# Patient Record
Sex: FEMALE | Race: WHITE | NOT HISPANIC OR LATINO | Employment: OTHER | URBAN - METROPOLITAN AREA
[De-identification: names, ages, dates, MRNs, and addresses within clinical notes are randomized per-mention and may not be internally consistent; named-entity substitution may affect disease eponyms.]

---

## 2018-01-12 ENCOUNTER — HOSPITAL ENCOUNTER (OUTPATIENT)
Dept: RADIOLOGY | Facility: HOSPITAL | Age: 63
Discharge: HOME | End: 2018-01-12
Attending: INTERNAL MEDICINE
Payer: MEDICARE

## 2018-01-12 ENCOUNTER — GENERIC CONVERSION - ENCOUNTER (OUTPATIENT)
Dept: OTHER | Facility: OTHER | Age: 63
End: 2018-01-12

## 2018-01-12 VITALS — OXYGEN SATURATION: 98 % | SYSTOLIC BLOOD PRESSURE: 123 MMHG | DIASTOLIC BLOOD PRESSURE: 62 MMHG | HEART RATE: 75 BPM

## 2018-01-12 DIAGNOSIS — K74.69 FLORID CIRRHOSIS (HCC): ICD-10-CM

## 2018-01-12 PROCEDURE — 49083 ABD PARACENTESIS W/IMAGING: CPT

## 2018-01-12 RX ORDER — ALBUMIN (HUMAN) 12.5 G/50ML
SOLUTION INTRAVENOUS
Status: DISCONTINUED | OUTPATIENT
Start: 2018-01-12 | End: 2018-01-13 | Stop reason: HOSPADM

## 2018-01-12 RX ADMIN — ALBUMIN (HUMAN) 50 G: 5 SOLUTION INTRAVENOUS at 14:10

## 2018-04-09 ENCOUNTER — HOSPITAL ENCOUNTER (OUTPATIENT)
Dept: RADIOLOGY | Facility: HOSPITAL | Age: 63
Discharge: HOME | End: 2018-04-09
Admitting: RADIOLOGY
Payer: MEDICARE

## 2018-04-09 VITALS
OXYGEN SATURATION: 95 % | SYSTOLIC BLOOD PRESSURE: 119 MMHG | DIASTOLIC BLOOD PRESSURE: 61 MMHG | HEART RATE: 67 BPM | RESPIRATION RATE: 16 BRPM

## 2018-04-09 DIAGNOSIS — K74.00 HEPATIC FIBROSIS: ICD-10-CM

## 2018-04-09 DIAGNOSIS — K74.69 FLORID CIRRHOSIS (HCC): ICD-10-CM

## 2018-04-09 PROCEDURE — 49083 ABD PARACENTESIS W/IMAGING: CPT

## 2018-04-09 PROCEDURE — 49083 ABD PARACENTESIS W/IMAGING: CPT | Performed by: RADIOLOGY

## 2018-04-09 RX ORDER — ALBUMIN (HUMAN) 12.5 G/50ML
SOLUTION INTRAVENOUS
Status: COMPLETED | OUTPATIENT
Start: 2018-04-09 | End: 2018-04-09

## 2018-04-09 RX ADMIN — ALBUMIN (HUMAN) 25 G: 5 SOLUTION INTRAVENOUS at 10:10

## 2018-04-09 NOTE — BRIEF OP NOTE (RAD/CATH)
IR PARACENTESIS  Procedure Note    PATIENT NAME: Luis Lockhartole  : 1955  MRN: [id number]     Pre-op Diagnosis:   1  Hepatic fibrosis    2  [place] cirrhosis (HCC)      Post-op Diagnosis:   1  Hepatic fibrosis    2   [place] cirrhosis [facility])        Surgeon:   Max Canales MD  Assistants:     No qualified resident was available, Resident is only observing    Estimated Blood Loss: none  Findings: 6950 ml clear, abimael ascites via RLQ    Specimens: none    Complications:  none    Anesthesia: Local    Max Canales MD     Date: 2018  Time: 12:02 PM

## 2018-08-23 ENCOUNTER — HOSPITAL ENCOUNTER (OUTPATIENT)
Dept: RADIOLOGY | Facility: HOSPITAL | Age: 63
Discharge: HOME | End: 2018-08-23
Attending: INTERNAL MEDICINE | Admitting: RADIOLOGY
Payer: MEDICARE

## 2018-08-23 VITALS
SYSTOLIC BLOOD PRESSURE: 113 MMHG | OXYGEN SATURATION: 98 % | RESPIRATION RATE: 18 BRPM | HEART RATE: 63 BPM | DIASTOLIC BLOOD PRESSURE: 56 MMHG

## 2018-08-23 DIAGNOSIS — R18.8 ASCITES: ICD-10-CM

## 2018-08-23 LAB
ALBUMIN FLD-MCNC: 1.9 G/DL
APPEARANCE FLD: ABNORMAL
BASOPHILS NFR FLD MANUAL: 0 %
COLOR FLD: ABNORMAL
EOSINOPHIL NFR FLD MANUAL: 0 %
LDH FLD L TO P-CCNC: 78 U/L
LYMPHOCYTES NFR BLD AUTO: 37 %
MONO+MESO NFR FLD MANUAL: 1 %
MONOCYTES NFR BLD AUTO: 50 %
MONONUC CELLS NFR FLD MANUAL: 0 %
NEUTS BAND NFR FLD MANUAL: 0 %
NEUTS SEG NFR BLD AUTO: 12 %
PROT FLD-MCNC: 3.6 G/DL
SITE: ABNORMAL
TOTAL CELLS COUNTED SPEC: 100
WBC # FLD MANUAL: 355 /UL

## 2018-08-23 PROCEDURE — 88305 TISSUE EXAM BY PATHOLOGIST: CPT | Performed by: PATHOLOGY

## 2018-08-23 PROCEDURE — 49083 ABD PARACENTESIS W/IMAGING: CPT

## 2018-08-23 PROCEDURE — 88112 CYTOPATH CELL ENHANCE TECH: CPT | Performed by: PATHOLOGY

## 2018-08-23 PROCEDURE — 84157 ASSAY OF PROTEIN OTHER: CPT | Performed by: INTERNAL MEDICINE

## 2018-08-23 PROCEDURE — 87205 SMEAR GRAM STAIN: CPT | Performed by: INTERNAL MEDICINE

## 2018-08-23 PROCEDURE — 49083 ABD PARACENTESIS W/IMAGING: CPT | Performed by: RADIOLOGY

## 2018-08-23 PROCEDURE — 87070 CULTURE OTHR SPECIMN AEROBIC: CPT | Performed by: INTERNAL MEDICINE

## 2018-08-23 PROCEDURE — 89051 BODY FLUID CELL COUNT: CPT | Performed by: INTERNAL MEDICINE

## 2018-08-23 PROCEDURE — 83615 LACTATE (LD) (LDH) ENZYME: CPT | Performed by: INTERNAL MEDICINE

## 2018-08-23 PROCEDURE — 82042 OTHER SOURCE ALBUMIN QUAN EA: CPT | Performed by: INTERNAL MEDICINE

## 2018-08-23 RX ORDER — ALBUMIN (HUMAN) 12.5 G/50ML
SOLUTION INTRAVENOUS
Status: COMPLETED | OUTPATIENT
Start: 2018-08-23 | End: 2018-08-23

## 2018-08-23 RX ADMIN — ALBUMIN (HUMAN) 25 G: 5 SOLUTION INTRAVENOUS at 11:01

## 2018-08-23 NOTE — DISCHARGE INSTRUCTIONS
Abdominal Paracentesis     WHAT YOU NEED TO KNOW:   Abdominal paracentesis is a procedure to remove abnormal fluid buildup in your abdomen  Fluid builds up because of liver problems, such as swelling and scarring  Heart failure, kidney disease, a mass, or problems with your pancreas may also cause fluid buildup  DISCHARGE INSTRUCTIONS:     Follow up with your healthcare provider as directed: Write down your questions so you remember to ask them during your visits  Wound care: Remove dressing after 24 hours  Leave glue in place  Return to your normal activities    Contact Interventional Radiology at [phone] Darleen PATIENTS: Contact Interventional Radiology at [phone]) Dariel Teixeira PATIENTS: Contact Interventional Radiology at [phone]) if:  · You have a fever and your wound is red and swollen  · You have yellow, green, or bad-smelling discharge coming from your wound  · You have pain or swelling in your abdomen  · You have an upset stomach or you vomit  · You have sudden, sharp pain in your abdomen  · You urinate very little or not at all  · You feel confused and more tired than usual    · Your arm or leg feels warm, tender, and painful  It may look swollen and red  · You suddenly feel lightheaded and have trouble breathing

## 2018-08-26 LAB
BACTERIA SPEC BFLD CULT: NO GROWTH
GRAM STN SPEC: NORMAL
GRAM STN SPEC: NORMAL

## 2018-11-05 ENCOUNTER — HOSPITAL ENCOUNTER (OUTPATIENT)
Dept: RADIOLOGY | Facility: HOSPITAL | Age: 63
Discharge: HOME | End: 2018-11-05
Attending: INTERNAL MEDICINE | Admitting: RADIOLOGY
Payer: MEDICARE

## 2018-11-05 VITALS
DIASTOLIC BLOOD PRESSURE: 56 MMHG | OXYGEN SATURATION: 99 % | HEART RATE: 64 BPM | RESPIRATION RATE: 20 BRPM | SYSTOLIC BLOOD PRESSURE: 114 MMHG

## 2018-11-05 DIAGNOSIS — R18.8 ASCITES: ICD-10-CM

## 2018-11-05 PROCEDURE — 49083 ABD PARACENTESIS W/IMAGING: CPT | Performed by: RADIOLOGY

## 2018-11-05 PROCEDURE — 49083 ABD PARACENTESIS W/IMAGING: CPT

## 2018-11-05 RX ORDER — ALBUMIN (HUMAN) 12.5 G/50ML
SOLUTION INTRAVENOUS
Status: COMPLETED | OUTPATIENT
Start: 2018-11-05 | End: 2018-11-05

## 2018-11-05 RX ADMIN — ALBUMIN (HUMAN) 25 G: 5 SOLUTION INTRAVENOUS at 13:55

## 2018-11-05 NOTE — BRIEF OP NOTE (RAD/CATH)
IR PARACENTESIS  Procedure Note    PATIENT NAME: Rosa Velasquez  : 1955  MRN: [id number]     Pre-op Diagnosis:   1  Ascites      Post-op Diagnosis:   1   Ascites        Surgeon:   Dipesh Rivero MD  Assistants:     No qualified resident was available, Resident is only observing    Estimated Blood Loss: none  Findings: 8000 ml cloudy, abimael ascites    Specimens: none    Complications:  none    Anesthesia: Local    Dipesh Rivero MD     Date: 2018  Time: 4:42 PM

## 2019-01-11 ENCOUNTER — HOSPITAL ENCOUNTER (OUTPATIENT)
Dept: RADIOLOGY | Facility: HOSPITAL | Age: 64
Discharge: HOME | End: 2019-01-11
Attending: INTERNAL MEDICINE | Admitting: RADIOLOGY
Payer: MEDICARE

## 2019-01-11 VITALS
HEART RATE: 70 BPM | RESPIRATION RATE: 20 BRPM | OXYGEN SATURATION: 98 % | SYSTOLIC BLOOD PRESSURE: 132 MMHG | DIASTOLIC BLOOD PRESSURE: 63 MMHG

## 2019-01-11 DIAGNOSIS — R18.8 ASCITES: ICD-10-CM

## 2019-01-11 PROCEDURE — 49083 ABD PARACENTESIS W/IMAGING: CPT | Performed by: RADIOLOGY

## 2019-01-11 PROCEDURE — 49083 ABD PARACENTESIS W/IMAGING: CPT

## 2019-01-11 RX ORDER — ALBUMIN (HUMAN) 12.5 G/50ML
SOLUTION INTRAVENOUS
Status: COMPLETED | OUTPATIENT
Start: 2019-01-11 | End: 2019-01-11

## 2019-01-11 RX ADMIN — ALBUMIN (HUMAN) 25 G: 5 SOLUTION INTRAVENOUS at 11:20

## 2019-02-27 ENCOUNTER — HOSPITAL ENCOUNTER (OUTPATIENT)
Dept: RADIOLOGY | Facility: HOSPITAL | Age: 64
Discharge: HOME | End: 2019-02-27
Attending: INTERNAL MEDICINE | Admitting: RADIOLOGY
Payer: MEDICARE

## 2019-02-27 VITALS
DIASTOLIC BLOOD PRESSURE: 55 MMHG | SYSTOLIC BLOOD PRESSURE: 102 MMHG | HEART RATE: 58 BPM | OXYGEN SATURATION: 98 % | RESPIRATION RATE: 18 BRPM

## 2019-02-27 DIAGNOSIS — R18.8 ASCITES: ICD-10-CM

## 2019-02-27 DIAGNOSIS — R18.8 OTHER ASCITES: ICD-10-CM

## 2019-02-27 PROCEDURE — 49083 ABD PARACENTESIS W/IMAGING: CPT

## 2019-02-27 PROCEDURE — 49083 ABD PARACENTESIS W/IMAGING: CPT | Performed by: RADIOLOGY

## 2019-02-27 RX ORDER — ALBUMIN (HUMAN) 12.5 G/50ML
25 SOLUTION INTRAVENOUS ONCE
Status: COMPLETED | OUTPATIENT
Start: 2019-02-27 | End: 2019-02-27

## 2019-02-27 RX ADMIN — ALBUMIN (HUMAN) 25 G: 5 SOLUTION INTRAVENOUS at 13:16

## 2019-02-27 NOTE — BRIEF OP NOTE (RAD/CATH)
IR PARACENTESIS  Procedure Note    PATIENT NAME: Marino Wells  : 1955  MRN: [id number]     Pre-op Diagnosis:   1  Ascites      Post-op Diagnosis:   1   Ascites        Surgeon:   Amauri Santillan MD  Assistants:     No qualified resident was available, Resident is only observing    Estimated Blood Loss: none  Findings: 5100 ml clear, yellow ascites    Specimens: none    Complications:  none    Anesthesia: Local    Amauri Santillan MD     Date: 2019  Time: 1:38 PM

## 2019-04-15 ENCOUNTER — HOSPITAL ENCOUNTER (OUTPATIENT)
Dept: RADIOLOGY | Facility: HOSPITAL | Age: 64
Discharge: HOME | End: 2019-04-15
Admitting: NURSE PRACTITIONER
Payer: MEDICARE

## 2019-04-15 VITALS
SYSTOLIC BLOOD PRESSURE: 112 MMHG | DIASTOLIC BLOOD PRESSURE: 53 MMHG | HEART RATE: 58 BPM | RESPIRATION RATE: 16 BRPM | OXYGEN SATURATION: 99 %

## 2019-04-15 DIAGNOSIS — R18.8 ASCITES: ICD-10-CM

## 2019-04-15 DIAGNOSIS — R18.8 OTHER ASCITES: ICD-10-CM

## 2019-04-15 PROCEDURE — 49083 ABD PARACENTESIS W/IMAGING: CPT

## 2019-04-15 PROCEDURE — 49083 ABD PARACENTESIS W/IMAGING: CPT | Performed by: RADIOLOGY

## 2019-04-15 RX ORDER — LIDOCAINE HYDROCHLORIDE 10 MG/ML
INJECTION, SOLUTION INFILTRATION; PERINEURAL CODE/TRAUMA/SEDATION MEDICATION
Status: COMPLETED | OUTPATIENT
Start: 2019-04-15 | End: 2019-04-15

## 2019-04-15 RX ORDER — ALBUMIN (HUMAN) 12.5 G/50ML
SOLUTION INTRAVENOUS
Status: COMPLETED | OUTPATIENT
Start: 2019-04-15 | End: 2019-04-15

## 2019-04-15 RX ADMIN — LIDOCAINE HYDROCHLORIDE 5 ML: 10 INJECTION, SOLUTION INFILTRATION; PERINEURAL at 09:43

## 2019-04-15 RX ADMIN — ALBUMIN (HUMAN) 50 G: 12.5 SOLUTION INTRAVENOUS at 10:15

## 2019-05-24 ENCOUNTER — ANESTHESIA EVENT (OUTPATIENT)
Dept: GASTROENTEROLOGY | Facility: HOSPITAL | Age: 64
End: 2019-05-24

## 2019-05-28 ENCOUNTER — ANESTHESIA (OUTPATIENT)
Dept: GASTROENTEROLOGY | Facility: HOSPITAL | Age: 64
End: 2019-05-28

## 2019-05-28 ENCOUNTER — HOSPITAL ENCOUNTER (OUTPATIENT)
Dept: GASTROENTEROLOGY | Facility: HOSPITAL | Age: 64
Setting detail: OUTPATIENT SURGERY
Discharge: HOME | End: 2019-05-28
Attending: INTERNAL MEDICINE | Admitting: INTERNAL MEDICINE
Payer: MEDICARE

## 2019-05-28 VITALS
OXYGEN SATURATION: 98 % | WEIGHT: 230 LBS | HEIGHT: 65 IN | BODY MASS INDEX: 38.32 KG/M2 | TEMPERATURE: 98.2 F | RESPIRATION RATE: 20 BRPM | DIASTOLIC BLOOD PRESSURE: 53 MMHG | SYSTOLIC BLOOD PRESSURE: 118 MMHG | HEART RATE: 75 BPM

## 2019-05-28 DIAGNOSIS — R10.9 ABDOMINAL PAIN: ICD-10-CM

## 2019-05-28 DIAGNOSIS — K92.2 GASTROINTESTINAL HEMORRHAGE: ICD-10-CM

## 2019-05-28 DIAGNOSIS — K64.0 FIRST DEGREE HEMORRHOIDS: ICD-10-CM

## 2019-05-28 RX ORDER — METOPROLOL SUCCINATE 25 MG/1
1 TABLET, EXTENDED RELEASE ORAL 2 TIMES DAILY
COMMUNITY
Start: 2018-08-27

## 2019-05-28 RX ORDER — PROPOFOL 10 MG/ML
INJECTION, EMULSION INTRAVENOUS AS NEEDED
Status: DISCONTINUED | OUTPATIENT
Start: 2019-05-28 | End: 2019-05-28 | Stop reason: SURG

## 2019-05-28 RX ORDER — ACETAMINOPHEN 500 MG
500 TABLET ORAL EVERY 6 HOURS PRN
COMMUNITY

## 2019-05-28 RX ORDER — ALBUTEROL SULFATE 90 UG/1
1 AEROSOL, METERED RESPIRATORY (INHALATION) EVERY 6 HOURS PRN
COMMUNITY
Start: 2019-03-17 | End: 2020-03-16

## 2019-05-28 RX ORDER — SODIUM CHLORIDE 9 MG/ML
125 INJECTION, SOLUTION INTRAVENOUS CONTINUOUS
Status: DISCONTINUED | OUTPATIENT
Start: 2019-05-28 | End: 2019-06-01 | Stop reason: HOSPADM

## 2019-05-28 RX ORDER — FUROSEMIDE 40 MG/1
80 TABLET ORAL DAILY
COMMUNITY
Start: 2015-11-18 | End: 2020-09-14 | Stop reason: SDUPTHER

## 2019-05-28 RX ORDER — ESCITALOPRAM OXALATE 20 MG/1
20 TABLET ORAL DAILY
COMMUNITY
Start: 2019-03-28

## 2019-05-28 RX ORDER — SPIRONOLACTONE 50 MG/1
100 TABLET, FILM COATED ORAL DAILY
COMMUNITY
End: 2021-04-08

## 2019-05-28 RX ORDER — TITANIUM DIOXIDE, OCTINOXATE, ZINC OXIDE 4.61; 1.6; .78 G/40ML; G/40ML; G/40ML
1 CREAM TOPICAL DAILY
COMMUNITY

## 2019-05-28 RX ORDER — OMEPRAZOLE 20 MG/1
20 CAPSULE, DELAYED RELEASE ORAL EVERY OTHER DAY
COMMUNITY
Start: 2017-02-03 | End: 2020-07-23 | Stop reason: HOSPADM

## 2019-05-28 RX ORDER — FLUTICASONE PROPIONATE 50 MCG
2 SPRAY, SUSPENSION (ML) NASAL
COMMUNITY
End: 2020-07-19

## 2019-05-28 RX ADMIN — PROPOFOL 100 MG: 10 INJECTION, EMULSION INTRAVENOUS at 15:12

## 2019-05-28 RX ADMIN — PROPOFOL 50 MG: 10 INJECTION, EMULSION INTRAVENOUS at 15:14

## 2019-05-28 RX ADMIN — PROPOFOL 50 MG: 10 INJECTION, EMULSION INTRAVENOUS at 15:32

## 2019-05-28 RX ADMIN — SODIUM CHLORIDE 125 ML/HR: 0.9 INJECTION, SOLUTION INTRAVENOUS at 14:43

## 2019-05-28 RX ADMIN — SODIUM CHLORIDE: 0.9 INJECTION, SOLUTION INTRAVENOUS at 15:54

## 2019-06-03 ENCOUNTER — HOSPITAL ENCOUNTER (OUTPATIENT)
Dept: RADIOLOGY | Facility: HOSPITAL | Age: 64
Discharge: HOME | End: 2019-06-03
Attending: INTERNAL MEDICINE | Admitting: RADIOLOGY
Payer: MEDICARE

## 2019-06-03 VITALS
HEART RATE: 67 BPM | SYSTOLIC BLOOD PRESSURE: 141 MMHG | OXYGEN SATURATION: 97 % | RESPIRATION RATE: 18 BRPM | DIASTOLIC BLOOD PRESSURE: 65 MMHG

## 2019-06-03 DIAGNOSIS — R18.8 ASCITES: ICD-10-CM

## 2019-06-03 DIAGNOSIS — R18.8 OTHER ASCITES: ICD-10-CM

## 2019-06-03 PROCEDURE — 49083 ABD PARACENTESIS W/IMAGING: CPT

## 2019-06-03 PROCEDURE — 49083 ABD PARACENTESIS W/IMAGING: CPT | Performed by: RADIOLOGY

## 2019-06-03 RX ORDER — ALBUMIN (HUMAN) 12.5 G/50ML
50 SOLUTION INTRAVENOUS ONCE
Status: COMPLETED | OUTPATIENT
Start: 2019-06-03 | End: 2019-06-03

## 2019-06-03 RX ADMIN — ALBUMIN (HUMAN) 50 G: 5 SOLUTION INTRAVENOUS at 14:19

## 2019-06-27 ENCOUNTER — TRANSCRIBE ORDERS (OUTPATIENT)
Dept: ADMINISTRATIVE | Facility: HOSPITAL | Age: 64
End: 2019-06-27

## 2019-06-27 DIAGNOSIS — K74.00 HEPATIC FIBROSIS: Primary | ICD-10-CM

## 2019-07-10 ENCOUNTER — HOSPITAL ENCOUNTER (OUTPATIENT)
Dept: RADIOLOGY | Facility: HOSPITAL | Age: 64
Discharge: HOME | End: 2019-07-10
Attending: INTERNAL MEDICINE
Payer: MEDICARE

## 2019-07-10 ENCOUNTER — HOSPITAL ENCOUNTER (OUTPATIENT)
Dept: INFUSION CENTER | Facility: HOSPITAL | Age: 64
Discharge: HOME | End: 2019-07-10
Payer: MEDICARE

## 2019-07-10 VITALS
SYSTOLIC BLOOD PRESSURE: 141 MMHG | RESPIRATION RATE: 18 BRPM | DIASTOLIC BLOOD PRESSURE: 67 MMHG | HEART RATE: 60 BPM | OXYGEN SATURATION: 97 %

## 2019-07-10 VITALS
HEART RATE: 57 BPM | SYSTOLIC BLOOD PRESSURE: 144 MMHG | RESPIRATION RATE: 18 BRPM | TEMPERATURE: 97 F | DIASTOLIC BLOOD PRESSURE: 65 MMHG

## 2019-07-10 DIAGNOSIS — R18.8 ASCITES: ICD-10-CM

## 2019-07-10 DIAGNOSIS — R18.8 OTHER ASCITES: ICD-10-CM

## 2019-07-10 PROCEDURE — 49083 ABD PARACENTESIS W/IMAGING: CPT

## 2019-07-10 PROCEDURE — 49083 ABD PARACENTESIS W/IMAGING: CPT | Performed by: RADIOLOGY

## 2019-07-10 RX ORDER — ALBUMIN (HUMAN) 12.5 G/50ML
25 SOLUTION INTRAVENOUS ONCE
Status: COMPLETED | OUTPATIENT
Start: 2019-07-10 | End: 2019-07-10

## 2019-07-10 RX ADMIN — ALBUMIN (HUMAN) 25 G: 12.5 SOLUTION INTRAVENOUS at 12:55

## 2019-07-10 NOTE — PLAN OF CARE
Problem: Potential for Falls  Goal: Patient will remain free of falls  Description  INTERVENTIONS:  - Assess patient frequently for physical needs  -  Identify cognitive and physical deficits and behaviors that affect risk of falls    -  [place] fall precautions as indicated by assessment   - Educate patient/family on patient safety including physical limitations  - Instruct patient to call for assistance with activity based on assessment  - Modify environment to reduce risk of injury  - Consider OT/PT consult to assist with strengthening/mobility  Outcome: Progressing

## 2019-07-10 NOTE — BRIEF OP NOTE (RAD/CATH)
IR PARACENTESIS  Procedure Note    PATIENT NAME: Macrina Galindo  : 1955  MRN: [id number]     Pre-op Diagnosis:   1  Ascites      Post-op Diagnosis:   1  Ascites        Surgeon:   Tc Mckenzie MD    Estimated Blood Loss: None    Findings: Successful paracentesis with 6 7 L abimael ascites fluid removed      Specimens: None    Complications:  None    Anesthesia: Local    Tc Mckenzie MD     Date: 7/10/2019  Time: 11:12 AM

## 2019-07-10 NOTE — H&P
IR H&P    HPI:  75year old female with cirrhosis and recurrent ascites returns for paracentesis  PMH:  Cirrhosis    PSH:  N/A    Physical exam:  /74   Pulse (!) 54   Resp 18   SpO2 96%   Gen: NAD  Abd: Distended    A/P:  75year old female with cirrhosis and recurrent ascites      - Paracentesis

## 2019-07-10 NOTE — PROGRESS NOTES
Infusion center visit complete without issue  Pt refused AVS pt will be discussing upcoming appts with physician next week

## 2019-07-18 ENCOUNTER — HOSPITAL ENCOUNTER (OUTPATIENT)
Dept: ULTRASOUND IMAGING | Facility: MEDICAL CENTER | Age: 64
Discharge: HOME | End: 2019-07-18
Attending: INTERNAL MEDICINE
Payer: MEDICARE

## 2019-07-18 DIAGNOSIS — K74.00 HEPATIC FIBROSIS: ICD-10-CM

## 2019-07-18 PROCEDURE — 76700 US EXAM ABDOM COMPLETE: CPT

## 2019-08-16 ENCOUNTER — HOSPITAL ENCOUNTER (OUTPATIENT)
Dept: RADIOLOGY | Facility: HOSPITAL | Age: 64
Discharge: HOME | End: 2019-08-16
Attending: INTERNAL MEDICINE | Admitting: RADIOLOGY
Payer: MEDICARE

## 2019-08-16 VITALS
OXYGEN SATURATION: 100 % | HEART RATE: 65 BPM | RESPIRATION RATE: 16 BRPM | SYSTOLIC BLOOD PRESSURE: 129 MMHG | DIASTOLIC BLOOD PRESSURE: 62 MMHG

## 2019-08-16 DIAGNOSIS — R18.8 OTHER ASCITES: ICD-10-CM

## 2019-08-16 DIAGNOSIS — R18.8 ASCITES: ICD-10-CM

## 2019-08-16 PROCEDURE — 49083 ABD PARACENTESIS W/IMAGING: CPT | Performed by: RADIOLOGY

## 2019-08-16 PROCEDURE — 49083 ABD PARACENTESIS W/IMAGING: CPT

## 2019-08-16 RX ORDER — ALBUMIN (HUMAN) 12.5 G/50ML
SOLUTION INTRAVENOUS
Status: COMPLETED | OUTPATIENT
Start: 2019-08-16 | End: 2019-08-16

## 2019-08-16 RX ORDER — LIDOCAINE WITH 8.4% SOD BICARB 0.9%(10ML)
SYRINGE (ML) INJECTION CODE/TRAUMA/SEDATION MEDICATION
Status: COMPLETED | OUTPATIENT
Start: 2019-08-16 | End: 2019-08-16

## 2019-08-16 RX ADMIN — LIDOCAINE HYDROCHLORIDE 10 ML: 10 INJECTION, SOLUTION INFILTRATION; PERINEURAL at 09:01

## 2019-08-16 RX ADMIN — ALBUMIN (HUMAN) 25 G: 5 SOLUTION INTRAVENOUS at 10:06

## 2019-08-16 NOTE — DISCHARGE INSTRUCTIONS
Abdominal Paracentesis     WHAT YOU NEED TO KNOW:   Abdominal paracentesis is a procedure to remove abnormal fluid buildup in your abdomen  Fluid builds up because of liver problems, such as swelling and scarring  Heart failure, kidney disease, a mass, or problems with your pancreas may also cause fluid buildup  DISCHARGE INSTRUCTIONS:     Follow up with your healthcare provider as directed: Write down your questions so you remember to ask them during your visits  Wound care: Remove dressing after 24 hours  Leave glue in place  Return to your normal activities    Contact Interventional Radiology at [phone] Darleen PATIENTS: Contact Interventional Radiology at [phone]) Quan Villatoro PATIENTS: Contact Interventional Radiology at [phone]) if:  · You have a fever and your wound is red and swollen  · You have yellow, green, or bad-smelling discharge coming from your wound  · You have pain or swelling in your abdomen  · You have an upset stomach or you vomit  · You have sudden, sharp pain in your abdomen  · You urinate very little or not at all  · You feel confused and more tired than usual    · Your arm or leg feels warm, tender, and painful  It may look swollen and red  · You suddenly feel lightheaded and have trouble breathing

## 2019-09-10 ENCOUNTER — HOSPITAL ENCOUNTER (OUTPATIENT)
Dept: RADIOLOGY | Facility: HOSPITAL | Age: 64
Discharge: HOME | End: 2019-09-10
Attending: INTERNAL MEDICINE | Admitting: RADIOLOGY
Payer: MEDICARE

## 2019-09-10 VITALS
DIASTOLIC BLOOD PRESSURE: 69 MMHG | RESPIRATION RATE: 16 BRPM | OXYGEN SATURATION: 99 % | HEART RATE: 53 BPM | SYSTOLIC BLOOD PRESSURE: 144 MMHG

## 2019-09-10 DIAGNOSIS — R18.8 ASCITES: ICD-10-CM

## 2019-09-10 DIAGNOSIS — R18.8 OTHER ASCITES: ICD-10-CM

## 2019-09-10 PROCEDURE — 49083 ABD PARACENTESIS W/IMAGING: CPT | Performed by: RADIOLOGY

## 2019-09-10 PROCEDURE — 49083 ABD PARACENTESIS W/IMAGING: CPT

## 2019-09-10 RX ORDER — ALBUMIN (HUMAN) 12.5 G/50ML
SOLUTION INTRAVENOUS
Status: COMPLETED | OUTPATIENT
Start: 2019-09-10 | End: 2019-09-10

## 2019-09-10 RX ORDER — LIDOCAINE WITH 8.4% SOD BICARB 0.9%(10ML)
SYRINGE (ML) INJECTION CODE/TRAUMA/SEDATION MEDICATION
Status: COMPLETED | OUTPATIENT
Start: 2019-09-10 | End: 2019-09-10

## 2019-09-10 RX ADMIN — ALBUMIN (HUMAN) 25 G: 12.5 SOLUTION INTRAVENOUS at 09:55

## 2019-09-10 RX ADMIN — Medication 5 ML: at 09:27

## 2019-09-10 NOTE — DISCHARGE INSTRUCTIONS
Abdominal Paracentesis     WHAT YOU NEED TO KNOW:   Abdominal paracentesis is a procedure to remove abnormal fluid buildup in your abdomen  Fluid builds up because of liver problems, such as swelling and scarring  Heart failure, kidney disease, a mass, or problems with your pancreas may also cause fluid buildup  DISCHARGE INSTRUCTIONS:     Follow up with your healthcare provider as directed: Write down your questions so you remember to ask them during your visits  Wound care: Remove dressing after 24 hours  Leave glue in place  Return to your normal activities    Contact Interventional Radiology at [phone] Darleen PATIENTS: Contact Interventional Radiology at [phone]) Vaishali Maddox PATIENTS: Contact Interventional Radiology at [phone]) if:  · You have a fever and your wound is red and swollen  · You have yellow, green, or bad-smelling discharge coming from your wound  · You have pain or swelling in your abdomen  · You have an upset stomach or you vomit  · You have sudden, sharp pain in your abdomen  · You urinate very little or not at all  · You feel confused and more tired than usual    · Your arm or leg feels warm, tender, and painful  It may look swollen and red  · You suddenly feel lightheaded and have trouble breathing

## 2019-09-10 NOTE — BRIEF OP NOTE (RAD/CATH)
IR PARACENTESIS  Procedure Note    PATIENT NAME: Neftali Jimenez  : 1955  MRN: [id number]     Pre-op Diagnosis:   1  Ascites      Post-op Diagnosis:   1   Ascites        Surgeon:   Criss Hutson MD  Assistants:     No qualified resident was available, Resident is only observing    Estimated Blood Loss: minimal     Findings:     Paracentesis performed    Specimens: none    Complications:  none    Anesthesia: Amy Chang MD     Date: 9/10/2019  Time: 9:38 AM

## 2019-09-30 ENCOUNTER — ANESTHESIA EVENT (OUTPATIENT)
Dept: GASTROENTEROLOGY | Facility: HOSPITAL | Age: 64
End: 2019-09-30

## 2019-09-30 RX ORDER — NITROFURANTOIN 25; 75 MG/1; MG/1
100 CAPSULE ORAL 2 TIMES DAILY
COMMUNITY
End: 2019-10-01 | Stop reason: ALTCHOICE

## 2019-09-30 RX ORDER — IPRATROPIUM BROMIDE AND ALBUTEROL SULFATE 2.5; .5 MG/3ML; MG/3ML
3 SOLUTION RESPIRATORY (INHALATION) 4 TIMES DAILY
Status: ON HOLD | COMMUNITY
End: 2020-07-21 | Stop reason: SDUPTHER

## 2019-10-01 ENCOUNTER — HOSPITAL ENCOUNTER (OUTPATIENT)
Dept: GASTROENTEROLOGY | Facility: HOSPITAL | Age: 64
Setting detail: OUTPATIENT SURGERY
Discharge: HOME | End: 2019-10-01
Attending: INTERNAL MEDICINE | Admitting: INTERNAL MEDICINE
Payer: MEDICARE

## 2019-10-01 ENCOUNTER — ANESTHESIA (OUTPATIENT)
Dept: GASTROENTEROLOGY | Facility: HOSPITAL | Age: 64
End: 2019-10-01

## 2019-10-01 VITALS
HEIGHT: 65 IN | DIASTOLIC BLOOD PRESSURE: 67 MMHG | RESPIRATION RATE: 20 BRPM | BODY MASS INDEX: 38.99 KG/M2 | TEMPERATURE: 98 F | OXYGEN SATURATION: 100 % | SYSTOLIC BLOOD PRESSURE: 128 MMHG | HEART RATE: 66 BPM | WEIGHT: 234 LBS

## 2019-10-01 DIAGNOSIS — K74.00 HEPATIC FIBROSIS: ICD-10-CM

## 2019-10-01 PROCEDURE — 88305 TISSUE EXAM BY PATHOLOGIST: CPT | Performed by: PATHOLOGY

## 2019-10-01 PROCEDURE — 88342 IMHCHEM/IMCYTCHM 1ST ANTB: CPT | Performed by: PATHOLOGY

## 2019-10-01 RX ORDER — SODIUM CHLORIDE 9 MG/ML
125 INJECTION, SOLUTION INTRAVENOUS CONTINUOUS
Status: DISCONTINUED | OUTPATIENT
Start: 2019-10-01 | End: 2019-10-05 | Stop reason: HOSPADM

## 2019-10-01 RX ORDER — PROPOFOL 10 MG/ML
INJECTION, EMULSION INTRAVENOUS AS NEEDED
Status: DISCONTINUED | OUTPATIENT
Start: 2019-10-01 | End: 2019-10-01 | Stop reason: SURG

## 2019-10-01 RX ADMIN — SODIUM CHLORIDE 125 ML/HR: 0.9 INJECTION, SOLUTION INTRAVENOUS at 13:32

## 2019-10-01 RX ADMIN — PROPOFOL 150 MG: 10 INJECTION, EMULSION INTRAVENOUS at 14:13

## 2019-10-01 RX ADMIN — LIDOCAINE HYDROCHLORIDE 100 MG: 20 INJECTION, SOLUTION INTRAVENOUS at 14:13

## 2019-10-01 NOTE — DISCHARGE INSTRUCTIONS
Gastritis   WHAT YOU NEED TO KNOW:   Gastritis is inflammation or irritation of the lining of your stomach  DISCHARGE INSTRUCTIONS:   Call 911 for any of the following:   · You develop chest pain or shortness of breath  Seek care immediately if:   · You vomit blood  · You have black or bloody bowel movements  · You have severe stomach or back pain  Contact your healthcare provider if:   · You have a fever  · You have new or worsening symptoms, even after treatment  · You have questions or concerns about your condition or care  Medicines:   · Medicines  may be given to help treat a bacterial infection or decrease stomach acid  · Take your medicine as directed  Contact your healthcare provider if you think your medicine is not helping or if you have side effects  Tell him or her if you are allergic to any medicine  Keep a list of the medicines, vitamins, and herbs you take  Include the amounts, and when and why you take them  Bring the list or the pill bottles to follow-up visits  Carry your medicine list with you in case of an emergency  Manage or prevent gastritis:   · Do not smoke  Nicotine and other chemicals in cigarettes and cigars can make your symptoms worse and cause lung damage  Ask your healthcare provider for information if you currently smoke and need help to quit  E-cigarettes or smokeless tobacco still contain nicotine  Talk to your healthcare provider before you use these products  · Do not drink alcohol  Alcohol can prevent healing and make your gastritis worse  Talk to your healthcare provider if you need help to stop drinking  · Do not take NSAIDs or aspirin unless directed  These and similar medicines can cause irritation  If your healthcare provider says it is okay to take NSAIDs, take them with food  · Do not eat foods that cause irritation  Foods such as oranges and salsa can cause burning or pain  Eat a variety of healthy foods   Examples include fruits (not citrus), vegetables, low-fat dairy products, beans, whole-grain breads, and lean meats and fish  Try to eat small meals, and drink water with your meals  Do not eat for at least 3 hours before you go to bed  · Find ways to relax and decrease stress  Stress can increase stomach acid and make gastritis worse  Activities such as yoga, meditation, or listening to music can help you relax  Spend time with friends, or do things you enjoy  Follow up with your healthcare provider as directed: You may need ongoing tests or treatment, or referral to a gastroenterologist  Write down your questions so you remember to ask them during your visits  © 2017 [employer] Information is for End User's use only and may not be sold, redistributed or otherwise used for commercial purposes  All illustrations and images included in CareNotes® are the copyrighted property of [employer]  or Kenrick Romero  The above information is an  only  It is not intended as medical advice for individual conditions or treatments  Talk to your doctor, nurse or pharmacist before following any medical regimen to see if it is safe and effective for you

## 2019-10-01 NOTE — ANESTHESIA PREPROCEDURE EVALUATION
Review of Systems/Medical History  Patient summary reviewed  Chart reviewed  No history of anesthetic complications     Cardiovascular   Pulmonary  Pneumonia, Asthma , Sleep apnea ,        GI/Hepatic    GI bleeding , history of, Liver disease , cirrhosis, Chronic liver disease,             Endo/Other  History of thyroid disease (nodules) ,      GYN       Hematology   Musculoskeletal    Arthritis     Neurology   Psychology   Anxiety, Depression ,                      Review of Systems/Medical History  Patient summary reviewed  Chart reviewed  No history of anesthetic complications     Cardiovascular   Pulmonary  Pneumonia, Asthma , well controlled/ stable Last rescue: < 1 week ago Asthma type of rescue: PRN inhaler, Sleep apnea (no CPAP) ,        GI/Hepatic    GI bleeding , history of, GERD well controlled, Liver disease , cirrhosis, Chronic liver disease,        Negative  ROS        Endo/Other  History of thyroid disease (nodules) ,   Obesity  morbid obesity   GYN       Hematology  Negative hematology ROS      Musculoskeletal    Arthritis     Neurology   Psychology   Anxiety, Depression , being treated for depression,              Physical Exam    Airway    Mallampati score: III  TM Distance: >3 FB  Neck ROM: full     Dental   Comment: Partials--out, No notable dental hx upper dentures and lower dentures,     Cardiovascular  Rhythm: regular, Rate: normal, Cardiovascular exam normal    Pulmonary  Pulmonary exam normal Breath sounds clear to auscultation,     Other Findings        Anesthesia Plan  ASA Score- 3     Anesthesia Type- IV sedation with anesthesia with ASA Monitors  Additional Monitors:   Airway Plan:         Plan Factors-Patient not instructed to abstain from smoking on day of procedure  Patient did not smoke on day of surgery  Induction- intravenous  Postoperative Plan-     Informed Consent- Anesthetic plan and risks discussed with patient    I personally reviewed this patient with the CRNA  Discussed and agreed on the Anesthesia Plan with the CRNA  Madhu Sorenson

## 2019-10-06 ENCOUNTER — HOSPITAL ENCOUNTER (OUTPATIENT)
Facility: HOSPITAL | Age: 64
Setting detail: OBSERVATION
Discharge: HOME | End: 2019-10-07
Attending: EMERGENCY MEDICINE | Admitting: HOSPITALIST
Payer: MEDICARE

## 2019-10-06 DIAGNOSIS — R18.8 ASCITES: Primary | ICD-10-CM

## 2019-10-06 DIAGNOSIS — R10.9 ABDOMINAL PAIN: ICD-10-CM

## 2019-10-06 PROCEDURE — 99284 EMERGENCY DEPT VISIT MOD MDM: CPT

## 2019-10-06 PROCEDURE — 1123F ACP DISCUSS/DSCN MKR DOCD: CPT | Performed by: RADIOLOGY

## 2019-10-06 PROCEDURE — 99285 EMERGENCY DEPT VISIT HI MDM: CPT | Performed by: EMERGENCY MEDICINE

## 2019-10-07 ENCOUNTER — APPOINTMENT (OUTPATIENT)
Dept: NON INVASIVE DIAGNOSTICS | Facility: HOSPITAL | Age: 64
End: 2019-10-07
Payer: MEDICARE

## 2019-10-07 VITALS
RESPIRATION RATE: 18 BRPM | TEMPERATURE: 97.1 F | HEIGHT: 65 IN | DIASTOLIC BLOOD PRESSURE: 52 MMHG | BODY MASS INDEX: 40.48 KG/M2 | OXYGEN SATURATION: 98 % | HEART RATE: 59 BPM | WEIGHT: 242.95 LBS | SYSTOLIC BLOOD PRESSURE: 92 MMHG

## 2019-10-07 PROBLEM — K72.90 DECOMPENSATED HEPATIC CIRRHOSIS (HCC): Status: ACTIVE | Noted: 2019-10-07

## 2019-10-07 PROBLEM — I10 ESSENTIAL HYPERTENSION: Status: ACTIVE | Noted: 2019-10-07

## 2019-10-07 PROBLEM — J45.909 ASTHMA: Status: ACTIVE | Noted: 2019-10-07

## 2019-10-07 PROBLEM — K74.60 DECOMPENSATED HEPATIC CIRRHOSIS (HCC): Status: ACTIVE | Noted: 2019-10-07

## 2019-10-07 PROBLEM — F32.A DEPRESSION: Status: ACTIVE | Noted: 2019-10-07

## 2019-10-07 PROBLEM — G47.30 SLEEP APNEA: Status: ACTIVE | Noted: 2019-10-07

## 2019-10-07 LAB
ALBUMIN SERPL BCP-MCNC: 2.8 G/DL (ref 3.5–5)
ALP SERPL-CCNC: 120 U/L (ref 46–116)
ALT SERPL W P-5'-P-CCNC: 15 U/L (ref 12–78)
ANION GAP SERPL CALCULATED.3IONS-SCNC: 7 MMOL/L (ref 4–13)
APTT PPP: 35 SECONDS (ref 23–37)
AST SERPL W P-5'-P-CCNC: 30 U/L (ref 5–45)
BASOPHILS # BLD AUTO: 0.05 THOUSANDS/ΜL (ref 0–0.1)
BASOPHILS NFR BLD AUTO: 1 % (ref 0–1)
BILIRUB SERPL-MCNC: 0.62 MG/DL (ref 0.2–1)
BUN SERPL-MCNC: 11 MG/DL (ref 5–25)
CALCIUM SERPL-MCNC: 8.8 MG/DL (ref 8.3–10.1)
CHLORIDE SERPL-SCNC: 101 MMOL/L (ref 100–108)
CO2 SERPL-SCNC: 29 MMOL/L (ref 21–32)
CREAT SERPL-MCNC: 0.94 MG/DL (ref 0.6–1.3)
EOSINOPHIL # BLD AUTO: 0.28 THOUSAND/ΜL (ref 0–0.61)
EOSINOPHIL NFR BLD AUTO: 4 % (ref 0–6)
ERYTHROCYTE [DISTWIDTH] IN BLOOD BY AUTOMATED COUNT: 14.4 % (ref 11.6–15.1)
GFR SERPL CREATININE-BSD FRML MDRD: 63 ML/MIN/1.73SQ M
GLUCOSE SERPL-MCNC: 133 MG/DL (ref 65–140)
HCT VFR BLD AUTO: 43.6 % (ref 34.8–46.1)
HGB BLD-MCNC: 14 G/DL (ref 11.5–15.4)
IMM GRANULOCYTES # BLD AUTO: 0.02 THOUSAND/UL (ref 0–0.2)
IMM GRANULOCYTES NFR BLD AUTO: 0 % (ref 0–2)
INR PPP: 1.1 (ref 0.84–1.19)
LIPASE SERPL-CCNC: 155 U/L (ref 73–393)
LYMPHOCYTES # BLD AUTO: 0.84 THOUSANDS/ΜL (ref 0.6–4.47)
LYMPHOCYTES NFR BLD AUTO: 13 % (ref 14–44)
MCH RBC QN AUTO: 29.9 PG (ref 26.8–34.3)
MCHC RBC AUTO-ENTMCNC: 32.1 G/DL (ref 31.4–37.4)
MCV RBC AUTO: 93 FL (ref 82–98)
MONOCYTES # BLD AUTO: 0.49 THOUSAND/ΜL (ref 0.17–1.22)
MONOCYTES NFR BLD AUTO: 8 % (ref 4–12)
NEUTROPHILS # BLD AUTO: 4.79 THOUSANDS/ΜL (ref 1.85–7.62)
NEUTS SEG NFR BLD AUTO: 74 % (ref 43–75)
NRBC BLD AUTO-RTO: 0 /100 WBCS
PLATELET # BLD AUTO: 152 THOUSANDS/UL (ref 149–390)
PMV BLD AUTO: 10.8 FL (ref 8.9–12.7)
POTASSIUM SERPL-SCNC: 3.8 MMOL/L (ref 3.5–5.3)
PROT SERPL-MCNC: 6.8 G/DL (ref 6.4–8.2)
PROTHROMBIN TIME: 14.3 SECONDS (ref 11.6–14.5)
RBC # BLD AUTO: 4.68 MILLION/UL (ref 3.81–5.12)
SODIUM SERPL-SCNC: 137 MMOL/L (ref 136–145)
WBC # BLD AUTO: 6.47 THOUSAND/UL (ref 4.31–10.16)

## 2019-10-07 PROCEDURE — 49083 ABD PARACENTESIS W/IMAGING: CPT | Performed by: RADIOLOGY

## 2019-10-07 PROCEDURE — NC001 PR NO CHARGE: Performed by: RADIOLOGY

## 2019-10-07 PROCEDURE — 90662 IIV NO PRSV INCREASED AG IM: CPT | Performed by: INTERNAL MEDICINE

## 2019-10-07 PROCEDURE — 49083 ABD PARACENTESIS W/IMAGING: CPT

## 2019-10-07 PROCEDURE — 96374 THER/PROPH/DIAG INJ IV PUSH: CPT

## 2019-10-07 PROCEDURE — 85025 COMPLETE CBC W/AUTO DIFF WBC: CPT | Performed by: EMERGENCY MEDICINE

## 2019-10-07 PROCEDURE — 36415 COLL VENOUS BLD VENIPUNCTURE: CPT | Performed by: EMERGENCY MEDICINE

## 2019-10-07 PROCEDURE — 80053 COMPREHEN METABOLIC PANEL: CPT | Performed by: EMERGENCY MEDICINE

## 2019-10-07 PROCEDURE — 85730 THROMBOPLASTIN TIME PARTIAL: CPT | Performed by: EMERGENCY MEDICINE

## 2019-10-07 PROCEDURE — G0008 ADMIN INFLUENZA VIRUS VAC: HCPCS | Performed by: INTERNAL MEDICINE

## 2019-10-07 PROCEDURE — 85610 PROTHROMBIN TIME: CPT | Performed by: EMERGENCY MEDICINE

## 2019-10-07 PROCEDURE — 99236 HOSP IP/OBS SAME DATE HI 85: CPT | Performed by: HOSPITALIST

## 2019-10-07 PROCEDURE — 83690 ASSAY OF LIPASE: CPT | Performed by: EMERGENCY MEDICINE

## 2019-10-07 RX ORDER — HEPARIN SODIUM 5000 [USP'U]/ML
5000 INJECTION, SOLUTION INTRAVENOUS; SUBCUTANEOUS EVERY 8 HOURS SCHEDULED
Status: DISCONTINUED | OUTPATIENT
Start: 2019-10-07 | End: 2019-10-07 | Stop reason: HOSPADM

## 2019-10-07 RX ORDER — SPIRONOLACTONE 100 MG/1
100 TABLET, FILM COATED ORAL DAILY
Status: DISCONTINUED | OUTPATIENT
Start: 2019-10-07 | End: 2019-10-07 | Stop reason: HOSPADM

## 2019-10-07 RX ORDER — DIPHENHYDRAMINE HYDROCHLORIDE 50 MG/ML
50 INJECTION INTRAMUSCULAR; INTRAVENOUS ONCE
Status: DISCONTINUED | OUTPATIENT
Start: 2019-10-07 | End: 2019-10-07

## 2019-10-07 RX ORDER — METOPROLOL SUCCINATE 25 MG/1
25 TABLET, EXTENDED RELEASE ORAL 2 TIMES DAILY
Status: DISCONTINUED | OUTPATIENT
Start: 2019-10-07 | End: 2019-10-07 | Stop reason: HOSPADM

## 2019-10-07 RX ORDER — ALBUMIN (HUMAN) 12.5 G/50ML
50 SOLUTION INTRAVENOUS ONCE
Status: COMPLETED | OUTPATIENT
Start: 2019-10-07 | End: 2019-10-07

## 2019-10-07 RX ORDER — ESCITALOPRAM OXALATE 20 MG/1
20 TABLET ORAL DAILY
Status: DISCONTINUED | OUTPATIENT
Start: 2019-10-07 | End: 2019-10-07 | Stop reason: HOSPADM

## 2019-10-07 RX ORDER — FLUTICASONE PROPIONATE 50 MCG
2 SPRAY, SUSPENSION (ML) NASAL DAILY
Status: DISCONTINUED | OUTPATIENT
Start: 2019-10-07 | End: 2019-10-07 | Stop reason: HOSPADM

## 2019-10-07 RX ORDER — ALBUTEROL SULFATE 90 UG/1
2 AEROSOL, METERED RESPIRATORY (INHALATION) EVERY 6 HOURS PRN
Status: DISCONTINUED | OUTPATIENT
Start: 2019-10-07 | End: 2019-10-07 | Stop reason: HOSPADM

## 2019-10-07 RX ORDER — PANTOPRAZOLE SODIUM 20 MG/1
20 TABLET, DELAYED RELEASE ORAL EVERY OTHER DAY
Status: DISCONTINUED | OUTPATIENT
Start: 2019-10-07 | End: 2019-10-07 | Stop reason: HOSPADM

## 2019-10-07 RX ORDER — ONDANSETRON 2 MG/ML
4 INJECTION INTRAMUSCULAR; INTRAVENOUS ONCE
Status: COMPLETED | OUTPATIENT
Start: 2019-10-07 | End: 2019-10-07

## 2019-10-07 RX ORDER — LORAZEPAM 2 MG/ML
1 INJECTION INTRAMUSCULAR ONCE
Status: DISCONTINUED | OUTPATIENT
Start: 2019-10-07 | End: 2019-10-07

## 2019-10-07 RX ORDER — IPRATROPIUM BROMIDE AND ALBUTEROL SULFATE 2.5; .5 MG/3ML; MG/3ML
3 SOLUTION RESPIRATORY (INHALATION) EVERY 6 HOURS PRN
Status: DISCONTINUED | OUTPATIENT
Start: 2019-10-07 | End: 2019-10-07 | Stop reason: HOSPADM

## 2019-10-07 RX ORDER — CALCIUM CARBONATE 200(500)MG
1000 TABLET,CHEWABLE ORAL DAILY PRN
Status: DISCONTINUED | OUTPATIENT
Start: 2019-10-07 | End: 2019-10-07 | Stop reason: HOSPADM

## 2019-10-07 RX ORDER — ONDANSETRON 2 MG/ML
4 INJECTION INTRAMUSCULAR; INTRAVENOUS EVERY 6 HOURS PRN
Status: DISCONTINUED | OUTPATIENT
Start: 2019-10-07 | End: 2019-10-07 | Stop reason: HOSPADM

## 2019-10-07 RX ORDER — ACETAMINOPHEN 325 MG/1
650 TABLET ORAL EVERY 6 HOURS PRN
Status: DISCONTINUED | OUTPATIENT
Start: 2019-10-07 | End: 2019-10-07 | Stop reason: HOSPADM

## 2019-10-07 RX ORDER — FUROSEMIDE 40 MG/1
80 TABLET ORAL DAILY
Status: DISCONTINUED | OUTPATIENT
Start: 2019-10-07 | End: 2019-10-07 | Stop reason: HOSPADM

## 2019-10-07 RX ADMIN — ONDANSETRON 4 MG: 2 INJECTION INTRAMUSCULAR; INTRAVENOUS at 00:28

## 2019-10-07 RX ADMIN — INFLUENZA A VIRUS A/MICHIGAN/45/2015 X-275 (H1N1) ANTIGEN (FORMALDEHYDE INACTIVATED), INFLUENZA A VIRUS A/SINGAPORE/INFIMH-16-0019/2016 IVR-186 (H3N2) ANTIGEN (FORMALDEHYDE INACTIVATED), AND INFLUENZA B VIRUS B/MARYLAND/15/2016 BX-69A (A B/COLORADO/6/2017-LIKE VIRUS) ANTIGEN (FORMALDEHYDE INACTIVATED) 0.5 ML: 60; 60; 60 INJECTION, SUSPENSION INTRAMUSCULAR at 11:14

## 2019-10-07 RX ADMIN — ALBUMIN (HUMAN) 50 G: 0.25 INJECTION, SOLUTION INTRAVENOUS at 12:23

## 2019-10-07 RX ADMIN — ESCITALOPRAM OXALATE 20 MG: 20 TABLET ORAL at 11:14

## 2019-10-07 RX ADMIN — PANTOPRAZOLE SODIUM 20 MG: 20 TABLET, DELAYED RELEASE ORAL at 05:04

## 2019-10-07 RX ADMIN — ONDANSETRON 4 MG: 2 INJECTION INTRAMUSCULAR; INTRAVENOUS at 07:54

## 2019-10-07 NOTE — UTILIZATION REVIEW
Initial Clinical Review    ED TREATMENT TIME  10/6  @    2341    Admission: Date/Time/Statement:   OBS  ORDER    10/7  @    0154  Orders Placed This Encounter   Procedures    Place in Observation (expected length of stay for this patient is less than two midnights)     Standing Status:   Standing     Number of Occurrences:   1     Order Specific Question:   Admitting Physician     Answer:   Crystal Avendano     Order Specific Question:   Level of Care     Answer:   Med Surg [16]     ED Arrival Information     Expected Arrival Acuity Means of Arrival Escorted By Service Admission Type    - 10/6/2019 23:30 Urgent Walk-In Self Hospitalist Urgent    Arrival Complaint    abd pain        Chief Complaint   Patient presents with    Abdominal Pain     pt  reports hx of ascited and last tape of fluid was 1 month ago  pt  reports generalzed abdominal pressure, nausea, bowel incontinence, and SOB     Assessment/Plan: Decompensated hepatic cirrhosis ([facility] [address] )  Assessment & Plan  Patient presents with abdominal pressure in the setting of ascites   Reports she get tapped approx  Every month, last paracentesis 9/10/19, next appointment 11/5/19  IR consult for paracentesis   Continue home diuretics   Continue outpatient GI follow up      Essential hypertension  Assessment & Plan  Slightly elevated at time of presentation resolved without intervention  Continue home metoprolol, lasix and aldactone   Continue to monitor         Depression  Assessment & Plan  Continue daily lexapro      Asthma  Assessment & Plan  Does not appear to be in exacerbation at this time  Continue home nebulizers with PRN albuterol     Anticipated Length of Stay:  Patient will be admitted on an Observation basis with an anticipated length of stay of  Less than 2 midnights  Justification for Hospital Stay: cirrhosis   Tessa Laura is a 75 y o  female with a PMHx of liver disease, asthma, HTN who presents with abdominal distention   Patient reports she has been getting paracenteses regularly for the past 4 years due to decompensated liver disease  Patient reports last tap was last month but that she was not scheduled for another for another month and could not wait that long  Patient reports abdominal distention with pain and pressure  Patient also reports worsening shortness of breath secondary to her abdominal size as well as underlying asthma  Patient reports the pain is mild at this time and that she does not typically take anything for pain at home  Patient currently denies any chest pain, vision changes or headache       ED Triage Vitals   Temperature Pulse Respirations Blood Pressure SpO2   10/06/19 2342 10/06/19 2341 10/06/19 2341 10/06/19 2341 10/06/19 2341   98 6 °F (37 °C) 68 (!) 24 (!) 176/68 98 %      Temp Source Heart Rate Source Patient Position - Orthostatic VS BP Location FiO2 (%)   10/06/19 2342 10/06/19 2341 10/06/19 2341 10/06/19 2341 --   Temporal Monitor Sitting Right arm       Pain Score       10/06/19 2341       8        Wt Readings from Last 1 Encounters:   10/07/19 110 kg (242 lb 15 2 oz)     Additional Vital Signs:   10/07/19 09:13:34  --  66  --  129/64  96 %  --  --   10/07/19 0800  --  --  --  --  --  None (Room air)  --   10/07/19 0656  97 8 °F (36 6 °C)  71  18  138/61  97 %  None (Room air)  Lying   10/07/19 0242  97 5 °F (36 4 °C)  70  20  146/65  98 %  None (Room air)  Lying   10/07/19 0221  --  74  20  118/72  96 %  None (Room air)  Lying   10/06/19 2354  --  --  --  --  --  None (Room air)  --   10/06/19 2342  98 6 °F (37 °C)  --  --  --  --  --  --   10/06/19 2341  --  68  24Abnormal   176/68Abnormal   98 %  None (Room air)  Sitting         Pertinent Labs/Diagnostic Test Results:   Results from last 7 days   Lab Units 10/07/19  0018   WBC Thousand/uL 6 47   HEMOGLOBIN g/dL 14 0   HEMATOCRIT % 43 6   PLATELETS Thousands/uL 152   NEUTROS ABS Thousands/µL 4 79         Results from last 7 days   Lab Units 10/07/19  0018   SODIUM mmol/L 137   POTASSIUM mmol/L 3 8   CHLORIDE mmol/L 101   CO2 mmol/L 29   ANION GAP mmol/L 7   BUN mg/dL 11   CREATININE mg/dL 0 94   EGFR ml/min/1 73sq m 63   CALCIUM mg/dL 8 8     Results from last 7 days   Lab Units 10/07/19  0018   AST U/L 30   ALT U/L 15   ALK PHOS U/L 120*   TOTAL PROTEIN g/dL 6 8   ALBUMIN g/dL 2 8*   TOTAL BILIRUBIN mg/dL 0 62         Results from last 7 days   Lab Units 10/07/19  0018   GLUCOSE RANDOM mg/dL 133           Results from last 7 days   Lab Units 10/07/19  0018   PROTIME seconds 14 3   INR  1 10   PTT seconds 35           Results from last 7 days   Lab Units 10/07/19  0018   LIPASE u/L 155         ED Treatment:   Medication Administration from 10/06/2019 2330 to 10/07/2019 2946       Date/Time Order Dose Route Action Action by Comments     10/07/2019 0028 ondansetron (ZOFRAN) injection 4 mg 4 mg Intravenous Given Stephanie Mondragon RN         Past Medical History:   Diagnosis Date    Anxiety     Arthritis     Asthma     [facility]osis ([facility] )     Depression     Disease of thyroid gland     nodules    Diverticulitis 2011    Diverticulosis     DVT (deep venous thrombosis) ([facility] ) 2013    GERD (gastroesophageal reflux disease)     Liver disease     cirrhosis    Pneumonia     Portal hypertensive gastropathy ([facility] )     Sleep apnea     Vertigo      Present on Admission:  **None**      Admitting Diagnosis: Abdominal pain [R10 9]  Ascites [R18 8]  Age/Sex: 75 y o  female  Admission Orders:    Current Facility-Administered Medications:  acetaminophen 650 mg Oral Q6H PRN Susy Smudde, PA-C   albuterol 2 puff Inhalation Q6H PRN Susy Smudde, PA-C   calcium carbonate 1,000 mg Oral Daily PRN Susy Smudde, PA-C   escitalopram 20 mg Oral Daily Susy Smudde, PA-C   fluticasone 2 spray Nasal Daily Susy Smudde, PA-C   furosemide 80 mg Oral Daily Susy Smudde, PA-C   heparin (porcine) 5,000 Units Subcutaneous Q8H [facility] Susy Smudde, PA-C   hydrocortisone  Rectal PRN Marianne Davis, BARBARA influenza vaccine 0 5 mL Intramuscular Once Aroldo Murrieta MD   ipratropium-albuterol 3 mL Nebulization Q6H PRN Jung Mackay PA-C   metoprolol succinate 25 mg Oral BID Susy López PA-C   ondansetron 4 mg Intravenous Q6H PRN Susy López PA-C   pantoprazole 20 mg Oral Every Other Day Susy López PA-C   spironolactone 100 mg Oral Daily Jung Mackay PA-C       None    Network Utilization Review Department  Phone: [phone]; Fax [phone]  Maria [email or web address], [email or web address]  ATTENTION: Please call with any questions or concerns to [phone]  and carefully listen to the prompts so that you are directed to the right person  Send all requests for admission clinical reviews, approved or denied determinations and any other requests to fax [phone]   All voicemails are confidential

## 2019-10-07 NOTE — ED PROVIDER NOTES
History  Chief Complaint   Patient presents with    Abdominal Pain     pt  reports hx of ascited and last tape of fluid was 1 month ago  pt  reports generalzed abdominal pressure, nausea, bowel incontinence, and SOB     Patient is a 75year-old female with a history of cirrhosis who presents for abdominal distention and discomfort  Patient reports that abdominal discomfort has been progressive for the past several days  She reports generalized, severe discomfort that she attributes to fluid/pressure  Associated with nausea, shortness of breath  Has not taken anything to treat symptoms at home  Reports similar symptoms in the past secondary to ascites  Denies associated fever, chest pain, diarrhea, constipation, dysuria, hematuria  Prior to Admission Medications   Prescriptions Last Dose Informant Patient Reported? Taking?    Calcium Carb-Ergocalciferol 250-125 MG-UNIT TABS 10/6/2019 at Unknown time  Yes Yes   Sig: Take 1 tablet by mouth daily   Cranberry 400 MG CAPS 10/6/2019 at Morning  Yes Yes   Sig: Take 1 capsule by mouth daily   PROCTOZONE-HC 2 5 % rectal cream   Yes No   Sig: Apply 1 each topically as needed rectally   acetaminophen (TYLENOL) 500 mg tablet   Yes No   Sig: Take 500 mg by mouth every 6 (six) hours as needed   albuterol (VENTOLIN HFA) 90 mcg/act inhaler   Yes No   Sig: Inhale 1 puff every 6 (six) hours as needed   escitalopram (LEXAPRO) 20 mg tablet 10/6/2019 at Morningtime  Yes Yes   Sig: Take 20 mg by mouth daily   fluticasone (FLONASE) 50 mcg/act nasal spray   Yes No   Si sprays into each nostril   furosemide (LASIX) 40 mg tablet 10/6/2019 at Morning  Yes Yes   Sig: Take 80 mg by mouth daily   hydrocortisone 2 5 % cream   Yes No   Sig: Apply 1 application topically 2 (two) times a day   ipratropium-albuterol (DUO-NEB) 0 5-2 5 mg/3 mL nebulizer solution   Yes No   Sig: Take 3 mL by nebulization 4 (four) times a day   metoprolol succinate (TOPROL-XL) 25 mg 24 hr tablet 10/6/2019 at 0900  Yes Yes   Sig: Take 1 tablet by mouth 2 (two) times a day   omeprazole (PRILOSEC) 20 mg delayed release capsule 10/6/2019 at Unknown time  Yes Yes   Sig: Take 20 mg by mouth every other day    spironolactone (ALDACTONE) 50 mg tablet 10/6/2019 at Morning  Yes Yes   Sig: Take 100 mg by mouth daily       Facility-Administered Medications: None       Past Medical History:   Diagnosis Date    Anxiety     Arthritis     Asthma     Cir[place]osis ([place] [address] )     Depression     Disease of thyroid gland     nodules    Diverticulitis 2011    Diverticulosis     DVT (deep venous thrombosis) ([place] [address] ) 2013    GERD (gastroesophageal reflux disease)     Liver disease     cirrhosis    Pneumonia     Portal hypertensive gastropathy ([place] [address] )     Sleep apnea     Vertigo        Past Surgical History:   Procedure Laterality Date    BLADDER SUSPENSION      CHOLECYSTECTOMY      COLONOSCOPY  05/24/2019    had multiple with last being [id number]    EGD      HERNIA REPAIR      IR PARACENTESIS  8/23/2018    IR PARACENTESIS  11/5/2018    IR PARACENTESIS  1/11/2019    IR PARACENTESIS  2/27/2019    IR PARACENTESIS  4/15/2019    IR PARACENTESIS  6/3/2019    IR PARACENTESIS  7/10/2019    IR PARACENTESIS  8/16/2019    IR PARACENTESIS  9/10/2019    TONSILLECTOMY      TRIGGER FINGER RELEASE Bilateral        History reviewed  No pertinent family history  I have reviewed and agree with the history as documented  Social History     Tobacco Use    Smoking status: Never Smoker    Smokeless tobacco: Never Used   Substance Use Topics    Alcohol use: Yes     Comment: rare    Drug use: Never        Review of Systems   Constitutional: Negative for chills, fatigue and fever  HENT: Negative for congestion and sore throat  Eyes: Negative for visual disturbance  Respiratory: Positive for shortness of breath  Negative for cough  Cardiovascular: Negative for chest pain     Gastrointestinal: Positive for abdominal pain and nausea  Negative for diarrhea and vomiting  Endocrine: Negative for polyuria  Genitourinary: Negative for difficulty urinating and dysuria  Musculoskeletal: Negative for arthralgias  Skin: Negative for rash  Neurological: Negative for dizziness, light-headedness and headaches  All other systems reviewed and are negative  Physical Exam  ED Triage Vitals   Temperature Pulse Respirations Blood Pressure SpO2   10/06/19 2342 10/06/19 2341 10/06/19 2341 10/06/19 2341 10/06/19 2341   98 6 °F (37 °C) 68 (!) 24 (!) 176/68 98 %      Temp Source Heart Rate Source Patient Position - Orthostatic VS BP Location FiO2 (%)   10/06/19 2342 10/06/19 2341 10/06/19 2341 10/06/19 2341 --   Temporal Monitor Sitting Right arm       Pain Score       10/06/19 2341       8             Orthostatic Vital Signs  Vitals:    10/06/19 2341 10/07/19 0221 10/07/19 0242   BP: (!) 176/68 118/72 146/65   Pulse: 68 74 70   Patient Position - Orthostatic VS: Sitting Lying Lying       Physical Exam   Constitutional: She is oriented to person, place, and time  She appears well-developed and well-nourished  No distress  HENT:   Head: Normocephalic and atraumatic  Eyes: Pupils are equal, round, and reactive to light  EOM are normal    Neck: Normal range of motion  Neck supple  Cardiovascular: Normal rate, regular rhythm and normal heart sounds  Pulmonary/Chest: Effort normal and breath sounds normal  No respiratory distress  Abdominal: Soft  Bowel sounds are normal  She exhibits distension and fluid wave  There is generalized tenderness  There is no rigidity and no guarding  Musculoskeletal: Normal range of motion  Neurological: She is alert and oriented to person, place, and time  Skin: Skin is warm and dry  Psychiatric: She has a normal mood and affect  Nursing note and vitals reviewed        ED Medications  Medications   albuterol (PROVENTIL HFA,VENTOLIN HFA) inhaler 2 puff (has no administration in time range) escitalopram (LEXAPRO) tablet 20 mg (has no administration in time range)   fluticasone (FLONASE) 50 mcg/act nasal spray 2 spray (has no administration in time range)   furosemide (LASIX) tablet 80 mg (has no administration in time range)   ipratropium-albuterol (DUO-NEB) 0 5-2 5 mg/3 mL inhalation solution 3 mL (has no administration in time range)   metoprolol succinate (TOPROL-XL) 24 hr tablet 25 mg (has no administration in time range)   pantoprazole (PROTONIX) EC tablet 20 mg (has no administration in time range)   hydrocortisone (ANUSOL-HC, PROCTOSOL HC)) 2 5 % rectal cream (has no administration in time range)   spironolactone (ALDACTONE) tablet 100 mg (has no administration in time range)   ondansetron (ZOFRAN) injection 4 mg (has no administration in time range)   calcium carbonate (TUMS) chewable tablet 1,000 mg (has no administration in time range)   heparin (porcine) subcutaneous injection 5,000 Units (has no administration in time range)   acetaminophen (TYLENOL) tablet 650 mg (has no administration in time range)   influenza vaccine, high-dose (FLUZONE HIGH-DOSE) IM injection LAVELL 0 5 mL (has no administration in time range)   ondansetron (ZOFRAN) injection 4 mg (4 mg Intravenous Given 10/7/19 0028)       Diagnostic Studies  Results Reviewed     Procedure Component Value Units Date/Time    CMP [[id number]]  (Abnormal) Collected:  10/07/19 0018    Lab Status:  Final result Specimen:  Blood from Arm, Right Updated:  10/07/19 0052     Sodium 137 mmol/L      Potassium 3 8 mmol/L      Chloride 101 mmol/L      CO2 29 mmol/L      ANION GAP 7 mmol/L      BUN 11 mg/dL      Creatinine 0 94 mg/dL      Glucose 133 mg/dL      Calcium 8 8 mg/dL      AST 30 U/L      ALT 15 U/L      Alkaline Phosphatase 120 U/L      Total Protein 6 8 g/dL      Albumin 2 8 g/dL      Total Bilirubin 0 62 mg/dL      eGFR 63 ml/min/1 73sq m     Narrative:       [place] guidelines for Chronic Kidney Disease (CKD):      Stage 1 with normal or high GFR (GFR > 90 mL/min/1 73 square meters)    Stage 2 Mild CKD (GFR = 60-89 mL/min/1 73 square meters)    Stage 3A Moderate CKD (GFR = 45-59 mL/min/1 73 square meters)    Stage 3B Moderate CKD (GFR = 30-44 mL/min/1 73 square meters)    Stage 4 Severe CKD (GFR = 15-29 mL/min/1 73 square meters)    Stage 5 End Stage CKD (GFR <15 mL/min/1 73 square meters)  Note: GFR calculation is accurate only with a steady state creatinine    Lipase [[id number]]  (Normal) Collected:  10/07/19 0018    Lab Status:  Final result Specimen:  Blood from Arm, Right Updated:  10/07/19 0052     Lipase 155 u/L     Protime-INR [[id number]]  (Normal) Collected:  10/07/19 0018    Lab Status:  Final result Specimen:  Blood from Arm, Right Updated:  10/07/19 0045     Protime 14 3 seconds      INR 1 10    APTT [[id number]]  (Normal) Collected:  10/07/19 0018    Lab Status:  Final result Specimen:  Blood from Arm, Right Updated:  10/07/19 0045     PTT 35 seconds     CBC and differential [[id number]]  (Abnormal) Collected:  10/07/19 0018    Lab Status:  Final result Specimen:  Blood from Arm, Right Updated:  10/07/19 0036     WBC 6 47 Thousand/uL      RBC 4 68 Million/uL      Hemoglobin 14 0 g/dL      Hematocrit 43 6 %      MCV 93 fL      MCH 29 9 pg      MCHC 32 1 g/dL      RDW 14 4 %      MPV 10 8 fL      Platelets 433 Thousands/uL      nRBC 0 /100 WBCs      Neutrophils Relative 74 %      Immat GRANS % 0 %      Lymphocytes Relative 13 %      Monocytes Relative 8 %      Eosinophils Relative 4 %      Basophils Relative 1 %      Neutrophils Absolute 4 79 Thousands/µL      Immature Grans Absolute 0 02 Thousand/uL      Lymphocytes Absolute 0 84 Thousands/µL      Monocytes Absolute 0 49 Thousand/µL      Eosinophils Absolute 0 28 Thousand/µL      Basophils Absolute 0 05 Thousands/µL                  IR consult    (Results Pending)         Procedures  Procedures        ED Course MDM  Number of Diagnoses or Management Options  Abdominal pain:   Ascites:   Diagnosis management comments: Patient is a 75year-old female with a history of cirrhosis who presents for abdominal distention and discomfort  Exam shows generalized abdominal distention and tenderness  Will admit for IR thoracentesis  Disposition  Final diagnoses:   Ascites   Abdominal pain     Time reflects when diagnosis was documented in both MDM as applicable and the Disposition within this note     Time User Action Codes Description Comment    10/7/2019  2:00 AM Elizabethangle Daryl Add [R18 8] Ascites     10/7/2019  2:01 AM Javier Geronimo Add [R10 9] Abdominal pain       ED Disposition     ED Disposition Condition Date/Time Comment    Admit Stable Mon Oct 7, 2019  1:54 AM Case was discussed with CHARAN and the patient's admission status was agreed to be Admission Status: observation status to the service of Dr Kael Eisenberg   Follow-up Information    None         Current Discharge Medication List      CONTINUE these medications which have NOT CHANGED    Details   Calcium Carb-Ergocalciferol 250-125 MG-UNIT TABS Take 1 tablet by mouth daily      Cranberry 400 MG CAPS Take 1 capsule by mouth daily      escitalopram (LEXAPRO) 20 mg tablet Take 20 mg by mouth daily      furosemide (LASIX) 40 mg tablet Take 80 mg by mouth daily      metoprolol succinate (TOPROL-XL) 25 mg 24 hr tablet Take 1 tablet by mouth 2 (two) times a day      omeprazole (PRILOSEC) 20 mg delayed release capsule Take 20 mg by mouth every other day       spironolactone (ALDACTONE) 50 mg tablet Take 100 mg by mouth daily       acetaminophen (TYLENOL) 500 mg tablet Take 500 mg by mouth every 6 (six) hours as needed      albuterol (VENTOLIN HFA) 90 mcg/act inhaler Inhale 1 puff every 6 (six) hours as needed    Comments: Substitution to a formulary equivalent within the same pharmaceutical class is authorized        fluticasone (FLONASE) 50 mcg/act nasal spray 2 sprays into each nostril      hydrocortisone 2 5 % cream Apply 1 application topically 2 (two) times a day      ipratropium-albuterol (DUO-NEB) 0 5-2 5 mg/3 mL nebulizer solution Take 3 mL by nebulization 4 (four) times a day      PROCTOZONE-HC 2 5 % rectal cream Apply 1 each topically as needed rectally  Refills: 0           No discharge procedures on file  ED Provider  Attending physically available and evaluated Felice West I managed the patient along with the ED Attending      Electronically Signed by         Maria G Campbell MD  10/07/19 0960

## 2019-10-07 NOTE — ASSESSMENT & PLAN NOTE
Patient presents with abdominal pressure in the setting of ascites   Reports she get tapped approx   Every month, last paracentesis 9/10/19, next appointment 11/5/19  IR consult for paracentesis   Continue home diuretics   Continue outpatient GI follow up

## 2019-10-07 NOTE — PLAN OF CARE
Problem: Potential for Falls  Goal: Patient will remain free of falls  Description  INTERVENTIONS:  - Assess patient frequently for physical needs  -  Identify cognitive and physical deficits and behaviors that affect risk of falls    -  [place] fall precautions as indicated by assessment   - Educate patient/family on patient safety including physical limitations  - Instruct patient to call for assistance with activity based on assessment  - Modify environment to reduce risk of injury  - Consider OT/PT consult to assist with strengthening/mobility  Outcome: Progressing     Problem: PAIN - ADULT  Goal: Verbalizes/displays adequate comfort level or baseline comfort level  Description  Interventions:  - Encourage patient to monitor pain and request assistance  - Assess pain using appropriate pain scale  - Administer analgesics based on type and severity of pain and evaluate response  - Implement non-pharmacological measures as appropriate and evaluate response  - Consider cultural and social influences on pain and pain management  - Notify physician/advanced practitioner if interventions unsuccessful or patient reports new pain  Outcome: Progressing     Problem: INFECTION - ADULT  Goal: Absence or prevention of progression during hospitalization  Description  INTERVENTIONS:  - Assess and monitor for signs and symptoms of infection  - Monitor lab/diagnostic results  - Monitor all insertion sites, i e  indwelling lines, tubes, and drains  - Monitor endotracheal if appropriate and nasal secretions for changes in amount and color  - [place] appropriate cooling/warming therapies per order  - Administer medications as ordered  - Instruct and encourage patient and family to use good hand hygiene technique  - Identify and instruct in appropriate isolation precautions for identified infection/condition  Outcome: Progressing     Problem: SAFETY ADULT  Goal: Maintain or return to baseline ADL function  Description  INTERVENTIONS:  -  Assess patient's ability to carry out ADLs; assess patient's baseline for ADL function and identify physical deficits which impact ability to perform ADLs (bathing, care of mouth/teeth, toileting, grooming, dressing, etc )  - Assess/evaluate cause of self-care deficits   - Assess range of motion  - Assess patient's mobility; develop plan if impaired  - Assess patient's need for assistive devices and provide as appropriate  - Encourage maximum independence but intervene and supervise when necessary  - Involve family in performance of ADLs  - Assess for home care needs following discharge   - Consider OT consult to assist with ADL evaluation and planning for discharge  - Provide patient education as appropriate  Outcome: Progressing  Goal: Maintain or return mobility status to optimal level  Description  INTERVENTIONS:  - Assess patient's baseline mobility status (ambulation, transfers, stairs, etc )    - Identify cognitive and physical deficits and behaviors that affect mobility  - Identify mobility aids required to assist with transfers and/or ambulation (gait belt, sit-to-stand, lift, walker, cane, etc )  - [place] fall precautions as indicated by assessment  - Record patient progress and toleration of activity level on Mobility SBAR; progress patient to next Phase/Stage  - Instruct patient to call for assistance with activity based on assessment  - Consider rehabilitation consult to assist with strengthening/weightbearing, etc   Outcome: Progressing     Problem: DISCHARGE PLANNING  Goal: Discharge to home or other facility with appropriate resources  Description  INTERVENTIONS:  - Identify barriers to discharge w/patient and caregiver  - Arrange for needed discharge resources and transportation as appropriate  - Identify discharge learning needs (meds, wound care, etc )  - Arrange for interpretive services to assist at discharge as needed  - Refer to Case Management Department for coordinating discharge planning if the patient needs post-hospital services based on physician/advanced practitioner order or complex needs related to functional status, cognitive ability, or social support system  Outcome: Progressing     Problem: Knowledge Deficit  Goal: Patient/family/caregiver demonstrates understanding of disease process, treatment plan, medications, and discharge instructions  Description  Complete learning assessment and assess knowledge base    Interventions:  - Provide teaching at level of understanding  - Provide teaching via preferred learning methods  Outcome: Progressing     Problem: RESPIRATORY - ADULT  Goal: Achieves optimal ventilation and oxygenation  Description  INTERVENTIONS:  - Assess for changes in respiratory status  - Assess for changes in mentation and behavior  - Position to facilitate oxygenation and minimize respiratory effort  - Oxygen administered by appropriate delivery if ordered  - Initiate smoking cessation education as indicated  - Encourage broncho-pulmonary hygiene including cough, deep breathe, Incentive Spirometry  - Assess the need for suctioning and aspirate as needed  - Assess and instruct to report SOB or any respiratory difficulty  - Respiratory Therapy support as indicated  Outcome: Progressing     Problem: GASTROINTESTINAL - ADULT  Goal: Minimal or absence of nausea and/or vomiting  Description  INTERVENTIONS:  - Administer IV fluids if ordered to ensure adequate hydration  - Maintain NPO status until nausea and vomiting are resolved  - Nasogastric tube if ordered  - Administer ordered antiemetic medications as needed  - Provide nonpharmacologic comfort measures as appropriate  - Advance diet as tolerated, if ordered  - Consider nutrition services referral to assist patient with adequate nutrition and appropriate food choices  Outcome: Progressing  Goal: Maintains adequate nutritional intake  Description  INTERVENTIONS:  - Monitor percentage of each meal consumed  - Identify factors contributing to decreased intake, treat as appropriate  - Assist with meals as needed  - Monitor I&O, weight, and lab values if indicated  - Obtain nutrition services referral as needed  Outcome: Progressing     Problem: METABOLIC, FLUID AND ELECTROLYTES - ADULT  Goal: Electrolytes maintained within normal limits  Description  INTERVENTIONS:  - Monitor labs and assess patient for signs and symptoms of electrolyte imbalances  - Administer electrolyte replacement as ordered  - Monitor response to electrolyte replacements, including repeat lab results as appropriate  - Instruct patient on fluid and nutrition as appropriate  Outcome: Progressing  Goal: Fluid balance maintained  Description  INTERVENTIONS:  - Monitor labs   - Monitor I/O and WT  - Instruct patient on fluid and nutrition as appropriate  - Assess for signs & symptoms of volume excess or deficit  Outcome: Progressing  Goal: Glucose maintained within target range  Description  INTERVENTIONS:  - Monitor Blood Glucose as ordered  - Assess for signs and symptoms of hyperglycemia and hypoglycemia  - Administer ordered medications to maintain glucose within target range  - Assess nutritional intake and initiate nutrition service referral as needed  Outcome: Progressing     Problem: SKIN/TISSUE INTEGRITY - ADULT  Goal: Skin integrity remains intact  Description  INTERVENTIONS  - Identify patients at risk for skin breakdown  - Assess and monitor skin integrity  - Assess and monitor nutrition and hydration status  - Monitor labs (i e  albumin)  - Assess for incontinence   - Turn and reposition patient  - Assist with mobility/ambulation  - Relieve pressure over bony prominences  - Avoid friction and shearing  - Provide appropriate hygiene as needed including keeping skin clean and dry  - Evaluate need for skin moisturizer/barrier cream  - Collaborate with interdisciplinary team (i e  Nutrition, Rehabilitation, etc )   - Patient/family teaching  Outcome: Progressing  Goal: Incision(s), wounds(s) or drain site(s) healing without S/S of infection  Description  INTERVENTIONS  - Assess and document risk factors for skin impairment   - Assess and document dressing, incision, wound bed, drain sites and surrounding tissue  - Consider nutrition services referral as needed  - Oral mucous membranes remain intact  - Provide patient/ family education  Outcome: Progressing

## 2019-10-07 NOTE — DISCHARGE SUMMARY
Discharge- Lauryn Goodwin 1955, 75 y o  female MRN: [id number]    Unit/Bed#: E2 -01 Encounter: [id number]    Primary Care Provider: Tabatha Alamo DO   Date and time admitted to hospital: 10/6/2019 11:34 PM        * Decompensated hepatic cirrhosis ([facility] [address] )  Assessment & Plan  Had large volume paracentesis today  She is feeling much better  She is anxious to go home      Discharging Physician / Practitioner: William Paget, DO  PCP: Tabatha Alamo DO  Admission Date:   Admission Orders (From admission, onward)     Ordered        10/07/19 0154  Place in Observation (expected length of stay for this patient is less than two midnights)  Once                   Discharge Date: 10/07/19    Resolved Problems  Date Reviewed: 10/7/2019    None            Consultations During Hospital Stay:  · IR        Procedures Performed:     · Large volume paracentesis  Reason for Admission: abdominal swelling       Hospital Course:     Lauryn Goodwin is a 75 y o  female patient who originally presented to the hospital on 10/6/2019 due to abdominal swelling  She has cirrhosis with ascites  She gets paracentesis every month  Last paracentesis was about 25 days ago  She presents with increased abdominal pain and swelling  She underwent large volume paracentesis by IR  They removed 7 units of ascites  She received IV albumin  We will discharge her home  Please see above list of diagnoses and related plan for additional information  Condition at Discharge: good       Discharge Day Visit / Exam:     Subjective:  Feels better  Less abd swelling        Vitals: Blood Pressure: 92/52 (10/07/19 1125)  Pulse: 59 (10/07/19 1125)  Temperature: (!) 97 1 °F (36 2 °C) (10/07/19 1125)  Temp Source: Tympanic (10/07/19 1125)  Respirations: 18 (10/07/19 1125)  Height: 5' 4 5" (163 8 cm) (10/07/19 0242)  Weight - Scale: 110 kg (242 lb 15 2 oz) (10/07/19 0242)  SpO2: 98 % (10/07/19 0957)    Exam:     Physical Exam   HENT:   Head: Normocephalic and atraumatic  Eyes: Pupils are equal, round, and reactive to light  EOM are normal    Cardiovascular: Normal rate and regular rhythm  Exam reveals no gallop and no friction rub  No murmur heard  Pulmonary/Chest: Effort normal and breath sounds normal  She has no wheezes  She has no rales  Abdominal: Soft  Bowel sounds are normal  She exhibits no distension  There is no tenderness  Obese      No fluid wave   Musculoskeletal: She exhibits no edema  Nursing note and vitals reviewed            Discharge instructions/Information to patient and family:   See after visit summary for information provided to patient and family  Provisions for Follow-Up Care:  See after visit summary for information related to follow-up care and any pertinent home health orders  Disposition:     Home       Discharge Statement:  I spent 36 minutes discharging the patient  This time was spent on the day of discharge  I had direct contact with the patient on the day of discharge  Greater than 50% of the total time was spent examining patient, answering all patient questions, arranging and discussing plan of care with patient as well as directly providing post-discharge instructions  Additional time then spent on discharge activities  Discharge Medications:  See after visit summary for reconciled discharge medications provided to patient and family        ** Please Note: This note has been constructed using a voice recognition system **

## 2019-10-07 NOTE — ASSESSMENT & PLAN NOTE
Slightly elevated at time of presentation resolved without intervention  Continue home metoprolol, lasix and aldactone   Continue to monitor

## 2019-10-07 NOTE — H&P
[facility]  H&P- Hannah Simmons 1955, 75 y o  female MRN: [id number]    Unit/Bed#: E2 -01 Encounter: [id number]    Primary Care Provider: Soren Pierre DO   Date and time admitted to hospital: 10/6/2019 11:34 PM        * Decompensated hepatic cirrhosis [facility])  Assessment & Plan  Patient presents with abdominal pressure in the setting of ascites   Reports she get tapped approx  Every month, last paracentesis 9/10/19, next appointment 11/5/19  IR consult for paracentesis   Continue home diuretics   Continue outpatient GI follow up     Essential hypertension  Assessment & Plan  Slightly elevated at time of presentation resolved without intervention  Continue home metoprolol, lasix and aldactone   Continue to monitor       Depression  Assessment & Plan  Continue daily lexapro     Asthma  Assessment & Plan  Does not appear to be in exacerbation at this time  Continue home nebulizers with PRN albuterol       VTE Prophylaxis: Heparin  / sequential compression device   Code Status: full code  POLST: POLST form is not discussed and not completed at this time  Discussion with family: none    Anticipated Length of Stay:  Patient will be admitted on an Observation basis with an anticipated length of stay of  Less than 2 midnights  Justification for Hospital Stay: cirrhosis     Total Time for Visit, including Counseling / Coordination of Care: 30 minutes  Greater than 50% of this total time spent on direct patient counseling and coordination of care  Chief Complaint:   Abdominal distention     History of Present Illness:    Hannah Simmons is a 75 y o  female with a PMHx of liver disease, asthma, HTN who presents with abdominal distention  Patient reports she has been getting paracenteses regularly for the past 4 years due to decompensated liver disease  Patient reports last tap was last month but that she was not scheduled for another for another month and could not wait that long  Patient reports abdominal distention with pain and pressure  Patient also reports worsening shortness of breath secondary to her abdominal size as well as underlying asthma  Patient reports the pain is mild at this time and that she does not typically take anything for pain at home  Patient currently denies any chest pain, vision changes or headache  Review of Systems:    Review of Systems   Constitutional: Negative for fatigue and fever  HENT: Negative for sore throat  Eyes: Negative for visual disturbance  Respiratory: Positive for shortness of breath and wheezing  Negative for cough  Cardiovascular: Negative for chest pain and leg swelling  Gastrointestinal: Positive for abdominal distention and abdominal pain  Negative for nausea and vomiting  Endocrine: Negative  Genitourinary: Negative for dysuria  Musculoskeletal: Negative for arthralgias and back pain  Skin: Negative for rash  Allergic/Immunologic: Negative  Neurological: Negative for dizziness and weakness  Hematological: Negative  Psychiatric/Behavioral: Negative          Past Medical and Surgical History:     Past Medical History:   Diagnosis Date    Anxiety     Arthritis     Asthma     Cir[place]osis ([place] [address] )     Depression     Disease of thyroid gland     nodules    Diverticulitis 2011    Diverticulosis     DVT (deep venous thrombosis) ([place] [address] ) 2013    GERD (gastroesophageal reflux disease)     Liver disease     cirrhosis    Pneumonia     Portal hypertensive gastropathy ([place] [address] )     Sleep apnea     Vertigo        Past Surgical History:   Procedure Laterality Date    BLADDER SUSPENSION      CHOLECYSTECTOMY      COLONOSCOPY  05/24/2019    had multiple with last being 06886    EGD     [address]      IR PARACENTESIS  8/23/2018    IR PARACENTESIS  11/5/2018    IR PARACENTESIS  1/11/2019    IR PARACENTESIS  2/27/2019    IR PARACENTESIS  4/15/2019    IR PARACENTESIS  6/3/2019    IR PARACENTESIS  7/10/2019    IR PARACENTESIS  8/16/2019    IR PARACENTESIS  9/10/2019    TONSILLECTOMY      TRIGGER FINGER RELEASE Bilateral        Meds/Allergies:    Prior to Admission medications    Medication Sig Start Date End Date Taking? Authorizing Provider   acetaminophen (TYLENOL) 500 mg tablet Take 500 mg by mouth every 6 (six) hours as needed    Historical Provider, MD   albuterol (VENTOLIN HFA) 90 mcg/act inhaler Inhale 1 puff every 6 (six) hours as needed 3/17/19 3/16/20  Historical Provider, MD   Calcium Carb-Ergocalciferol 250-125 MG-UNIT TABS Take 1 tablet by mouth daily    Historical Provider, MD   Cranberry 400 MG CAPS Take 1 capsule by mouth daily    Historical Provider, MD   escitalopram (LEXAPRO) 20 mg tablet Take 20 mg by mouth daily 3/28/19   Historical Provider, MD   fluticasone (FLONASE) 50 mcg/act nasal spray 2 sprays into each nostril    Historical Provider, MD   furosemide (LASIX) 40 mg tablet Take 80 mg by mouth daily 11/18/15   Historical Provider, MD   hydrocortisone 2 5 % cream Apply 1 application topically 2 (two) times a day    Historical Provider, MD   ipratropium-albuterol (DUO-NEB) 0 5-2 5 mg/3 mL nebulizer solution Take 3 mL by nebulization 4 (four) times a day    Historical Provider, MD   metoprolol succinate (TOPROL-XL) 25 mg 24 hr tablet Take 1 tablet by mouth 2 (two) times a day 8/27/18   Historical Provider, MD   omeprazole (PRILOSEC) 20 mg delayed release capsule Take 20 mg by mouth every other day  2/3/17   Historical Provider, MD   PROCTOZONE-HC 2 5 % rectal cream Apply 1 each topically as needed rectally 5/24/19   Historical Provider, MD   spironolactone (ALDACTONE) 50 mg tablet Take 100 mg by mouth daily     Historical Provider, MD     I have reviewed home medications with patient personally  Allergies:    Allergies   Allergen Reactions    Penicillins Itching and Rash     rash      Adhesive [Medical Tape]      Skin tears    Attapulgite     Cephalexin      ?ithcy    Clioquinol     Diclofenac Sodium Itching    Meloxicam Itching    Nabumetone Other (See Comments)     rash    Phenylalanine     Pseudoephedrine-Guaifenesin Other (See Comments)     Entex- "gittery"    Streptomycin     Celecoxib Rash    Rofecoxib Rash     skin reaction       Social History:     Marital Status:    Occupation: unknown  Patient Pre-hospital Living Situation: home  Patient Pre-hospital Level of Mobility: ambulates with cane  Patient Pre-hospital Diet Restrictions: none  Substance Use History:   Social History     Substance and Sexual Activity   Alcohol Use Yes    Comment: rare     Social History     Tobacco Use   Smoking Status Never Smoker   Smokeless Tobacco Never Used     Social History     Substance and Sexual Activity   Drug Use Never       Family History:    History reviewed  No pertinent family history  Physical Exam:     Vitals:   Blood Pressure: 146/65 (10/07/19 0242)  Pulse: 70 (10/07/19 0242)  Temperature: 97 5 °F (36 4 °C) (10/07/19 0242)  Temp Source: Tympanic (10/07/19 0242)  Respirations: 20 (10/07/19 0242)  Height: 5' 4 5" (163 8 cm) (10/07/19 0242)  Weight - Scale: 110 kg (242 lb 15 2 oz) (10/07/19 0242)  SpO2: 98 % (10/07/19 0242)    Physical Exam   Constitutional: She is oriented to person, place, and time  She appears well-nourished  No distress  HENT:   Head: Normocephalic and atraumatic  Mouth/Throat: Oropharynx is clear and moist    Eyes: Conjunctivae and EOM are normal  No scleral icterus  Neck: Normal range of motion  Cardiovascular: Normal rate, regular rhythm and normal heart sounds  No murmur heard  Pulmonary/Chest: Effort normal and breath sounds normal  No respiratory distress  She has no wheezes  She has no rales  Abdominal: Soft  She exhibits distension  There is tenderness  There is no guarding  Musculoskeletal: Normal range of motion  She exhibits no edema  Neurological: She is alert and oriented to person, place, and time  Skin: Skin is warm and dry  Psychiatric: She has a normal mood and affect  Her behavior is normal  Thought content normal    Vitals reviewed  Additional Data:     Lab Results: I have personally reviewed pertinent reports  Results from last 7 days   Lab Units 10/07/19  0018   WBC Thousand/uL 6 47   HEMOGLOBIN g/dL 14 0   HEMATOCRIT % 43 6   PLATELETS Thousands/uL 152   NEUTROS PCT % 74   LYMPHS PCT % 13*   MONOS PCT % 8   EOS PCT % 4     Results from last 7 days   Lab Units 10/07/19  0018   SODIUM mmol/L 137   POTASSIUM mmol/L 3 8   CHLORIDE mmol/L 101   CO2 mmol/L 29   BUN mg/dL 11   CREATININE mg/dL 0 94   ANION GAP mmol/L 7   CALCIUM mg/dL 8 8   ALBUMIN g/dL 2 8*   TOTAL BILIRUBIN mg/dL 0 62   ALK PHOS U/L 120*   ALT U/L 15   AST U/L 30   GLUCOSE RANDOM mg/dL 133     Results from last 7 days   Lab Units 10/07/19  0018   INR  1 10                   Imaging: I have personally reviewed pertinent reports  No orders to display         AllscriPhysitrack / Epic Records Reviewed: Yes     ** Please Note: This note has been constructed using a voice recognition system   **

## 2019-10-07 NOTE — CONSULTS
IR Consult Note    HPI:  75year old female with cirrhosis and recurrent ascites is referred for paracentesis      PMH:  Cirrhosis     PSH:  N/A     Physical exam:  /64   Pulse 66   Temp 97 8 °F (36 6 °C) (Tympanic)   Resp 18   Ht 5' 4 5" (1 638 m)   Wt 110 kg (242 lb 15 2 oz)   SpO2 96%   BMI 41 06 kg/m²   Gen: NAD  Abd: Distended     A/P:  75year old female with cirrhosis and recurrent ascites      - Paracentesis

## 2019-10-07 NOTE — BRIEF OP NOTE (RAD/CATH)
Paracentesis  Procedure Note    PATIENT NAME: Sean Valdovinos  : 1955  MRN: [id number]     Pre-op Diagnosis:   1  Ascites    2  Abdominal pain      Post-op Diagnosis:   1  Ascites    2  Abdominal pain        Surgeon:   Aime Shelby MD    Estimated Blood Loss: None    Findings: Successful paracentesis with 7 1 L abimael ascites fluid removed      Specimens: None    Complications:  None    Anesthesia: Local    Aime Shelby MD     Date: 10/7/2019  Time: 10:15 AM

## 2019-10-07 NOTE — ED ATTENDING ATTESTATION
10/6/2019  IAlex DO, saw and evaluated the patient  I have discussed the patient with the resident/non-physician practitioner and agree with the resident's/non-physician practitioner's findings, Plan of Care, and MDM as documented in the resident's/non-physician practitioner's note, except where noted  All available labs and Radiology studies were reviewed  I was present for key portions of any procedure(s) performed by the resident/non-physician practitioner and I was immediately available to provide assistance  At this point I agree with the current assessment done in the Emergency Department  I have conducted an independent evaluation of this patient a history and physical is as follows:    Patient is 58-year-old female with a history of cirrhosis that presents for worsening abdominal pain, distension and ascites  States that she follows up with Gastroenterology and has undergone multiple outpatient paracentesis in the past   Has 1 scheduled for next month but states that her symptoms are worsening which is what brought her in today  Denies any fevers, chills, nausea, vomiting or diarrhea  Patient is in no acute distress  Does have abdominal distention with ascites on exam   Mild generalized tenderness  Vital signs are normal   No fever  Labs done and are negative  Low suspicion for SBP or any other intra-abdominal infection  Patient states that this discomfort is due to her increasing ascites  I believe this is true  We offered outpatient therapy and an IR consultation for tomorrow but patient is refusing  States that she is too uncomfortable to go home  Will admit to Internal Medicine for the procedure    ED Course         Critical Care Time  Procedures

## 2019-11-05 ENCOUNTER — HOSPITAL ENCOUNTER (OUTPATIENT)
Dept: RADIOLOGY | Facility: HOSPITAL | Age: 64
Discharge: HOME | End: 2019-11-05
Attending: INTERNAL MEDICINE | Admitting: RADIOLOGY
Payer: MEDICARE

## 2019-11-05 VITALS
OXYGEN SATURATION: 97 % | RESPIRATION RATE: 16 BRPM | HEART RATE: 60 BPM | DIASTOLIC BLOOD PRESSURE: 56 MMHG | SYSTOLIC BLOOD PRESSURE: 119 MMHG

## 2019-11-05 DIAGNOSIS — R18.8 ASCITES: ICD-10-CM

## 2019-11-05 DIAGNOSIS — K70.31 ASCITES DUE TO ALCOHOLIC CIRRHOSIS (HCC): ICD-10-CM

## 2019-11-05 PROCEDURE — 49083 ABD PARACENTESIS W/IMAGING: CPT

## 2019-11-05 PROCEDURE — 49083 ABD PARACENTESIS W/IMAGING: CPT | Performed by: RADIOLOGY

## 2019-11-05 RX ORDER — ALBUMIN (HUMAN) 12.5 G/50ML
SOLUTION INTRAVENOUS
Status: COMPLETED | OUTPATIENT
Start: 2019-11-05 | End: 2019-11-05

## 2019-11-05 RX ADMIN — ALBUMIN (HUMAN) 50 G: 5 SOLUTION INTRAVENOUS at 13:00

## 2019-11-05 NOTE — DISCHARGE INSTRUCTIONS
Abdominal Paracentesis     WHAT YOU NEED TO KNOW:   Abdominal paracentesis is a procedure to remove abnormal fluid buildup in your abdomen  Fluid builds up because of liver problems, such as swelling and scarring  Heart failure, kidney disease, a mass, or problems with your pancreas may also cause fluid buildup  DISCHARGE INSTRUCTIONS:     Follow up with your healthcare provider as directed: Write down your questions so you remember to ask them during your visits  Wound care: Remove dressing after 24 hours  Leave glue in place  Return to your normal activities    Contact Interventional Radiology at [phone] Darleen PATIENTS: Contact Interventional Radiology at [phone]) Kael Delong PATIENTS: Contact Interventional Radiology at [phone]) if:  · You have a fever and your wound is red and swollen  · You have yellow, green, or bad-smelling discharge coming from your wound  · You have pain or swelling in your abdomen  · You have an upset stomach or you vomit  · You have sudden, sharp pain in your abdomen  · You urinate very little or not at all  · You feel confused and more tired than usual    · Your arm or leg feels warm, tender, and painful  It may look swollen and red  · You suddenly feel lightheaded and have trouble breathing

## 2019-11-05 NOTE — BRIEF OP NOTE (RAD/CATH)
IR PARACENTESIS  Procedure Note    PATIENT NAME: Omar Lomas  : 1955  MRN: [id number]     Pre-op Diagnosis:   1  Ascites    2  Ascites due to alcoholic cirrhosis (HCC)      Post-op Diagnosis:   1  Ascites    2   Ascites due to alcoholic cirrhosis [facility])        Surgeon:   Mario Raya MD  Assistants:     No qualified resident was available, Resident is only observing    Estimated Blood Loss: minimal  Findings: 7 2L ascites removed    Specimens: none    Complications:  None immediate    Anesthesia: Local    Mario Raya MD     Date: 2019  Time: 4:27 PM

## 2019-11-22 ENCOUNTER — HOSPITAL ENCOUNTER (OUTPATIENT)
Dept: RADIOLOGY | Facility: HOSPITAL | Age: 64
Discharge: HOME | End: 2019-11-22
Attending: INTERNAL MEDICINE | Admitting: RADIOLOGY
Payer: MEDICARE

## 2019-11-22 VITALS
SYSTOLIC BLOOD PRESSURE: 149 MMHG | RESPIRATION RATE: 18 BRPM | OXYGEN SATURATION: 95 % | DIASTOLIC BLOOD PRESSURE: 72 MMHG | HEART RATE: 65 BPM

## 2019-11-22 DIAGNOSIS — R18.8 ASCITES: ICD-10-CM

## 2019-11-22 PROCEDURE — 49083 ABD PARACENTESIS W/IMAGING: CPT

## 2019-11-22 PROCEDURE — 49083 ABD PARACENTESIS W/IMAGING: CPT | Performed by: RADIOLOGY

## 2019-11-22 RX ORDER — LIDOCAINE WITH 8.4% SOD BICARB 0.9%(10ML)
SYRINGE (ML) INJECTION CODE/TRAUMA/SEDATION MEDICATION
Status: COMPLETED | OUTPATIENT
Start: 2019-11-22 | End: 2019-11-22

## 2019-11-22 RX ORDER — ALBUMIN (HUMAN) 12.5 G/50ML
SOLUTION INTRAVENOUS
Status: COMPLETED | OUTPATIENT
Start: 2019-11-22 | End: 2019-11-22

## 2019-11-22 RX ADMIN — ALBUMIN (HUMAN) 25 G: 5 SOLUTION INTRAVENOUS at 09:34

## 2019-11-22 RX ADMIN — LIDOCAINE HYDROCHLORIDE 8 ML: 10 INJECTION, SOLUTION INFILTRATION; PERINEURAL at 08:48

## 2019-11-22 NOTE — DISCHARGE INSTRUCTIONS
Abdominal Paracentesis     WHAT YOU NEED TO KNOW:   Abdominal paracentesis is a procedure to remove abnormal fluid buildup in your abdomen  Fluid builds up because of liver problems, such as swelling and scarring  Heart failure, kidney disease, a mass, or problems with your pancreas may also cause fluid buildup  DISCHARGE INSTRUCTIONS:     Follow up with your healthcare provider as directed: Write down your questions so you remember to ask them during your visits  Wound care: Remove dressing after 24 hours  Leave glue in place  Return to your normal activities    Contact Interventional Radiology at [phone] Darleen PATIENTS: Contact Interventional Radiology at [phone]) Kobi Kandice PATIENTS: Contact Interventional Radiology at [phone]) if:  · You have a fever and your wound is red and swollen  · You have yellow, green, or bad-smelling discharge coming from your wound  · You have pain or swelling in your abdomen  · You have an upset stomach or you vomit  · You have sudden, sharp pain in your abdomen  · You urinate very little or not at all  · You feel confused and more tired than usual    · Your arm or leg feels warm, tender, and painful  It may look swollen and red  · You suddenly feel lightheaded and have trouble breathing

## 2019-12-17 ENCOUNTER — HOSPITAL ENCOUNTER (OUTPATIENT)
Dept: RADIOLOGY | Facility: HOSPITAL | Age: 64
Discharge: HOME | End: 2019-12-17
Attending: INTERNAL MEDICINE
Payer: MEDICARE

## 2019-12-17 VITALS
HEART RATE: 76 BPM | OXYGEN SATURATION: 98 % | RESPIRATION RATE: 16 BRPM | SYSTOLIC BLOOD PRESSURE: 126 MMHG | DIASTOLIC BLOOD PRESSURE: 85 MMHG

## 2019-12-17 DIAGNOSIS — R18.8 ASCITES: ICD-10-CM

## 2019-12-17 PROCEDURE — 49083 ABD PARACENTESIS W/IMAGING: CPT | Performed by: RADIOLOGY

## 2019-12-17 PROCEDURE — 49083 ABD PARACENTESIS W/IMAGING: CPT

## 2019-12-17 RX ORDER — ALBUMIN (HUMAN) 12.5 G/50ML
SOLUTION INTRAVENOUS
Status: COMPLETED | OUTPATIENT
Start: 2019-12-17 | End: 2019-12-17

## 2019-12-17 RX ADMIN — ALBUMIN (HUMAN) 25 G: 5 SOLUTION INTRAVENOUS at 12:16

## 2020-01-07 ENCOUNTER — HOSPITAL ENCOUNTER (OUTPATIENT)
Dept: RADIOLOGY | Facility: HOSPITAL | Age: 65
Discharge: HOME | End: 2020-01-07
Attending: INTERNAL MEDICINE | Admitting: INTERNAL MEDICINE
Payer: MEDICARE

## 2020-01-07 VITALS — OXYGEN SATURATION: 95 % | HEART RATE: 78 BPM | DIASTOLIC BLOOD PRESSURE: 67 MMHG | SYSTOLIC BLOOD PRESSURE: 98 MMHG

## 2020-01-07 DIAGNOSIS — R18.8 ASCITES: ICD-10-CM

## 2020-01-07 PROCEDURE — 49083 ABD PARACENTESIS W/IMAGING: CPT

## 2020-01-07 PROCEDURE — 49083 ABD PARACENTESIS W/IMAGING: CPT | Performed by: RADIOLOGY

## 2020-01-07 NOTE — SEDATION DOCUMENTATION
6600 cc abimael fluid drained via paracentesis  Small amounts of hilario blood in tubing, Dr Heather Aldrich aware  Patient asymptomatic  Albumin infusing per orders  Dry dressing to site c/d/i  VSS

## 2020-01-08 NOTE — BRIEF OP NOTE (RAD/CATH)
IR PARACENTESIS Procedure Note    PATIENT NAME: Amira Kaplan  : 1955  MRN: [id number]    Pre-op Diagnosis:   1  Ascites      Post-op Diagnosis:   1   Ascites        Surgeon:   Abelino Meyer MD  Assistants:     No qualified resident was available, Resident is only observing    Estimated Blood Loss: minimal  Findings: 6 6L ascites removed    Specimens: none    Complications:  none immediate    Anesthesia: Local    Abelino Meyer MD     Date: 2020  Time: 7:00 PM

## 2020-01-28 ENCOUNTER — HOSPITAL ENCOUNTER (OUTPATIENT)
Dept: RADIOLOGY | Facility: HOSPITAL | Age: 65
Discharge: HOME | End: 2020-01-28
Attending: INTERNAL MEDICINE | Admitting: RADIOLOGY
Payer: MEDICARE

## 2020-01-28 VITALS
SYSTOLIC BLOOD PRESSURE: 117 MMHG | OXYGEN SATURATION: 98 % | RESPIRATION RATE: 18 BRPM | DIASTOLIC BLOOD PRESSURE: 56 MMHG | HEART RATE: 78 BPM

## 2020-01-28 DIAGNOSIS — R18.8 ASCITES: ICD-10-CM

## 2020-01-28 PROCEDURE — 49083 ABD PARACENTESIS W/IMAGING: CPT

## 2020-01-28 PROCEDURE — 49083 ABD PARACENTESIS W/IMAGING: CPT | Performed by: RADIOLOGY

## 2020-01-28 RX ORDER — LIDOCAINE WITH 8.4% SOD BICARB 0.9%(10ML)
SYRINGE (ML) INJECTION CODE/TRAUMA/SEDATION MEDICATION
Status: COMPLETED | OUTPATIENT
Start: 2020-01-28 | End: 2020-01-28

## 2020-01-28 RX ORDER — ALBUMIN (HUMAN) 12.5 G/50ML
SOLUTION INTRAVENOUS
Status: COMPLETED | OUTPATIENT
Start: 2020-01-28 | End: 2020-01-28

## 2020-01-28 RX ADMIN — ALBUMIN (HUMAN) 50 G: 5 SOLUTION INTRAVENOUS at 10:22

## 2020-01-28 RX ADMIN — LIDOCAINE HYDROCHLORIDE 5 ML: 10 INJECTION, SOLUTION INFILTRATION; PERINEURAL at 10:02

## 2020-02-18 ENCOUNTER — HOSPITAL ENCOUNTER (OUTPATIENT)
Dept: RADIOLOGY | Facility: HOSPITAL | Age: 65
Discharge: HOME | End: 2020-02-18
Attending: INTERNAL MEDICINE | Admitting: RADIOLOGY
Payer: MEDICARE

## 2020-02-18 VITALS
DIASTOLIC BLOOD PRESSURE: 64 MMHG | OXYGEN SATURATION: 98 % | RESPIRATION RATE: 16 BRPM | SYSTOLIC BLOOD PRESSURE: 135 MMHG | HEART RATE: 65 BPM

## 2020-02-18 DIAGNOSIS — R18.8 ASCITES: ICD-10-CM

## 2020-02-18 PROCEDURE — 49083 ABD PARACENTESIS W/IMAGING: CPT | Performed by: RADIOLOGY

## 2020-02-18 PROCEDURE — 49083 ABD PARACENTESIS W/IMAGING: CPT

## 2020-02-18 RX ORDER — ALBUMIN (HUMAN) 12.5 G/50ML
SOLUTION INTRAVENOUS
Status: COMPLETED | OUTPATIENT
Start: 2020-02-18 | End: 2020-02-18

## 2020-02-18 RX ADMIN — ALBUMIN (HUMAN) 50 G: 5 SOLUTION INTRAVENOUS at 09:14

## 2020-02-18 NOTE — DISCHARGE INSTRUCTIONS
Abdominal Paracentesis     WHAT YOU NEED TO KNOW:   Abdominal paracentesis is a procedure to remove abnormal fluid buildup in your abdomen  Fluid builds up because of liver problems, such as swelling and scarring  Heart failure, kidney disease, a mass, or problems with your pancreas may also cause fluid buildup  DISCHARGE INSTRUCTIONS:     Follow up with your healthcare provider as directed: Write down your questions so you remember to ask them during your visits  Wound care: Remove dressing after 24 hours  Leave glue in place  Return to your normal activities    Contact Interventional Radiology at [phone] Darleen PATIENTS: Contact Interventional Radiology at [phone]) Braxton Treviño PATIENTS: Contact Interventional Radiology at [phone]) if:  · You have a fever and your wound is red and swollen  · You have yellow, green, or bad-smelling discharge coming from your wound  · You have pain or swelling in your abdomen  · You have an upset stomach or you vomit  · You have sudden, sharp pain in your abdomen  · You urinate very little or not at all  · You feel confused and more tired than usual    · Your arm or leg feels warm, tender, and painful  It may look swollen and red  · You suddenly feel lightheaded and have trouble breathing

## 2020-03-10 ENCOUNTER — HOSPITAL ENCOUNTER (OUTPATIENT)
Dept: RADIOLOGY | Facility: HOSPITAL | Age: 65
Discharge: HOME | End: 2020-03-10
Attending: INTERNAL MEDICINE
Payer: MEDICARE

## 2020-03-10 VITALS
RESPIRATION RATE: 18 BRPM | DIASTOLIC BLOOD PRESSURE: 63 MMHG | HEART RATE: 71 BPM | OXYGEN SATURATION: 97 % | SYSTOLIC BLOOD PRESSURE: 133 MMHG

## 2020-03-10 DIAGNOSIS — R18.8 ASCITES: ICD-10-CM

## 2020-03-10 PROCEDURE — 49083 ABD PARACENTESIS W/IMAGING: CPT

## 2020-03-10 PROCEDURE — 49083 ABD PARACENTESIS W/IMAGING: CPT | Performed by: RADIOLOGY

## 2020-03-10 RX ORDER — ALBUMIN (HUMAN) 12.5 G/50ML
SOLUTION INTRAVENOUS
Status: COMPLETED | OUTPATIENT
Start: 2020-03-10 | End: 2020-03-10

## 2020-03-10 RX ADMIN — ALBUMIN (HUMAN) 50 G: 5 SOLUTION INTRAVENOUS at 09:54

## 2020-03-31 ENCOUNTER — HOSPITAL ENCOUNTER (OUTPATIENT)
Dept: RADIOLOGY | Facility: HOSPITAL | Age: 65
Discharge: HOME | End: 2020-03-31
Attending: INTERNAL MEDICINE
Payer: MEDICARE

## 2020-03-31 VITALS
RESPIRATION RATE: 18 BRPM | DIASTOLIC BLOOD PRESSURE: 56 MMHG | TEMPERATURE: 98.9 F | SYSTOLIC BLOOD PRESSURE: 119 MMHG | HEART RATE: 58 BPM | OXYGEN SATURATION: 96 %

## 2020-03-31 DIAGNOSIS — R18.8 OTHER ASCITES: ICD-10-CM

## 2020-03-31 DIAGNOSIS — R18.8 ASCITES: ICD-10-CM

## 2020-03-31 PROCEDURE — 49083 ABD PARACENTESIS W/IMAGING: CPT

## 2020-03-31 PROCEDURE — 49083 ABD PARACENTESIS W/IMAGING: CPT | Performed by: RADIOLOGY

## 2020-04-21 ENCOUNTER — HOSPITAL ENCOUNTER (OUTPATIENT)
Dept: RADIOLOGY | Facility: HOSPITAL | Age: 65
Discharge: HOME | End: 2020-04-21
Attending: INTERNAL MEDICINE | Admitting: RADIOLOGY
Payer: MEDICARE

## 2020-04-21 VITALS
HEART RATE: 57 BPM | OXYGEN SATURATION: 95 % | DIASTOLIC BLOOD PRESSURE: 58 MMHG | RESPIRATION RATE: 16 BRPM | SYSTOLIC BLOOD PRESSURE: 110 MMHG

## 2020-04-21 DIAGNOSIS — K70.11 ASCITES DUE TO ALCOHOLIC HEPATITIS: ICD-10-CM

## 2020-04-21 DIAGNOSIS — R18.8 ASCITES: ICD-10-CM

## 2020-04-21 PROCEDURE — 49083 ABD PARACENTESIS W/IMAGING: CPT

## 2020-04-21 PROCEDURE — 49083 ABD PARACENTESIS W/IMAGING: CPT | Performed by: RADIOLOGY

## 2020-04-21 RX ORDER — LIDOCAINE WITH 8.4% SOD BICARB 0.9%(10ML)
SYRINGE (ML) INJECTION CODE/TRAUMA/SEDATION MEDICATION
Status: COMPLETED | OUTPATIENT
Start: 2020-04-21 | End: 2020-04-21

## 2020-04-21 RX ADMIN — Medication 9 ML: at 10:39

## 2020-05-12 ENCOUNTER — HOSPITAL ENCOUNTER (OUTPATIENT)
Dept: RADIOLOGY | Facility: HOSPITAL | Age: 65
Discharge: HOME | End: 2020-05-12
Attending: INTERNAL MEDICINE | Admitting: RADIOLOGY
Payer: MEDICARE

## 2020-05-12 VITALS
RESPIRATION RATE: 18 BRPM | SYSTOLIC BLOOD PRESSURE: 135 MMHG | TEMPERATURE: 96 F | OXYGEN SATURATION: 98 % | DIASTOLIC BLOOD PRESSURE: 66 MMHG | HEART RATE: 56 BPM

## 2020-05-12 DIAGNOSIS — R18.8 ASCITES: ICD-10-CM

## 2020-05-12 PROCEDURE — 49083 ABD PARACENTESIS W/IMAGING: CPT

## 2020-05-12 PROCEDURE — 49083 ABD PARACENTESIS W/IMAGING: CPT | Performed by: RADIOLOGY

## 2020-05-12 RX ORDER — LIDOCAINE WITH 8.4% SOD BICARB 0.9%(10ML)
SYRINGE (ML) INJECTION CODE/TRAUMA/SEDATION MEDICATION
Status: COMPLETED | OUTPATIENT
Start: 2020-05-12 | End: 2020-05-12

## 2020-05-12 RX ADMIN — Medication 10 ML: at 10:36

## 2020-06-02 ENCOUNTER — HOSPITAL ENCOUNTER (OUTPATIENT)
Dept: RADIOLOGY | Facility: HOSPITAL | Age: 65
Discharge: HOME | End: 2020-06-02
Attending: INTERNAL MEDICINE
Payer: MEDICARE

## 2020-06-02 VITALS
OXYGEN SATURATION: 97 % | HEART RATE: 62 BPM | RESPIRATION RATE: 14 BRPM | DIASTOLIC BLOOD PRESSURE: 61 MMHG | SYSTOLIC BLOOD PRESSURE: 113 MMHG

## 2020-06-02 DIAGNOSIS — R18.8 ASCITES: ICD-10-CM

## 2020-06-02 DIAGNOSIS — K70.11 ASCITES DUE TO ALCOHOLIC HEPATITIS: ICD-10-CM

## 2020-06-02 PROCEDURE — 49083 ABD PARACENTESIS W/IMAGING: CPT

## 2020-06-02 PROCEDURE — 49083 ABD PARACENTESIS W/IMAGING: CPT | Performed by: RADIOLOGY

## 2020-06-02 RX ORDER — LIDOCAINE WITH 8.4% SOD BICARB 0.9%(10ML)
SYRINGE (ML) INJECTION CODE/TRAUMA/SEDATION MEDICATION
Status: COMPLETED | OUTPATIENT
Start: 2020-06-02 | End: 2020-06-02

## 2020-06-02 RX ADMIN — Medication 10 ML: at 10:07

## 2020-06-30 ENCOUNTER — HOSPITAL ENCOUNTER (OUTPATIENT)
Dept: RADIOLOGY | Facility: HOSPITAL | Age: 65
Discharge: HOME | End: 2020-06-30
Attending: INTERNAL MEDICINE | Admitting: RADIOLOGY
Payer: MEDICARE

## 2020-06-30 VITALS
SYSTOLIC BLOOD PRESSURE: 135 MMHG | DIASTOLIC BLOOD PRESSURE: 65 MMHG | RESPIRATION RATE: 18 BRPM | OXYGEN SATURATION: 96 % | HEART RATE: 60 BPM

## 2020-06-30 DIAGNOSIS — R18.8 ASCITES: ICD-10-CM

## 2020-06-30 PROCEDURE — 49083 ABD PARACENTESIS W/IMAGING: CPT | Performed by: RADIOLOGY

## 2020-06-30 PROCEDURE — 49083 ABD PARACENTESIS W/IMAGING: CPT

## 2020-06-30 RX ORDER — LIDOCAINE WITH 8.4% SOD BICARB 0.9%(10ML)
SYRINGE (ML) INJECTION CODE/TRAUMA/SEDATION MEDICATION
Status: COMPLETED | OUTPATIENT
Start: 2020-06-30 | End: 2020-06-30

## 2020-06-30 RX ADMIN — Medication 10 ML: at 12:40

## 2020-06-30 NOTE — DISCHARGE INSTRUCTIONS
Abdominal Paracentesis     WHAT YOU NEED TO KNOW:   Abdominal paracentesis is a procedure to remove abnormal fluid buildup in your abdomen  Fluid builds up because of liver problems, such as swelling and scarring  Heart failure, kidney disease, a mass, or problems with your pancreas may also cause fluid buildup  DISCHARGE INSTRUCTIONS:     Follow up with your healthcare provider as directed: Write down your questions so you remember to ask them during your visits  Wound care: Remove dressing after 24 hours  Leave glue in place  Return to your normal activities    Contact Interventional Radiology at [phone] Darleen PATIENTS: Contact Interventional Radiology at [phone]) Ila Thompson PATIENTS: Contact Interventional Radiology at [phone]) if:  · You have a fever and your wound is red and swollen  · You have yellow, green, or bad-smelling discharge coming from your wound  · You have pain or swelling in your abdomen  · You have an upset stomach or you vomit  · You have sudden, sharp pain in your abdomen  · You urinate very little or not at all  · You feel confused and more tired than usual    · Your arm or leg feels warm, tender, and painful  It may look swollen and red  · You suddenly feel lightheaded and have trouble breathing

## 2020-07-19 ENCOUNTER — HOSPITAL ENCOUNTER (INPATIENT)
Facility: HOSPITAL | Age: 65
LOS: 2 days | Discharge: HOME | DRG: 202 | End: 2020-07-21
Attending: EMERGENCY MEDICINE | Admitting: INTERNAL MEDICINE
Payer: MEDICARE

## 2020-07-19 ENCOUNTER — APPOINTMENT (EMERGENCY)
Dept: RADIOLOGY | Facility: HOSPITAL | Age: 65
DRG: 202 | End: 2020-07-19
Payer: MEDICARE

## 2020-07-19 DIAGNOSIS — J45.901 ASTHMA EXACERBATION: Primary | ICD-10-CM

## 2020-07-19 PROBLEM — R06.02 SHORTNESS OF BREATH: Status: ACTIVE | Noted: 2020-07-19

## 2020-07-19 LAB
ANION GAP SERPL CALCULATED.3IONS-SCNC: 4 MMOL/L (ref 4–13)
BASOPHILS # BLD AUTO: 0.03 THOUSANDS/ΜL (ref 0–0.1)
BASOPHILS NFR BLD AUTO: 1 % (ref 0–1)
BUN SERPL-MCNC: 10 MG/DL (ref 5–25)
CALCIUM SERPL-MCNC: 8.8 MG/DL (ref 8.3–10.1)
CHLORIDE SERPL-SCNC: 101 MMOL/L (ref 100–108)
CO2 SERPL-SCNC: 31 MMOL/L (ref 21–32)
CREAT SERPL-MCNC: 0.7 MG/DL (ref 0.6–1.3)
EOSINOPHIL # BLD AUTO: 0.24 THOUSAND/ΜL (ref 0–0.61)
EOSINOPHIL NFR BLD AUTO: 6 % (ref 0–6)
ERYTHROCYTE [DISTWIDTH] IN BLOOD BY AUTOMATED COUNT: 14.6 % (ref 11.6–15.1)
GFR SERPL CREATININE-BSD FRML MDRD: 90 ML/MIN/1.73SQ M
GLUCOSE SERPL-MCNC: 158 MG/DL (ref 65–140)
GLUCOSE SERPL-MCNC: 181 MG/DL (ref 65–140)
GLUCOSE SERPL-MCNC: 332 MG/DL (ref 65–140)
HCT VFR BLD AUTO: 38.2 % (ref 34.8–46.1)
HGB BLD-MCNC: 12.1 G/DL (ref 11.5–15.4)
IMM GRANULOCYTES # BLD AUTO: 0.01 THOUSAND/UL (ref 0–0.2)
IMM GRANULOCYTES NFR BLD AUTO: 0 % (ref 0–2)
LYMPHOCYTES # BLD AUTO: 0.31 THOUSANDS/ΜL (ref 0.6–4.47)
LYMPHOCYTES NFR BLD AUTO: 8 % (ref 14–44)
MCH RBC QN AUTO: 29.7 PG (ref 26.8–34.3)
MCHC RBC AUTO-ENTMCNC: 31.7 G/DL (ref 31.4–37.4)
MCV RBC AUTO: 94 FL (ref 82–98)
MONOCYTES # BLD AUTO: 0.25 THOUSAND/ΜL (ref 0.17–1.22)
MONOCYTES NFR BLD AUTO: 7 % (ref 4–12)
NEUTROPHILS # BLD AUTO: 2.96 THOUSANDS/ΜL (ref 1.85–7.62)
NEUTS SEG NFR BLD AUTO: 78 % (ref 43–75)
NRBC BLD AUTO-RTO: 0 /100 WBCS
NT-PROBNP SERPL-MCNC: 83 PG/ML
PLATELET # BLD AUTO: 101 THOUSANDS/UL (ref 149–390)
PLATELET # BLD AUTO: 115 THOUSANDS/UL (ref 149–390)
PMV BLD AUTO: 10 FL (ref 8.9–12.7)
PMV BLD AUTO: 10.4 FL (ref 8.9–12.7)
POTASSIUM SERPL-SCNC: 4.7 MMOL/L (ref 3.5–5.3)
RBC # BLD AUTO: 4.08 MILLION/UL (ref 3.81–5.12)
SARS-COV-2 RNA RESP QL NAA+PROBE: NEGATIVE
SODIUM SERPL-SCNC: 136 MMOL/L (ref 136–145)
TROPONIN I SERPL-MCNC: <0.02 NG/ML
TROPONIN I SERPL-MCNC: <0.02 NG/ML
WBC # BLD AUTO: 3.8 THOUSAND/UL (ref 4.31–10.16)

## 2020-07-19 PROCEDURE — 82948 REAGENT STRIP/BLOOD GLUCOSE: CPT

## 2020-07-19 PROCEDURE — 85049 AUTOMATED PLATELET COUNT: CPT | Performed by: INTERNAL MEDICINE

## 2020-07-19 PROCEDURE — 71046 X-RAY EXAM CHEST 2 VIEWS: CPT

## 2020-07-19 PROCEDURE — 99291 CRITICAL CARE FIRST HOUR: CPT | Performed by: EMERGENCY MEDICINE

## 2020-07-19 PROCEDURE — 87635 SARS-COV-2 COVID-19 AMP PRB: CPT | Performed by: INTERNAL MEDICINE

## 2020-07-19 PROCEDURE — 84484 ASSAY OF TROPONIN QUANT: CPT | Performed by: INTERNAL MEDICINE

## 2020-07-19 PROCEDURE — 96374 THER/PROPH/DIAG INJ IV PUSH: CPT

## 2020-07-19 PROCEDURE — 99223 1ST HOSP IP/OBS HIGH 75: CPT | Performed by: INTERNAL MEDICINE

## 2020-07-19 PROCEDURE — 36415 COLL VENOUS BLD VENIPUNCTURE: CPT | Performed by: EMERGENCY MEDICINE

## 2020-07-19 PROCEDURE — 83880 ASSAY OF NATRIURETIC PEPTIDE: CPT | Performed by: EMERGENCY MEDICINE

## 2020-07-19 PROCEDURE — 94640 AIRWAY INHALATION TREATMENT: CPT

## 2020-07-19 PROCEDURE — 99291 CRITICAL CARE FIRST HOUR: CPT

## 2020-07-19 PROCEDURE — 93005 ELECTROCARDIOGRAM TRACING: CPT

## 2020-07-19 PROCEDURE — 84484 ASSAY OF TROPONIN QUANT: CPT | Performed by: EMERGENCY MEDICINE

## 2020-07-19 PROCEDURE — 80048 BASIC METABOLIC PNL TOTAL CA: CPT | Performed by: EMERGENCY MEDICINE

## 2020-07-19 PROCEDURE — 85025 COMPLETE CBC W/AUTO DIFF WBC: CPT | Performed by: EMERGENCY MEDICINE

## 2020-07-19 RX ORDER — TITANIUM DIOXIDE, OCTINOXATE, ZINC OXIDE 4.61; 1.6; .78 G/40ML; G/40ML; G/40ML
1 CREAM TOPICAL DAILY
Status: DISCONTINUED | OUTPATIENT
Start: 2020-07-20 | End: 2020-07-19

## 2020-07-19 RX ORDER — FLUTICASONE PROPIONATE 110 UG/1
2 AEROSOL, METERED RESPIRATORY (INHALATION)
Status: DISCONTINUED | OUTPATIENT
Start: 2020-07-19 | End: 2020-07-21 | Stop reason: HOSPADM

## 2020-07-19 RX ORDER — METHYLPREDNISOLONE SODIUM SUCCINATE 40 MG/ML
40 INJECTION, POWDER, LYOPHILIZED, FOR SOLUTION INTRAMUSCULAR; INTRAVENOUS EVERY 6 HOURS SCHEDULED
Status: DISCONTINUED | OUTPATIENT
Start: 2020-07-20 | End: 2020-07-20

## 2020-07-19 RX ORDER — NITROFURANTOIN 25; 75 MG/1; MG/1
100 CAPSULE ORAL 2 TIMES DAILY
Status: DISCONTINUED | OUTPATIENT
Start: 2020-07-19 | End: 2020-07-21 | Stop reason: HOSPADM

## 2020-07-19 RX ORDER — IPRATROPIUM BROMIDE AND ALBUTEROL SULFATE 2.5; .5 MG/3ML; MG/3ML
3 SOLUTION RESPIRATORY (INHALATION) 4 TIMES DAILY
Status: DISCONTINUED | OUTPATIENT
Start: 2020-07-19 | End: 2020-07-19

## 2020-07-19 RX ORDER — METOPROLOL SUCCINATE 25 MG/1
25 TABLET, EXTENDED RELEASE ORAL 2 TIMES DAILY
Status: DISCONTINUED | OUTPATIENT
Start: 2020-07-19 | End: 2020-07-21 | Stop reason: HOSPADM

## 2020-07-19 RX ORDER — ACETAMINOPHEN 325 MG/1
500 TABLET ORAL EVERY 6 HOURS PRN
Status: DISCONTINUED | OUTPATIENT
Start: 2020-07-19 | End: 2020-07-21 | Stop reason: HOSPADM

## 2020-07-19 RX ORDER — HEPARIN SODIUM 5000 [USP'U]/ML
5000 INJECTION, SOLUTION INTRAVENOUS; SUBCUTANEOUS EVERY 8 HOURS SCHEDULED
Status: DISCONTINUED | OUTPATIENT
Start: 2020-07-19 | End: 2020-07-21 | Stop reason: HOSPADM

## 2020-07-19 RX ORDER — LANSOPRAZOLE 30 MG/1
30 CAPSULE, DELAYED RELEASE ORAL DAILY
COMMUNITY
End: 2021-06-14 | Stop reason: SDUPTHER

## 2020-07-19 RX ORDER — SPIRONOLACTONE 50 MG/1
100 TABLET, FILM COATED ORAL DAILY
Status: DISCONTINUED | OUTPATIENT
Start: 2020-07-20 | End: 2020-07-21 | Stop reason: HOSPADM

## 2020-07-19 RX ORDER — METHYLPREDNISOLONE SODIUM SUCCINATE 125 MG/2ML
125 INJECTION, POWDER, LYOPHILIZED, FOR SOLUTION INTRAMUSCULAR; INTRAVENOUS ONCE
Status: COMPLETED | OUTPATIENT
Start: 2020-07-19 | End: 2020-07-19

## 2020-07-19 RX ORDER — SODIUM CHLORIDE FOR INHALATION 0.9 %
3 VIAL, NEBULIZER (ML) INHALATION ONCE
Status: COMPLETED | OUTPATIENT
Start: 2020-07-19 | End: 2020-07-19

## 2020-07-19 RX ORDER — ESCITALOPRAM OXALATE 10 MG/1
20 TABLET ORAL DAILY
Status: DISCONTINUED | OUTPATIENT
Start: 2020-07-20 | End: 2020-07-21 | Stop reason: HOSPADM

## 2020-07-19 RX ORDER — PANTOPRAZOLE SODIUM 40 MG/1
40 TABLET, DELAYED RELEASE ORAL
Status: DISCONTINUED | OUTPATIENT
Start: 2020-07-20 | End: 2020-07-21 | Stop reason: HOSPADM

## 2020-07-19 RX ORDER — ALBUTEROL SULFATE 2.5 MG/3ML
2.5 SOLUTION RESPIRATORY (INHALATION) EVERY 4 HOURS PRN
Status: DISCONTINUED | OUTPATIENT
Start: 2020-07-19 | End: 2020-07-21 | Stop reason: HOSPADM

## 2020-07-19 RX ORDER — IPRATROPIUM BROMIDE AND ALBUTEROL SULFATE 2.5; .5 MG/3ML; MG/3ML
3 SOLUTION RESPIRATORY (INHALATION)
Status: DISCONTINUED | OUTPATIENT
Start: 2020-07-20 | End: 2020-07-21 | Stop reason: HOSPADM

## 2020-07-19 RX ORDER — NITROFURANTOIN 25; 75 MG/1; MG/1
100 CAPSULE ORAL 2 TIMES DAILY
COMMUNITY

## 2020-07-19 RX ORDER — FUROSEMIDE 40 MG/1
80 TABLET ORAL DAILY
Status: DISCONTINUED | OUTPATIENT
Start: 2020-07-20 | End: 2020-07-21 | Stop reason: HOSPADM

## 2020-07-19 RX ORDER — FUROSEMIDE 10 MG/ML
40 INJECTION INTRAMUSCULAR; INTRAVENOUS ONCE
Status: COMPLETED | OUTPATIENT
Start: 2020-07-19 | End: 2020-07-19

## 2020-07-19 RX ADMIN — NITROFURANTOIN (MONOHYDRATE/MACROCRYSTALS) 100 MG: 75; 25 CAPSULE ORAL at 22:29

## 2020-07-19 RX ADMIN — ALBUTEROL SULFATE 10 MG: 2.5 SOLUTION RESPIRATORY (INHALATION) at 12:50

## 2020-07-19 RX ADMIN — METHYLPREDNISOLONE SODIUM SUCCINATE 125 MG: 125 INJECTION, POWDER, FOR SOLUTION INTRAMUSCULAR; INTRAVENOUS at 15:22

## 2020-07-19 RX ADMIN — HEPARIN SODIUM 5000 UNITS: 5000 INJECTION INTRAVENOUS; SUBCUTANEOUS at 22:29

## 2020-07-19 RX ADMIN — ISODIUM CHLORIDE 3 ML: 0.03 SOLUTION RESPIRATORY (INHALATION) at 12:30

## 2020-07-19 RX ADMIN — FLUTICASONE PROPIONATE 2 PUFF: 110 AEROSOL, METERED RESPIRATORY (INHALATION) at 22:29

## 2020-07-19 RX ADMIN — FUROSEMIDE 40 MG: 10 INJECTION, SOLUTION INTRAMUSCULAR; INTRAVENOUS at 22:29

## 2020-07-19 RX ADMIN — IPRATROPIUM BROMIDE 1 MG: 0.5 SOLUTION RESPIRATORY (INHALATION) at 12:50

## 2020-07-19 NOTE — PLAN OF CARE
Problem: Potential for Falls  Goal: Patient will remain free of falls  Description  INTERVENTIONS:  - Assess patient frequently for physical needs  -  Identify cognitive and physical deficits and behaviors that affect risk of falls    -  [place] fall precautions as indicated by assessment   - Educate patient/family on patient safety including physical limitations  - Instruct patient to call for assistance with activity based on assessment  - Modify environment to reduce risk of injury  - Consider OT/PT consult to assist with strengthening/mobility  Outcome: Progressing     Problem: RESPIRATORY - ADULT  Goal: Achieves optimal ventilation and oxygenation  Description  INTERVENTIONS:  - Assess for changes in respiratory status  - Assess for changes in mentation and behavior  - Position to facilitate oxygenation and minimize respiratory effort  - Oxygen administered by appropriate delivery if ordered  - Initiate smoking cessation education as indicated  - Encourage broncho-pulmonary hygiene including cough, deep breathe, Incentive Spirometry  - Assess the need for suctioning and aspirate as needed  - Assess and instruct to report SOB or any respiratory difficulty  - Respiratory Therapy support as indicated  Outcome: Progressing     Problem: PAIN - ADULT  Goal: Verbalizes/displays adequate comfort level or baseline comfort level  Description  Interventions:  - Encourage patient to monitor pain and request assistance  - Assess pain using appropriate pain scale  - Administer analgesics based on type and severity of pain and evaluate response  - Implement non-pharmacological measures as appropriate and evaluate response  - Consider cultural and social influences on pain and pain management  - Notify physician/advanced practitioner if interventions unsuccessful or patient reports new pain  Outcome: Progressing     Problem: INFECTION - ADULT  Goal: Absence or prevention of progression during hospitalization  Description  INTERVENTIONS:  - Assess and monitor for signs and symptoms of infection  - Monitor lab/diagnostic results  - Monitor all insertion sites, i e  indwelling lines, tubes, and drains  - Monitor endotracheal if appropriate and nasal secretions for changes in amount and color  - [place] appropriate cooling/warming therapies per order  - Administer medications as ordered  - Instruct and encourage patient and family to use good hand hygiene technique  - Identify and instruct in appropriate isolation precautions for identified infection/condition  Outcome: Progressing

## 2020-07-19 NOTE — ED PROVIDER NOTES
History  Chief Complaint   Patient presents with    Shortness of Breath     sob, "wheezing" cough, lower extremity edema  A 76 yo female with pmhx of asthma, cirrhosis (most recent paracentesis, 6/30), HTN, DM, DVT and GERD; presents with shortness of breath, wheezing, cough and lower extremity edema which has gotten progressively worse for the past 2-3 weeks  Patient states she has been using her nebulizer over the past week without improvement in her symptoms  Patient does also complain of increasing abdominal distension, and is concerned she may need an another paracentesis  Patient otherwise denies fever, chills, chest pain, abdominal pain, nausea, vomiting, diarrhea and rashes  Of note, patient was seen at [address] [place] [address] ED on 7/14 for the left lower extremity swelling  Patient had a Doppler of the left lower extremity which was negative for acute DVT  A/P:  Dyspnea, associated with cough, wheezing and lower extremity edema  Patient has no documented history of CHF however given history of cirrhosis and lower extremity edema, there is concern for possible vascular congestion  Also possibly due to asthma/COPD exacerbation  Will check lab work and chest x-ray for further evaluation  Will treat symptomatically with a Heart neb  History provided by:  Patient and medical records  Shortness of Breath   Associated symptoms: cough and wheezing        Prior to Admission Medications   Prescriptions Last Dose Informant Patient Reported? Taking?    Calcium Carb-Ergocalciferol 250-125 MG-UNIT TABS   Yes Yes   Sig: Take 1 tablet by mouth daily   Cranberry 400 MG CAPS   Yes Yes   Sig: Take 1 capsule by mouth daily   PROCTOZONE-HC 2 5 % rectal cream   Yes No   Sig: Apply 1 each topically as needed rectally   acetaminophen (TYLENOL) 500 mg tablet   Yes Yes   Sig: Take 500 mg by mouth every 6 (six) hours as needed   escitalopram (LEXAPRO) 20 mg tablet   Yes Yes   Sig: Take 20 mg by mouth daily   furosemide (LASIX) 40 mg tablet   Yes Yes   Sig: Take 80 mg by mouth daily   hydrocortisone 2 5 % cream   Yes No   Sig: Apply 1 application topically 2 (two) times a day   ipratropium-albuterol (DUO-NEB) 0 5-2 5 mg/3 mL nebulizer solution 7/19/2020 at Unknown time  Yes Yes   Sig: Take 3 mL by nebulization 4 (four) times a day   metoprolol succinate (TOPROL-XL) 25 mg 24 hr tablet   Yes No   Sig: Take 1 tablet by mouth 2 (two) times a day   nitrofurantoin (MACROBID) 100 mg capsule   Yes Yes   Sig: Take 100 mg by mouth 2 (two) times a day   omeprazole (PRILOSEC) 20 mg delayed release capsule   Yes Yes   Sig: Take 20 mg by mouth every other day    spironolactone (ALDACTONE) 50 mg tablet   Yes Yes   Sig: Take 100 mg by mouth daily       Facility-Administered Medications: None       Past Medical History:   Diagnosis Date    Anxiety     Arthritis     Asthma     [facility]rhosis ([facility] )     Depression     Disease of thyroid gland     nodules    Diverticulitis 2011    Diverticulosis     DVT (deep venous thrombosis) ([place] [address] ) 2013    GERD (gastroesophageal reflux disease)     Liver disease     cirrhosis    Pneumonia     Portal hypertensive gastropathy ([place] [address] )     Sleep apnea     Vertigo        Past Surgical History:   Procedure Laterality Date    BLADDER SUSPENSION      CHOLECYSTECTOMY      COLONOSCOPY  05/24/2019    had multiple with last being 43040    EGD      HERNIA REPAIR      IR PARACENTESIS  8/23/2018    IR PARACENTESIS  11/5/2018    IR PARACENTESIS  1/11/2019    IR PARACENTESIS  2/27/2019    IR PARACENTESIS  4/15/2019    IR PARACENTESIS  6/3/2019    IR PARACENTESIS  7/10/2019    IR PARACENTESIS  8/16/2019    IR PARACENTESIS  9/10/2019    IR PARACENTESIS  10/7/2019    IR PARACENTESIS  11/5/2019    IR PARACENTESIS  11/22/2019    IR PARACENTESIS  12/17/2019    IR PARACENTESIS  1/7/2020    IR PARACENTESIS  1/28/2020    IR PARACENTESIS  2/18/2020    IR PARACENTESIS  3/10/2020    IR PARACENTESIS  3/31/2020    IR PARACENTESIS 4/21/2020    IR PARACENTESIS  5/12/2020    IR PARACENTESIS  6/2/2020    IR PARACENTESIS  6/30/2020    TONSILLECTOMY      TRIGGER FINGER RELEASE Bilateral        History reviewed  No pertinent family history  I have reviewed and agree with the history as documented  E-Cigarette/Vaping     E-Cigarette/Vaping Substances     Social History     Tobacco Use    Smoking status: Never Smoker    Smokeless tobacco: Never Used   Substance Use Topics    Alcohol use: Yes     Comment: rare    Drug use: Never       Review of Systems   Respiratory: Positive for cough, chest tightness, shortness of breath and wheezing  Cardiovascular: Positive for leg swelling  Gastrointestinal: Positive for abdominal distention  All other systems reviewed and are negative  Physical Exam  Physical Exam  General Appearance: alert and oriented, nad, non toxic appearing  Skin:  Warm, dry, intact  HEENT: atraumatic, normocephalic  Neck: Supple, trachea midline  Cardiac: RRR; no murmurs, rub, gallops  Pulmonary: No acute respiratory distress  Diminished air entry bilaterally with bilateral wheezing  No rhonchi/rales  Gastrointestinal: abdomen soft, nontender, distended; no guarding or rebound tenderness; good bowel sounds, no mass or bruits  Extremities:  Pitting edema appreciated to the bilateral lower extremities, LLE > RLE    2+ pulses; no calf tenderness, no clubbing, no cyanosis  Neuro:  no focal motor or sensory deficits, CN 2-12 grossly intact  Psych:  Normal mood and affect, normal judgement and insight      Vital Signs  ED Triage Vitals [07/19/20 1214]   Temperature Pulse Respirations Blood Pressure SpO2   97 5 °F (36 4 °C) 83 16 125/57 94 %      Temp Source Heart Rate Source Patient Position - Orthostatic VS BP Location FiO2 (%)   Temporal -- Sitting Left arm --      Pain Score       --           Vitals:    07/19/20 1330 07/19/20 1345 07/19/20 1400 07/19/20 1625   BP:  151/77  131/58   Pulse: 82  84 85   Patient Position - Orthostatic VS:    Lying         Visual Acuity      ED Medications  Medications   albuterol inhalation solution 10 mg (10 mg Nebulization Given 7/19/20 1250)     And   ipratropium (ATROVENT) 0 02 % inhalation solution 1 mg (1 mg Nebulization Given 7/19/20 1250)     And   sodium chloride 0 9 % inhalation solution 3 mL (has no administration in time range)   methylPREDNISolone sodium succinate (Solu-MEDROL) injection 125 mg (125 mg Intravenous Given 7/19/20 1522)       Diagnostic Studies  Results Reviewed     Procedure Component Value Units Date/Time    Basic metabolic panel [[id number]]  (Abnormal) Collected:  07/19/20 1246    Lab Status:  Final result Specimen:  Blood from Arm, Left Updated:  07/19/20 1316     Sodium 136 mmol/L      Potassium 4 7 mmol/L      Chloride 101 mmol/L      CO2 31 mmol/L      ANION GAP 4 mmol/L      BUN 10 mg/dL      Creatinine 0 70 mg/dL      Glucose 158 mg/dL      Calcium 8 8 mg/dL      eGFR 90 ml/min/1 73sq m     Narrative:       [place] guidelines for Chronic Kidney Disease (CKD):     Stage 1 with normal or high GFR (GFR > 90 mL/min/1 73 square meters)    Stage 2 Mild CKD (GFR = 60-89 mL/min/1 73 square meters)    Stage 3A Moderate CKD (GFR = 45-59 mL/min/1 73 square meters)    Stage 3B Moderate CKD (GFR = 30-44 mL/min/1 73 square meters)    Stage 4 Severe CKD (GFR = 15-29 mL/min/1 73 square meters)    Stage 5 End Stage CKD (GFR <15 mL/min/1 73 square meters)  Note: GFR calculation is accurate only with a steady state creatinine    NT-BNP PRO [[id number]]  (Normal) Collected:  07/19/20 1246    Lab Status:  Final result Specimen:  Blood from Arm, Left Updated:  07/19/20 1316     NT-proBNP 83 pg/mL     CBC and differential [[id number]]  (Abnormal) Collected:  07/19/20 1246    Lab Status:  Final result Specimen:  Blood from Arm, Left Updated:  07/19/20 1315     WBC 3 80 Thousand/uL      RBC 4 08 Million/uL      Hemoglobin 12 1 g/dL      Hematocrit 38 2 %      MCV 94 fL      MCH 29 7 pg      MCHC 31 7 g/dL      RDW 14 6 %      MPV 10 4 fL      Platelets 928 Thousands/uL      nRBC 0 /100 WBCs      Neutrophils Relative 78 %      Immat GRANS % 0 %      Lymphocytes Relative 8 %      Monocytes Relative 7 %      Eosinophils Relative 6 %      Basophils Relative 1 %      Neutrophils Absolute 2 96 Thousands/µL      Immature Grans Absolute 0 01 Thousand/uL      Lymphocytes Absolute 0 31 Thousands/µL      Monocytes Absolute 0 25 Thousand/µL      Eosinophils Absolute 0 24 Thousand/µL      Basophils Absolute 0 03 Thousands/µL     Troponin I [[id number]]  (Normal) Collected:  07/19/20 1246    Lab Status:  Final result Specimen:  Blood from Arm, Left Updated:  07/19/20 1314     Troponin I <0 02 ng/mL                  XR chest 2 views   ED Interpretation by Marija Pulido DO (07/19 1343)   No acute disease      Final Result by Janki Vidales MD (07/19 1503)      No acute cardiopulmonary disease              Workstation performed: [id number]                    Procedures  CriticalCare Time  Performed by: Marija Pulido DO  Authorized by: Marija Pulido DO     Critical care provider statement:     Critical care time (minutes):  30    Critical care time was exclusive of:  Separately billable procedures and treating other patients and teaching time    Critical care was necessary to treat or prevent imminent or life-threatening deterioration of the following conditions:  Respiratory failure    Critical care was time spent personally by me on the following activities:  Obtaining history from patient or surrogate, development of treatment plan with patient or surrogate, examination of patient, evaluation of patient's response to treatment, re-evaluation of patient's condition, ordering and review of radiographic studies, ordering and performing treatments and interventions, review of old charts and ordering and review of laboratory studies    I assumed direction of critical care for this patient from another provider in my specialty: no         ECG 12 Lead Documentation  Date/Time: today/date: 7/19/2020  Performed by: Richard Simmons    ECG reviewed by me, the ED Provider: yes    Patient location:  ED   Previous ECG:  No old for comparison    Rate:  76  ECG rate assessment: normal    Rhythm: sinus rhythm    Ectopy:  none    QRS axis:  Normal  Intervals: normal   Q waves: None   ST segments:  Normal  T waves: normal      Impression: Normal EKG          ED Course  ED Course as of Jul 19 1722   Sun Jul 19, 2020   1451 Pt reports feeling somewhat improved, completing breathing treatment at this time  CXR negative, labs within normal limits  Suspect SOB and wheezing are multifactorial from asthma exacerbation and abdominal distension due to ascites  Will give dose fo steroids now  1541 Patient completed Heart neb, reporting improvement in her symptoms  On re-examine, patient does have resolution of her wheezing  Patient's O2 sat in the low 90s on room air  Patient agreeable to admission for asthma exacerbation and if necessary a paracentesis  MDM      Disposition  Final diagnoses:   Asthma exacerbation     Time reflects when diagnosis was documented in both MDM as applicable and the Disposition within this note     Time User Action Codes Description Comment    7/19/2020  4:39 PM Richard Simmons Add [O93 [id number]] Asthma exacerbation       ED Disposition     ED Disposition Condition Date/Time Comment    Admit Stable [place] Jul 19, 2020  4:39 PM Case was discussed with CHARAN and the patient's admission status was agreed to be Admission Status: inpatient status to the service of Dr Lulu Reed   Follow-up Information    None         Patient's Medications   Discharge Prescriptions    No medications on file     No discharge procedures on file      PDMP Review     None          ED Provider  Electronically Signed by           Grady Guerrero DO  07/19/20 [address] [facility]

## 2020-07-20 ENCOUNTER — APPOINTMENT (INPATIENT)
Dept: RADIOLOGY | Facility: HOSPITAL | Age: 65
DRG: 202 | End: 2020-07-20
Attending: INTERNAL MEDICINE
Payer: MEDICARE

## 2020-07-20 LAB
ALBUMIN FLD-MCNC: 1.4 G/DL
APPEARANCE FLD: NORMAL
ATRIAL RATE: 76 BPM
COLOR FLD: NORMAL
GLUCOSE SERPL-MCNC: 209 MG/DL (ref 65–140)
GLUCOSE SERPL-MCNC: 217 MG/DL (ref 65–140)
GLUCOSE SERPL-MCNC: 283 MG/DL (ref 65–140)
GLUCOSE SERPL-MCNC: 289 MG/DL (ref 65–140)
GLUCOSE SERPL-MCNC: 341 MG/DL (ref 65–140)
HISTIOCYTES NFR FLD: 68 %
INR PPP: 1.18 (ref 0.84–1.19)
LDH FLD L TO P-CCNC: 75 U/L
LYMPHOCYTES NFR BLD AUTO: 15 %
MONO+MESO NFR FLD MANUAL: 1 %
MONOCYTES NFR BLD AUTO: 13 %
NEUTS SEG NFR BLD AUTO: 3 %
P AXIS: 58 DEGREES
PR INTERVAL: 136 MS
PROT FLD-MCNC: 3.3 G/DL
PROTHROMBIN TIME: 14.8 SECONDS (ref 11.6–14.5)
QRS AXIS: 64 DEGREES
QRSD INTERVAL: 66 MS
QT INTERVAL: 366 MS
QTC INTERVAL: 411 MS
SITE: NORMAL
T WAVE AXIS: 45 DEGREES
TOTAL CELLS COUNTED SPEC: 100
TROPONIN I SERPL-MCNC: <0.02 NG/ML
TROPONIN I SERPL-MCNC: <0.02 NG/ML
VENTRICULAR RATE: 76 BPM
WBC # FLD MANUAL: 325 /UL

## 2020-07-20 PROCEDURE — 84157 ASSAY OF PROTEIN OTHER: CPT | Performed by: INTERNAL MEDICINE

## 2020-07-20 PROCEDURE — 84484 ASSAY OF TROPONIN QUANT: CPT | Performed by: INTERNAL MEDICINE

## 2020-07-20 PROCEDURE — 99232 SBSQ HOSP IP/OBS MODERATE 35: CPT | Performed by: INTERNAL MEDICINE

## 2020-07-20 PROCEDURE — 93010 ELECTROCARDIOGRAM REPORT: CPT | Performed by: INTERNAL MEDICINE

## 2020-07-20 PROCEDURE — 49083 ABD PARACENTESIS W/IMAGING: CPT | Performed by: RADIOLOGY

## 2020-07-20 PROCEDURE — 82948 REAGENT STRIP/BLOOD GLUCOSE: CPT

## 2020-07-20 PROCEDURE — 0W9G3ZZ DRAINAGE OF PERITONEAL CAVITY, PERCUTANEOUS APPROACH: ICD-10-PCS | Performed by: RADIOLOGY

## 2020-07-20 PROCEDURE — 87205 SMEAR GRAM STAIN: CPT | Performed by: INTERNAL MEDICINE

## 2020-07-20 PROCEDURE — 94640 AIRWAY INHALATION TREATMENT: CPT

## 2020-07-20 PROCEDURE — 82042 OTHER SOURCE ALBUMIN QUAN EA: CPT | Performed by: INTERNAL MEDICINE

## 2020-07-20 PROCEDURE — 49083 ABD PARACENTESIS W/IMAGING: CPT

## 2020-07-20 PROCEDURE — 89051 BODY FLUID CELL COUNT: CPT | Performed by: INTERNAL MEDICINE

## 2020-07-20 PROCEDURE — 85610 PROTHROMBIN TIME: CPT | Performed by: INTERNAL MEDICINE

## 2020-07-20 PROCEDURE — 87070 CULTURE OTHR SPECIMN AEROBIC: CPT | Performed by: INTERNAL MEDICINE

## 2020-07-20 PROCEDURE — 83615 LACTATE (LD) (LDH) ENZYME: CPT | Performed by: INTERNAL MEDICINE

## 2020-07-20 RX ORDER — PREDNISONE 20 MG/1
40 TABLET ORAL DAILY
Status: DISCONTINUED | OUTPATIENT
Start: 2020-07-21 | End: 2020-07-21 | Stop reason: HOSPADM

## 2020-07-20 RX ORDER — METHYLPREDNISOLONE SODIUM SUCCINATE 40 MG/ML
40 INJECTION, POWDER, LYOPHILIZED, FOR SOLUTION INTRAMUSCULAR; INTRAVENOUS EVERY 6 HOURS SCHEDULED
Status: COMPLETED | OUTPATIENT
Start: 2020-07-20 | End: 2020-07-20

## 2020-07-20 RX ADMIN — IPRATROPIUM BROMIDE AND ALBUTEROL SULFATE 3 ML: 2.5; .5 SOLUTION RESPIRATORY (INHALATION) at 14:31

## 2020-07-20 RX ADMIN — NITROFURANTOIN (MONOHYDRATE/MACROCRYSTALS) 100 MG: 75; 25 CAPSULE ORAL at 17:07

## 2020-07-20 RX ADMIN — FUROSEMIDE 80 MG: 40 TABLET ORAL at 08:49

## 2020-07-20 RX ADMIN — METHYLPREDNISOLONE SODIUM SUCCINATE 40 MG: 40 INJECTION, POWDER, FOR SOLUTION INTRAMUSCULAR; INTRAVENOUS at 17:08

## 2020-07-20 RX ADMIN — INSULIN LISPRO 1 UNITS: 100 INJECTION, SOLUTION INTRAVENOUS; SUBCUTANEOUS at 17:00

## 2020-07-20 RX ADMIN — FLUTICASONE PROPIONATE 2 PUFF: 110 AEROSOL, METERED RESPIRATORY (INHALATION) at 22:07

## 2020-07-20 RX ADMIN — SPIRONOLACTONE 100 MG: 50 TABLET, FILM COATED ORAL at 08:49

## 2020-07-20 RX ADMIN — INSULIN LISPRO 2 UNITS: 100 INJECTION, SOLUTION INTRAVENOUS; SUBCUTANEOUS at 22:08

## 2020-07-20 RX ADMIN — PANTOPRAZOLE SODIUM 40 MG: 40 TABLET, DELAYED RELEASE ORAL at 06:50

## 2020-07-20 RX ADMIN — IPRATROPIUM BROMIDE AND ALBUTEROL SULFATE 3 ML: 2.5; .5 SOLUTION RESPIRATORY (INHALATION) at 19:40

## 2020-07-20 RX ADMIN — ESCITALOPRAM OXALATE 20 MG: 10 TABLET ORAL at 08:49

## 2020-07-20 RX ADMIN — INSULIN LISPRO 2 UNITS: 100 INJECTION, SOLUTION INTRAVENOUS; SUBCUTANEOUS at 01:14

## 2020-07-20 RX ADMIN — HEPARIN SODIUM 5000 UNITS: 5000 INJECTION INTRAVENOUS; SUBCUTANEOUS at 14:08

## 2020-07-20 RX ADMIN — FLUTICASONE PROPIONATE 2 PUFF: 110 AEROSOL, METERED RESPIRATORY (INHALATION) at 08:49

## 2020-07-20 RX ADMIN — METHYLPREDNISOLONE SODIUM SUCCINATE 40 MG: 40 INJECTION, POWDER, FOR SOLUTION INTRAMUSCULAR; INTRAVENOUS at 06:21

## 2020-07-20 RX ADMIN — METOPROLOL SUCCINATE 25 MG: 25 TABLET, EXTENDED RELEASE ORAL at 08:49

## 2020-07-20 RX ADMIN — NITROFURANTOIN (MONOHYDRATE/MACROCRYSTALS) 100 MG: 75; 25 CAPSULE ORAL at 08:49

## 2020-07-20 RX ADMIN — INSULIN LISPRO 1 UNITS: 100 INJECTION, SOLUTION INTRAVENOUS; SUBCUTANEOUS at 07:52

## 2020-07-20 RX ADMIN — METHYLPREDNISOLONE SODIUM SUCCINATE 40 MG: 40 INJECTION, POWDER, FOR SOLUTION INTRAMUSCULAR; INTRAVENOUS at 01:11

## 2020-07-20 RX ADMIN — IPRATROPIUM BROMIDE AND ALBUTEROL SULFATE 3 ML: 2.5; .5 SOLUTION RESPIRATORY (INHALATION) at 07:49

## 2020-07-20 RX ADMIN — HEPARIN SODIUM 5000 UNITS: 5000 INJECTION INTRAVENOUS; SUBCUTANEOUS at 06:21

## 2020-07-20 RX ADMIN — METOPROLOL SUCCINATE 25 MG: 25 TABLET, EXTENDED RELEASE ORAL at 17:07

## 2020-07-20 RX ADMIN — HEPARIN SODIUM 5000 UNITS: 5000 INJECTION INTRAVENOUS; SUBCUTANEOUS at 22:07

## 2020-07-20 RX ADMIN — METHYLPREDNISOLONE SODIUM SUCCINATE 40 MG: 40 INJECTION, POWDER, FOR SOLUTION INTRAMUSCULAR; INTRAVENOUS at 12:36

## 2020-07-20 RX ADMIN — INSULIN LISPRO 3 UNITS: 100 INJECTION, SOLUTION INTRAVENOUS; SUBCUTANEOUS at 12:33

## 2020-07-20 NOTE — ASSESSMENT & PLAN NOTE
? Differential includes asthma exacerbation, volume overload and 2/2 increased abdominal girth  CXR - unremarkable     2D echo pending  Patient has improved with steroids and nebulizers, will continue  Troponin negative x3  Saturating well on room air

## 2020-07-20 NOTE — PLAN OF CARE
Problem: Potential for Falls  Goal: Patient will remain free of falls  Description  INTERVENTIONS:  - Assess patient frequently for physical needs  -  Identify cognitive and physical deficits and behaviors that affect risk of falls    -  [place] fall precautions as indicated by assessment   - Educate patient/family on patient safety including physical limitations  - Instruct patient to call for assistance with activity based on assessment  - Modify environment to reduce risk of injury  - Consider OT/PT consult to assist with strengthening/mobility  Outcome: Progressing     Problem: RESPIRATORY - ADULT  Goal: Achieves optimal ventilation and oxygenation  Description  INTERVENTIONS:  - Assess for changes in respiratory status  - Assess for changes in mentation and behavior  - Position to facilitate oxygenation and minimize respiratory effort  - Oxygen administered by appropriate delivery if ordered  - Initiate smoking cessation education as indicated  - Encourage broncho-pulmonary hygiene including cough, deep breathe, Incentive Spirometry  - Assess the need for suctioning and aspirate as needed  - Assess and instruct to report SOB or any respiratory difficulty  - Respiratory Therapy support as indicated  Outcome: Progressing     Problem: PAIN - ADULT  Goal: Verbalizes/displays adequate comfort level or baseline comfort level  Description  Interventions:  - Encourage patient to monitor pain and request assistance  - Assess pain using appropriate pain scale  - Administer analgesics based on type and severity of pain and evaluate response  - Implement non-pharmacological measures as appropriate and evaluate response  - Consider cultural and social influences on pain and pain management  - Notify physician/advanced practitioner if interventions unsuccessful or patient reports new pain  Outcome: Progressing     Problem: INFECTION - ADULT  Goal: Absence or prevention of progression during hospitalization  Description  INTERVENTIONS:  - Assess and monitor for signs and symptoms of infection  - Monitor lab/diagnostic results  - Monitor all insertion sites, i e  indwelling lines, tubes, and drains  - Monitor endotracheal if appropriate and nasal secretions for changes in amount and color  - [place] appropriate cooling/warming therapies per order  - Administer medications as ordered  - Instruct and encourage patient and family to use good hand hygiene technique  - Identify and instruct in appropriate isolation precautions for identified infection/condition  Outcome: Progressing

## 2020-07-20 NOTE — ASSESSMENT & PLAN NOTE
Patient stating increasing abdominal girth  Has had frequent paracentesis in the past  Status post IR guided paracentesis today with 2400 cc removed  Continue with lasix, aldactone

## 2020-07-20 NOTE — PLAN OF CARE
Problem: Potential for Falls  Goal: Patient will remain free of falls  Description  INTERVENTIONS:  - Assess patient frequently for physical needs  -  Identify cognitive and physical deficits and behaviors that affect risk of falls    -  [place] fall precautions as indicated by assessment   - Educate patient/family on patient safety including physical limitations  - Instruct patient to call for assistance with activity based on assessment  - Modify environment to reduce risk of injury  - Consider OT/PT consult to assist with strengthening/mobility  Outcome: Progressing     Problem: RESPIRATORY - ADULT  Goal: Achieves optimal ventilation and oxygenation  Description  INTERVENTIONS:  - Assess for changes in respiratory status  - Assess for changes in mentation and behavior  - Position to facilitate oxygenation and minimize respiratory effort  - Oxygen administered by appropriate delivery if ordered  - Initiate smoking cessation education as indicated  - Encourage broncho-pulmonary hygiene including cough, deep breathe, Incentive Spirometry  - Assess the need for suctioning and aspirate as needed  - Assess and instruct to report SOB or any respiratory difficulty  - Respiratory Therapy support as indicated  Outcome: Progressing     Problem: PAIN - ADULT  Goal: Verbalizes/displays adequate comfort level or baseline comfort level  Description  Interventions:  - Encourage patient to monitor pain and request assistance  - Assess pain using appropriate pain scale  - Administer analgesics based on type and severity of pain and evaluate response  - Implement non-pharmacological measures as appropriate and evaluate response  - Consider cultural and social influences on pain and pain management  - Notify physician/advanced practitioner if interventions unsuccessful or patient reports new pain  Outcome: Progressing     Problem: INFECTION - ADULT  Goal: Absence or prevention of progression during hospitalization  Description  INTERVENTIONS:  - Assess and monitor for signs and symptoms of infection  - Monitor lab/diagnostic results  - Monitor all insertion sites, i e  indwelling lines, tubes, and drains  - Monitor endotracheal if appropriate and nasal secretions for changes in amount and color  - [place] appropriate cooling/warming therapies per order  - Administer medications as ordered  - Instruct and encourage patient and family to use good hand hygiene technique  - Identify and instruct in appropriate isolation precautions for identified infection/condition  Outcome: Progressing

## 2020-07-20 NOTE — ASSESSMENT & PLAN NOTE
With worsening SOB  Will start steroids and nebs  May transition to PO tomorrow if clinically improved

## 2020-07-20 NOTE — ASSESSMENT & PLAN NOTE
? Differential includes asthma exacerbation, volume overload and 2/2 increased abdominal girth  CXR - unremarkable  Will get Echo     Will treat with steroids and nebulizers  Will get IR consult tomorrow for ?para  Tropx3  Telemetry

## 2020-07-20 NOTE — PROGRESS NOTES
Progress Note - Jazz Young 1955, 77 y o  female MRN: [id number]    Unit/Bed#: [facility] [address] 222-01 Encounter: [id number]    Primary Care Provider: Traci Troncoso DO   Date and time admitted to hospital: 2020 12:15 PM        * Shortness of breath  Assessment & Plan  ? Differential includes asthma exacerbation, volume overload and 2/2 increased abdominal girth  CXR - unremarkable  2D echo pending  Patient has improved with steroids and nebulizers, will continue  Troponin negative x3  Saturating well on room air      Asthma  Assessment & Plan  With worsening SOB  Continue steroids can likely start on prednisone tomorrow    Decompensated hepatic cirrhosis ([facility] [address] )  Assessment & Plan  Patient stating increasing abdominal girth  Has had frequent paracentesis in the past  Status post IR guided paracentesis today with 2400 cc removed  Continue with lasix, aldactone            VTE Pharmacologic Prophylaxis:   Pharmacologic:  Heparin    Patient Centered Rounds: I have performed bedside rounds with nursing staff today  Time Spent for Care: 20 minutes  More than 50% of total time spent on counseling and coordination of care as described above  Current Length of Stay: 1 day(s)    Current Patient Status: Inpatient   Certification Statement: The patient will continue to require additional inpatient hospital stay due to Dyspnea    Discharge Plan / Estimated Discharge Date:  24-48H    Code Status: Level 2 - DNAR: but accepts endotracheal intubation      Subjective:   Patient seen and examined at bedside, currently denies any complaints    Objective:     Vitals:   Temp (24hrs), Av 6 °F (37 °C), Min:98 3 °F (36 8 °C), Max:99 1 °F (37 3 °C)    Temp:  [98 3 °F (36 8 °C)-99 1 °F (37 3 °C)] 98 3 °F (36 8 °C)  HR:  [68-81] 76  Resp:  [18] 18  BP: (110-156)/() 110/61  SpO2:  [90 %-95 %] 95 %  Body mass index is 41 8 kg/m²  Input and Output Summary (last 24 hours):        Intake/Output Summary (Last 24 hours) at 7/20/2020 1657  Last data filed at 7/20/2020 0620  Gross per 24 hour   Intake    Output 500 ml   Net -500 ml       Physical Exam:    Constitutional: Patient is oriented to person, place and time, no acute distress  HEENT:  Normocephalic, atraumatic  Cardiovascular: Normal S1S2, RRR, No murmurs/rubs/gallops appreciated  Pulmonary:  Bilateral air entry, No rhonchi/rales/wheezing appreciated  Abdominal: Soft, Bowel sounds present, Non-tender, Non-distended  Extremities:  No cyanosis, clubbing or edema  Neurological: Cranial nerves II-XII grossly intact, sensation intact, otherwise no focal neurological symptoms  Additional Data:     Labs:    Results from last 7 days   Lab Units 07/19/20  2214 07/19/20  1246   WBC Thousand/uL  --  3 80*   HEMOGLOBIN g/dL  --  12 1   HEMATOCRIT %  --  38 2   PLATELETS Thousands/uL 101* 115*   NEUTROS PCT %  --  78*   LYMPHS PCT %  --  8*   MONOS PCT %  --  7   EOS PCT %  --  6     Results from last 7 days   Lab Units 07/19/20  1246   POTASSIUM mmol/L 4 7   CHLORIDE mmol/L 101   CO2 mmol/L 31   BUN mg/dL 10   CREATININE mg/dL 0 70   CALCIUM mg/dL 8 8     Results from last 7 days   Lab Units 07/20/20  0817   INR  1 18        I Have Reviewed All Lab Data Listed Above          Recent Cultures (last 7 days):           Last 24 Hours Medication List:     Current Facility-Administered Medications:  acetaminophen 488 mg Oral Q6H PRN Haritha Whitt MD   albuterol 2 5 mg Nebulization Q4H PRN Haritha Whitt MD   escitalopram 20 mg Oral Daily Haritha Whitt MD   fluticasone 2 puff Inhalation BID Haritha Whitt MD   furosemide 80 mg Oral Daily Haritha Whitt MD   heparin (porcine) 5,000 Units Subcutaneous [facility] Haritha Whitt MD   insulin lispro 1-5 Units Subcutaneous TID Noemi Murphy MD   insulin lispro 1-5 Units Subcutaneous HS Haritha Whitt MD   ipratropium-albuterol 3 mL Nebulization TID Haritha Whitt MD   methylPREDNISolone sodium succinate 40 mg Intravenous Q6H [address] Peggy Meadows MD   metoprolol succinate 25 mg Oral BID Zachraiah Herzog MD   nitrofurantoin 100 mg Oral BID Zachariah Herzog MD   pantoprazole 40 mg Oral Early Morning Zachariah Herzog MD   [START ON 7/21/2020] predniSONE 40 mg Oral Daily Peggy Meadows MD   spironolactone 100 mg Oral Daily Zachariah Herzog MD        Today, Patient Was Seen By: Peggy Meadows MD

## 2020-07-20 NOTE — H&P
H&P- Kyle Muhammad 1955, 77 y o  female MRN: [id number]    Unit/Bed#: [facility] [address] 218-02 Encounter: [id number]    Primary Care Provider: Risa Fallon DO   Date and time admitted to hospital: 7/19/2020 12:15 PM        Shortness of breath  Assessment & Plan  ? Differential includes asthma exacerbation, volume overload and 2/2 increased abdominal girth  CXR - unremarkable  Will get Echo  Will treat with steroids and nebulizers  Will get IR consult tomorrow for ?para  Tropx3  Telemetry      Asthma  Assessment & Plan  With worsening SOB  Will start steroids and nebs  May transition to PO tomorrow if clinically improved  Decompensated hepatic cirrhosis ([facility] [address] )  Assessment & Plan  Outpatient follow up with GI  Continue diuretics  VTE Prophylaxis: Heparin  / sequential compression device   Code Status: Full Code  POLST: There is no POLST form on file for this patient (pre-hospital)  Discussion with family: Discussed with patient will update family  Anticipated Length of Stay:  Patient will be admitted on an Inpatient basis with an anticipated length of stay of   2 midnights  Justification for Hospital Stay: asthma exacerbation  Total Time for Visit, including Counseling / Coordination of Care: 45 minutes Greater than 50% of this total time spent on direct patient counseling and coordination of care  Chief Complaint:     Shortness of breath  History of Present Illness:    Kyle Muhammad is a 77 y o  female who presents with shortness of breath, and lower extremity swelling which started 2 days ago and has gradually been getting worse  Patient has a background of cirrhosis with recurrent ascites and paracentesis every 3-4 weeks  Today she feels her abdomen is "more tense" and also her breathing "very tight"  She feels better after treatments in the ED, but is also hoping for paracentesis prior to DC  Patient denies any fevers or chills or sick contacts       Review of Systems:    Review of Systems   Constitutional: Negative  HENT: Negative  Eyes: Negative  Respiratory: Positive for chest tightness and shortness of breath  Cardiovascular: Positive for leg swelling  Gastrointestinal: Positive for abdominal distention  Endocrine: Negative  Genitourinary: Negative  Musculoskeletal: Negative  Neurological: Negative  Hematological: Negative  Psychiatric/Behavioral: Negative  Past Medical and Surgical History:     Past Medical History:   Diagnosis Date    Anxiety     Arthritis     Asthma     Cir[place]osis ([place] [address] )     Depression     Disease of thyroid gland     nodules    Diverticulitis 2011    Diverticulosis     DVT (deep venous thrombosis) ([place] [address] ) 2013    GERD (gastroesophageal reflux disease)     Liver disease     cirrhosis    Pneumonia     Portal hypertensive gastropathy ([place] [address] )     Sleep apnea     Vertigo        Past Surgical History:   Procedure Laterality Date    BLADDER SUSPENSION      CHOLECYSTECTOMY      COLONOSCOPY  05/24/2019    had multiple with last being [id number]    EGD      HERNIA REPAIR      IR PARACENTESIS  8/23/2018    IR PARACENTESIS  11/5/2018    IR PARACENTESIS  1/11/2019    IR PARACENTESIS  2/27/2019    IR PARACENTESIS  4/15/2019    IR PARACENTESIS  6/3/2019    IR PARACENTESIS  7/10/2019    IR PARACENTESIS  8/16/2019    IR PARACENTESIS  9/10/2019    IR PARACENTESIS  10/7/2019    IR PARACENTESIS  11/5/2019    IR PARACENTESIS  11/22/2019    IR PARACENTESIS  12/17/2019    IR PARACENTESIS  1/7/2020    IR PARACENTESIS  1/28/2020    IR PARACENTESIS  2/18/2020    IR PARACENTESIS  3/10/2020    IR PARACENTESIS  3/31/2020    IR PARACENTESIS  4/21/2020    IR PARACENTESIS  5/12/2020    IR PARACENTESIS  6/2/2020    IR PARACENTESIS  6/30/2020    TONSILLECTOMY      TRIGGER FINGER RELEASE Bilateral        Meds/Allergies:    Prior to Admission medications    Medication Sig Start Date End Date Taking?  Authorizing Provider   Calcium Carb-Ergocalciferol 250-125 MG-UNIT TABS Take 1 tablet by mouth daily   Yes Historical Provider, MD   Cranberry 400 MG CAPS Take 1 capsule by mouth daily   Yes Historical Provider, MD   escitalopram (LEXAPRO) 20 mg tablet Take 20 mg by mouth daily 3/28/19  Yes Historical Provider, MD   furosemide (LASIX) 40 mg tablet Take 80 mg by mouth daily 11/18/15  Yes Historical Provider, MD   hydrocortisone 2 5 % cream Apply 1 application topically 2 (two) times a day   Yes Historical Provider, MD   ipratropium-albuterol (DUO-NEB) 0 5-2 5 mg/3 mL nebulizer solution Take 3 mL by nebulization 4 (four) times a day   Yes Historical Provider, MD   lansoprazole (PREVACID) 30 mg capsule Take 30 mg by mouth daily   Yes Historical Provider, MD   metoprolol succinate (TOPROL-XL) 25 mg 24 hr tablet Take 1 tablet by mouth 2 (two) times a day 8/27/18  Yes Historical Provider, MD   nitrofurantoin (MACROBID) 100 mg capsule Take 100 mg by mouth 2 (two) times a day   Yes Historical Provider, MD   omeprazole (PRILOSEC) 20 mg delayed release capsule Take 20 mg by mouth every other day  2/3/17  Yes Historical Provider, MD   spironolactone (ALDACTONE) 50 mg tablet Take 100 mg by mouth daily    Yes Historical Provider, MD   acetaminophen (TYLENOL) 500 mg tablet Take 500 mg by mouth every 6 (six) hours as needed    Historical Provider, MD   PROCTOZONE-HC 2 5 % rectal cream Apply 1 each topically as needed rectally 5/24/19   Historical Provider, MD   fluticasone (FLONASE) 50 mcg/act nasal spray 2 sprays into each nostril  7/19/20  Historical Provider, MD     I have reviewed home medications with patient personally  Allergies:    Allergies   Allergen Reactions    Penicillins Itching and Rash     rash      Adhesive [Medical Tape]      Skin tears    Attapulgite     Cephalexin      ?ithcy    Clioquinol     Diclofenac Sodium Itching    Meloxicam Itching    Nabumetone Other (See Comments)     rash    Phenylalanine     Pseudoephedrine-Guaifenesin Other (See Comments)     Entex- "gittery"    Streptomycin     Celecoxib Rash    Rofecoxib Rash     skin reaction       Social History:     Marital Status:    Occupation: retired  Lives [place]  Patient Pre-hospital Living Situation: as above   Patient Pre-hospital Level of Mobility: fully   Patient Pre-hospital Diet Restrictions: none   Substance Use History:   Social History     Substance and Sexual Activity   Alcohol Use Yes    Comment: rare     Social History     Tobacco Use   Smoking Status Never Smoker   Smokeless Tobacco Never Used     Social History     Substance and Sexual Activity   Drug Use Never       Family History:    History reviewed  No pertinent family history  Physical Exam:     Vitals:   Blood Pressure: 131/58 (07/19/20 1625)  Pulse: 81 (07/19/20 1806)  Temperature: 99 1 °F (37 3 °C) (07/19/20 1806)  Temp Source: Temporal (07/19/20 1214)  Respirations: 22 (07/19/20 1625)  Weight - Scale: 112 kg (247 lb) (07/19/20 1214)  SpO2: 94 % (07/19/20 1806)    Physical Exam   Constitutional: She is oriented to person, place, and time  She appears well-developed  No distress  HENT:   Head: Normocephalic  Mouth/Throat: No oropharyngeal exudate  Eyes: Pupils are equal, round, and reactive to light  Right eye exhibits no discharge  Left eye exhibits no discharge  No scleral icterus  Neck: No JVD present  No tracheal deviation present  No thyromegaly present  Cardiovascular: Normal rate  Exam reveals no gallop and no friction rub  No murmur heard  Pulmonary/Chest: Effort normal  No stridor  No respiratory distress  She has wheezes  She has no rales  She exhibits no tenderness  Abdominal: Soft  She exhibits no distension and no mass  There is no tenderness  There is no guarding  No hernia  No shifting dullness  Musculoskeletal: She exhibits no edema, tenderness or deformity  Lymphadenopathy:     She has no cervical adenopathy     Neurological: She is alert and oriented to person, place, and time  She displays normal reflexes  No cranial nerve deficit or sensory deficit  Coordination normal    Skin: Capillary refill takes less than 2 seconds  No rash noted  She is not diaphoretic  No erythema  There is pallor  Psychiatric: She has a normal mood and affect  Additional Data:     Lab Results: I have personally reviewed pertinent reports  Results from last 7 days   Lab Units 07/19/20  1246   WBC Thousand/uL 3 80*   HEMOGLOBIN g/dL 12 1   HEMATOCRIT % 38 2   PLATELETS Thousands/uL 115*   NEUTROS PCT % 78*   LYMPHS PCT % 8*   MONOS PCT % 7   EOS PCT % 6     Results from last 7 days   Lab Units 07/19/20  1246   SODIUM mmol/L 136   POTASSIUM mmol/L 4 7   CHLORIDE mmol/L 101   CO2 mmol/L 31   BUN mg/dL 10   CREATININE mg/dL 0 70   ANION GAP mmol/L 4   CALCIUM mg/dL 8 8   GLUCOSE RANDOM mg/dL 158*         Results from last 7 days   Lab Units 07/19/20  1827   POC GLUCOSE mg/dl 181*               Imaging: I have personally reviewed pertinent reports  XR chest 2 views   ED Interpretation by Cece Munoz DO (07/19 1343)   No acute disease      Final Result by Roxy Brandt MD (07/19 1503)      No acute cardiopulmonary disease  Workstation performed: [id number]             EKG, Pathology, and Other Studies Reviewed on Admission:   · EKG: no EKG on chart  Allscripts / Epic Records Reviewed: Yes     ** Please Note: This note has been constructed using a voice recognition system   **

## 2020-07-21 ENCOUNTER — APPOINTMENT (INPATIENT)
Dept: NON INVASIVE DIAGNOSTICS | Facility: HOSPITAL | Age: 65
DRG: 202 | End: 2020-07-21
Payer: MEDICARE

## 2020-07-21 ENCOUNTER — HOSPITAL ENCOUNTER (INPATIENT)
Facility: HOSPITAL | Age: 65
LOS: 2 days | Discharge: HOME | DRG: 202 | End: 2020-07-23
Attending: INTERNAL MEDICINE | Admitting: INTERNAL MEDICINE
Payer: MEDICARE

## 2020-07-21 VITALS
WEIGHT: 244.71 LBS | TEMPERATURE: 98.2 F | BODY MASS INDEX: 41.36 KG/M2 | DIASTOLIC BLOOD PRESSURE: 55 MMHG | SYSTOLIC BLOOD PRESSURE: 112 MMHG | HEART RATE: 59 BPM | RESPIRATION RATE: 18 BRPM | OXYGEN SATURATION: 90 %

## 2020-07-21 DIAGNOSIS — E11.9 DIABETES MELLITUS (HCC): ICD-10-CM

## 2020-07-21 DIAGNOSIS — R93.1 ABNORMAL ECHOCARDIOGRAM: Primary | ICD-10-CM

## 2020-07-21 DIAGNOSIS — I51.89 LEFT ATRIAL MASS: ICD-10-CM

## 2020-07-21 LAB
ALBUMIN SERPL BCP-MCNC: 2.6 G/DL (ref 3.5–5)
ALP SERPL-CCNC: 96 U/L (ref 46–116)
ALT SERPL W P-5'-P-CCNC: 23 U/L (ref 12–78)
ANION GAP SERPL CALCULATED.3IONS-SCNC: 7 MMOL/L (ref 4–13)
AST SERPL W P-5'-P-CCNC: 16 U/L (ref 5–45)
BASOPHILS # BLD AUTO: 0 THOUSANDS/ΜL (ref 0–0.1)
BASOPHILS NFR BLD AUTO: 0 % (ref 0–1)
BILIRUB SERPL-MCNC: 0.52 MG/DL (ref 0.2–1)
BUN SERPL-MCNC: 19 MG/DL (ref 5–25)
CALCIUM SERPL-MCNC: 8.5 MG/DL (ref 8.3–10.1)
CHLORIDE SERPL-SCNC: 101 MMOL/L (ref 100–108)
CO2 SERPL-SCNC: 30 MMOL/L (ref 21–32)
CREAT SERPL-MCNC: 0.91 MG/DL (ref 0.6–1.3)
EOSINOPHIL # BLD AUTO: 0 THOUSAND/ΜL (ref 0–0.61)
EOSINOPHIL NFR BLD AUTO: 0 % (ref 0–6)
ERYTHROCYTE [DISTWIDTH] IN BLOOD BY AUTOMATED COUNT: 14.1 % (ref 11.6–15.1)
GFR SERPL CREATININE-BSD FRML MDRD: 65 ML/MIN/1.73SQ M
GLUCOSE SERPL-MCNC: 208 MG/DL (ref 65–140)
GLUCOSE SERPL-MCNC: 210 MG/DL (ref 65–140)
GLUCOSE SERPL-MCNC: 228 MG/DL (ref 65–140)
GLUCOSE SERPL-MCNC: 260 MG/DL (ref 65–140)
GLUCOSE SERPL-MCNC: 263 MG/DL (ref 65–140)
HCT VFR BLD AUTO: 36.7 % (ref 34.8–46.1)
HGB BLD-MCNC: 11.3 G/DL (ref 11.5–15.4)
IMM GRANULOCYTES # BLD AUTO: 0.02 THOUSAND/UL (ref 0–0.2)
IMM GRANULOCYTES NFR BLD AUTO: 0 % (ref 0–2)
LYMPHOCYTES # BLD AUTO: 0.27 THOUSANDS/ΜL (ref 0.6–4.47)
LYMPHOCYTES NFR BLD AUTO: 5 % (ref 14–44)
MCH RBC QN AUTO: 28.8 PG (ref 26.8–34.3)
MCHC RBC AUTO-ENTMCNC: 30.8 G/DL (ref 31.4–37.4)
MCV RBC AUTO: 94 FL (ref 82–98)
MONOCYTES # BLD AUTO: 0.23 THOUSAND/ΜL (ref 0.17–1.22)
MONOCYTES NFR BLD AUTO: 4 % (ref 4–12)
NEUTROPHILS # BLD AUTO: 4.74 THOUSANDS/ΜL (ref 1.85–7.62)
NEUTS SEG NFR BLD AUTO: 91 % (ref 43–75)
NRBC BLD AUTO-RTO: 0 /100 WBCS
PLATELET # BLD AUTO: 122 THOUSANDS/UL (ref 149–390)
PLATELET # BLD AUTO: 97 THOUSANDS/UL (ref 149–390)
PMV BLD AUTO: 11 FL (ref 8.9–12.7)
PMV BLD AUTO: 12.1 FL (ref 8.9–12.7)
POTASSIUM SERPL-SCNC: 3.2 MMOL/L (ref 3.5–5.3)
PROT SERPL-MCNC: 6.3 G/DL (ref 6.4–8.2)
RBC # BLD AUTO: 3.92 MILLION/UL (ref 3.81–5.12)
SODIUM SERPL-SCNC: 138 MMOL/L (ref 136–145)
WBC # BLD AUTO: 5.26 THOUSAND/UL (ref 4.31–10.16)

## 2020-07-21 PROCEDURE — 82948 REAGENT STRIP/BLOOD GLUCOSE: CPT

## 2020-07-21 PROCEDURE — 99223 1ST HOSP IP/OBS HIGH 75: CPT | Performed by: INTERNAL MEDICINE

## 2020-07-21 PROCEDURE — 93306 TTE W/DOPPLER COMPLETE: CPT

## 2020-07-21 PROCEDURE — 80053 COMPREHEN METABOLIC PANEL: CPT | Performed by: INTERNAL MEDICINE

## 2020-07-21 PROCEDURE — 99239 HOSP IP/OBS DSCHRG MGMT >30: CPT | Performed by: INTERNAL MEDICINE

## 2020-07-21 PROCEDURE — 1123F ACP DISCUSS/DSCN MKR DOCD: CPT | Performed by: INTERNAL MEDICINE

## 2020-07-21 PROCEDURE — 93306 TTE W/DOPPLER COMPLETE: CPT | Performed by: INTERNAL MEDICINE

## 2020-07-21 PROCEDURE — 85049 AUTOMATED PLATELET COUNT: CPT | Performed by: INTERNAL MEDICINE

## 2020-07-21 PROCEDURE — 85025 COMPLETE CBC W/AUTO DIFF WBC: CPT | Performed by: INTERNAL MEDICINE

## 2020-07-21 RX ORDER — POTASSIUM CHLORIDE 20 MEQ/1
40 TABLET, EXTENDED RELEASE ORAL ONCE
Status: COMPLETED | OUTPATIENT
Start: 2020-07-21 | End: 2020-07-21

## 2020-07-21 RX ORDER — PREDNISONE 10 MG/1
TABLET ORAL
Qty: 10 TABLET | Refills: 0 | Status: SHIPPED | OUTPATIENT
Start: 2020-07-22 | End: 2020-08-24 | Stop reason: ALTCHOICE

## 2020-07-21 RX ORDER — ACETAMINOPHEN 325 MG/1
650 TABLET ORAL EVERY 6 HOURS PRN
Status: DISCONTINUED | OUTPATIENT
Start: 2020-07-21 | End: 2020-07-23 | Stop reason: HOSPADM

## 2020-07-21 RX ORDER — IPRATROPIUM BROMIDE AND ALBUTEROL SULFATE 2.5; .5 MG/3ML; MG/3ML
3 SOLUTION RESPIRATORY (INHALATION) EVERY 6 HOURS PRN
Qty: 3 ML | Refills: 0 | Status: SHIPPED | OUTPATIENT
Start: 2020-07-21 | End: 2020-08-20

## 2020-07-21 RX ORDER — SPIRONOLACTONE 50 MG/1
100 TABLET, FILM COATED ORAL DAILY
Status: DISCONTINUED | OUTPATIENT
Start: 2020-07-22 | End: 2020-07-23 | Stop reason: HOSPADM

## 2020-07-21 RX ORDER — NITROFURANTOIN 25; 75 MG/1; MG/1
100 CAPSULE ORAL 2 TIMES DAILY
Status: DISCONTINUED | OUTPATIENT
Start: 2020-07-21 | End: 2020-07-23 | Stop reason: HOSPADM

## 2020-07-21 RX ORDER — PREDNISONE 20 MG/1
40 TABLET ORAL DAILY
Status: DISCONTINUED | OUTPATIENT
Start: 2020-07-22 | End: 2020-07-22

## 2020-07-21 RX ORDER — METOPROLOL SUCCINATE 25 MG/1
25 TABLET, EXTENDED RELEASE ORAL 2 TIMES DAILY
Status: DISCONTINUED | OUTPATIENT
Start: 2020-07-21 | End: 2020-07-23 | Stop reason: HOSPADM

## 2020-07-21 RX ORDER — FUROSEMIDE 80 MG
80 TABLET ORAL DAILY
Status: DISCONTINUED | OUTPATIENT
Start: 2020-07-22 | End: 2020-07-23 | Stop reason: HOSPADM

## 2020-07-21 RX ORDER — PANTOPRAZOLE SODIUM 40 MG/1
40 TABLET, DELAYED RELEASE ORAL
Status: DISCONTINUED | OUTPATIENT
Start: 2020-07-22 | End: 2020-07-23 | Stop reason: HOSPADM

## 2020-07-21 RX ORDER — ESCITALOPRAM OXALATE 20 MG/1
20 TABLET ORAL DAILY
Status: DISCONTINUED | OUTPATIENT
Start: 2020-07-22 | End: 2020-07-23 | Stop reason: HOSPADM

## 2020-07-21 RX ORDER — IPRATROPIUM BROMIDE AND ALBUTEROL SULFATE 2.5; .5 MG/3ML; MG/3ML
3 SOLUTION RESPIRATORY (INHALATION) EVERY 6 HOURS PRN
Status: DISCONTINUED | OUTPATIENT
Start: 2020-07-21 | End: 2020-07-23 | Stop reason: HOSPADM

## 2020-07-21 RX ADMIN — SPIRONOLACTONE 100 MG: 50 TABLET, FILM COATED ORAL at 09:14

## 2020-07-21 RX ADMIN — POTASSIUM CHLORIDE 40 MEQ: 1500 TABLET, EXTENDED RELEASE ORAL at 09:14

## 2020-07-21 RX ADMIN — INSULIN LISPRO 2 UNITS: 100 INJECTION, SOLUTION INTRAVENOUS; SUBCUTANEOUS at 18:23

## 2020-07-21 RX ADMIN — NITROFURANTOIN (MONOHYDRATE/MACROCRYSTALS) 100 MG: 75; 25 CAPSULE ORAL at 19:03

## 2020-07-21 RX ADMIN — PANTOPRAZOLE SODIUM 40 MG: 40 TABLET, DELAYED RELEASE ORAL at 05:53

## 2020-07-21 RX ADMIN — FLUTICASONE PROPIONATE 2 PUFF: 110 AEROSOL, METERED RESPIRATORY (INHALATION) at 08:11

## 2020-07-21 RX ADMIN — PREDNISONE 40 MG: 20 TABLET ORAL at 09:14

## 2020-07-21 RX ADMIN — NITROFURANTOIN (MONOHYDRATE/MACROCRYSTALS) 100 MG: 75; 25 CAPSULE ORAL at 09:15

## 2020-07-21 RX ADMIN — ESCITALOPRAM OXALATE 20 MG: 10 TABLET ORAL at 09:14

## 2020-07-21 RX ADMIN — HEPARIN SODIUM 5000 UNITS: 5000 INJECTION INTRAVENOUS; SUBCUTANEOUS at 05:53

## 2020-07-21 RX ADMIN — INSULIN LISPRO 1 UNITS: 100 INJECTION, SOLUTION INTRAVENOUS; SUBCUTANEOUS at 11:55

## 2020-07-21 RX ADMIN — FUROSEMIDE 80 MG: 40 TABLET ORAL at 09:13

## 2020-07-21 RX ADMIN — METOPROLOL SUCCINATE 25 MG: 25 TABLET, EXTENDED RELEASE ORAL at 18:23

## 2020-07-21 RX ADMIN — INSULIN LISPRO 1 UNITS: 100 INJECTION, SOLUTION INTRAVENOUS; SUBCUTANEOUS at 09:15

## 2020-07-21 RX ADMIN — METOPROLOL SUCCINATE 25 MG: 25 TABLET, EXTENDED RELEASE ORAL at 09:14

## 2020-07-21 NOTE — ASSESSMENT & PLAN NOTE
Echocardiogram revealed ejection fraction 70%, 1 9 x 1 8 cm area of increased echogenicity along lateral wall of left atrium cannot exclude possibility of left atrial mass  Patient in sinus rhythm  Will monitor on telemetry  NPO past midnight   Cardiology consult for possible GLEN

## 2020-07-21 NOTE — H&P
H&P- Peter Raines 1955, 77 y o  female MRN: [id number]    Unit/Bed#: E4 -01 Encounter: [id number]    Primary Care Provider: Michelle Mc DO   Date and time admitted to hospital: 7/21/2020  5:51 PM        * Abnormal echocardiogram  Assessment & Plan  Echocardiogram revealed ejection fraction 70%, 1 9 x 1 8 cm area of increased echogenicity along lateral wall of left atrium cannot exclude possibility of left atrial mass  Patient in sinus rhythm  Will monitor on telemetry  NPO past midnight   Cardiology consult for possible GLEN    Diabetes mellitus [facility])  Assessment & Plan  Lab Results   Component Value Date    HGBA1C 7 1 (H) 05/23/2020       Recent Labs     07/20/20  1659 07/20/20  2207 07/21/20  0702 07/21/20  1129   POCGLU 217* 283* 210* 208*     -not on any medications at home  -monitor Accu-Cheks, sliding scale for coverage    Asthma  Assessment & Plan  Patient was treated with asthma exacerbation on last admission continue with prednisone taper  Duo nebs as needed    Essential hypertension  Assessment & Plan  Continue home medications, metoprolol, Lasix, Aldactone    Hepatic cirrhosis ([address] )  Assessment & Plan  Patient has history of hepatic cirrhosis status post paracentesis frequently in the past  Status post IR guided paracentesis on 07/20/2020 with 2400 cc removed  Maintained on Lasix and Aldactone        VTE Prophylaxis: Enoxaparin (Lovenox)  / sequential compression device   Code Status: DNAR  POLST: There is no POLST form on file for this patient (pre-hospital)    Anticipated Length of Stay:  Patient will be admitted on an Inpatient basis with an anticipated length of stay of  greater than 2 midnights  Justification for Hospital Stay:  Left atrial mass    Total Time for Visit, including Counseling / Coordination of Care: 30 minutes  Greater than 50% of this total time spent on direct patient counseling and coordination of care      Chief Complaint:   Abnormal echocardiogram    History of Present Illness:    Darin Fajardo is a 77 y o  female who presents as direct admission for abnormal echocardiogram   Was recently admitted and discharged earlier today after being treated for asthma exacerbation and increased abdominal girth underwent paracentesis  Patient denies any fever, chills, abdominal pain, nausea, vomiting  Denies any chest pain, palpitations or dyspnea  Review of Systems:    Review of Systems   Constitutional: Negative  HENT: Negative  Eyes: Negative  Respiratory: Negative  Cardiovascular: Negative  Gastrointestinal: Negative  Endocrine: Negative  Genitourinary: Negative  Musculoskeletal: Negative  Skin: Negative  Allergic/Immunologic: Negative  Neurological: Negative  Hematological: Negative          Past Medical and Surgical History:     Past Medical History:   Diagnosis Date    Anxiety     Arthritis     Asthma     [facility]     Depression     Disease of thyroid gland     nodules    Diverticulitis 2011    Diverticulosis     DVT (deep venous thrombosis) ([facility] ) 2013    GERD (gastroesophageal reflux disease)     Liver disease     cirrhosis    Pneumonia     Portal hypertensive gastropathy ([facility] )     Sleep apnea     Vertigo        Past Surgical History:   Procedure Laterality Date    BLADDER SUSPENSION      CHOLECYSTECTOMY      COLONOSCOPY  05/24/2019    had multiple with last being [id number]    EGD      HERNIA REPAIR      IR PARACENTESIS  8/23/2018    IR PARACENTESIS  11/5/2018    IR PARACENTESIS  1/11/2019    IR PARACENTESIS  2/27/2019    IR PARACENTESIS  4/15/2019    IR PARACENTESIS  6/3/2019    IR PARACENTESIS  7/10/2019    IR PARACENTESIS  8/16/2019    IR PARACENTESIS  9/10/2019    IR PARACENTESIS  10/7/2019    IR PARACENTESIS  11/5/2019    IR PARACENTESIS  11/22/2019    IR PARACENTESIS  12/17/2019    IR PARACENTESIS  1/7/2020    IR PARACENTESIS  1/28/2020    IR PARACENTESIS  2/18/2020    IR PARACENTESIS 3/10/2020    IR PARACENTESIS  3/31/2020    IR PARACENTESIS  4/21/2020    IR PARACENTESIS  5/12/2020    IR PARACENTESIS  6/2/2020    IR PARACENTESIS  6/30/2020    IR PARACENTESIS  7/20/2020    TONSILLECTOMY      TRIGGER FINGER RELEASE Bilateral        Meds/Allergies:    Prior to Admission medications    Medication Sig Start Date End Date Taking?  Authorizing Provider   acetaminophen (TYLENOL) 500 mg tablet Take 500 mg by mouth every 6 (six) hours as needed    Historical Provider, MD   Calcium Carb-Ergocalciferol 250-125 MG-UNIT TABS Take 1 tablet by mouth daily    Historical Provider, MD   Cranberry 400 MG CAPS Take 1 capsule by mouth daily    Historical Provider, MD   escitalopram (LEXAPRO) 20 mg tablet Take 20 mg by mouth daily 3/28/19   Historical Provider, MD   furosemide (LASIX) 40 mg tablet Take 80 mg by mouth daily 11/18/15   Historical Provider, MD   hydrocortisone 2 5 % cream Apply 1 application topically 2 (two) times a day    Historical Provider, MD   ipratropium-albuterol (DUO-NEB) 0 5-2 5 mg/3 mL nebulizer solution Take 1 vial (3 mL total) by nebulization every 6 (six) hours as needed for wheezing or shortness of breath 7/21/20 8/20/20  Vince Grant MD   lansoprazole (PREVACID) 30 mg capsule Take 30 mg by mouth daily    Historical Provider, MD   metoprolol succinate (TOPROL-XL) 25 mg 24 hr tablet Take 1 tablet by mouth 2 (two) times a day 8/27/18   Historical Provider, MD   nitrofurantoin (MACROBID) 100 mg capsule Take 100 mg by mouth 2 (two) times a day    Historical Provider, MD   omeprazole (PRILOSEC) 20 mg delayed release capsule Take 20 mg by mouth every other day  2/3/17   Historical Provider, MD   predniSONE 10 mg tablet Take 4 tablets daily for 1 days Then take 3 tablets daily for 1 days Then take 2 tablets for one day Then Take 1 tablet for one day 7/22/20   Vince Grant MD   PROCTOZONE-HC 2 5 % rectal cream Apply 1 each topically as needed rectally 5/24/19   Historical Provider, MD spironolactone (ALDACTONE) 50 mg tablet Take 100 mg by mouth daily     Historical Provider, MD   ipratropium-albuterol (DUO-NEB) 0 5-2 5 mg/3 mL nebulizer solution Take 3 mL by nebulization 4 (four) times a day  7/21/20  Historical Provider, MD     I have reviewed home medications with patient personally  Allergies: Allergies   Allergen Reactions    Penicillins Itching and Rash     rash      Adhesive [Medical Tape]      Skin tears    Attapulgite     Cephalexin      ?ithcy    Clioquinol     Diclofenac Sodium Itching    Meloxicam Itching    Nabumetone Other (See Comments)     rash    Phenylalanine     Pseudoephedrine-Guaifenesin Other (See Comments)     Entex- "gittery"    Streptomycin     Celecoxib Rash    Rofecoxib Rash     skin reaction       Social History:     Social History     Substance and Sexual Activity   Alcohol Use Yes    Comment: rare     Social History     Tobacco Use   Smoking Status Never Smoker   Smokeless Tobacco Never Used     Social History     Substance and Sexual Activity   Drug Use Never       Family History:    No family history on file  Physical Exam:     Vitals:        Constitutional: Patient is oriented to person, place and time, no acute distress  HEENT:  Normocephalic, atraumatic  Cardiovascular: Normal S1S2, RRR, No murmurs/rubs/gallops appreciated  Pulmonary:  Bilateral air entry, No rhonchi/rales/wheezing appreciated  Abdominal: Soft, Bowel sounds present, Non-tender, Non-distended  Extremities:  No cyanosis, clubbing or edema  Neurological: Cranial nerves II-XII grossly intact, sensation intact, otherwise no focal neurological symptoms  Additional Data:     Lab Results: I have personally reviewed pertinent reports        Results from last 7 days   Lab Units 07/21/20  0552   WBC Thousand/uL 5 26   HEMOGLOBIN g/dL 11 3*   HEMATOCRIT % 36 7   PLATELETS Thousands/uL 97*   NEUTROS PCT % 91*   LYMPHS PCT % 5*   MONOS PCT % 4   EOS PCT % 0     Results from last 7 days   Lab Units 07/21/20  0552   POTASSIUM mmol/L 3 2*   CHLORIDE mmol/L 101   CO2 mmol/L 30   BUN mg/dL 19   CREATININE mg/dL 0 91   CALCIUM mg/dL 8 5   ALK PHOS U/L 96   ALT U/L 23   AST U/L 16     Results from last 7 days   Lab Units 07/20/20  0817   INR  1 18       Imaging: I have personally reviewed pertinent reports  Xr Chest 2 Views    Result Date: 7/19/2020  Narrative: CHEST INDICATION:   SOB COMPARISON:  Chest radiograph from 1/7/2013  EXAM PERFORMED/VIEWS:  XR CHEST PA & LATERAL WITH DUAL ENERGY SUBTRACTION  FINDINGS: Cardiomediastinal silhouette is normal  Lungs are clear  No effusion or pneumothorax  Osseous structures are within normal limits for age  Impression: No acute cardiopulmonary disease  Workstation performed: [id number]     Ir Paracentesis    Result Date: 7/20/2020  Narrative: IMAGE GUIDED PARACENTESIS Indication:  Chronic liver disease  Shortness of breath  Procedure: A suitable location for puncture was identified using ultrasound guidance over the left abdomen and the site was cleansed and draped in sterile fashion  1% lidocaine was used for local anesthetic  A 5 [place] multisidehole catheter was advanced  into the peritoneal space  The fluid appeared typical for ascites  A static sonographic image was saved  Fluid was drained by [facility] system  Following drainage, the puncture site was cleansed and a dressing was applied  The patient tolerated this procedure well without immediate complication  Findings: Focused ultrasound of the abdomen confirms the presence of ascites  2400 mL of fluid was removed  Ordered labs were sent  Impression: Impression: Successful ultrasound-guided paracentesis  Workstation performed: [id number]     Ir Paracentesis    Result Date: 6/30/2020  Narrative: Ultrasound-guided paracentesis Clinical History: Recurrent Ascites Procedure: After explaining the risks and benefits of the procedure to the patient, informed consent was obtained   Ultrasound used to localize the left lower quadrant ascites  The overlying skin was prepped and draped in usual sterile fashion and local anesthesia was obtained with the 1% lidocaine solution  A 5 [place] needle was advanced until fluid was aspirated  Approximately 2800 cc' s of cloudy, yellow fluid was aspirated  The patient tolerated the procedure well and left the department in stable condition  Impression: Impression: Successful ultrasound-guided paracentesis  Workstation performed: [id number]       [employer] / [facility] Records Reviewed: Yes     ** Please Note: This note has been constructed using a voice recognition system   **

## 2020-07-21 NOTE — ASSESSMENT & PLAN NOTE
Lab Results   Component Value Date    HGBA1C 7 1 (H) 05/23/2020       Recent Labs     07/20/20  1659 07/20/20  2207 07/21/20  0702 07/21/20  1129   POCGLU 217* 283* 210* 208*     -not on any medications at home  -monitor Accu-Cheks, sliding scale for coverage

## 2020-07-21 NOTE — DISCHARGE SUMMARY
Discharge- Wandy Sheikh 1955, 77 y o  female MRN: [id number]    Unit/Bed#: [address] 222-01 Encounter: [id number]    Primary Care Provider: Kieran Chowdhury DO   Date and time admitted to hospital: 7/19/2020 12:15 PM        * Shortness of breath  Assessment & Plan  ? Differential includes asthma exacerbation, volume overload and 2/2 increased abdominal girth  CXR - unremarkable  Patient has improved with steroids and nebulizers, will continue  Troponin negative x3  Saturating well on room air    Asthma  Assessment & Plan  With worsening SOB  Continue steroids can likely start on prednisone tomorrow    Decompensated hepatic cirrhosis ([facility] [facility] )  Assessment & Plan  Patient stating increasing abdominal girth  Has had frequent paracentesis in the past  Status post IR guided paracentesis today with 2400 cc removed  Continue with lasix, aldactone    Abnormal echocardiogram  -echocardiogram reveals 1 9 x 1 8 cm area increased echogenicity along lateral wall of left atrium, cannot exclude possibility of left atrial mass  -phone call placed to patient to return to the to the hospital for further evaluation of this  -will consult cardiology when patient is being admitted      Transition of Care Discharge Summary - [facility] Internal Medicine    Patient Information: Wandy Sheikh 77 y o  female MRN: [id number]  Unit/Bed#: [address] 222[address]01 Encounter: [id number]    Discharging Physician / Practitioner: Mckenzie Arellano MD  PCP: Kieran Chowdhury DO  Admission Date: 7/19/2020  Discharge Date: 07/21/20    Disposition:      Other: home      Reason for Admission:  Dyspnea    Discharge Diagnoses:     Principal Problem:    Shortness of breath  Active Problems:    Decompensated hepatic cirrhosis ([facility] [facility] )    Asthma  Resolved Problems:    * No resolved hospital problems   *      Consultations During Hospital Stay:  · Intervention Radiology    Procedures Performed:     · Paracentesis    Medication Adjustments and Discharge Medications:  · Medication Dosing Tapers - Please refer to Discharge Medication List for details on any medication dosing tapers (if applicable to patient)  · Discharge Medication List: See after visit summary for reconciled discharge medications  Wound Care Recommendations:  When applicable, please see wound care section of After Visit Summary  Diet Recommendations at Discharge:  Diet -        Diet Orders   (From admission, onward)             Start     Ordered    07/19/20 2118  Diet Regular; Regular House; Sodium 2 GM, Fluid Restriction 1500 ML  Diet effective now     Question Answer Comment   Diet Type Regular    Regular Regular House    Other Restriction(s): Sodium 2 GM    Other Restriction(s): Fluid Restriction 1500 ML    RD to adjust diet per protocol? Yes        07/19/20 2118              Fluid Restriction - Continue Fluid Restriction as Listed Above at Discharge  Significant Findings / Test Results:     · 2D echo reviewed ejection fraction 70%, 1 9 x 1 8 cm area of increased echogenicity along lateral wall of left atrium, unknown significance but cannot exclude possibility of left atrial mass, trace mitral regurgitation      Hospital Course:     Jazz Young is a 77 y o  female patient who originally presented to the hospital on 7/19/2020 due to dyspnea  Patient has history of hepatic cirrhosis with multiple paracenteses in the past presenting with dyspnea and feels that her abdomen is increasing in girth  Intervention Radiology consulted and patient underwent paracentesis with 2400 cc removed  Patient was also started on steroids for asthma exacerbation, she has significantly improved is otherwise hemodynamically stable  Patient did have echocardiogram done however did not want to wait for results before discharge  I have called patients PCP, Corrie Gray [phone] and spoke to nurse informing of results   I have also placed call to patient to return to hospital for further workup of this atrial mass  She will be returning to the hospital for further evaluation  Please see above problem list for further details  Condition at Discharge: good     Discharge Day Visit / Exam:     Subjective:  Patient seen examined at bedside, denies any complaints    Vitals: Blood Pressure: 112/55 (07/21/20 0655)  Pulse: 59 (07/21/20 0655)  Temperature: 98 2 °F (36 8 °C) (07/21/20 0655)  Temp Source: Oral (07/21/20 0655)  Respirations: 18 (07/21/20 0655)  Weight - Scale: 111 kg (244 lb 11 4 oz) (07/21/20 0538)  SpO2: 90 % (07/21/20 0655)    Physical Exam:    Constitutional: Patient is oriented to person, place and time, no acute distress  HEENT:  Normocephalic, atraumatic  Cardiovascular: Normal S1S2, RRR, No murmurs/rubs/gallops appreciated  Pulmonary:  Bilateral air entry, No rhonchi/rales/wheezing appreciated  Abdominal: Soft, Bowel sounds present, Non-tender, Non-distended  Extremities:  No cyanosis, clubbing or edema  Neurological: Cranial nerves II-XII grossly intact, sensation intact, otherwise no focal neurological symptoms  Discharge instructions/Information to patient and family:   See after visit summary section titled Discharge Instructions for information provided to patient and family  Planned Readmission: yes      Discharge Statement:  I spent 35 minutes discharging the patient  This time was spent on the day of discharge  I had direct contact with the patient on the day of discharge  Greater than 50% of the total time was spent examining patient, answering all patient questions, arranging and discussing plan of care with patient as well as directly providing post-discharge instructions  Additional time then spent on discharge activities      ** Please Note: This note has been constructed using a voice recognition system **

## 2020-07-21 NOTE — PLAN OF CARE
Problem: Potential for Falls  Goal: Patient will remain free of falls  Description  INTERVENTIONS:  - Assess patient frequently for physical needs  -  Identify cognitive and physical deficits and behaviors that affect risk of falls    -  [place] fall precautions as indicated by assessment   - Educate patient/family on patient safety including physical limitations  - Instruct patient to call for assistance with activity based on assessment  - Modify environment to reduce risk of injury  - Consider OT/PT consult to assist with strengthening/mobility  Outcome: Progressing     Problem: RESPIRATORY - ADULT  Goal: Achieves optimal ventilation and oxygenation  Description  INTERVENTIONS:  - Assess for changes in respiratory status  - Assess for changes in mentation and behavior  - Position to facilitate oxygenation and minimize respiratory effort  - Oxygen administered by appropriate delivery if ordered  - Initiate smoking cessation education as indicated  - Encourage broncho-pulmonary hygiene including cough, deep breathe, Incentive Spirometry  - Assess the need for suctioning and aspirate as needed  - Assess and instruct to report SOB or any respiratory difficulty  - Respiratory Therapy support as indicated  Outcome: Progressing     Problem: PAIN - ADULT  Goal: Verbalizes/displays adequate comfort level or baseline comfort level  Description  Interventions:  - Encourage patient to monitor pain and request assistance  - Assess pain using appropriate pain scale  - Administer analgesics based on type and severity of pain and evaluate response  - Implement non-pharmacological measures as appropriate and evaluate response  - Consider cultural and social influences on pain and pain management  - Notify physician/advanced practitioner if interventions unsuccessful or patient reports new pain  Outcome: Progressing     Problem: INFECTION - ADULT  Goal: Absence or prevention of progression during hospitalization  Description  INTERVENTIONS:  - Assess and monitor for signs and symptoms of infection  - Monitor lab/diagnostic results  - Monitor all insertion sites, i e  indwelling lines, tubes, and drains  - Monitor endotracheal if appropriate and nasal secretions for changes in amount and color  - [place] appropriate cooling/warming therapies per order  - Administer medications as ordered  - Instruct and encourage patient and family to use good hand hygiene technique  - Identify and instruct in appropriate isolation precautions for identified infection/condition  Outcome: Progressing

## 2020-07-21 NOTE — ASSESSMENT & PLAN NOTE
Patient has history of hepatic cirrhosis status post paracentesis frequently in the past  Status post IR guided paracentesis on 07/20/2020 with 2400 cc removed  Maintained on Lasix and Aldactone

## 2020-07-21 NOTE — PLAN OF CARE
Problem: Potential for Falls  Goal: Patient will remain free of falls  Description  INTERVENTIONS:  - Assess patient frequently for physical needs  -  Identify cognitive and physical deficits and behaviors that affect risk of falls    -  [place] fall precautions as indicated by assessment   - Educate patient/family on patient safety including physical limitations  - Instruct patient to call for assistance with activity based on assessment  - Modify environment to reduce risk of injury  - Consider OT/PT consult to assist with strengthening/mobility  7/21/2020 1328 by Demetrio Clayton RN  Outcome: Completed  7/21/2020 1045 by Demetrio Clayton RN  Outcome: Progressing     Problem: RESPIRATORY - ADULT  Goal: Achieves optimal ventilation and oxygenation  Description  INTERVENTIONS:  - Assess for changes in respiratory status  - Assess for changes in mentation and behavior  - Position to facilitate oxygenation and minimize respiratory effort  - Oxygen administered by appropriate delivery if ordered  - Initiate smoking cessation education as indicated  - Encourage broncho-pulmonary hygiene including cough, deep breathe, Incentive Spirometry  - Assess the need for suctioning and aspirate as needed  - Assess and instruct to report SOB or any respiratory difficulty  - Respiratory Therapy support as indicated  7/21/2020 1328 by Demetrio Clayton RN  Outcome: Completed  7/21/2020 1045 by Demetrio Clayton RN  Outcome: Progressing     Problem: PAIN - ADULT  Goal: Verbalizes/displays adequate comfort level or baseline comfort level  Description  Interventions:  - Encourage patient to monitor pain and request assistance  - Assess pain using appropriate pain scale  - Administer analgesics based on type and severity of pain and evaluate response  - Implement non-pharmacological measures as appropriate and evaluate response  - Consider cultural and social influences on pain and pain management  - Notify physician/advanced practitioner if interventions unsuccessful or patient reports new pain  7/21/2020 1328 by César Cooper RN  Outcome: Completed  7/21/2020 1045 by César Cooper RN  Outcome: Progressing     Problem: INFECTION - ADULT  Goal: Absence or prevention of progression during hospitalization  Description  INTERVENTIONS:  - Assess and monitor for signs and symptoms of infection  - Monitor lab/diagnostic results  - Monitor all insertion sites, i e  indwelling lines, tubes, and drains  - Monitor endotracheal if appropriate and nasal secretions for changes in amount and color  - [place] appropriate cooling/warming therapies per order  - Administer medications as ordered  - Instruct and encourage patient and family to use good hand hygiene technique  - Identify and instruct in appropriate isolation precautions for identified infection/condition  7/21/2020 1328 by César Cooper RN  Outcome: Completed  7/21/2020 1045 by César Cooper RN  Outcome: Progressing

## 2020-07-21 NOTE — ASSESSMENT & PLAN NOTE
Patient was treated with asthma exacerbation on last admission continue with prednisone taper  Duo nebs as needed

## 2020-07-21 NOTE — PLAN OF CARE
Problem: Potential for Falls  Goal: Patient will remain free of falls  Description  INTERVENTIONS:  - Assess patient frequently for physical needs  -  Identify cognitive and physical deficits and behaviors that affect risk of falls    -  [place] fall precautions as indicated by assessment   - Educate patient/family on patient safety including physical limitations  - Instruct patient to call for assistance with activity based on assessment  - Modify environment to reduce risk of injury  - Consider OT/PT consult to assist with strengthening/mobility  Outcome: Progressing     Problem: RESPIRATORY - ADULT  Goal: Achieves optimal ventilation and oxygenation  Description  INTERVENTIONS:  - Assess for changes in respiratory status  - Assess for changes in mentation and behavior  - Position to facilitate oxygenation and minimize respiratory effort  - Oxygen administered by appropriate delivery if ordered  - Initiate smoking cessation education as indicated  - Encourage broncho-pulmonary hygiene including cough, deep breathe, Incentive Spirometry  - Assess the need for suctioning and aspirate as needed  - Assess and instruct to report SOB or any respiratory difficulty  - Respiratory Therapy support as indicated  Outcome: Progressing     Problem: PAIN - ADULT  Goal: Verbalizes/displays adequate comfort level or baseline comfort level  Description  Interventions:  - Encourage patient to monitor pain and request assistance  - Assess pain using appropriate pain scale  - Administer analgesics based on type and severity of pain and evaluate response  - Implement non-pharmacological measures as appropriate and evaluate response  - Consider cultural and social influences on pain and pain management  - Notify physician/advanced practitioner if interventions unsuccessful or patient reports new pain  Outcome: Progressing     Problem: INFECTION - ADULT  Goal: Absence or prevention of progression during hospitalization  Description  INTERVENTIONS:  - Assess and monitor for signs and symptoms of infection  - Monitor lab/diagnostic results  - Monitor all insertion sites, i e  indwelling lines, tubes, and drains  - Monitor endotracheal if appropriate and nasal secretions for changes in amount and color  - [place] appropriate cooling/warming therapies per order  - Administer medications as ordered  - Instruct and encourage patient and family to use good hand hygiene technique  - Identify and instruct in appropriate isolation precautions for identified infection/condition  Outcome: Progressing

## 2020-07-21 NOTE — NURSING NOTE
Pt taken to vehicle via wheelchair by RN  Discharge instructions reviewed with patient  All belongings taken with patient  IV removed

## 2020-07-22 ENCOUNTER — ANESTHESIA (INPATIENT)
Dept: NON INVASIVE DIAGNOSTICS | Facility: HOSPITAL | Age: 65
DRG: 202 | End: 2020-07-22
Payer: MEDICARE

## 2020-07-22 LAB
ALBUMIN SERPL BCP-MCNC: 2.5 G/DL (ref 3.5–5)
ALP SERPL-CCNC: 91 U/L (ref 46–116)
ALT SERPL W P-5'-P-CCNC: 20 U/L (ref 12–78)
ANION GAP SERPL CALCULATED.3IONS-SCNC: 9 MMOL/L (ref 4–13)
AST SERPL W P-5'-P-CCNC: 28 U/L (ref 5–45)
BASOPHILS # BLD AUTO: 0 THOUSANDS/ΜL (ref 0–0.1)
BASOPHILS NFR BLD AUTO: 0 % (ref 0–1)
BILIRUB SERPL-MCNC: 0.56 MG/DL (ref 0.2–1)
BUN SERPL-MCNC: 23 MG/DL (ref 5–25)
CALCIUM SERPL-MCNC: 8 MG/DL (ref 8.3–10.1)
CHLORIDE SERPL-SCNC: 103 MMOL/L (ref 100–108)
CO2 SERPL-SCNC: 29 MMOL/L (ref 21–32)
CREAT SERPL-MCNC: 0.89 MG/DL (ref 0.6–1.3)
EOSINOPHIL # BLD AUTO: 0 THOUSAND/ΜL (ref 0–0.61)
EOSINOPHIL NFR BLD AUTO: 0 % (ref 0–6)
ERYTHROCYTE [DISTWIDTH] IN BLOOD BY AUTOMATED COUNT: 13.9 % (ref 11.6–15.1)
GFR SERPL CREATININE-BSD FRML MDRD: 67 ML/MIN/1.73SQ M
GLUCOSE SERPL-MCNC: 115 MG/DL (ref 65–140)
GLUCOSE SERPL-MCNC: 134 MG/DL (ref 65–140)
GLUCOSE SERPL-MCNC: 143 MG/DL (ref 65–140)
GLUCOSE SERPL-MCNC: 159 MG/DL (ref 65–140)
GLUCOSE SERPL-MCNC: 187 MG/DL (ref 65–140)
GLUCOSE SERPL-MCNC: 308 MG/DL (ref 65–140)
HCT VFR BLD AUTO: 36 % (ref 34.8–46.1)
HGB BLD-MCNC: 11.3 G/DL (ref 11.5–15.4)
IMM GRANULOCYTES # BLD AUTO: 0.01 THOUSAND/UL (ref 0–0.2)
IMM GRANULOCYTES NFR BLD AUTO: 0 % (ref 0–2)
LYMPHOCYTES # BLD AUTO: 0.43 THOUSANDS/ΜL (ref 0.6–4.47)
LYMPHOCYTES NFR BLD AUTO: 13 % (ref 14–44)
MCH RBC QN AUTO: 29.4 PG (ref 26.8–34.3)
MCHC RBC AUTO-ENTMCNC: 31.4 G/DL (ref 31.4–37.4)
MCV RBC AUTO: 94 FL (ref 82–98)
MONOCYTES # BLD AUTO: 0.23 THOUSAND/ΜL (ref 0.17–1.22)
MONOCYTES NFR BLD AUTO: 7 % (ref 4–12)
NEUTROPHILS # BLD AUTO: 2.64 THOUSANDS/ΜL (ref 1.85–7.62)
NEUTS SEG NFR BLD AUTO: 80 % (ref 43–75)
NRBC BLD AUTO-RTO: 0 /100 WBCS
PLATELET # BLD AUTO: 86 THOUSANDS/UL (ref 149–390)
PMV BLD AUTO: 9.9 FL (ref 8.9–12.7)
POTASSIUM SERPL-SCNC: 3.4 MMOL/L (ref 3.5–5.3)
PROT SERPL-MCNC: 6.3 G/DL (ref 6.4–8.2)
RBC # BLD AUTO: 3.85 MILLION/UL (ref 3.81–5.12)
SODIUM SERPL-SCNC: 141 MMOL/L (ref 136–145)
WBC # BLD AUTO: 3.31 THOUSAND/UL (ref 4.31–10.16)

## 2020-07-22 PROCEDURE — 85025 COMPLETE CBC W/AUTO DIFF WBC: CPT | Performed by: INTERNAL MEDICINE

## 2020-07-22 PROCEDURE — 99232 SBSQ HOSP IP/OBS MODERATE 35: CPT | Performed by: INTERNAL MEDICINE

## 2020-07-22 PROCEDURE — 82948 REAGENT STRIP/BLOOD GLUCOSE: CPT

## 2020-07-22 PROCEDURE — 80053 COMPREHEN METABOLIC PANEL: CPT | Performed by: INTERNAL MEDICINE

## 2020-07-22 PROCEDURE — 93320 DOPPLER ECHO COMPLETE: CPT

## 2020-07-22 PROCEDURE — 99222 1ST HOSP IP/OBS MODERATE 55: CPT

## 2020-07-22 PROCEDURE — 94760 N-INVAS EAR/PLS OXIMETRY 1: CPT

## 2020-07-22 PROCEDURE — 93312 ECHO TRANSESOPHAGEAL: CPT

## 2020-07-22 PROCEDURE — NC001 PR NO CHARGE

## 2020-07-22 PROCEDURE — 94640 AIRWAY INHALATION TREATMENT: CPT

## 2020-07-22 PROCEDURE — 93325 DOPPLER ECHO COLOR FLOW MAPG: CPT

## 2020-07-22 RX ORDER — EPHEDRINE SULFATE 50 MG/ML
INJECTION INTRAVENOUS AS NEEDED
Status: DISCONTINUED | OUTPATIENT
Start: 2020-07-22 | End: 2020-07-22 | Stop reason: SURG

## 2020-07-22 RX ORDER — ONDANSETRON 2 MG/ML
4 INJECTION INTRAMUSCULAR; INTRAVENOUS ONCE AS NEEDED
Status: DISCONTINUED | OUTPATIENT
Start: 2020-07-22 | End: 2020-07-22 | Stop reason: HOSPADM

## 2020-07-22 RX ORDER — SODIUM CHLORIDE, SODIUM LACTATE, POTASSIUM CHLORIDE, CALCIUM CHLORIDE 600; 310; 30; 20 MG/100ML; MG/100ML; MG/100ML; MG/100ML
INJECTION, SOLUTION INTRAVENOUS CONTINUOUS PRN
Status: DISCONTINUED | OUTPATIENT
Start: 2020-07-22 | End: 2020-07-22 | Stop reason: SURG

## 2020-07-22 RX ORDER — POTASSIUM CHLORIDE 20 MEQ/1
20 TABLET, EXTENDED RELEASE ORAL ONCE
Status: COMPLETED | OUTPATIENT
Start: 2020-07-22 | End: 2020-07-22

## 2020-07-22 RX ORDER — LIDOCAINE HYDROCHLORIDE 20 MG/ML
INJECTION, SOLUTION EPIDURAL; INFILTRATION; INTRACAUDAL; PERINEURAL AS NEEDED
Status: DISCONTINUED | OUTPATIENT
Start: 2020-07-22 | End: 2020-07-22 | Stop reason: SURG

## 2020-07-22 RX ORDER — HYDROMORPHONE HCL/PF 1 MG/ML
0.5 SYRINGE (ML) INJECTION
Status: DISCONTINUED | OUTPATIENT
Start: 2020-07-22 | End: 2020-07-22 | Stop reason: HOSPADM

## 2020-07-22 RX ORDER — FENTANYL CITRATE/PF 50 MCG/ML
25 SYRINGE (ML) INJECTION
Status: DISCONTINUED | OUTPATIENT
Start: 2020-07-22 | End: 2020-07-22 | Stop reason: HOSPADM

## 2020-07-22 RX ORDER — PROPOFOL 10 MG/ML
INJECTION, EMULSION INTRAVENOUS AS NEEDED
Status: DISCONTINUED | OUTPATIENT
Start: 2020-07-22 | End: 2020-07-22 | Stop reason: SURG

## 2020-07-22 RX ADMIN — METOPROLOL SUCCINATE 25 MG: 25 TABLET, EXTENDED RELEASE ORAL at 08:34

## 2020-07-22 RX ADMIN — LIDOCAINE HYDROCHLORIDE 100 MG: 20 INJECTION, SOLUTION EPIDURAL; INFILTRATION; INTRACAUDAL; PERINEURAL at 12:54

## 2020-07-22 RX ADMIN — FUROSEMIDE 80 MG: 80 TABLET ORAL at 08:34

## 2020-07-22 RX ADMIN — PROPOFOL 50 MG: 10 INJECTION, EMULSION INTRAVENOUS at 13:03

## 2020-07-22 RX ADMIN — NITROFURANTOIN (MONOHYDRATE/MACROCRYSTALS) 100 MG: 75; 25 CAPSULE ORAL at 08:34

## 2020-07-22 RX ADMIN — METOPROLOL SUCCINATE 25 MG: 25 TABLET, EXTENDED RELEASE ORAL at 17:22

## 2020-07-22 RX ADMIN — PROPOFOL 40 MG: 10 INJECTION, EMULSION INTRAVENOUS at 13:12

## 2020-07-22 RX ADMIN — POTASSIUM CHLORIDE 20 MEQ: 1500 TABLET, EXTENDED RELEASE ORAL at 14:17

## 2020-07-22 RX ADMIN — SPIRONOLACTONE 100 MG: 50 TABLET ORAL at 08:33

## 2020-07-22 RX ADMIN — PROPOFOL 50 MG: 10 INJECTION, EMULSION INTRAVENOUS at 12:54

## 2020-07-22 RX ADMIN — INSULIN LISPRO 1 UNITS: 100 INJECTION, SOLUTION INTRAVENOUS; SUBCUTANEOUS at 17:21

## 2020-07-22 RX ADMIN — ENOXAPARIN SODIUM 40 MG: 40 INJECTION SUBCUTANEOUS at 08:33

## 2020-07-22 RX ADMIN — ESCITALOPRAM OXALATE 20 MG: 20 TABLET, FILM COATED ORAL at 08:34

## 2020-07-22 RX ADMIN — PROPOFOL 50 MG: 10 INJECTION, EMULSION INTRAVENOUS at 12:56

## 2020-07-22 RX ADMIN — PREDNISONE 40 MG: 20 TABLET ORAL at 08:34

## 2020-07-22 RX ADMIN — PROPOFOL 40 MG: 10 INJECTION, EMULSION INTRAVENOUS at 13:07

## 2020-07-22 RX ADMIN — PROPOFOL 50 MG: 10 INJECTION, EMULSION INTRAVENOUS at 12:59

## 2020-07-22 RX ADMIN — NITROFURANTOIN (MONOHYDRATE/MACROCRYSTALS) 100 MG: 75; 25 CAPSULE ORAL at 17:21

## 2020-07-22 RX ADMIN — IPRATROPIUM BROMIDE AND ALBUTEROL SULFATE 3 ML: 2.5; .5 SOLUTION RESPIRATORY (INHALATION) at 08:45

## 2020-07-22 RX ADMIN — SODIUM CHLORIDE, SODIUM LACTATE, POTASSIUM CHLORIDE, AND CALCIUM CHLORIDE: .6; .31; .03; .02 INJECTION, SOLUTION INTRAVENOUS at 12:09

## 2020-07-22 RX ADMIN — EPHEDRINE SULFATE 5 MG: 50 INJECTION, SOLUTION INTRAVENOUS at 13:13

## 2020-07-22 NOTE — PLAN OF CARE
Problem: Potential for Falls  Goal: Patient will remain free of falls  Description  INTERVENTIONS:  - Assess patient frequently for physical needs  -  Identify cognitive and physical deficits and behaviors that affect risk of falls    -  [place] fall precautions as indicated by assessment   - Educate patient/family on patient safety including physical limitations  - Instruct patient to call for assistance with activity based on assessment  - Modify environment to reduce risk of injury  - Consider OT/PT consult to assist with strengthening/mobility  Outcome: Progressing

## 2020-07-22 NOTE — PROGRESS NOTES
[facility] Progress Note  Patient: Crystal Arevalo 77 y o  female   MRN: [id number]  PCP: Shara Larry DO  Unit/Bed#: E4 -12 Encounter: [id number]  Date Of Visit: 07/22/20      Assessment/plan  1  Abnormal echo- 1 9x 1 8 cm area of increased echogenicity along lateral wall of left atrium  Pt is for GLEN today at noon    2  Type 2 diabetes a1c is 7 1  Continue insulin sliding scale  Pt is not on medications at home  consider starting metformin at discharge with repeat a1c in 3 to 4 months  Her hyperglycemia likely due to steroid taper  3  Asthma- no exacerbation  Continue steroid taper from recent exacerbation  Will decrease to 30mg of prednisone  4  htn- stable continue current treatment    5  H/o cirrhosis- s/p paracentesis on 7/20  Continue lasix and aldactone  6  Hypokalemia- will replace  7  Pancytopenia- likely due to number 5      dispo- awaiting GLEN     Subjective:   Pt seen and examined  Pt doing well  No f/c no cp no sob no n/v/d no abd pain  She is awaiting GLEN    Objective:     Vitals: Blood pressure 111/55, pulse 55, temperature (!) 97 °F (36 1 °C), temperature source Temporal, resp  rate 18, height 5' 4 5" (1 638 m), weight 106 kg (233 lb 7 5 oz), SpO2 96 %  ,Body mass index is 39 46 kg/m²  Lab, Imaging and other studies:  Results from last 7 days   Lab Units 07/22/20  0545  07/20/20  0817   WBC Thousand/uL 3 31*   < >  --    HEMOGLOBIN g/dL 11 3*   < >  --    HEMATOCRIT % 36 0   < >  --    PLATELETS Thousands/uL 86*   < >  --    INR   --   --  1 18    < > = values in this interval not displayed       Results from last 7 days   Lab Units 07/22/20  0545   POTASSIUM mmol/L 3 4*   CHLORIDE mmol/L 103   CO2 mmol/L 29   BUN mg/dL 23   CREATININE mg/dL 0 89   CALCIUM mg/dL 8 0*   ALK PHOS U/L 91   ALT U/L 20   AST U/L 28     Results from last 7 days   Lab Units 07/20/20  0631 07/20/20  0258 07/19/20  2214   TROPONIN I ng/mL <0 02 <0 02 <0 02     No results found for: Emmy Kenyon      Xr Chest 2 Views    Result Date: 7/19/2020  Narrative: CHEST INDICATION:   SOB COMPARISON:  Chest radiograph from 1/7/2013  EXAM PERFORMED/VIEWS:  XR CHEST PA & LATERAL WITH DUAL ENERGY SUBTRACTION  FINDINGS: Cardiomediastinal silhouette is normal  Lungs are clear  No effusion or pneumothorax  Osseous structures are within normal limits for age  Impression: No acute cardiopulmonary disease  Workstation performed: [id number]     Ir Paracentesis    Result Date: 7/20/2020  Narrative: IMAGE GUIDED PARACENTESIS Indication:  Chronic liver disease  Shortness of breath  Procedure: A suitable location for puncture was identified using ultrasound guidance over the left abdomen and the site was cleansed and draped in sterile fashion  1% lidocaine was used for local anesthetic  A 5 English multisidehole catheter was advanced  into the peritoneal space  The fluid appeared typical for ascites  A static sonographic image was saved  Fluid was drained by [facility]  Following drainage, the puncture site was cleansed and a dressing was applied  The patient tolerated this procedure well without immediate complication  Findings: Focused ultrasound of the abdomen confirms the presence of ascites  2400 mL of fluid was removed  Ordered labs were sent  Impression: Impression: Successful ultrasound-guided paracentesis  Workstation performed: [id number]     Ir Paracentesis    Result Date: 6/30/2020  Narrative: Ultrasound-guided paracentesis Clinical History: Recurrent Ascites Procedure: After explaining the risks and benefits of the procedure to the patient, informed consent was obtained  Ultrasound used to localize the left lower quadrant ascites  The overlying skin was prepped and draped in usual sterile fashion and local anesthesia was obtained with the 1% lidocaine solution  A 5 [place] needle was advanced until fluid was aspirated   Approximately 2800 cc' s of cloudy, yellow fluid was aspirated  The patient tolerated the procedure well and left the department in stable condition  Impression: Impression: Successful ultrasound-guided paracentesis  Workstation performed: [id number]       Scheduled Meds:   Current Facility-Administered Medications:  acetaminophen 650 mg Oral Q6H PRN Harpreet Brown MD   enoxaparin 40 mg Subcutaneous Daily Harpreet Brown MD   escitalopram 20 mg Oral Daily Harpreet Brown MD   furosemide 80 mg Oral Daily Harpreet Brown MD   insulin lispro 1-5 Units Subcutaneous TID AC Harpreet Brown MD   ipratropium-albuterol 3 mL Nebulization Q6H PRN Harpreet Brown MD   metoprolol succinate 25 mg Oral BID Harpreet Brown MD   nitrofurantoin 100 mg Oral BID Harpreet Brown MD   pantoprazole 40 mg Oral Daily Before Breakfast Harpreet Brown MD   predniSONE 40 mg Oral Daily Harpreet Brown MD   spironolactone 100 mg Oral Daily Harpreet Brown MD     Continuous Infusions:    PRN Meds:   acetaminophen    ipratropium-albuterol      Physical exam:  Physical Exam  General appearance: alert and oriented, in no acute distress  Head: Normocephalic, without obvious abnormality, atraumatic  Eyes: conjunctivae/corneas clear  PERRL, EOM's intact  Fundi benign    Neck: no adenopathy, no carotid bruit, no JVD, supple, symmetrical, trachea midline and thyroid not enlarged, symmetric, no tenderness/mass/nodules  Lungs: clear to auscultation bilaterally  Heart: regular rate and rhythm, S1, S2 normal, no murmur, click, rub or gallop  Abdomen: soft, non-tender; bowel sounds normal; no masses,  no organomegaly  Extremities: extremities normal, warm and well-perfused; no cyanosis, clubbing, or edema  Pulses: 2+ and symmetric  Skin: Skin color, texture, turgor normal  No rashes or lesions  Neurologic: Mental status: Alert, oriented, thought content appropriate      VTE Pharmacologic Prophylaxis: Enoxaparin (Lovenox)  VTE Mechanical Prophylaxis: sequential compression device    Counseling / Coordination of Care  Total floor / unit time spent today 20 minutes    Current Length of Stay: 1 day(s)    Current Patient Status: Inpatient     Code Status: Level 2 - DNAR: but accepts endotracheal intubation

## 2020-07-22 NOTE — QUICK NOTE
GLEN failed to demonstrate any discrete left atrial mass  Did show prominent Coumadin ridge  A large Coumadin ridge has been documented to give a pseudo tumor appearance on echocardiogram   No atrial myxoma demonstrated  No further workup necessary  No thrombus was visualized on the structure or in the left atrial appendage  There was a very small tunneled patent foramen ovale demonstrated on color Doppler  No significant valvular heart disease  Preserved LV function  May be discharged today from cardiac standpoint

## 2020-07-22 NOTE — ANESTHESIA PREPROCEDURE EVALUATION
Review of Systems/Medical History          Cardiovascular  DVT   Pulmonary  Asthma Asthma type of rescue: daily nebulizer, Sleep apnea ,        GI/Hepatic    GERD well controlled, Liver disease (s/p recent paracentesis) , cirrhosis,             Endo/Other  Diabetes type 2 , History of thyroid disease , hypothyroidism,   Obesity  morbid obesity   GYN       Hematology  Negative hematology ROS      Musculoskeletal    Arthritis     Neurology  Negative neurology ROS      Psychology   Anxiety, Depression ,              Physical Exam    Airway    Mallampati score: II  TM Distance: >3 FB  Neck ROM: full     Dental   Comment: Upper partial is out,     Cardiovascular  Rhythm: regular, Rate: normal,     Pulmonary  Decreased breath sounds,     Other Findings        Anesthesia Plan  ASA Score- 4     Anesthesia Type- general with ASA Monitors  Additional Monitors:   Airway Plan:         Plan Factors-    Induction- intravenous  Postoperative Plan-     Informed Consent- Anesthetic plan and risks discussed with patient

## 2020-07-22 NOTE — OP NOTE
GLEN preliminary REPORT  PATIENT NAME: Valerie García    :  1955  MRN: [id number]  Pt Location:  Catheterization lab  Procedure note:  GLEN Preliminary Report    Impression:     LV:  Normal size and systolic function  Left ventricular ejection fraction ~ 60%  LA:  Normal size  No discrete mass has been found  TABITHA:  Normal size and function  No spontaneous echocontrast ("smoke"), or thrombus  There is a prominent Coumadin ridge  This is likely the structure seen on transthoracic echocardiogram   RA:  Normal size  Interatrial septum:  There is a very small tunneled PFO seen on color Doppler imaging  RV:  Normal size and systolic function  MV:  Normal structure  No mitral stenosis  Mild regurgitation  AV:  Normal structure  No aortic stenosis  No regurgitation  TV:  Normal structure  No tricuspid stenosis  Mild regurgitation  PV:  Grossly normal but poorly visualized  Trace pulmonic insufficiency  No pulmonic stenosis  Main pulmonary artery appears normal   AO:  Normal appearing root, descending thoracic aorta, and aortic arch  Informed consent obtained from patient prior to procedure after all indications, risks, and benefits of GLEN thoroughly discussed  Propofol was utilized for sedation and administered intravenously by Anesthesia  Blood pressure, EKG, pulse oximetry, respirations, and level of consciousness were monitored throughout the procedure  Pt tolerated procedure well with nurse anesthetist performing sedation and monitoring  GLEN probe passed without difficulty with no blood seen on probe upon extubation          Heath Leyva DO, [facility]    SIGNATURE: Heath Leyva DO  DATE: 2020  TIME: 1:23 PM

## 2020-07-22 NOTE — CONSULTS
Consult - Cardiology   Mary Singleton 77 y o  female MRN: [id number]  Unit/Bed#: E4 -01 Encounter: [id number]        Reason For Consult: Abnormal echocardiogram                ASSESSMENT:  1  Abnormal echocardiogram, 1 9 x 1 8 cm area of increased echogenicity along the lateral left atrial wall, possible left atrial mass  2  History of hepatic cirrhosis  3  EGD September 2019- patchy erythematous mucosa in the antrum, no esophageal varices or gastric varices  4  Type 2 diabetes  5  Asthma  6  History of palpitations, on Toprol  7  Nuclear stress test June 2020:  New EKG changes, no evidence of prior infarct or reversible ischemia    PLAN/ DISCUSSION:     This morning we discussed the indication for a transesophageal echocardiogram in order to further characterize the abnormal finding in her left atria on TTE  She is agreeable to proceed and has been NPO since midnight  She has no loose teeth or history of dysphagia  She does have a history of hepatic cirrhosis and follows with [facility]RE[facility]AL[facility]  EGD in September 2019 showed no esophageal or gastric varices  · GLEN, will try to schedule for today  · NPO for now  · Further recommendation to follow after getting better images of her heart      History Of Present Illness: This is a very kind 38-year-old female who receives her cardiac care from Dr Gomez Perez of [facility]  Follows with him for hypertension, palpitation and history of chest pain  She tells me she has never had any actual cardiac arrhythmias identified and takes metoprolol succinate for her palpitations which helps  She has a history of atypical chest pressure for which she recently underwent a nuclear stress test in June 2020 which was unremarkable  She follows with Dr Ashu Noe [facility]) for history of cirrhosis  She follows with Dr Nikky aMr [facility]) for her primary care  She was just hospitalized for abdominal distention and shortness of breath    She does have a history of hepatic cirrhosis and undergoes periodic paracentesis  On July 20, 2020 bile admitted to the hospital for abdominal distention she underwent IR paracentesis with drainage of 2400 cc of fluid  She is also treated for mild asthma exacerbation with steroids  She had an echocardiogram performed on this admission  On July 21, 2020 she was discharged in stable condition  She was advised to stay in the hospital until her echocardiogram was read however she preferred to go home and get the results as an outpatient  After her discharge her echo was read which showed abnormality he has noted above  She was contacted by our primary hospitalist service advised to return to the hospital for further characterization of the abnormal finding in her left atrium      Past Medical History:        Past Medical History:   Diagnosis Date    Anxiety     Arthritis     Asthma     [facility] [address] [facility]     Depression     Disease of thyroid gland     nodules    Diverticulitis 2011    Diverticulosis     [facility]    GERD (gastroesophageal reflux disease)     Liver disease     cirrhosis    Pneumonia     Portal hypertensive gastropathy [facility]     Sleep apnea     Vertigo       Past Surgical History:   Procedure Laterality Date    BLADDER SUSPENSION      CHOLECYSTECTOMY      COLONOSCOPY  05/24/2019    had multiple with last being [id number]    EGD      HERNIA REPAIR      IR PARACENTESIS  8/23/2018    IR PARACENTESIS  11/5/2018    IR PARACENTESIS  1/11/2019    IR PARACENTESIS  2/27/2019    IR PARACENTESIS  4/15/2019    IR PARACENTESIS  6/3/2019    IR PARACENTESIS  7/10/2019    IR PARACENTESIS  8/16/2019    IR PARACENTESIS  9/10/2019    IR PARACENTESIS  10/7/2019    IR PARACENTESIS  11/5/2019    IR PARACENTESIS  11/22/2019    IR PARACENTESIS  12/17/2019    IR PARACENTESIS  1/7/2020    IR PARACENTESIS  1/28/2020    IR PARACENTESIS  2/18/2020    IR PARACENTESIS  3/10/2020    IR PARACENTESIS  3/31/2020    IR PARACENTESIS  4/21/2020    IR PARACENTESIS  5/12/2020    IR PARACENTESIS  6/2/2020    IR PARACENTESIS  6/30/2020    IR PARACENTESIS  7/20/2020    TONSILLECTOMY      TRIGGER FINGER RELEASE Bilateral         Allergy:        Allergies   Allergen Reactions    Penicillins Itching and Rash     rash      Adhesive [Medical Tape]      Skin tears    Attapulgite     Cephalexin      ?ithcy    Clioquinol     Diclofenac Sodium Itching    Meloxicam Itching    Nabumetone Other (See Comments)     rash    Phenylalanine     Pseudoephedrine-Guaifenesin Other (See Comments)     Entex- "gittery"    Streptomycin     Celecoxib Rash    Rofecoxib Rash     skin reaction       Medications:       Prior to Admission medications    Medication Sig Start Date End Date Taking?  Authorizing Provider   acetaminophen (TYLENOL) 500 mg tablet Take 500 mg by mouth every 6 (six) hours as needed    Historical Provider, MD   Calcium Carb-Ergocalciferol 250-125 MG-UNIT TABS Take 1 tablet by mouth daily    Historical Provider, MD   Cranberry 400 MG CAPS Take 1 capsule by mouth daily    Historical Provider, MD   escitalopram (LEXAPRO) 20 mg tablet Take 20 mg by mouth daily 3/28/19   Historical Provider, MD   furosemide (LASIX) 40 mg tablet Take 80 mg by mouth daily 11/18/15   Historical Provider, MD   hydrocortisone 2 5 % cream Apply 1 application topically 2 (two) times a day    Historical Provider, MD   ipratropium-albuterol (DUO-NEB) 0 5-2 5 mg/3 mL nebulizer solution Take 1 vial (3 mL total) by nebulization every 6 (six) hours as needed for wheezing or shortness of breath 7/21/20 8/20/20  Peggy Meadows MD   lansoprazole (PREVACID) 30 mg capsule Take 30 mg by mouth daily    Historical Provider, MD   metoprolol succinate (TOPROL-XL) 25 mg 24 hr tablet Take 1 tablet by mouth 2 (two) times a day 8/27/18   Historical Provider, MD   nitrofurantoin (MACROBID) 100 mg capsule Take 100 mg by mouth 2 (two) times a day    Historical Provider, MD omeprazole (PRILOSEC) 20 mg delayed release capsule Take 20 mg by mouth every other day  2/3/17   Historical Provider, MD   predniSONE 10 mg tablet Take 4 tablets daily for 1 days Then take 3 tablets daily for 1 days Then take 2 tablets for one day Then Take 1 tablet for one day 7/22/20   Michael Dominguez MD   PROCTOZONE-HC 2 5 % rectal cream Apply 1 each topically as needed rectally 5/24/19   Historical Provider, MD   spironolactone (ALDACTONE) 50 mg tablet Take 100 mg by mouth daily     Historical Provider, MD       Family History:     History reviewed  No pertinent family history  Social History:       Social History     Socioeconomic History    Marital status:       Spouse name: None    Number of children: None    Years of education: None    Highest education level: None   Occupational History    None   Social Needs    Financial resource strain: None    Food insecurity:     Worry: None     Inability: None    Transportation needs:     Medical: None     Non-medical: None   Tobacco Use    Smoking status: Never Smoker    Smokeless tobacco: Never Used   Substance and Sexual Activity    Alcohol use: Yes     Comment: rare    Drug use: Never    Sexual activity: None   Lifestyle    Physical activity:     Days per week: None     Minutes per session: None    Stress: None   Relationships    Social connections:     Talks on phone: None     Gets together: None     Attends Sabianist service: None     Active member of club or organization: None     Attends meetings of clubs or organizations: None     Relationship status: None    Intimate partner violence:     Fear of current or ex partner: None     Emotionally abused: None     Physically abused: None     Forced sexual activity: None   Other Topics Concern    None   Social History Narrative    None       ROS:  Symptoms per HPI  Remainder review of systems is negative    Exam:  General:  alert, oriented and in no distress, cooperative  Head: Normocephalic, atraumatic  Eyes:  EOMI  Pupils - equal, round, reactive to accomodation  No icterus  Normal Conjunctiva  Oropharynx: moist and normal-appearing mucosa  Neck: supple, symmetrical, trachea midline and no JVD  Heart:  RRR, No: murmer, rub or gallop, S1 & S2 normal   Respiratory effort / Chest Inspection: unlabored  Lungs:  normal air entry, lungs clear to auscultation and no rales, rhonchi or wheezing   Abdomen: flat, normal findings: bowel sounds normal and soft, non-tender  Lower Limbs:  no pitting edema  Pulses[de-identified]  RLE - DP: present 2+                 LLE - DP: present 2+  Musculoskeletal: ROM grossly normal    DATA:      ECG:                 NSR  Normal Axis  Low voltage QRS      Telemetry: bradycardic  HR 50's          Echocardiogram:       July 2020:  SUMMARY     SUMMARY:  1  This is a technically adequate study  2  Left ventricle is normal in size with hyperdynamic systolic function  Left ventricular ejection fraction is estimated at 70%  3  Normal diastolic function  4  There is a 1 9 x 1 8 cm area of increased echogenicity along the lateral wall of the left atrium  This finding is of unknown significance, but cannot exclude the possibility of left atrial mass  Please correlate clinically and  additional imaging may be considered such as cardiac MRI or GLEN  5  Trace mitral regurgitation  6  Mild tricuspid regurgitation with pulmonary artery systolic pressure is estimated at 30-35 mm Hg  7  There is no study for comparison      SUMMARY MEASUREMENTS  2D measurements:  Unspecified Anatomy:   RWT was 0 4    CW measurements:  Unspecified Anatomy:   TR Vmax was 2 6 m/s   TR maxPG was 27 3 mmHg  MM measurements:  Unspecified Anatomy:   TAPSE was 2 2 cm  PW measurements:  Unspecified Anatomy:   E' Sept was 0 1 m/s  E/E' Sept was 8 6   MV A Manoj was 0 7 m/s  MV Dec Kane was 3 8 m/s2  MV DecT was 192 3 ms   MV E Manoj was 0 7 m/s  MV E/A Ratio was 1   MV PHT was 55 8 ms    MVA By PHT was 3 9 cm2      HISTORY: PRIOR HISTORY: Hypertension      PROCEDURE: The procedure was performed at the bedside  This was a routine study  The transthoracic approach was used  The study included complete 2D imaging, M-mode, complete spectral Doppler, and color Doppler  The heart rate was 65 bpm,  at the start of the study  Image quality was adequate      LEFT VENTRICLE: Left ventricle is normal in size with hyperdynamic systolic function  Left ventricular ejection fraction is estimated at 70%  Normal wall thickness  Normal diastolic function  There are no obvious high-grade regional wall  motion abnormalities      RIGHT VENTRICLE: Right ventricle is normal in size and function      LEFT ATRIUM: Left atrium is top-normal in size  There is a 1 9 x 1 8 cm area of increased echogenicity along the lateral left atrium  This finding is of unknown significance, however can represent possible mass  Please correlate clinically  and additional imaging may be considered      ATRIAL SEPTUM: The interatrial septum appears to be grossly normal without evidence of shunting by color-flow Doppler      RIGHT ATRIUM: The right atrium is normal in size      MITRAL VALVE: Mitral valve opens well  No evidence of mitral stenosis  Trace mitral regurgitation      AORTIC VALVE: Aortic valve opens well  The aortic valve is trileaflet  No evidence of aortic stenosis or aortic regurgitation      TRICUSPID VALVE: Tricuspid valve opens well  There is mild tricuspid regurgitation  Pulmonary artery systolic pressures are estimated at 30-35 mm Hg      PULMONIC VALVE: Pulmonic valve opens well  No evidence of pulmonic stenosis  Trace pulmonic regurgitation      PERICARDIUM: There is no evidence of significant pericardial effusion      AORTA: Aortic root is normal in size   The ascending aorta is normal in size      SYSTEMIC VEINS: IVC: The IVC is normal size with collapse      SYSTEM MEASUREMENT TABLES     2D  %FS: 42 7 %  Ao Diam: 3 1 cm  EDV(Teich): 92 5 ml  EF(Teich): 74 %  ESV(Teich): 24 1 ml  IVSd: 0 9 cm  LA Area: 22 1 cm2  LA Diam: 4 1 cm  LAAs A4C: 22 1 cm2  LAESV A-L A4C: 67 2 ml  LAESV MOD A4C: 60 8 ml  LALs A4C: 6 2 cm  LVEDV MOD A4C: 55 7 ml  LVEF MOD A4C: 63 6 %  LVESV MOD A4C: 20 3 ml  LVIDd: 4 5 cm  LVIDs: 2 6 cm  LVLd A4C: 7 2 cm  LVLs A4C: 5 8 cm  LVPWd: 0 9 cm  RA Area: 11 1 cm2  RVIDd: 3 cm  RWT: 0 4  SV MOD A4C: 35 4 ml  SV(Teich): 68 4 ml     CW  TR Vmax: 2 6 m/s  TR maxP 3 mmHg     MM  TAPSE: 2 2 cm     PW  E' Sept: 0 1 m/s  E/E' Sept: 8 6  MV A Manoj: 0 7 m/s  MV Dec [place]: 3 8 m/s2  MV DecT: 192 3 ms  MV E Manoj: 0 7 m/s  MV E/A Ratio: 1  MV PHT: 55 8 ms  MVA By PHT: 3 9 cm2           Weights: Wt Readings from Last 3 Encounters:   20 106 kg (233 lb 7 5 oz)   20 111 kg (244 lb 11 4 oz)   10/07/19 110 kg (242 lb 15 2 oz)   , Body mass index is 39 46 kg/m²  Lab Studies:    Results from last 7 days   Lab Units 20  0631 20  0258 20  2214   TROPONIN I ng/mL <0 02 <0 02 <0 02          Results from last 7 days   Lab Units 20  0545 20  1828 20  0552  20  1246   WBC Thousand/uL 3 31*  --  5 26  --  3 80*   HEMOGLOBIN g/dL 11 3*  --  11 3*  --  12 1   HEMATOCRIT % 36 0  --  36 7  --  38 2   PLATELETS Thousands/uL 86* 122* 97*   < > 115*    < > = values in this interval not displayed     ,   Results from last 7 days   Lab Units 20  0545 20  0552 20  1246   POTASSIUM mmol/L 3 4* 3 2* 4 7   CHLORIDE mmol/L 103 101 101   CO2 mmol/L 29 30 31   BUN mg/dL 23 19 10   CREATININE mg/dL 0 89 0 91 0 70   CALCIUM mg/dL 8 0* 8 5 8 8   ALK PHOS U/L 91 96  --    ALT U/L 20 23  --    AST U/L 28 16  --

## 2020-07-22 NOTE — PLAN OF CARE
Problem: Potential for Falls  Goal: Patient will remain free of falls  Description  INTERVENTIONS:  - Assess patient frequently for physical needs  -  Identify cognitive and physical deficits and behaviors that affect risk of falls    -  [place] fall precautions as indicated by assessment   - Educate patient/family on patient safety including physical limitations  - Instruct patient to call for assistance with activity based on assessment  - Modify environment to reduce risk of injury  - Consider OT/PT consult to assist with strengthening/mobility  Outcome: Progressing     Problem: CARDIOVASCULAR - ADULT  Goal: Maintains optimal cardiac output and hemodynamic stability  Description  INTERVENTIONS:  - Monitor I/O, vital signs and rhythm  - Monitor for S/S and trends of decreased cardiac output  - Administer and titrate ordered vasoactive medications to optimize hemodynamic stability  - Assess quality of pulses, skin color and temperature  - Assess for signs of decreased coronary artery perfusion  - Instruct patient to report change in severity of symptoms  Outcome: Progressing  Goal: Absence of cardiac dysrhythmias or at baseline rhythm  Description  INTERVENTIONS:  - Continuous cardiac monitoring, vital signs, obtain 12 lead EKG if ordered  - Administer antiarrhythmic and heart rate control medications as ordered  - Monitor electrolytes and administer replacement therapy as ordered  Outcome: Progressing     Problem: RESPIRATORY - ADULT  Goal: Achieves optimal ventilation and oxygenation  Description  INTERVENTIONS:  - Assess for changes in respiratory status  - Assess for changes in mentation and behavior  - Position to facilitate oxygenation and minimize respiratory effort  - Oxygen administered by appropriate delivery if ordered  - Initiate smoking cessation education as indicated  - Encourage broncho-pulmonary hygiene including cough, deep breathe, Incentive Spirometry  - Assess the need for suctioning and aspirate as needed  - Assess and instruct to report SOB or any respiratory difficulty  - Respiratory Therapy support as indicated  Outcome: Progressing     Problem: GENITOURINARY - ADULT  Goal: Maintains or returns to baseline urinary function  Description  INTERVENTIONS:  - Assess urinary function  - Encourage oral fluids to ensure adequate hydration if ordered  - Administer IV fluids as ordered to ensure adequate hydration  - Administer ordered medications as needed  - Offer frequent toileting  - Follow urinary retention protocol if ordered  Outcome: Progressing     Problem: METABOLIC, FLUID AND ELECTROLYTES - ADULT  Goal: Fluid balance maintained  Description  INTERVENTIONS:  - Monitor labs   - Monitor I/O and WT  - Instruct patient on fluid and nutrition as appropriate  - Assess for signs & symptoms of volume excess or deficit  Outcome: Progressing     Problem: PAIN - ADULT  Goal: Verbalizes/displays adequate comfort level or baseline comfort level  Description  Interventions:  - Encourage patient to monitor pain and request assistance  - Assess pain using appropriate pain scale  - Administer analgesics based on type and severity of pain and evaluate response  - Implement non-pharmacological measures as appropriate and evaluate response  - Consider cultural and social influences on pain and pain management  - Notify physician/advanced practitioner if interventions unsuccessful or patient reports new pain  Outcome: Progressing     Problem: INFECTION - ADULT  Goal: Absence or prevention of progression during hospitalization  Description  INTERVENTIONS:  - Assess and monitor for signs and symptoms of infection  - Monitor lab/diagnostic results  - Monitor all insertion sites, i e  indwelling lines, tubes, and drains  - Monitor endotracheal if appropriate and nasal secretions for changes in amount and color  - [place] appropriate cooling/warming therapies per order  - Administer medications as ordered  - Instruct and encourage patient and family to use good hand hygiene technique  - Identify and instruct in appropriate isolation precautions for identified infection/condition  Outcome: Progressing  Goal: Absence of fever/infection during neutropenic period  Description  INTERVENTIONS:  - Monitor WBC    Outcome: Progressing     Problem: DISCHARGE PLANNING  Goal: Discharge to home or other facility with appropriate resources  Description  INTERVENTIONS:  - Identify barriers to discharge w/patient and caregiver  - Arrange for needed discharge resources and transportation as appropriate  - Identify discharge learning needs (meds, wound care, etc )  - Arrange for interpretive services to assist at discharge as needed  - Refer to Case Management Department for coordinating discharge planning if the patient needs post-hospital services based on physician/advanced practitioner order or complex needs related to functional status, cognitive ability, or social support system  Outcome: Progressing     Problem: Knowledge Deficit  Goal: Patient/family/caregiver demonstrates understanding of disease process, treatment plan, medications, and discharge instructions  Description  Complete learning assessment and assess knowledge base    Interventions:  - Provide teaching at level of understanding  - Provide teaching via preferred learning methods  Outcome: Progressing

## 2020-07-23 VITALS
DIASTOLIC BLOOD PRESSURE: 62 MMHG | OXYGEN SATURATION: 97 % | TEMPERATURE: 97.4 F | WEIGHT: 233.47 LBS | HEIGHT: 65 IN | RESPIRATION RATE: 18 BRPM | BODY MASS INDEX: 38.9 KG/M2 | HEART RATE: 53 BPM | SYSTOLIC BLOOD PRESSURE: 119 MMHG

## 2020-07-23 PROBLEM — R93.1 ABNORMAL ECHOCARDIOGRAM: Status: RESOLVED | Noted: 2020-07-21 | Resolved: 2020-07-23

## 2020-07-23 LAB
ANION GAP SERPL CALCULATED.3IONS-SCNC: 7 MMOL/L (ref 4–13)
BACTERIA SPEC BFLD CULT: NO GROWTH
BUN SERPL-MCNC: 20 MG/DL (ref 5–25)
CALCIUM SERPL-MCNC: 8.1 MG/DL (ref 8.3–10.1)
CHLORIDE SERPL-SCNC: 103 MMOL/L (ref 100–108)
CO2 SERPL-SCNC: 30 MMOL/L (ref 21–32)
CREAT SERPL-MCNC: 1.02 MG/DL (ref 0.6–1.3)
ERYTHROCYTE [DISTWIDTH] IN BLOOD BY AUTOMATED COUNT: 13.7 % (ref 11.6–15.1)
GFR SERPL CREATININE-BSD FRML MDRD: 57 ML/MIN/1.73SQ M
GLUCOSE SERPL-MCNC: 180 MG/DL (ref 65–140)
GLUCOSE SERPL-MCNC: 209 MG/DL (ref 65–140)
GLUCOSE SERPL-MCNC: 225 MG/DL (ref 65–140)
GLUCOSE SERPL-MCNC: 229 MG/DL (ref 65–140)
GRAM STN SPEC: NORMAL
HCT VFR BLD AUTO: 36.9 % (ref 34.8–46.1)
HGB BLD-MCNC: 11.6 G/DL (ref 11.5–15.4)
MCH RBC QN AUTO: 29.3 PG (ref 26.8–34.3)
MCHC RBC AUTO-ENTMCNC: 31.4 G/DL (ref 31.4–37.4)
MCV RBC AUTO: 93 FL (ref 82–98)
PLATELET # BLD AUTO: 92 THOUSANDS/UL (ref 149–390)
PMV BLD AUTO: 10.4 FL (ref 8.9–12.7)
POTASSIUM SERPL-SCNC: 3.9 MMOL/L (ref 3.5–5.3)
RBC # BLD AUTO: 3.96 MILLION/UL (ref 3.81–5.12)
SODIUM SERPL-SCNC: 140 MMOL/L (ref 136–145)
WBC # BLD AUTO: 2.67 THOUSAND/UL (ref 4.31–10.16)

## 2020-07-23 PROCEDURE — 99239 HOSP IP/OBS DSCHRG MGMT >30: CPT | Performed by: INTERNAL MEDICINE

## 2020-07-23 PROCEDURE — 80048 BASIC METABOLIC PNL TOTAL CA: CPT | Performed by: INTERNAL MEDICINE

## 2020-07-23 PROCEDURE — 85027 COMPLETE CBC AUTOMATED: CPT | Performed by: INTERNAL MEDICINE

## 2020-07-23 PROCEDURE — 82948 REAGENT STRIP/BLOOD GLUCOSE: CPT

## 2020-07-23 RX ORDER — PREDNISONE 20 MG/1
20 TABLET ORAL DAILY
Status: DISCONTINUED | OUTPATIENT
Start: 2020-07-24 | End: 2020-07-23 | Stop reason: HOSPADM

## 2020-07-23 RX ADMIN — NITROFURANTOIN (MONOHYDRATE/MACROCRYSTALS) 100 MG: 75; 25 CAPSULE ORAL at 08:16

## 2020-07-23 RX ADMIN — PANTOPRAZOLE SODIUM 40 MG: 40 TABLET, DELAYED RELEASE ORAL at 05:29

## 2020-07-23 RX ADMIN — INSULIN LISPRO 1 UNITS: 100 INJECTION, SOLUTION INTRAVENOUS; SUBCUTANEOUS at 08:16

## 2020-07-23 RX ADMIN — SPIRONOLACTONE 100 MG: 50 TABLET ORAL at 08:15

## 2020-07-23 RX ADMIN — ENOXAPARIN SODIUM 40 MG: 40 INJECTION SUBCUTANEOUS at 08:15

## 2020-07-23 RX ADMIN — PREDNISONE 30 MG: 20 TABLET ORAL at 08:16

## 2020-07-23 RX ADMIN — ESCITALOPRAM OXALATE 20 MG: 20 TABLET, FILM COATED ORAL at 08:16

## 2020-07-23 RX ADMIN — INSULIN LISPRO 2 UNITS: 100 INJECTION, SOLUTION INTRAVENOUS; SUBCUTANEOUS at 11:11

## 2020-07-23 RX ADMIN — FUROSEMIDE 80 MG: 80 TABLET ORAL at 08:15

## 2020-07-23 RX ADMIN — METOPROLOL SUCCINATE 25 MG: 25 TABLET, EXTENDED RELEASE ORAL at 08:16

## 2020-07-23 NOTE — DISCHARGE SUMMARY
Discharge Summary - [facility] Internal Medicine    Patient Information: Natalie Hsieh 77 y o  female MRN: [id number]  Unit/Bed#: E4 -01 Encounter: [id number]    Discharging Physician / Practitioner: Morenita Fenton DO  PCP: Brenda Salas DO  Admission Date: 7/21/2020  Discharge Date: 07/23/20    Disposition:     Home     Reason for Admission: abnormal echo    Discharge Diagnoses:     Principal Problem (Resolved): Abnormal echocardiogram  Active Problems:    Hepatic cirrhosis (HCC)    Essential hypertension    Asthma    Diabetes mellitus ([address] )      Consultations During Hospital Stay:  · cardiology    Procedures Performed:     · GLEN: negative  Significant Findings / Test Results:   Xr Chest 2 Views  Result Date: 7/19/2020  Impression: No acute cardiopulmonary disease  Incidental Findings:   · none    Test Results Pending at Discharge (will require follow up):   · none     Outpatient Tests Requested:  none    Hospital Course:     Natalie Hsieh is a 77 y o  female patient who originally presented to the hospital on 7/21/2020 due to abnormal echo which showed a 1 9x 1 8 cm area of increased echogenicity along lateral wall of left atrium  S/p GLEN that was negative for mass  Pt had a prominent ridge which gave the appearance of a mass      She has Type 2 diabetes a1c is 7 1  Her a1c has increased from 6 2  She will be started on januvia and will require repeat a1c in 3 to 4 months  Pt had a recent asthma exacerbation  She is currently on a steroid taper   She has h/o cirrhosis and s/p paracentesis on 7/20  She will continue with lasix and aldactone  Pt has pancytopenia due to cirrhosis  She was discharged to home and will follow up with her family doctor       Discharge Day Visit / Exam:     * Please refer to separate progress note for these details *    Discharge instructions/Information to patient and family:   See after visit summary for information provided to patient and family  Provisions for Follow-Up Care:  See after visit summary for information related to follow-up care and any pertinent home health orders  Discharge Statement:  I spent 34 minutes discharging the patient  This time was spent on the day of discharge  I had direct contact with the patient on the day of discharge  Greater than 50% of the total time was spent examining patient, answering all patient questions, arranging and discussing plan of care with patient as well as directly providing post-discharge instructions  Additional time then spent on discharge activities  Discharge Medications:  See after visit summary for reconciled discharge medications provided to patient and family

## 2020-07-23 NOTE — PLAN OF CARE
Problem: Potential for Falls  Goal: Patient will remain free of falls  Description  INTERVENTIONS:  - Assess patient frequently for physical needs  -  Identify cognitive and physical deficits and behaviors that affect risk of falls    -  [place] fall precautions as indicated by assessment   - Educate patient/family on patient safety including physical limitations  - Instruct patient to call for assistance with activity based on assessment  - Modify environment to reduce risk of injury  - Consider OT/PT consult to assist with strengthening/mobility  Outcome: Progressing     Problem: CARDIOVASCULAR - ADULT  Goal: Maintains optimal cardiac output and hemodynamic stability  Description  INTERVENTIONS:  - Monitor I/O, vital signs and rhythm  - Monitor for S/S and trends of decreased cardiac output  - Administer and titrate ordered vasoactive medications to optimize hemodynamic stability  - Assess quality of pulses, skin color and temperature  - Assess for signs of decreased coronary artery perfusion  - Instruct patient to report change in severity of symptoms  Outcome: Progressing  Goal: Absence of cardiac dysrhythmias or at baseline rhythm  Description  INTERVENTIONS:  - Continuous cardiac monitoring, vital signs, obtain 12 lead EKG if ordered  - Administer antiarrhythmic and heart rate control medications as ordered  - Monitor electrolytes and administer replacement therapy as ordered  Outcome: Progressing     Problem: RESPIRATORY - ADULT  Goal: Achieves optimal ventilation and oxygenation  Description  INTERVENTIONS:  - Assess for changes in respiratory status  - Assess for changes in mentation and behavior  - Position to facilitate oxygenation and minimize respiratory effort  - Oxygen administered by appropriate delivery if ordered  - Initiate smoking cessation education as indicated  - Encourage broncho-pulmonary hygiene including cough, deep breathe, Incentive Spirometry  - Assess the need for suctioning and aspirate as needed  - Assess and instruct to report SOB or any respiratory difficulty  - Respiratory Therapy support as indicated  Outcome: Progressing     Problem: GENITOURINARY - ADULT  Goal: Maintains or returns to baseline urinary function  Description  INTERVENTIONS:  - Assess urinary function  - Encourage oral fluids to ensure adequate hydration if ordered  - Administer IV fluids as ordered to ensure adequate hydration  - Administer ordered medications as needed  - Offer frequent toileting  - Follow urinary retention protocol if ordered  Outcome: Progressing     Problem: METABOLIC, FLUID AND ELECTROLYTES - ADULT  Goal: Fluid balance maintained  Description  INTERVENTIONS:  - Monitor labs   - Monitor I/O and WT  - Instruct patient on fluid and nutrition as appropriate  - Assess for signs & symptoms of volume excess or deficit  Outcome: Progressing     Problem: PAIN - ADULT  Goal: Verbalizes/displays adequate comfort level or baseline comfort level  Description  Interventions:  - Encourage patient to monitor pain and request assistance  - Assess pain using appropriate pain scale  - Administer analgesics based on type and severity of pain and evaluate response  - Implement non-pharmacological measures as appropriate and evaluate response  - Consider cultural and social influences on pain and pain management  - Notify physician/advanced practitioner if interventions unsuccessful or patient reports new pain  Outcome: Progressing     Problem: INFECTION - ADULT  Goal: Absence or prevention of progression during hospitalization  Description  INTERVENTIONS:  - Assess and monitor for signs and symptoms of infection  - Monitor lab/diagnostic results  - Monitor all insertion sites, i e  indwelling lines, tubes, and drains  - Monitor endotracheal if appropriate and nasal secretions for changes in amount and color  - [place] appropriate cooling/warming therapies per order  - Administer medications as ordered  - Instruct and encourage patient and family to use good hand hygiene technique  - Identify and instruct in appropriate isolation precautions for identified infection/condition  Outcome: Progressing  Goal: Absence of fever/infection during neutropenic period  Description  INTERVENTIONS:  - Monitor WBC    Outcome: Progressing     Problem: DISCHARGE PLANNING  Goal: Discharge to home or other facility with appropriate resources  Description  INTERVENTIONS:  - Identify barriers to discharge w/patient and caregiver  - Arrange for needed discharge resources and transportation as appropriate  - Identify discharge learning needs (meds, wound care, etc )  - Arrange for interpretive services to assist at discharge as needed  - Refer to Case Management Department for coordinating discharge planning if the patient needs post-hospital services based on physician/advanced practitioner order or complex needs related to functional status, cognitive ability, or social support system  Outcome: Progressing     Problem: Knowledge Deficit  Goal: Patient/family/caregiver demonstrates understanding of disease process, treatment plan, medications, and discharge instructions  Description  Complete learning assessment and assess knowledge base    Interventions:  - Provide teaching at level of understanding  - Provide teaching via preferred learning methods  Outcome: Progressing

## 2020-07-23 NOTE — PROGRESS NOTES
[facility] Progress Note  Patient: Gonzalez Mckeon 77 y o  female   MRN: [id number]  PCP: Janette Cuellar DO  Unit/Bed#: E4 -15 Encounter: [id number]  Date Of Visit: 07/23/20      Assessment/plan  1  Abnormal echo- 1 9x 1 8 cm area of increased echogenicity along lateral wall of left atrium  S/p GLEN that was negative for mass  Pt had a prominent ridge which gave the appearance of a mass       2  Type 2 diabetes a1c is 7 1  Continue insulin sliding scale  Pt is not on medications at home  consider starting metformin at discharge with repeat a1c in 3 to 4 months  Her hyperglycemia likely due to steroid taper       3  Asthma- no exacerbation  Continue steroid taper from recent exacerbation  Will decrease to 20mg of prednisone       4  htn- stable continue current treatment     5  H/o cirrhosis- s/p paracentesis on 7/20  Continue lasix and aldactone       6  Hypokalemia- replaced       7  Pancytopenia- likely due to number 5       dispo- d/c to home  Pt will need to follow up with family doctor      Subjective:   Pt seen and examined  Pt doing well  No dyspnea  No f/c no cp no n/v/d no abd pain  Objective:     Vitals: Blood pressure 119/62, pulse (!) 53, temperature (!) 97 4 °F (36 3 °C), temperature source Tympanic, resp  rate 18, height 5' 4 5" (1 638 m), weight 106 kg (233 lb 7 5 oz), SpO2 97 %  ,Body mass index is 39 46 kg/m²  Lab, Imaging and other studies:  Results from last 7 days   Lab Units 07/23/20  0519  07/20/20  0817   WBC Thousand/uL 2 67*   < >  --    HEMOGLOBIN g/dL 11 6   < >  --    HEMATOCRIT % 36 9   < >  --    PLATELETS Thousands/uL 92*   < >  --    INR   --   --  1 18    < > = values in this interval not displayed       Results from last 7 days   Lab Units 07/23/20  0519 07/22/20  0545   POTASSIUM mmol/L 3 9 3 4*   CHLORIDE mmol/L 103 103   CO2 mmol/L 30 29   BUN mg/dL 20 23   CREATININE mg/dL 1 02 0 89   CALCIUM mg/dL 8 1* 8 0*   ALK PHOS U/L  --  91   ALT U/L  --  20 AST U/L  --  28     Results from last 7 days   Lab Units 07/20/20  0631 07/20/20  0258 07/19/20  2214   TROPONIN I ng/mL <0 02 <0 02 <0 02     No results found for: Bailey Brewern, Elis Brush, SPUTUMCULTUR    Scheduled Meds:   Current Facility-Administered Medications:  acetaminophen 650 mg Oral Q6H PRN Abhi De Leon MD   enoxaparin 40 mg Subcutaneous Daily Abhi De Leon MD   escitalopram 20 mg Oral Daily Abhi De Leon MD   furosemide 80 mg Oral Daily Abhi De Leon MD   insulin lispro 1-5 Units Subcutaneous TID AC Abhi De Leon MD   ipratropium-albuterol 3 mL Nebulization Q6H PRN Abhi De Leon MD   metoprolol succinate 25 mg Oral BID Abhi De Leon MD   nitrofurantoin 100 mg Oral BID Abhi De Leon MD   pantoprazole 40 mg Oral Daily Before Breakfast Abhi De Leon MD   predniSONE 30 mg Oral Daily Yuly Nunes DO   spironolactone 100 mg Oral Daily Abhi De Leon MD     Continuous Infusions:    PRN Meds:   acetaminophen    ipratropium-albuterol      Physical exam:  Physical Exam  General appearance: alert and oriented, in no acute distress  Head: Normocephalic, without obvious abnormality, atraumatic  Eyes: conjunctivae/corneas clear  PERRL, EOM's intact  Fundi benign    Neck: no adenopathy, no carotid bruit, no JVD, supple, symmetrical, trachea midline and thyroid not enlarged, symmetric, no tenderness/mass/nodules  Lungs: clear to auscultation bilaterally  Heart: regular rate and rhythm, S1, S2 normal, no murmur, click, rub or gallop  Abdomen: soft, non-tender; bowel sounds normal; no masses,  no organomegaly  Extremities: extremities normal, warm and well-perfused; no cyanosis, clubbing, or edema  Pulses: 2+ and symmetric  Skin: Skin color, texture, turgor normal  No rashes or lesions  Neurologic: Mental status: Alert, oriented, thought content appropriate

## 2020-07-23 NOTE — PLAN OF CARE
Problem: Potential for Falls  Goal: Patient will remain free of falls  Description  INTERVENTIONS:  - Assess patient frequently for physical needs  -  Identify cognitive and physical deficits and behaviors that affect risk of falls    -  [place] fall precautions as indicated by assessment   - Educate patient/family on patient safety including physical limitations  - Instruct patient to call for assistance with activity based on assessment  - Modify environment to reduce risk of injury  - Consider OT/PT consult to assist with strengthening/mobility  Outcome: Progressing     Problem: CARDIOVASCULAR - ADULT  Goal: Maintains optimal cardiac output and hemodynamic stability  Description  INTERVENTIONS:  - Monitor I/O, vital signs and rhythm  - Monitor for S/S and trends of decreased cardiac output  - Administer and titrate ordered vasoactive medications to optimize hemodynamic stability  - Assess quality of pulses, skin color and temperature  - Assess for signs of decreased coronary artery perfusion  - Instruct patient to report change in severity of symptoms  Outcome: Progressing  Goal: Absence of cardiac dysrhythmias or at baseline rhythm  Description  INTERVENTIONS:  - Continuous cardiac monitoring, vital signs, obtain 12 lead EKG if ordered  - Administer antiarrhythmic and heart rate control medications as ordered  - Monitor electrolytes and administer replacement therapy as ordered  Outcome: Progressing     Problem: RESPIRATORY - ADULT  Goal: Achieves optimal ventilation and oxygenation  Description  INTERVENTIONS:  - Assess for changes in respiratory status  - Assess for changes in mentation and behavior  - Position to facilitate oxygenation and minimize respiratory effort  - Oxygen administered by appropriate delivery if ordered  - Initiate smoking cessation education as indicated  - Encourage broncho-pulmonary hygiene including cough, deep breathe, Incentive Spirometry  - Assess the need for suctioning and aspirate as needed  - Assess and instruct to report SOB or any respiratory difficulty  - Respiratory Therapy support as indicated  Outcome: Progressing     Problem: GENITOURINARY - ADULT  Goal: Maintains or returns to baseline urinary function  Description  INTERVENTIONS:  - Assess urinary function  - Encourage oral fluids to ensure adequate hydration if ordered  - Administer IV fluids as ordered to ensure adequate hydration  - Administer ordered medications as needed  - Offer frequent toileting  - Follow urinary retention protocol if ordered  Outcome: Progressing     Problem: METABOLIC, FLUID AND ELECTROLYTES - ADULT  Goal: Fluid balance maintained  Description  INTERVENTIONS:  - Monitor labs   - Monitor I/O and WT  - Instruct patient on fluid and nutrition as appropriate  - Assess for signs & symptoms of volume excess or deficit  Outcome: Progressing     Problem: PAIN - ADULT  Goal: Verbalizes/displays adequate comfort level or baseline comfort level  Description  Interventions:  - Encourage patient to monitor pain and request assistance  - Assess pain using appropriate pain scale  - Administer analgesics based on type and severity of pain and evaluate response  - Implement non-pharmacological measures as appropriate and evaluate response  - Consider cultural and social influences on pain and pain management  - Notify physician/advanced practitioner if interventions unsuccessful or patient reports new pain  Outcome: Progressing     Problem: INFECTION - ADULT  Goal: Absence or prevention of progression during hospitalization  Description  INTERVENTIONS:  - Assess and monitor for signs and symptoms of infection  - Monitor lab/diagnostic results  - Monitor all insertion sites, i e  indwelling lines, tubes, and drains  - Monitor endotracheal if appropriate and nasal secretions for changes in amount and color  - [place] appropriate cooling/warming therapies per order  - Administer medications as ordered  - Instruct and encourage patient and family to use good hand hygiene technique  - Identify and instruct in appropriate isolation precautions for identified infection/condition  Outcome: Progressing  Goal: Absence of fever/infection during neutropenic period  Description  INTERVENTIONS:  - Monitor WBC    Outcome: Progressing     Problem: DISCHARGE PLANNING  Goal: Discharge to home or other facility with appropriate resources  Description  INTERVENTIONS:  - Identify barriers to discharge w/patient and caregiver  - Arrange for needed discharge resources and transportation as appropriate  - Identify discharge learning needs (meds, wound care, etc )  - Arrange for interpretive services to assist at discharge as needed  - Refer to Case Management Department for coordinating discharge planning if the patient needs post-hospital services based on physician/advanced practitioner order or complex needs related to functional status, cognitive ability, or social support system  Outcome: Progressing     Problem: Knowledge Deficit  Goal: Patient/family/caregiver demonstrates understanding of disease process, treatment plan, medications, and discharge instructions  Description  Complete learning assessment and assess knowledge base    Interventions:  - Provide teaching at level of understanding  - Provide teaching via preferred learning methods  Outcome: Progressing

## 2020-08-18 ENCOUNTER — HOSPITAL ENCOUNTER (OUTPATIENT)
Dept: RADIOLOGY | Facility: HOSPITAL | Age: 65
Discharge: HOME | End: 2020-08-18
Attending: INTERNAL MEDICINE
Payer: MEDICARE

## 2020-08-18 VITALS
OXYGEN SATURATION: 98 % | HEART RATE: 56 BPM | SYSTOLIC BLOOD PRESSURE: 123 MMHG | DIASTOLIC BLOOD PRESSURE: 58 MMHG | RESPIRATION RATE: 16 BRPM

## 2020-08-18 DIAGNOSIS — R18.8 ASCITES: ICD-10-CM

## 2020-08-18 PROCEDURE — 49083 ABD PARACENTESIS W/IMAGING: CPT

## 2020-08-18 PROCEDURE — 76705 ECHO EXAM OF ABDOMEN: CPT | Performed by: STUDENT IN AN ORGANIZED HEALTH CARE EDUCATION/TRAINING PROGRAM

## 2020-08-18 NOTE — PROGRESS NOTES
Interventional Radiology Brief Note:    Limited four quadrant ultrasound showed no ascites  Paracentesis not performed  Thank you for allowing me to participate in the care of [facility]  Please don't hesitate to call, text, email, or TigerText with any questions  Brenton Landry MD  Interventional Radiologist  [facility]  Personal Cell: [phone]  [email or web address]

## 2020-10-05 ENCOUNTER — HOSPITAL ENCOUNTER (OUTPATIENT)
Dept: ULTRASOUND IMAGING | Facility: HOSPITAL | Age: 65
Discharge: HOME | End: 2020-10-05
Attending: INTERNAL MEDICINE
Payer: MEDICARE

## 2020-10-05 DIAGNOSIS — R18.8 OTHER ASCITES: ICD-10-CM

## 2020-10-05 PROCEDURE — 76700 US EXAM ABDOM COMPLETE: CPT

## 2020-11-12 ENCOUNTER — HOSPITAL ENCOUNTER (OUTPATIENT)
Dept: RADIOLOGY | Facility: HOSPITAL | Age: 65
Discharge: HOME | End: 2020-11-12
Attending: INTERNAL MEDICINE
Payer: MEDICARE

## 2020-11-12 VITALS
HEART RATE: 51 BPM | RESPIRATION RATE: 12 BRPM | DIASTOLIC BLOOD PRESSURE: 61 MMHG | OXYGEN SATURATION: 98 % | SYSTOLIC BLOOD PRESSURE: 126 MMHG

## 2020-11-12 DIAGNOSIS — R18.8 OTHER ASCITES: ICD-10-CM

## 2020-11-12 DIAGNOSIS — R18.8 ASCITES: ICD-10-CM

## 2020-11-12 PROCEDURE — 49083 ABD PARACENTESIS W/IMAGING: CPT

## 2020-11-12 PROCEDURE — 76705 ECHO EXAM OF ABDOMEN: CPT | Performed by: RADIOLOGY

## 2020-11-12 PROCEDURE — 76937 US GUIDE VASCULAR ACCESS: CPT

## 2021-03-10 ENCOUNTER — TRANSCRIBE ORDERS (OUTPATIENT)
Dept: RADIOLOGY | Facility: HOSPITAL | Age: 66
End: 2021-03-10

## 2021-03-10 DIAGNOSIS — K70.11 ASCITES DUE TO ALCOHOLIC HEPATITIS: Primary | ICD-10-CM

## 2021-03-11 ENCOUNTER — HOSPITAL ENCOUNTER (OUTPATIENT)
Dept: RADIOLOGY | Facility: HOSPITAL | Age: 66
Discharge: HOME | End: 2021-03-11
Attending: INTERNAL MEDICINE | Admitting: RADIOLOGY
Payer: MEDICARE

## 2021-03-11 DIAGNOSIS — K70.11 ASCITES DUE TO ALCOHOLIC HEPATITIS: ICD-10-CM

## 2021-03-11 PROCEDURE — 49083 ABD PARACENTESIS W/IMAGING: CPT

## 2021-03-11 PROCEDURE — 76705 ECHO EXAM OF ABDOMEN: CPT | Performed by: RADIOLOGY

## 2021-03-22 PROBLEM — K52.839 MICROSCOPIC COLITIS: Status: ACTIVE | Noted: 2021-03-22

## 2021-06-14 PROBLEM — D69.6 THROMBOCYTOPENIA (HCC): Status: ACTIVE | Noted: 2021-06-14

## 2021-06-14 PROBLEM — K72.90 LIVER FAILURE WITHOUT HEPATIC COMA, UNSPECIFIED CHRONICITY (HCC): Status: ACTIVE | Noted: 2021-06-14

## 2021-06-14 PROBLEM — E66.01 MORBID OBESITY (HCC): Status: ACTIVE | Noted: 2021-06-14

## 2022-02-08 ENCOUNTER — HOSPITAL ENCOUNTER (OUTPATIENT)
Dept: INTERVENTIONAL RADIOLOGY/VASCULAR | Facility: HOSPITAL | Age: 67
Discharge: HOME | End: 2022-02-08
Admitting: INTERNAL MEDICINE
Payer: MEDICARE

## 2022-02-08 VITALS
SYSTOLIC BLOOD PRESSURE: 148 MMHG | OXYGEN SATURATION: 98 % | HEART RATE: 74 BPM | RESPIRATION RATE: 16 BRPM | DIASTOLIC BLOOD PRESSURE: 67 MMHG

## 2022-02-08 DIAGNOSIS — R18.8 CIRRHOSIS OF LIVER WITH ASCITES, UNSPECIFIED HEPATIC CIRRHOSIS TYPE (HCC): ICD-10-CM

## 2022-02-08 DIAGNOSIS — K74.60 CIRRHOSIS OF LIVER WITH ASCITES, UNSPECIFIED HEPATIC CIRRHOSIS TYPE (HCC): ICD-10-CM

## 2022-02-08 PROCEDURE — 49083 ABD PARACENTESIS W/IMAGING: CPT | Performed by: INTERNAL MEDICINE

## 2022-02-08 PROCEDURE — 49083 ABD PARACENTESIS W/IMAGING: CPT

## 2022-02-08 RX ORDER — LIDOCAINE WITH 8.4% SOD BICARB 0.9%(10ML)
SYRINGE (ML) INJECTION CODE/TRAUMA/SEDATION MEDICATION
Status: COMPLETED | OUTPATIENT
Start: 2022-02-08 | End: 2022-02-08

## 2022-02-08 RX ORDER — ALBUMIN (HUMAN) 12.5 G/50ML
12.5 SOLUTION INTRAVENOUS ONCE
Status: COMPLETED | OUTPATIENT
Start: 2022-02-08 | End: 2022-02-08

## 2022-02-08 RX ADMIN — ALBUMIN (HUMAN) 12.5 G: 0.25 INJECTION, SOLUTION INTRAVENOUS at 13:30

## 2022-02-08 RX ADMIN — Medication 10 ML: at 13:22

## 2022-02-08 NOTE — DISCHARGE INSTRUCTIONS
Abdominal Paracentesis     WHAT YOU NEED TO KNOW:   Abdominal paracentesis is a procedure to remove abnormal fluid buildup in your abdomen  Fluid builds up because of liver problems, such as swelling and scarring  Heart failure, kidney disease, a mass, or problems with your pancreas may also cause fluid buildup  DISCHARGE INSTRUCTIONS:     Follow up with your healthcare provider as directed: Write down your questions so you remember to ask them during your visits  Wound care: Remove dressing after 24 hours  Leave glue in place  Return to your normal activities    Contact Interventional Radiology at [phone] Darleen PATIENTS: Contact Interventional Radiology at [phone]) Justin Ramírez PATIENTS: Contact Interventional Radiology at [phone]) if:  · You have a fever and your wound is red and swollen  · You have yellow, green, or bad-smelling discharge coming from your wound  · You have pain or swelling in your abdomen  · You have an upset stomach or you vomit  · You have sudden, sharp pain in your abdomen  · You urinate very little or not at all  · You feel confused and more tired than usual    · Your arm or leg feels warm, tender, and painful  It may look swollen and red  · You suddenly feel lightheaded and have trouble breathing

## 2022-02-08 NOTE — BRIEF OP NOTE (RAD/CATH)
INTERVENTIONAL RADIOLOGY PROCEDURE NOTE    Date: 2/8/2022    Procedure: IR PARACENTESIS    Preoperative diagnosis:   1  Cirrhosis of liver with ascites, unspecified hepatic cirrhosis type ([address] )         Postoperative diagnosis: Same  Surgeon: Ramona Rodríguez MD     Assistant: None  No qualified resident was available  Blood loss: None    Specimens: None     Findings: Ultrasound-guided paracentesis performed  Please see radiology report for details  Complications: None immediate      Anesthesia: local

## 2022-03-12 NOTE — QUICK NOTE
Limited four quadrant ultrasound showed no ascites  Paracentesis not performed  Patient has not had a diagnostic/therapeutic paracentesis since July 2020  Recommend imaging prior to scheduling paracentesis in the future       [address] [place] [address] Dyllan Leblanc
DISPLAY PLAN FREE TEXT

## 2022-05-02 ENCOUNTER — HOSPITAL ENCOUNTER (OUTPATIENT)
Dept: ULTRASOUND IMAGING | Facility: HOSPITAL | Age: 67
Discharge: HOME | End: 2022-05-02
Attending: INTERNAL MEDICINE
Payer: MEDICARE

## 2022-05-02 DIAGNOSIS — R18.8 CIRRHOSIS OF LIVER WITH ASCITES, UNSPECIFIED HEPATIC CIRRHOSIS TYPE (HCC): ICD-10-CM

## 2022-05-02 DIAGNOSIS — K74.60 CIRRHOSIS OF LIVER WITH ASCITES, UNSPECIFIED HEPATIC CIRRHOSIS TYPE (HCC): ICD-10-CM

## 2022-05-02 PROCEDURE — 76700 US EXAM ABDOM COMPLETE: CPT

## 2022-08-24 ENCOUNTER — HOSPITAL ENCOUNTER (OUTPATIENT)
Dept: RADIOLOGY | Facility: HOSPITAL | Age: 67
Discharge: HOME | End: 2022-08-24
Attending: INTERNAL MEDICINE
Payer: MEDICARE

## 2022-08-24 VITALS
DIASTOLIC BLOOD PRESSURE: 60 MMHG | OXYGEN SATURATION: 97 % | SYSTOLIC BLOOD PRESSURE: 138 MMHG | HEART RATE: 52 BPM | RESPIRATION RATE: 16 BRPM

## 2022-08-24 DIAGNOSIS — K74.60 CIRRHOSIS OF LIVER WITH ASCITES, UNSPECIFIED HEPATIC CIRRHOSIS TYPE (HCC): ICD-10-CM

## 2022-08-24 DIAGNOSIS — R18.8 CIRRHOSIS OF LIVER WITH ASCITES, UNSPECIFIED HEPATIC CIRRHOSIS TYPE (HCC): ICD-10-CM

## 2022-08-24 PROCEDURE — 49083 ABD PARACENTESIS W/IMAGING: CPT | Performed by: PHYSICIAN ASSISTANT

## 2022-08-24 PROCEDURE — 49083 ABD PARACENTESIS W/IMAGING: CPT

## 2022-08-24 NOTE — BRIEF OP NOTE (RAD/CATH)
Left IR PARACENTESIS Procedure Note    PATIENT NAME: Tariq Chen  : 1955  MRN: [id number]    Pre-op Diagnosis:   1  Cirrhosis of liver with ascites, unspecified hepatic cirrhosis type (HCC)      Post-op Diagnosis:   1  Cirrhosis of liver with ascites, unspecified hepatic cirrhosis type [facility])        Provider:  Hien Block PA-C    No qualified resident was available, Resident is only observing    Estimated Blood Loss: minimal    Findings: left sided paracentesis  4350cc clear yellow fluid removed       Specimens: none    Complications:  None immediate    Anesthesia: local    Hien Block PA-C     Date: 2022  Time: 1:13 PM

## 2022-08-24 NOTE — SEDATION DOCUMENTATION
Aprox: 4350 ml clear yellow ascites was withdrawn, patient tolerated procedure well with no complications  4x4 and Tegaderm was placed over puncture site on left

## 2022-11-11 LAB
LEFT EYE DIABETIC RETINOPATHY: NORMAL
RIGHT EYE DIABETIC RETINOPATHY: NORMAL
SEVERITY (EYE EXAM): NORMAL

## 2022-11-14 ENCOUNTER — HOSPITAL ENCOUNTER (OUTPATIENT)
Dept: RADIOLOGY | Facility: HOSPITAL | Age: 67
Discharge: HOME | End: 2022-11-14

## 2022-11-14 VITALS
OXYGEN SATURATION: 99 % | RESPIRATION RATE: 16 BRPM | DIASTOLIC BLOOD PRESSURE: 60 MMHG | HEART RATE: 69 BPM | SYSTOLIC BLOOD PRESSURE: 124 MMHG

## 2022-11-14 DIAGNOSIS — R18.8 CIRRHOSIS OF LIVER WITH ASCITES, UNSPECIFIED HEPATIC CIRRHOSIS TYPE (HCC): ICD-10-CM

## 2022-11-14 DIAGNOSIS — K74.60 CIRRHOSIS OF LIVER WITH ASCITES, UNSPECIFIED HEPATIC CIRRHOSIS TYPE (HCC): ICD-10-CM

## 2022-11-14 RX ORDER — ALBUMIN (HUMAN) 12.5 G/50ML
SOLUTION INTRAVENOUS
Status: COMPLETED | OUTPATIENT
Start: 2022-11-14 | End: 2022-11-14

## 2022-11-14 RX ORDER — LIDOCAINE HYDROCHLORIDE 10 MG/ML
INJECTION, SOLUTION EPIDURAL; INFILTRATION; INTRACAUDAL; PERINEURAL CODE/TRAUMA/SEDATION MEDICATION
Status: COMPLETED | OUTPATIENT
Start: 2022-11-14 | End: 2022-11-14

## 2022-11-14 RX ADMIN — LIDOCAINE HYDROCHLORIDE 10 ML: 10 INJECTION, SOLUTION EPIDURAL; INFILTRATION; INTRACAUDAL at 09:30

## 2022-11-14 RX ADMIN — ALBUMIN (HUMAN) 50 G: 0.25 INJECTION, SOLUTION INTRAVENOUS at 10:05

## 2022-11-14 NOTE — H&P
Interventional Radiology  History and Physical 11/14/2022     Adilson Matta   1955   [id number]    Assessment/Plan:  49-year-old female with cirrhosis and recurrent ascites returns for paracentesis  Problem List Items Addressed This Visit        Digestive    Hepatic cirrhosis ([facility] )    Relevant Orders    IR AMB Paracentesis             Subjective:     Patient ID: Adilson Matta is a 69 y o  female  History of Present Illness  Patient with cirrhosis and recurrent ascites returns for paracentesis  Review of Systems      Past Medical History:   Diagnosis Date   • Anxiety    • Arthritis    • Asthma    • Cirrhosis ([facility] )    • Depression    • Diabetes ([facility] )    • Disease of thyroid gland     nodules   • Diverticulitis 2011   • Diverticulosis    • DVT (deep venous thrombosis) ([facility] ) 2013   • GERD (gastroesophageal reflux disease)    • Liver disease     cirrhosis   • Pneumonia    • Portal hypertensive gastropathy ([facility] )    • Sleep apnea    • Vertigo         Past Surgical History:   Procedure Laterality Date   • BLADDER SUSPENSION     • CHOLECYSTECTOMY     • COLONOSCOPY  05/24/2019    had multiple with last being 43890   • COLONOSCOPY  10/07/2004    [facility] Dukes  / Diverticulosis   • COLONOSCOPY  06/14/2011    LOUIS Dukes D  / Diverticulosis   • EGD  02/28/2013    [facility] Daniel  / Mild Portal Hypertensive Gastropathy   • EGD  10/01/2019    LOU[facility] Dukes  / Gastritis   • EGD AND COLONOSCOPY  09/03/2015    [facility] Dukes  / [place]  Diverticulosis  • FLEXIBLE SIGMOIDOSCOPY  03/04/2013    LOUIS Sneed  / Hemorrhoidal Bleeding   • FLEXIBLE SIGMOIDOSCOPY  05/30/2018    LOUIS Sneed  / Internal hemorrhoids  biopsied     • HERNIA REPAIR     • IR PARACENTESIS  08/23/2018   • IR PARACENTESIS  11/05/2018   • IR PARACENTESIS  01/11/2019   • IR PARACENTESIS  02/27/2019   • IR PARACENTESIS  04/15/2019   • IR PARACENTESIS  06/03/2019   • IR PARACENTESIS  07/10/2019 • IR PARACENTESIS  08/16/2019   • IR PARACENTESIS  09/10/2019   • IR PARACENTESIS  10/07/2019   • IR PARACENTESIS  11/05/2019   • IR PARACENTESIS  11/22/2019   • IR PARACENTESIS  12/17/2019   • IR PARACENTESIS  01/07/2020   • IR PARACENTESIS  01/28/2020   • IR PARACENTESIS  02/18/2020   • IR PARACENTESIS  03/10/2020   • IR PARACENTESIS  03/31/2020   • IR PARACENTESIS  04/21/2020   • IR PARACENTESIS  05/12/2020   • IR PARACENTESIS  06/02/2020   • IR PARACENTESIS  06/30/2020   • IR PARACENTESIS  07/20/2020   • IR PARACENTESIS  08/18/2020   • IR PARACENTESIS  11/12/2020   • IR PARACENTESIS  03/11/2021   • IR PARACENTESIS  2/8/2022   • IR PARACENTESIS  8/24/2022   • TONSILLECTOMY     • TRIGGER FINGER RELEASE Bilateral         Social History     Tobacco Use   Smoking Status Never Smoker   Smokeless Tobacco Never Used        Social History     Substance and Sexual Activity   Alcohol Use Yes    Comment: rarely        Social History     Substance and Sexual Activity   Drug Use Never        Allergies   Allergen Reactions   • Penicillins Itching and Rash     rash     • Adhesive [Medical Tape]      Skin tears   • Attapulgite    • Cephalexin      ?ithcy   • Clioquinol    • Diclofenac Sodium Itching   • Meloxicam Itching   • Nabumetone Other (See Comments)     rash   • Phenylalanine    • Pseudoephedrine-Guaifenesin Other (See Comments)     Entex- "gittery"   • Streptomycin    • Celecoxib Rash   • Penicillin G Rash   • Rofecoxib Rash     skin reaction       Current Outpatient Medications   Medication Sig Dispense Refill   • acetaminophen (TYLENOL) 500 mg tablet Take 500 mg by mouth every 6 (six) hours as needed     • Calcium Carb-Ergocalciferol 250-125 MG-UNIT TABS Take 1 tablet by mouth daily (Patient not taking: No sig reported)     • Cranberry 400 MG CAPS Take 1 capsule by mouth daily (Patient not taking: No sig reported)     • ELDERBERRY PO Take by mouth (Patient not taking: No sig reported)     • escitalopram (LEXAPRO) 20 mg tablet Take 20 mg by mouth daily     • furosemide (LASIX) 40 mg tablet Take 80 mg by mouth in the morning  • furosemide (LASIX) 40 mg tablet Take 4 tablets (160 mg total) by mouth daily 2 tabs twice a day 360 tablet 3   • hydrocortisone 2 5 % cream Apply 1 application topically 2 (two) times a day as needed      • lansoprazole (PREVACID) 30 mg capsule Take 1 capsule (30 mg total) by mouth daily 90 capsule 3   • metoprolol succinate (TOPROL-XL) 25 mg 24 hr tablet Take 1 tablet by mouth 2 (two) times a day     • nitrofurantoin (MACROBID) 100 mg capsule Take 100 mg by mouth 2 (two) times a day (Patient not taking: No sig reported)     • Ozempic, 0 25 or 0 5 MG/DOSE, 2 MG/1 5ML SOPN inject 0 5 milligrams subcutaneously every week     • PROCTOZONE-HC 2 5 % rectal cream Apply 1 each topically as needed rectally  0   • sitaGLIPtin (JANUVIA) 25 mg tablet Take 1 tablet (25 mg total) by mouth daily (Patient not taking: No sig reported) 30 tablet 1   • spironolactone (ALDACTONE) 50 mg tablet Take 1 tablet (50 mg total) by mouth 2 (two) times a day 180 tablet 3     No current facility-administered medications for this encounter  Objective: There were no vitals filed for this visit  Physical Exam  Constitutional:       Appearance: Normal appearance  Pulmonary:      Effort: Pulmonary effort is normal    Abdominal:      General: There is distension  No results found for: BNP   Lab Results   Component Value Date    WBC 2 67 (L) 07/23/2020    HGB 11 6 07/23/2020    HCT 36 9 07/23/2020    MCV 93 07/23/2020    PLT 92 (L) 07/23/2020     Lab Results   Component Value Date    INR 1 18 07/20/2020    INR 1 10 10/07/2019    PROTIME 14 8 (H) 07/20/2020    PROTIME 14 3 10/07/2019     Lab Results   Component Value Date    PTT 35 10/07/2019         I have personally reviewed pertinent imaging and laboratory results       Code Status: Prior  Advance Directive and Living Will:      Power of :    POLST: This text is generated with voice recognition software  There may be translation, syntax,  or grammatical errors  If you have any questions, please contact the dictating provider

## 2022-11-14 NOTE — BRIEF OP NOTE (RAD/CATH)
INTERVENTIONAL RADIOLOGY PROCEDURE NOTE    Date: 11/14/2022    Procedure:   Procedure Summary     Date: 11/14/22 Room / Location: [address] [place] [address] Interventional Radiology    Anesthesia Start:  Anesthesia Stop:     Procedure: IR PARACENTESIS Diagnosis:       Cirrhosis of liver with ascites, unspecified hepatic cirrhosis type ([address] )      (Ascites)    Scheduled Providers:  Responsible Provider:     Anesthesia Type: Not recorded ASA Status: Not recorded          Preoperative diagnosis:   1  Cirrhosis of liver with ascites, unspecified hepatic cirrhosis type ([facility] )         Postoperative diagnosis: Same  Surgeon: Mary Wakefield MD     Assistant: None  No qualified resident was available  Blood loss: None    Specimens: 6750 mL clear yellow ascites fluid removed  Findings: Successful paracentesis  Complications: None immediate      Anesthesia: local

## 2022-12-21 ENCOUNTER — HOSPITAL ENCOUNTER (OUTPATIENT)
Dept: RADIOLOGY | Facility: HOSPITAL | Age: 67
Discharge: HOME | End: 2022-12-21
Attending: INTERNAL MEDICINE

## 2022-12-21 VITALS
OXYGEN SATURATION: 95 % | HEART RATE: 63 BPM | RESPIRATION RATE: 14 BRPM | DIASTOLIC BLOOD PRESSURE: 65 MMHG | SYSTOLIC BLOOD PRESSURE: 123 MMHG

## 2022-12-21 DIAGNOSIS — R18.8 CIRRHOSIS OF LIVER WITH ASCITES, UNSPECIFIED HEPATIC CIRRHOSIS TYPE (HCC): Primary | ICD-10-CM

## 2022-12-21 DIAGNOSIS — K74.60 CIRRHOSIS OF LIVER WITH ASCITES, UNSPECIFIED HEPATIC CIRRHOSIS TYPE (HCC): Primary | ICD-10-CM

## 2022-12-21 DIAGNOSIS — R18.8 ASCITES OF LIVER: ICD-10-CM

## 2022-12-21 RX ORDER — LIDOCAINE HYDROCHLORIDE 10 MG/ML
INJECTION, SOLUTION EPIDURAL; INFILTRATION; INTRACAUDAL; PERINEURAL AS NEEDED
Status: COMPLETED | OUTPATIENT
Start: 2022-12-21 | End: 2022-12-21

## 2022-12-21 RX ADMIN — LIDOCAINE HYDROCHLORIDE 10 ML: 10 INJECTION, SOLUTION EPIDURAL; INFILTRATION; INTRACAUDAL; PERINEURAL at 13:26

## 2022-12-21 NOTE — BRIEF OP NOTE (RAD/CATH)
IR Left PARACENTESIS Procedure Note    PATIENT NAME: Kathe Cabrera  : 1955  MRN: [id number]    Pre-op Diagnosis:   1  Ascites of liver      Post-op Diagnosis:   1   Ascites of liver        Provider:   Park Caceres PA-C      No qualified resident was available, Resident is only observing    Estimated Blood Loss: None  Findings: 8150mL of clear yellow fluid from left-sided paracentesis    Specimens: None    Complications:  None immediately    Anesthesia: local    Park Caceres PA-C     Performed under the supervision of Caridad Carney and Dr Francis Mcneal    Date: 2022  Time: 4:19 PM

## 2022-12-21 NOTE — DISCHARGE INSTRUCTIONS
Abdominal Paracentesis     WHAT YOU NEED TO KNOW:   Abdominal paracentesis is a procedure to remove abnormal fluid buildup in your abdomen  Fluid builds up because of liver problems, such as swelling and scarring  Heart failure, kidney disease, a mass, or problems with your pancreas may also cause fluid buildup  DISCHARGE INSTRUCTIONS:     Follow up with your healthcare provider as directed: Write down your questions so you remember to ask them during your visits  Wound care: Remove dressing after 24 hours  Leave glue in place  Return to your normal activities    Contact Interventional Radiology at [phone] Darleen PATIENTS: Contact Interventional Radiology at [phone]) Kennedidealex Rader PATIENTS: Contact Interventional Radiology at [phone]) if:  You have a fever and your wound is red and swollen  You have yellow, green, or bad-smelling discharge coming from your wound  You have pain or swelling in your abdomen  You have an upset stomach or you vomit  You have sudden, sharp pain in your abdomen  You urinate very little or not at all  You feel confused and more tired than usual    Your arm or leg feels warm, tender, and painful  It may look swollen and red  You suddenly feel lightheaded and have trouble breathing  Abdominal Paracentesis     WHAT YOU NEED TO KNOW:   Abdominal paracentesis is a procedure to remove abnormal fluid buildup in your abdomen  Fluid builds up because of liver problems, such as swelling and scarring  Heart failure, kidney disease, a mass, or problems with your pancreas may also cause fluid buildup  DISCHARGE INSTRUCTIONS:     Follow up with your healthcare provider as directed: Write down your questions so you remember to ask them during your visits  Wound care: Remove dressing after 24 hours  Leave glue in place      Return to your normal activities    Contact Interventional Radiology at [phone] Darleen PATIENTS: Contact Interventional Radiology at [phone]) Jabier Fresh PATIENTS: Contact Interventional Radiology at [phone]) if:  You have a fever and your wound is red and swollen  You have yellow, green, or bad-smelling discharge coming from your wound  You have pain or swelling in your abdomen  You have an upset stomach or you vomit  You have sudden, sharp pain in your abdomen  You urinate very little or not at all  You feel confused and more tired than usual    Your arm or leg feels warm, tender, and painful  It may look swollen and red  You suddenly feel lightheaded and have trouble breathing

## 2023-01-20 ENCOUNTER — OFFICE VISIT (OUTPATIENT)
Age: 68
End: 2023-01-20

## 2023-01-20 ENCOUNTER — APPOINTMENT (OUTPATIENT)
Age: 68
End: 2023-01-20

## 2023-01-20 ENCOUNTER — TELEPHONE (OUTPATIENT)
Age: 68
End: 2023-01-20

## 2023-01-20 VITALS
HEIGHT: 64 IN | SYSTOLIC BLOOD PRESSURE: 112 MMHG | OXYGEN SATURATION: 99 % | TEMPERATURE: 98.3 F | RESPIRATION RATE: 18 BRPM | BODY MASS INDEX: 37.49 KG/M2 | HEART RATE: 66 BPM | DIASTOLIC BLOOD PRESSURE: 84 MMHG | WEIGHT: 219.58 LBS

## 2023-01-20 DIAGNOSIS — E66.01 MORBID OBESITY (HCC): ICD-10-CM

## 2023-01-20 DIAGNOSIS — I10 ESSENTIAL HYPERTENSION: ICD-10-CM

## 2023-01-20 DIAGNOSIS — I20.8 OTHER FORMS OF ANGINA PECTORIS (HCC): ICD-10-CM

## 2023-01-20 DIAGNOSIS — F10.188 ALCOHOL ABUSE WITH OTHER ALCOHOL-INDUCED DISORDER (HCC): ICD-10-CM

## 2023-01-20 DIAGNOSIS — C22.0 LIVER CELL CARCINOMA (HCC): ICD-10-CM

## 2023-01-20 DIAGNOSIS — K74.60 CIRRHOSIS OF LIVER WITH ASCITES, UNSPECIFIED HEPATIC CIRRHOSIS TYPE (HCC): Primary | ICD-10-CM

## 2023-01-20 DIAGNOSIS — Z78.0 POST-MENOPAUSAL: ICD-10-CM

## 2023-01-20 DIAGNOSIS — Z13.820 SCREENING FOR OSTEOPOROSIS: ICD-10-CM

## 2023-01-20 DIAGNOSIS — F41.1 ANXIETY STATE: ICD-10-CM

## 2023-01-20 DIAGNOSIS — R18.8 CIRRHOSIS OF LIVER WITH ASCITES, UNSPECIFIED HEPATIC CIRRHOSIS TYPE (HCC): ICD-10-CM

## 2023-01-20 DIAGNOSIS — J44.9 CHRONIC OBSTRUCTIVE PULMONARY DISEASE, UNSPECIFIED COPD TYPE (HCC): ICD-10-CM

## 2023-01-20 DIAGNOSIS — K74.60 CIRRHOSIS OF LIVER WITH ASCITES, UNSPECIFIED HEPATIC CIRRHOSIS TYPE (HCC): ICD-10-CM

## 2023-01-20 DIAGNOSIS — N18.31 STAGE 3A CHRONIC KIDNEY DISEASE (HCC): ICD-10-CM

## 2023-01-20 DIAGNOSIS — F33.9 DEPRESSION, RECURRENT (HCC): ICD-10-CM

## 2023-01-20 DIAGNOSIS — R18.8 CIRRHOSIS OF LIVER WITH ASCITES, UNSPECIFIED HEPATIC CIRRHOSIS TYPE (HCC): Primary | ICD-10-CM

## 2023-01-20 DIAGNOSIS — D69.6 THROMBOCYTOPENIA (HCC): ICD-10-CM

## 2023-01-20 PROBLEM — Z98.890 S/P ENDOSCOPIC CARPAL TUNNEL RELEASE: Status: ACTIVE | Noted: 2019-12-23

## 2023-01-20 PROBLEM — L97.912 TRAUMATIC ULCER OF RIGHT LOWER LEG WITH FAT LAYER EXPOSED (HCC): Status: ACTIVE | Noted: 2020-06-05

## 2023-01-20 PROBLEM — M65.311 TRIGGER FINGER OF RIGHT THUMB: Status: ACTIVE | Noted: 2019-12-23

## 2023-01-20 PROBLEM — N32.81 OAB (OVERACTIVE BLADDER): Status: ACTIVE | Noted: 2021-07-29

## 2023-01-20 PROBLEM — H43.12 VITREOUS HEMORRHAGE OF LEFT EYE (HCC): Status: ACTIVE | Noted: 2023-01-20

## 2023-01-20 PROBLEM — M65.312 TRIGGER THUMB, LEFT THUMB: Status: ACTIVE | Noted: 2017-05-24

## 2023-01-20 PROBLEM — K72.90 DECOMPENSATED HEPATIC CIRRHOSIS (HCC): Status: ACTIVE | Noted: 2018-05-06

## 2023-01-20 PROBLEM — G56.02 CARPAL TUNNEL SYNDROME OF LEFT WRIST: Status: ACTIVE | Noted: 2017-05-24

## 2023-01-20 PROBLEM — M18.12 PRIMARY OSTEOARTHRITIS OF FIRST CARPOMETACARPAL JOINT OF LEFT HAND: Status: ACTIVE | Noted: 2017-05-24

## 2023-01-20 PROBLEM — M47.816 LUMBAR FACET ARTHROPATHY: Status: ACTIVE | Noted: 2018-09-18

## 2023-01-20 PROBLEM — R07.9 CHEST PAIN: Status: ACTIVE | Noted: 2017-05-04

## 2023-01-20 PROBLEM — R33.9 URINARY RETENTION: Status: ACTIVE | Noted: 2019-03-27

## 2023-01-20 PROBLEM — E87.1 HYPONATREMIA: Status: ACTIVE | Noted: 2021-10-07

## 2023-01-20 PROBLEM — E87.6 HYPOKALEMIA: Status: ACTIVE | Noted: 2017-08-16

## 2023-01-20 PROBLEM — R35.0 INCREASED FREQUENCY OF URINATION: Status: ACTIVE | Noted: 2020-03-06

## 2023-01-20 LAB
AFP-TM SERPL-MCNC: 5.3 NG/ML (ref 0.5–8)
ALBUMIN SERPL BCP-MCNC: 3 G/DL (ref 3.5–5)
ALP SERPL-CCNC: 122 U/L (ref 46–116)
ALT SERPL W P-5'-P-CCNC: 17 U/L (ref 12–78)
ANION GAP SERPL CALCULATED.3IONS-SCNC: 8 MMOL/L (ref 4–13)
AST SERPL W P-5'-P-CCNC: 24 U/L (ref 5–45)
BASOPHILS # BLD AUTO: 0.04 THOUSANDS/ÂΜL (ref 0–0.1)
BASOPHILS NFR BLD AUTO: 1 % (ref 0–1)
BILIRUB SERPL-MCNC: 1.53 MG/DL (ref 0.2–1)
BUN SERPL-MCNC: 10 MG/DL (ref 5–25)
CALCIUM ALBUM COR SERPL-MCNC: 9.8 MG/DL (ref 8.3–10.1)
CALCIUM SERPL-MCNC: 9 MG/DL (ref 8.3–10.1)
CHLORIDE SERPL-SCNC: 98 MMOL/L (ref 96–108)
CO2 SERPL-SCNC: 27 MMOL/L (ref 21–32)
CREAT SERPL-MCNC: 0.94 MG/DL (ref 0.6–1.3)
EOSINOPHIL # BLD AUTO: 0.08 THOUSAND/ÂΜL (ref 0–0.61)
EOSINOPHIL NFR BLD AUTO: 1 % (ref 0–6)
ERYTHROCYTE [DISTWIDTH] IN BLOOD BY AUTOMATED COUNT: 15.1 % (ref 11.6–15.1)
GFR SERPL CREATININE-BSD FRML MDRD: 61 ML/MIN/1.73SQ M
GLUCOSE P FAST SERPL-MCNC: 131 MG/DL (ref 65–99)
HCT VFR BLD AUTO: 41.3 % (ref 34.8–46.1)
HGB BLD-MCNC: 13.1 G/DL (ref 11.5–15.4)
IMM GRANULOCYTES # BLD AUTO: 0.02 THOUSAND/UL (ref 0–0.2)
IMM GRANULOCYTES NFR BLD AUTO: 0 % (ref 0–2)
INR PPP: 1.19 (ref 0.84–1.19)
LYMPHOCYTES # BLD AUTO: 0.4 THOUSANDS/ÂΜL (ref 0.6–4.47)
LYMPHOCYTES NFR BLD AUTO: 7 % (ref 14–44)
MCH RBC QN AUTO: 29.1 PG (ref 26.8–34.3)
MCHC RBC AUTO-ENTMCNC: 31.7 G/DL (ref 31.4–37.4)
MCV RBC AUTO: 92 FL (ref 82–98)
MONOCYTES # BLD AUTO: 0.51 THOUSAND/ÂΜL (ref 0.17–1.22)
MONOCYTES NFR BLD AUTO: 8 % (ref 4–12)
NEUTROPHILS # BLD AUTO: 5.01 THOUSANDS/ÂΜL (ref 1.85–7.62)
NEUTS SEG NFR BLD AUTO: 83 % (ref 43–75)
NRBC BLD AUTO-RTO: 0 /100 WBCS
PLATELET # BLD AUTO: 68 THOUSANDS/UL (ref 149–390)
PMV BLD AUTO: 12.3 FL (ref 8.9–12.7)
POTASSIUM SERPL-SCNC: 4 MMOL/L (ref 3.5–5.3)
PROT SERPL-MCNC: 7.1 G/DL (ref 6.4–8.4)
PROTHROMBIN TIME: 15.3 SECONDS (ref 11.6–14.5)
RBC # BLD AUTO: 4.5 MILLION/UL (ref 3.81–5.12)
SODIUM SERPL-SCNC: 133 MMOL/L (ref 135–147)
WBC # BLD AUTO: 6.06 THOUSAND/UL (ref 4.31–10.16)

## 2023-01-20 RX ORDER — ESCITALOPRAM OXALATE 20 MG/1
20 TABLET ORAL DAILY
Qty: 90 TABLET | Refills: 1 | Status: SHIPPED | OUTPATIENT
Start: 2023-01-20

## 2023-01-20 NOTE — PROGRESS NOTES
Name: Radha Moura      : 1955      MRN: [id number]  Encounter Provider: JONE Conner  Encounter Date: 2023   Encounter department: [facility]     1  Cirrhosis of liver with ascites, unspecified hepatic cirrhosis type [facility])  Assessment & Plan:  Chronic hepatic cirrhosis with ascites requiring intermittent paracentesis through IR, last one   Follows GI, and maintained on Lasix and aldactone  2  Liver cell carcinoma ([facility] )  Assessment & Plan:  chronic hepatic cirrhosis with ascites requiring intermittent paracentesis, last one 2022  No current N/V/stomach pain  continue with regular GI follow-up      3  Anxiety state  Assessment & Plan:  Well controlled on Lexapro  Orders:  -     escitalopram (LEXAPRO) 20 mg tablet; Take 1 tablet (20 mg total) by mouth daily    4  Chronic obstructive pulmonary disease, unspecified COPD type ([place] [address] )  Assessment & Plan:  Well controlled, not currently taking medication  Pt notes previous SOB likely r/t ascites  5  Alcohol abuse with other alcohol-induced disorder [facility])  Assessment & Plan:  Pt reports rare alcohol consumption  Encourage avoiding alcohol due to liver cirrhosis  6  Stage 3a chronic kidney disease [facility])  Assessment & Plan:  Lab Results   Component Value Date    EGFR 61 2023    EGFR 57 2020    EGFR 67 2020    CREATININE 0 94 2023    CREATININE 1 02 2020    CREATININE 0 89 2020   Will continue to monitor  Encouraged fluids, and avoid NSAIDs such as Aleve, Ibuprofen, Advil  7  Morbid obesity ([place] [address] )  Assessment & Plan:  Discussed well balanced diet, calorie/portion control, and exercise  8  Thrombocytopenia ([place] [address] )  Assessment & Plan:  Managed by GI  Last CBC  showed platelets 68, trending down from 98        9  Depression, recurrent (HCC)  Assessment & Plan:  Controlled on Lexapro, denies SI/HI      10   Other forms of angina pectoris [facility])  Assessment & Plan:  Denies current chest pain  Pt does follow with cardiology  Pt made aware to call with new symptoms, and discussed ED precautions  11  Screening for osteoporosis  Assessment & Plan:  Dexa scan ordered    Orders:  -     DXA bone density spine hip and pelvis; Future; Expected date: 01/20/2023    12  Post-menopausal  Assessment & Plan:  Dexa scan ordered  Orders:  -     DXA bone density spine hip and pelvis; Future; Expected date: 01/20/2023    13  Essential hypertension  Assessment & Plan:  Pt follows cardiology and BP is at goal  Continue Metoprolol, lasix and aldactone as ordered  Monitor home bp, and encouraged low salt diet  BMI Counseling: Body mass index is 37 69 kg/m²  The BMI is above normal  Nutrition recommendations include decreasing portion sizes, encouraging healthy choices of fruits and vegetables, decreasing fast food intake, consuming healthier snacks, limiting drinks that contain sugar, moderation in carbohydrate intake, increasing intake of lean protein, reducing intake of saturated and trans fat and reducing intake of cholesterol  Exercise recommendations include moderate physical activity 150 minutes/week  Rationale for BMI follow-up plan is due to patient being overweight or obese  Subjective      Pt here today to establish care  Pt with multiple chronic conditions  Pt has a hx of cirrhosis of the liver with intermittent ascites requiring paracentesis with IR  Pt taking lasix and aldactone, and follows with [facility] for management  Pt denies abdominal pain, bloating, N/V today  Pt with hx of T2D which appears to be well managed at this time  Pt sees [facility] endocrinology for management  Pt did have diabetic eye exam performed at [facility]  Denies episode of hypo or hyperglycemia  Pt has a hx of HTN which appears to be controlled today  Follows with cardiology     Pt has a hx of mixed incontinence and chronic bladder control and follows with urology  Pt with a hx of depression and anxiety  Pt denies SI/HI, and reports both are well controlled with Lexapro 20mg managed by PCP  Review of Systems   Constitutional: Negative  HENT: Negative  Eyes: Negative  Respiratory: Negative  Cardiovascular: Negative  Gastrointestinal: Negative  Endocrine: Negative  Genitourinary: Negative  Musculoskeletal: Negative  Skin: Negative  Neurological: Negative  Hematological: Negative  Psychiatric/Behavioral: Negative  Current Outpatient Medications on File Prior to Visit   Medication Sig   • acetaminophen (TYLENOL) 500 mg tablet Take 500 mg by mouth every 6 (six) hours as needed   • furosemide (LASIX) 40 mg tablet Take 2 tablets (80 mg total) by mouth daily AND 1 tablet (40 mg total) in the evening  Take before meals  (Patient taking differently: Take 120mg in AM)   • lansoprazole (PREVACID) 30 mg capsule Take 1 capsule (30 mg total) by mouth daily   • metoprolol succinate (TOPROL-XL) 25 mg 24 hr tablet Take 1 tablet by mouth 2 (two) times a day   • Ozempic, 0 25 or 0 5 MG/DOSE, 2 MG/1 5ML SOPN inject 0 5 milligrams subcutaneously every week   • spironolactone (ALDACTONE) 50 mg tablet Take 3 tablets (150 mg total) by mouth daily (Patient taking differently: Take 150 mg by mouth 2 (two) times a day 150 mg in am, 50 mg in PM)       Objective     /84 (BP Location: Right arm, Patient Position: Sitting, Cuff Size: Large)   Pulse 66   Temp 98 3 °F (36 8 °C) (Tympanic)   Resp 18   Ht 5' 4" (1 626 m)   Wt 99 6 kg (219 lb 9 3 oz)   SpO2 99%   BMI 37 69 kg/m²     Physical Exam  Vitals and nursing note reviewed  Constitutional:       General: She is not in acute distress  Appearance: Normal appearance  She is obese  She is not ill-appearing, toxic-appearing or diaphoretic  HENT:      Head: Normocephalic and atraumatic        Right Ear: External ear normal       Left Ear: External ear normal  Nose: Nose normal       Mouth/Throat:      Mouth: Mucous membranes are moist       Pharynx: Oropharynx is clear  Eyes:      General: No scleral icterus  Right eye: No discharge  Left eye: No discharge  Conjunctiva/sclera: Conjunctivae normal       Pupils: Pupils are equal, round, and reactive to light  Cardiovascular:      Rate and Rhythm: Normal rate and regular rhythm  Pulses: Normal pulses  Heart sounds: Normal heart sounds  Pulmonary:      Effort: Pulmonary effort is normal       Breath sounds: Normal breath sounds  Abdominal:      General: Bowel sounds are normal  There is no distension  Palpations: Abdomen is soft  Tenderness: There is no abdominal tenderness  There is no guarding  Musculoskeletal:         General: Normal range of motion  Cervical back: Normal range of motion and neck supple  Right lower leg: No edema  Left lower leg: No edema  Skin:     General: Skin is warm and dry  Neurological:      General: No focal deficit present  Mental Status: She is alert and oriented to person, place, and time  Psychiatric:         Mood and Affect: Mood normal          Behavior: Behavior normal          Thought Content:  Thought content normal          Judgment: Judgment normal        JONE Parish

## 2023-01-23 PROBLEM — Z13.820 SCREENING FOR OSTEOPOROSIS: Status: ACTIVE | Noted: 2023-01-23

## 2023-01-23 PROBLEM — I10 ESSENTIAL HYPERTENSION: Status: RESOLVED | Noted: 2019-10-07 | Resolved: 2023-01-23

## 2023-01-23 PROBLEM — R06.02 SOB (SHORTNESS OF BREATH): Status: ACTIVE | Noted: 2017-05-04

## 2023-01-23 PROBLEM — K52.9 ACUTE COLITIS: Status: ACTIVE | Noted: 2017-08-16

## 2023-01-23 PROBLEM — L97.912 TRAUMATIC ULCER OF RIGHT LOWER LEG WITH FAT LAYER EXPOSED (HCC): Status: RESOLVED | Noted: 2020-06-05 | Resolved: 2023-01-23

## 2023-01-23 PROBLEM — I10 ESSENTIAL HYPERTENSION: Status: ACTIVE | Noted: 2018-05-06

## 2023-01-23 PROBLEM — E66.01 OBESITY, MORBID, BMI 40.0-49.9 (HCC): Status: RESOLVED | Noted: 2017-08-16 | Resolved: 2023-01-23

## 2023-01-23 PROBLEM — Z78.0 POST-MENOPAUSAL: Status: ACTIVE | Noted: 2023-01-23

## 2023-01-23 PROBLEM — H43.12 VITREOUS HEMORRHAGE OF LEFT EYE (HCC): Status: RESOLVED | Noted: 2023-01-20 | Resolved: 2023-01-23

## 2023-01-23 NOTE — PATIENT INSTRUCTIONS
Anxiety   WHAT YOU NEED TO KNOW:   Anxiety is a condition that causes you to feel extremely worried or nervous  The feelings are so strong that they can cause problems with your daily activities or sleep  Anxiety may be triggered by something you fear, or it may happen without a cause  Family or work stress, smoking, caffeine, and alcohol can increase your risk for anxiety  Certain medicines or health conditions can also increase your risk  Anxiety can become a long-term condition if it is not managed or treated  DISCHARGE INSTRUCTIONS:   Call your local emergency number (911 in the [address] [facility]) if:   You have chest pain, tightness, or heaviness that may spread to your shoulders, arms, jaw, neck, or back  You feel like hurting yourself or someone else  Call your doctor if:   Your symptoms get worse or do not get better with treatment  Your anxiety keeps you from doing your regular daily activities  You have new symptoms since your last visit  You have questions or concerns about your condition or care  Medicines:   Medicines  may be given to help you feel more calm and relaxed, and decrease your symptoms  Take your medicine as directed  Contact your healthcare provider if you think your medicine is not helping or if you have side effects  Tell him of her if you are allergic to any medicine  Keep a list of the medicines, vitamins, and herbs you take  Include the amounts, and when and why you take them  Bring the list or the pill bottles to follow-up visits  Carry your medicine list with you in case of an emergency  Manage anxiety:   Talk to someone about your anxiety  Your healthcare provider may suggest counseling  Cognitive behavioral therapy can help you understand and change how you react to events that trigger your symptoms  You might feel more comfortable talking with a friend or family member about your anxiety  Choose someone you know will be supportive and encouraging      Find ways to relax  Activities such as exercise, meditation, or listening to music can help you relax  Spend time with friends, or do things you enjoy  Practice deep breathing  Deep breathing can help you relax when you feel anxious  Focus on taking slow, deep breaths several times a day, or during an anxiety attack  Breathe in through your nose and out through your mouth  Create a regular sleep routine  Regular sleep can help you feel calmer during the day  Go to sleep and wake up at the same times every day  Do not watch television or use the computer right before bed  Your room should be comfortable, dark, and quiet  Eat a variety of healthy foods  Healthy foods include fruits, vegetables, low-fat dairy products, lean meats, fish, whole-grain breads, and cooked beans  Healthy foods can help you feel less anxious and have more energy  Exercise regularly  Exercise can increase your energy level  Exercise may also lift your mood and help you sleep better  Your healthcare provider can help you create an exercise plan  Do not smoke  Nicotine and other chemicals in cigarettes and cigars can increase anxiety  Ask your healthcare provider for information if you currently smoke and need help to quit  E-cigarettes or smokeless tobacco still contain nicotine  Talk to your healthcare provider before you use these products  Do not have caffeine  Caffeine can make your symptoms worse  Do not have foods or drinks that are meant to increase your energy level  Limit or do not drink alcohol  Ask your healthcare provider if alcohol is safe for you  You may not be able to drink alcohol if you take certain anxiety or depression medicines  Limit alcohol to 1 drink per day if you are a woman  Limit alcohol to 2 drinks per day if you are a man  A drink of alcohol is 12 ounces of beer, 5 ounces of wine, or 1½ ounces of liquor  Do not use drugs  Drugs can make your anxiety worse   It can also make anxiety hard to manage  Talk to your healthcare provider if you use drugs and want help to quit  Follow up with your doctor within 2 weeks or as directed:  Write down your questions so you remember to ask them during your visits  © Copyright [employer] 2022 Information is for End User's use only and may not be sold, redistributed or otherwise used for commercial purposes  All illustrations and images included in CareNotes® are the copyrighted property of A D A M , Inc  or [facility] Brenda Mariscal   The above information is an  only  It is not intended as medical advice for individual conditions or treatments  Talk to your doctor, nurse or pharmacist before following any medical regimen to see if it is safe and effective for you

## 2023-01-23 NOTE — ASSESSMENT & PLAN NOTE
Chronic hepatic cirrhosis with ascites requiring intermittent paracentesis through IR, last one 12/22  Follows GI, and maintained on Lasix and aldactone

## 2023-01-23 NOTE — ASSESSMENT & PLAN NOTE
Pt follows cardiology and BP is at goal  Continue Metoprolol, lasix and aldactone as ordered  Monitor home bp, and encouraged low salt diet

## 2023-01-23 NOTE — ASSESSMENT & PLAN NOTE
Well controlled at this time  Follows with endocrinology, denies hypo or hyperglycemic events    Continue to follow-up with endocrinology as scheduled   Lab Results   Component Value Date    HGBA1C 5 8 (H) 08/18/2022

## 2023-01-23 NOTE — ASSESSMENT & PLAN NOTE
chronic hepatic cirrhosis with ascites requiring intermittent paracentesis, last one 12/2022     continue with regular GI follow-up

## 2023-01-23 NOTE — ASSESSMENT & PLAN NOTE
Denies current chest pain  Pt does follow with cardiology  Pt made aware to call with new symptoms, and discussed ED precautions

## 2023-01-23 NOTE — ASSESSMENT & PLAN NOTE
Lab Results   Component Value Date    EGFR 61 01/20/2023    EGFR 57 07/23/2020    EGFR 67 07/22/2020    CREATININE 0 94 01/20/2023    CREATININE 1 02 07/23/2020    CREATININE 0 89 07/22/2020   Will continue to monitor  Encouraged fluids, and avoid NSAIDs such as Aleve, Ibuprofen, Advil

## 2023-01-23 NOTE — ASSESSMENT & PLAN NOTE
chronic hepatic cirrhosis with ascites requiring intermittent paracentesis, last one 12/2022  No current N/V/stomach pain       continue with regular GI follow-up

## 2023-02-01 ENCOUNTER — HOSPITAL ENCOUNTER (OUTPATIENT)
Dept: ULTRASOUND IMAGING | Facility: HOSPITAL | Age: 68
Discharge: HOME | End: 2023-02-01
Attending: INTERNAL MEDICINE

## 2023-02-01 ENCOUNTER — HOSPITAL ENCOUNTER (OUTPATIENT)
Dept: RADIOLOGY | Facility: HOSPITAL | Age: 68
Discharge: HOME | End: 2023-02-01
Attending: INTERNAL MEDICINE | Admitting: RADIOLOGY

## 2023-02-01 VITALS
RESPIRATION RATE: 16 BRPM | DIASTOLIC BLOOD PRESSURE: 59 MMHG | SYSTOLIC BLOOD PRESSURE: 123 MMHG | OXYGEN SATURATION: 100 % | HEART RATE: 74 BPM

## 2023-02-01 DIAGNOSIS — R18.8 CIRRHOSIS OF LIVER WITH ASCITES, UNSPECIFIED HEPATIC CIRRHOSIS TYPE (HCC): ICD-10-CM

## 2023-02-01 DIAGNOSIS — K74.60 CIRRHOSIS OF LIVER WITH ASCITES, UNSPECIFIED HEPATIC CIRRHOSIS TYPE (HCC): ICD-10-CM

## 2023-02-01 RX ORDER — LIDOCAINE HYDROCHLORIDE 10 MG/ML
INJECTION, SOLUTION EPIDURAL; INFILTRATION; INTRACAUDAL; PERINEURAL AS NEEDED
Status: COMPLETED | OUTPATIENT
Start: 2023-02-01 | End: 2023-02-01

## 2023-02-01 RX ADMIN — LIDOCAINE HYDROCHLORIDE 10 ML: 10 INJECTION, SOLUTION EPIDURAL; INFILTRATION; INTRACAUDAL; PERINEURAL at 08:39

## 2023-02-01 NOTE — SEDATION DOCUMENTATION
Aprox 6500ml clear yellow fluid was aspirated, patient tolerated procedure well, 2x2 and Tegaderm applied to puncture sites  Patient assisted from stretcher to wheelchair and escorted to [facility]

## 2023-02-01 NOTE — BRIEF OP NOTE (RAD/CATH)
IR PARACENTESIS Procedure Note    PATIENT NAME: Kushal Chapman  : 1955  MRN: [id number]    Pre-op Diagnosis:   1  Cirrhosis of liver with ascites, unspecified hepatic cirrhosis type (HCC)      Post-op Diagnosis:   1   Cirrhosis of liver with ascites, unspecified hepatic cirrhosis type [facility])        Provider:   JONE Tidwell  Assistants:     No qualified resident was available, Resident is only observing    Estimated Blood Loss: none     Findings: 6500 mL clear yellow ascites, RLQ    Specimens: none     Complications:  None immeidate     Anesthesia: local    JONE Tidwell     Date: 2023  Time: 9:38 AM

## 2023-03-07 ENCOUNTER — HOSPITAL ENCOUNTER (OUTPATIENT)
Dept: RADIOLOGY | Facility: HOSPITAL | Age: 68
Discharge: HOME | End: 2023-03-07
Attending: INTERNAL MEDICINE | Admitting: RADIOLOGY

## 2023-03-07 VITALS
RESPIRATION RATE: 16 BRPM | DIASTOLIC BLOOD PRESSURE: 57 MMHG | HEART RATE: 60 BPM | OXYGEN SATURATION: 99 % | SYSTOLIC BLOOD PRESSURE: 107 MMHG

## 2023-03-07 DIAGNOSIS — K74.60 CIRRHOSIS OF LIVER WITH ASCITES, UNSPECIFIED HEPATIC CIRRHOSIS TYPE (HCC): ICD-10-CM

## 2023-03-07 DIAGNOSIS — R18.8 CIRRHOSIS OF LIVER WITH ASCITES, UNSPECIFIED HEPATIC CIRRHOSIS TYPE (HCC): ICD-10-CM

## 2023-03-07 RX ORDER — LIDOCAINE HYDROCHLORIDE 10 MG/ML
INJECTION, SOLUTION EPIDURAL; INFILTRATION; INTRACAUDAL; PERINEURAL AS NEEDED
Status: COMPLETED | OUTPATIENT
Start: 2023-03-07 | End: 2023-03-07

## 2023-03-07 RX ADMIN — LIDOCAINE HYDROCHLORIDE 10 ML: 10 INJECTION, SOLUTION EPIDURAL; INFILTRATION; INTRACAUDAL; PERINEURAL at 10:13

## 2023-03-07 NOTE — BRIEF OP NOTE (RAD/CATH)
Left IR PARACENTESIS Procedure Note    PATIENT NAME: Mert Lorenzana  : 1955  MRN: [id number]    Pre-op Diagnosis:   1  Cirrhosis of liver with ascites, unspecified hepatic cirrhosis type (HCC)      Post-op Diagnosis:   1  Cirrhosis of liver with ascites, unspecified hepatic cirrhosis type [facility])        Provider:  Magdaleno Steinberg PA-C    No qualified resident was available, Resident is only observing    Estimated Blood Loss: minimal    Findings: left sided paracentesis  4400cc clear yellow ascites removed       Specimens: none    Complications:  None immediate    Anesthesia: local    Magdaleno Steinberg PA-C     Date: 3/7/2023  Time: 11:27 AM

## 2023-03-16 ENCOUNTER — APPOINTMENT (EMERGENCY)
Dept: RADIOLOGY | Facility: HOSPITAL | Age: 68
End: 2023-03-16

## 2023-03-16 ENCOUNTER — HOSPITAL ENCOUNTER (EMERGENCY)
Facility: HOSPITAL | Age: 68
Discharge: HOME | End: 2023-03-16
Attending: EMERGENCY MEDICINE

## 2023-03-16 VITALS
WEIGHT: 202.38 LBS | BODY MASS INDEX: 34.55 KG/M2 | SYSTOLIC BLOOD PRESSURE: 103 MMHG | RESPIRATION RATE: 20 BRPM | OXYGEN SATURATION: 100 % | DIASTOLIC BLOOD PRESSURE: 66 MMHG | TEMPERATURE: 98.3 F | HEIGHT: 64 IN | HEART RATE: 74 BPM

## 2023-03-16 DIAGNOSIS — M25.551 RIGHT HIP PAIN: Primary | ICD-10-CM

## 2023-03-16 LAB
ANION GAP SERPL CALCULATED.3IONS-SCNC: 6 MMOL/L (ref 4–13)
BASOPHILS # BLD AUTO: 0.02 THOUSANDS/ÂΜL (ref 0–0.1)
BASOPHILS NFR BLD AUTO: 0 % (ref 0–1)
BUN SERPL-MCNC: 11 MG/DL (ref 5–25)
CALCIUM SERPL-MCNC: 8.9 MG/DL (ref 8.4–10.2)
CHLORIDE SERPL-SCNC: 97 MMOL/L (ref 96–108)
CO2 SERPL-SCNC: 28 MMOL/L (ref 21–32)
CREAT SERPL-MCNC: 0.8 MG/DL (ref 0.6–1.3)
EOSINOPHIL # BLD AUTO: 0.05 THOUSAND/ÂΜL (ref 0–0.61)
EOSINOPHIL NFR BLD AUTO: 1 % (ref 0–6)
ERYTHROCYTE [DISTWIDTH] IN BLOOD BY AUTOMATED COUNT: 15.4 % (ref 11.6–15.1)
GFR SERPL CREATININE-BSD FRML MDRD: 74 ML/MIN/1.73SQ M
GLUCOSE SERPL-MCNC: 92 MG/DL (ref 65–140)
HCT VFR BLD AUTO: 38.6 % (ref 34.8–46.1)
HGB BLD-MCNC: 12.7 G/DL (ref 11.5–15.4)
IMM GRANULOCYTES # BLD AUTO: 0.04 THOUSAND/UL (ref 0–0.2)
IMM GRANULOCYTES NFR BLD AUTO: 1 % (ref 0–2)
LYMPHOCYTES # BLD AUTO: 0.28 THOUSANDS/ÂΜL (ref 0.6–4.47)
LYMPHOCYTES NFR BLD AUTO: 5 % (ref 14–44)
MCH RBC QN AUTO: 29.8 PG (ref 26.8–34.3)
MCHC RBC AUTO-ENTMCNC: 32.9 G/DL (ref 31.4–37.4)
MCV RBC AUTO: 91 FL (ref 82–98)
MONOCYTES # BLD AUTO: 0.42 THOUSAND/ÂΜL (ref 0.17–1.22)
MONOCYTES NFR BLD AUTO: 8 % (ref 4–12)
NEUTROPHILS # BLD AUTO: 4.46 THOUSANDS/ÂΜL (ref 1.85–7.62)
NEUTS SEG NFR BLD AUTO: 85 % (ref 43–75)
NRBC BLD AUTO-RTO: 0 /100 WBCS
PLATELET # BLD AUTO: 113 THOUSANDS/UL (ref 149–390)
PMV BLD AUTO: 9.6 FL (ref 8.9–12.7)
POTASSIUM SERPL-SCNC: 3.8 MMOL/L (ref 3.5–5.3)
RBC # BLD AUTO: 4.26 MILLION/UL (ref 3.81–5.12)
SODIUM SERPL-SCNC: 131 MMOL/L (ref 135–147)
WBC # BLD AUTO: 5.27 THOUSAND/UL (ref 4.31–10.16)

## 2023-03-16 RX ORDER — SODIUM CHLORIDE 9 MG/ML
125 INJECTION, SOLUTION INTRAVENOUS CONTINUOUS
Status: CANCELLED | OUTPATIENT
Start: 2023-03-16

## 2023-03-16 RX ORDER — KETOROLAC TROMETHAMINE 30 MG/ML
15 INJECTION, SOLUTION INTRAMUSCULAR; INTRAVENOUS ONCE
Status: COMPLETED | OUTPATIENT
Start: 2023-03-16 | End: 2023-03-16

## 2023-03-16 RX ORDER — ACETAMINOPHEN 325 MG/1
650 TABLET ORAL ONCE
Status: COMPLETED | OUTPATIENT
Start: 2023-03-16 | End: 2023-03-16

## 2023-03-16 RX ORDER — MELOXICAM 7.5 MG/1
7.5 TABLET ORAL DAILY
Status: DISCONTINUED | OUTPATIENT
Start: 2023-03-16 | End: 2023-03-16 | Stop reason: HOSPADM

## 2023-03-16 RX ORDER — MELOXICAM 7.5 MG/1
7.5 TABLET ORAL DAILY
Qty: 7 TABLET | Refills: 0 | Status: SHIPPED | OUTPATIENT
Start: 2023-03-16 | End: 2023-03-20

## 2023-03-16 RX ADMIN — KETOROLAC TROMETHAMINE 15 MG: 30 INJECTION, SOLUTION INTRAMUSCULAR; INTRAVENOUS at 13:59

## 2023-03-16 RX ADMIN — MELOXICAM 7.5 MG: 7.5 TABLET ORAL at 15:37

## 2023-03-16 RX ADMIN — ACETAMINOPHEN 325MG 650 MG: 325 TABLET ORAL at 14:52

## 2023-03-16 NOTE — ED PROVIDER NOTES
History  Chief Complaint   Patient presents with   • Pain     Pt coming in from home, via EMS, woke up this am around 1130 c/o hip pain on R side, hx falls- 6 months ago, but did not fall today, pain 8/10 when walking, 5/10 sitting still      76 yo F with nonalcoholic hepatic cirrhosis, c/o right hip pain, onset this morning, without specific injury or incident, but noted upon getting out of bed, and then was experiencing localized pain that caused her inability to rise off the commode, which prompted EMS activation  She is not aware of any increased use or unusual activity  She denies fever, chills  Pain she localizes to right lateral hip area, right buttock  History provided by:  Patient      Prior to Admission Medications   Prescriptions Last Dose Informant Patient Reported? Taking? Ozempic, 0 25 or 0 5 MG/DOSE, 2 MG/1 5ML SOPN   Yes No   Sig: inject 0 5 milligrams subcutaneously every week   acetaminophen (TYLENOL) 500 mg tablet   Yes No   Sig: Take 500 mg by mouth every 6 (six) hours as needed   escitalopram (LEXAPRO) 20 mg tablet   No No   Sig: Take 1 tablet (20 mg total) by mouth daily   furosemide (LASIX) 40 mg tablet   No No   Sig: Take 2 tablets (80 mg total) by mouth daily AND 1 tablet (40 mg total) in the evening  Take before meals     Patient taking differently: Take 120mg in AM   lansoprazole (PREVACID) 30 mg capsule   No No   Sig: Take 1 capsule (30 mg total) by mouth daily   metoprolol succinate (TOPROL-XL) 25 mg 24 hr tablet   Yes No   Sig: Take 1 tablet by mouth 2 (two) times a day   spironolactone (ALDACTONE) 50 mg tablet   No No   Sig: Take 3 tablets (150 mg total) by mouth daily   Patient taking differently: Take 150 mg by mouth 2 (two) times a day 150 mg in am, 50 mg in PM      Facility-Administered Medications: None       Past Medical History:   Diagnosis Date   • Anxiety    • Anxiety and depression 9/29/2015   • Arthritis    • Asthma    • Asthma without status asthmaticus 4/26/2011 • Cirrhosis ([place] [address] )    • Depression    • Diabetes ([place] [address] )    • Disease of thyroid gland     nodules   • Diverticulitis 2011   • Diverticulosis    • DVT (deep venous thrombosis) ([place] [address] ) 2013   • GERD (gastroesophageal reflux disease)    • Liver disease     cirrhosis   • Obesity, morbid, BMI 40 0-49 9 ([place] [address] ) 8/16/2017   • Pneumonia    • Portal hypertensive gastropathy ([place] [address] )    • Sleep apnea    • Vertigo    • Vitreous hemorrhage of left eye ([place] [address] ) 1/20/2023       Past Surgical History:   Procedure Laterality Date   • BLADDER SUSPENSION     • CHOLECYSTECTOMY     • COLONOSCOPY  05/24/2019    had multiple with last being [id number]   • COLONOSCOPY  10/07/2004    [facility] Munoz  / Diverticulosis   • COLONOSCOPY  06/14/2011    LOU[facility] Munoz  / Diverticulosis   • EGD  02/28/2013    LOUIS Oneill  / Mild Portal Hypertensive Gastropathy   • EGD  10/01/2019    LOUIS Munoz  / Gastritis   • EGD AND COLONOSCOPY  09/03/2015    LOUIS Munoz  / [place]o [place]  Diverticulosis  • FLEXIBLE SIGMOIDOSCOPY  03/04/2013    Odella Seip, M D  / Hemorrhoidal Bleeding   • FLEXIBLE SIGMOIDOSCOPY  05/30/2018    Odella Seip, M D  / Internal hemorrhoids  biopsied     • HERNIA REPAIR     • IR PARACENTESIS  08/23/2018   • IR PARACENTESIS  11/05/2018   • IR PARACENTESIS  01/11/2019   • IR PARACENTESIS  02/27/2019   • IR PARACENTESIS  04/15/2019   • IR PARACENTESIS  06/03/2019   • IR PARACENTESIS  07/10/2019   • IR PARACENTESIS  08/16/2019   • IR PARACENTESIS  09/10/2019   • IR PARACENTESIS  10/07/2019   • IR PARACENTESIS  11/05/2019   • IR PARACENTESIS  11/22/2019   • IR PARACENTESIS  12/17/2019   • IR PARACENTESIS  01/07/2020   • IR PARACENTESIS  01/28/2020   • IR PARACENTESIS  02/18/2020   • IR PARACENTESIS  03/10/2020   • IR PARACENTESIS  03/31/2020   • IR PARACENTESIS  04/21/2020   • IR PARACENTESIS  05/12/2020   • IR PARACENTESIS  06/02/2020   • IR PARACENTESIS  06/30/2020   • IR PARACENTESIS  07/20/2020   • IR PARACENTESIS  08/18/2020   • IR PARACENTESIS  11/12/2020   • IR PARACENTESIS  03/11/2021   • IR PARACENTESIS  2/8/2022   • IR PARACENTESIS  8/24/2022   • IR PARACENTESIS  11/14/2022   • IR PARACENTESIS  12/21/2022   • IR PARACENTESIS  2/1/2023   • IR PARACENTESIS  3/7/2023   • TONSILLECTOMY     • TRIGGER FINGER RELEASE Bilateral        Family History   Problem Relation Age of Onset   • COPD Mother    • Heart attack Sister    • Lymphoma Brother    • Colon cancer Maternal Grandmother    • Cancer Maternal Aunt         unknown     I have reviewed and agree with the history as documented  E-Cigarette/Vaping   • E-Cigarette Use Never User      E-Cigarette/Vaping Substances     Social History     Tobacco Use   • Smoking status: Never   • Smokeless tobacco: Never   Vaping Use   • Vaping Use: Never used   Substance Use Topics   • Alcohol use: Yes     Comment: rarely   • Drug use: Never       Review of Systems    Physical Exam  Physical Exam  Vitals and nursing note reviewed  Constitutional:       General: She is not in acute distress  Appearance: She is well-developed  She is obese  She is not diaphoretic  HENT:      Head: Normocephalic and atraumatic  Right Ear: External ear normal       Left Ear: External ear normal       Nose: Nose normal    Eyes:      Conjunctiva/sclera: Conjunctivae normal       Pupils: Pupils are equal, round, and reactive to light  Cardiovascular:      Rate and Rhythm: Normal rate and regular rhythm  Pulmonary:      Effort: Pulmonary effort is normal    Abdominal:      Palpations: Abdomen is soft  Musculoskeletal:      Cervical back: Normal, normal range of motion and neck supple  Thoracic back: Normal       Lumbar back: Normal  No spasms, tenderness or bony tenderness  Right hip: Tenderness (right lateral greater trochanter, posterior hip) and crepitus present  Decreased range of motion  Skin:     General: Skin is warm and dry  Findings: No rash  Neurological:      Mental Status: She is alert and oriented to person, place, and time  Gait: Gait normal    Psychiatric:         Behavior: Behavior normal          Thought Content: Thought content normal          Judgment: Judgment normal          Vital Signs  ED Triage Vitals [03/16/23 1247]   Temperature Pulse Respirations Blood Pressure SpO2   98 3 °F (36 8 °C) 82 20 99/54 98 %      Temp Source Heart Rate Source Patient Position - Orthostatic VS BP Location FiO2 (%)   Oral Monitor Sitting Right arm --      Pain Score       8           Vitals:    03/16/23 1247   BP: 99/54   Pulse: 82   Patient Position - Orthostatic VS: Sitting         Visual Acuity      ED Medications  Medications - No data to display    Diagnostic Studies  Results Reviewed     None                 No orders to display              Procedures  Procedures         ED Course  ED Course as of 03/17/23 0818   Thu Mar 16, 2023   1342 XR hip/pelv 2-3 vws right  My independent interpretation of imaging:   no occult fracture, normal alignment, mild DJD   1505 She is up and ambulatory, I assisted with stabilizing, but she pushed up on her own  With a walker, I expected her to be able to push herself up      1522 She doesn't even remember taking meloxicam let alone having a rash, so I am going to try it here  MDM    Disposition  Final diagnoses:   None     ED Disposition     None      Follow-up Information    None         Patient's Medications   Discharge Prescriptions    No medications on file       No discharge procedures on file      PDMP Review     None          ED Provider  Electronically Signed by           Romario Gilbert MD  03/17/23 8336

## 2023-03-16 NOTE — DISCHARGE INSTRUCTIONS
Arthralgia   WHAT YOU NEED TO KNOW:   Arthralgia is pain in one or more joints  It may be short-term and get better within 6 to 8 weeks  Arthralgia can be an early sign of arthritis  Arthralgia may be caused by a medical condition, such as a hormone disorder or a tumor  It may also be caused by an infection or injury  Follow up with your healthcare provider or specialist as directed:  Write down your questions so you remember to ask them during your visits  Self-care:   Apply heat  to help decrease pain  Use a heating pad or heat wrap  Apply heat for 20 to 30 minutes every 2 hours as needed  Rest  as much as possible  Avoid activities that cause joint pain  Apply ice  to help decrease swelling and pain  Ice may also help prevent tissue damage  Use an ice pack, or put crushed ice in a plastic bag  Cover it with a towel and place it on your painful joint for 15 to 20 minutes every hour as needed  Contact your healthcare provider or specialist if:   You have a fever  You continue to have joint pain that cannot be relieved with heat, ice, or medicine  You have pain and inflammation around your joint  You have questions or concerns about your condition or care  Return to the emergency department if:   You have sudden, severe pain when you move your joint  You have a fever and shaking chills  You cannot move your joint  You lose feeling on the side of your body where you have the painful joint

## 2023-03-17 ENCOUNTER — ANESTHESIA EVENT (OUTPATIENT)
Dept: GASTROENTEROLOGY | Facility: HOSPITAL | Age: 68
End: 2023-03-17

## 2023-03-17 ENCOUNTER — ANESTHESIA (OUTPATIENT)
Dept: GASTROENTEROLOGY | Facility: HOSPITAL | Age: 68
End: 2023-03-17

## 2023-03-17 ENCOUNTER — HOSPITAL ENCOUNTER (OUTPATIENT)
Dept: GASTROENTEROLOGY | Facility: HOSPITAL | Age: 68
Setting detail: OUTPATIENT SURGERY
Discharge: HOME | End: 2023-03-17
Attending: INTERNAL MEDICINE

## 2023-03-17 ENCOUNTER — VBI (OUTPATIENT)
Age: 68
End: 2023-03-17

## 2023-03-17 VITALS
OXYGEN SATURATION: 99 % | HEIGHT: 64 IN | BODY MASS INDEX: 33.63 KG/M2 | RESPIRATION RATE: 15 BRPM | SYSTOLIC BLOOD PRESSURE: 96 MMHG | HEART RATE: 67 BPM | TEMPERATURE: 97.3 F | DIASTOLIC BLOOD PRESSURE: 48 MMHG | WEIGHT: 197 LBS

## 2023-03-17 DIAGNOSIS — K72.90 LIVER FAILURE WITHOUT HEPATIC COMA, UNSPECIFIED CHRONICITY (HCC): ICD-10-CM

## 2023-03-17 DIAGNOSIS — G47.30 SLEEP APNEA, UNSPECIFIED TYPE: ICD-10-CM

## 2023-03-17 DIAGNOSIS — K74.60 HEPATIC CIRRHOSIS, UNSPECIFIED HEPATIC CIRRHOSIS TYPE, UNSPECIFIED WHETHER ASCITES PRESENT (HCC): ICD-10-CM

## 2023-03-17 DIAGNOSIS — K74.60 CIRRHOSIS OF LIVER WITH ASCITES, UNSPECIFIED HEPATIC CIRRHOSIS TYPE (HCC): ICD-10-CM

## 2023-03-17 DIAGNOSIS — D69.6 THROMBOCYTOPENIA (HCC): ICD-10-CM

## 2023-03-17 DIAGNOSIS — R18.8 CIRRHOSIS OF LIVER WITH ASCITES, UNSPECIFIED HEPATIC CIRRHOSIS TYPE (HCC): ICD-10-CM

## 2023-03-17 DIAGNOSIS — E13.69 OTHER SPECIFIED DIABETES MELLITUS WITH OTHER SPECIFIED COMPLICATION, WITHOUT LONG-TERM CURRENT USE OF INSULIN (HCC): ICD-10-CM

## 2023-03-17 DIAGNOSIS — E66.01 MORBID OBESITY (HCC): ICD-10-CM

## 2023-03-17 DIAGNOSIS — K74.69 OTHER CIRRHOSIS OF LIVER (HCC): ICD-10-CM

## 2023-03-17 RX ORDER — GLYCOPYRROLATE 0.2 MG/ML
INJECTION INTRAMUSCULAR; INTRAVENOUS AS NEEDED
Status: DISCONTINUED | OUTPATIENT
Start: 2023-03-17 | End: 2023-03-17

## 2023-03-17 RX ORDER — LIDOCAINE HYDROCHLORIDE 20 MG/ML
INJECTION, SOLUTION EPIDURAL; INFILTRATION; INTRACAUDAL; PERINEURAL AS NEEDED
Status: DISCONTINUED | OUTPATIENT
Start: 2023-03-17 | End: 2023-03-17

## 2023-03-17 RX ORDER — PROPOFOL 10 MG/ML
INJECTION, EMULSION INTRAVENOUS AS NEEDED
Status: DISCONTINUED | OUTPATIENT
Start: 2023-03-17 | End: 2023-03-17

## 2023-03-17 RX ORDER — SODIUM CHLORIDE 9 MG/ML
125 INJECTION, SOLUTION INTRAVENOUS CONTINUOUS
Status: DISCONTINUED | OUTPATIENT
Start: 2023-03-17 | End: 2023-03-21 | Stop reason: HOSPADM

## 2023-03-17 RX ADMIN — LIDOCAINE HYDROCHLORIDE 100 MG: 20 INJECTION, SOLUTION EPIDURAL; INFILTRATION; INTRACAUDAL; PERINEURAL at 08:54

## 2023-03-17 RX ADMIN — PROPOFOL 100 MG: 10 INJECTION, EMULSION INTRAVENOUS at 08:54

## 2023-03-17 RX ADMIN — SODIUM CHLORIDE 125 ML/HR: 0.9 INJECTION, SOLUTION INTRAVENOUS at 08:48

## 2023-03-17 RX ADMIN — GLYCOPYRROLATE 0.2 MG: 0.2 INJECTION INTRAMUSCULAR; INTRAVENOUS at 08:51

## 2023-03-17 NOTE — TELEPHONE ENCOUNTER
Rene Person    ED Visit Information     Ed visit date: 3-16-23  Diagnosis Description: Right Hip Pain  In Network? Yes [facility]  Discharge status: Home  Discharged with meds ? Yes  Number of ED visits to date: 1  ED Severity:N/A     Outreach Information    Outreach successful: Yes 2  Date letter mailed:0  Date Finalized:3-20-23    Care Coordination    Follow up appointment with pcp: yes 3-20-23  Transportation issues ? No    [employer] type: 7 Day Outreach  Emergent necessity warranted by diagnosis: No  [facility] PCP: Yes  Transportation: Ambulance Transport  Ambulance - Was Pt given choice of of ED: Yes  If able to choose an ED  Would you have chosen [facility]: Yes  Called PCP first?: No  Feels able to call PCP for urgent problems ?: Yes  Understands what emergencies can be handled by PCP ?: Yes  Ever any problems getting appointment with PCP for minor emergency/urgency problems?: No  Practice Contacted Patient ?: No  Pt had ED follow up with pcp/staff ?: No    Seen for follow-up out of network ?: No  Reason Patient went to ED instead of Urgent Care or PCP?: Perceived Severity of Illness  Urgent care Education?: No  03/17/2023 08:57 AM EDT by Hany Fajardo 03/17/2023 08:57 AM EDT by Yulia Putnam (Self) [phone] (RLZD)[phone] (Mobile)  [phone] (Mobile)   - No Answer/BusyCommunicated - Voicemail box is not set up unable to leave a message    03/20/2023 08:26 AM EDT by Hany Fajardo      Personal communication with patient regarding recent ED visit on 3-16-23  Patient stated that she is still in a lot of pain   Patient has PCP follow-up

## 2023-03-17 NOTE — ANESTHESIA POSTPROCEDURE EVALUATION
Post-Op Assessment Note    CV Status:  Stable    Pain management: adequate     Mental Status:  Alert and awake   Hydration Status:  Euvolemic   PONV Controlled:  Controlled   Airway Patency:  Patent      Post Op Vitals Reviewed: Yes      Staff: Anesthesiologist, CRNA         No notable events documented      BP     Temp     Pulse     Resp      SpO2      BP (!) 96/48   Pulse 67   Temp (!) 97 3 °F (36 3 °C) (Temporal)   Resp 15   Ht 5' 4" (1 626 m)   Wt 89 4 kg (197 lb)   SpO2 99%   BMI 33 81 kg/m²

## 2023-03-17 NOTE — ANESTHESIA PREPROCEDURE EVALUATION
Review of Systems/Medical History  Patient summary reviewed  Chart reviewed  Cardiovascular  DVT   Pulmonary  Asthma Asthma type of rescue: daily nebulizer, Sleep apnea ,        GI/Hepatic    GERD well controlled, Liver disease (s/p recent paracentesis) , cirrhosis,             Endo/Other  Diabetes type 2 , History of thyroid disease , hypothyroidism,   Obesity  morbid obesity   GYN       Hematology  Negative hematology ROS      Musculoskeletal    Arthritis     Neurology  Negative neurology ROS      Psychology   Anxiety, Depression ,              Physical Exam    Airway    Mallampati score: II  TM Distance: >3 FB  Neck ROM: full     Dental   Comment: Upper partial is out,     Cardiovascular  Rhythm: regular, Rate: normal,     Pulmonary  Decreased breath sounds,     Other Findings        Anesthesia Plan  ASA Score- 4     Anesthesia Type- IV sedation with anesthesia with ASA Monitors  Additional Monitors:   Airway Plan:     Comment: GA prn  Plan Factors-    Chart reviewed  Patient summary reviewed  Patient is not a current smoker  Patient not instructed to abstain from smoking on day of procedure  Patient did not smoke on day of surgery  Induction- intravenous  Postoperative Plan-     Informed Consent- Anesthetic plan and risks discussed with patient (Spike Rene)

## 2023-03-17 NOTE — DISCHARGE INSTR - AVS FIRST PAGE
Please call [phone] with any problems  As discussed preprocedure would avoid NSAIDs such as Mobic because of risks of kidney failure and effects with cirrhosis  Your examination today showed some retained food however we were able to say that you do not have esophageal varices    Please follow-up with Dr Michelle Sun as previously scheduled done

## 2023-03-17 NOTE — INTERVAL H&P NOTE
H&P reviewed  After examining the patient I find no changes in the patients condition since the H&P had been written  Seen in ER for right hip pain  Given dose of Mobic and Voltaren gel  Explained to her that she should not take NSAIDs  She needs to follow-up more urgently with either family MD or referral to orthopedic surgery    Result of imaging not known but patient told no fracture  Vitals:    03/17/23 0829   Pulse: 67   Resp: 16   Temp: (!) 97 3 °F (36 3 °C)   SpO2: 98%

## 2023-03-20 ENCOUNTER — OFFICE VISIT (OUTPATIENT)
Age: 68
End: 2023-03-20

## 2023-03-20 VITALS
HEIGHT: 64 IN | BODY MASS INDEX: 35.91 KG/M2 | RESPIRATION RATE: 18 BRPM | SYSTOLIC BLOOD PRESSURE: 132 MMHG | WEIGHT: 210.32 LBS | TEMPERATURE: 98.3 F | DIASTOLIC BLOOD PRESSURE: 84 MMHG

## 2023-03-20 DIAGNOSIS — M54.50 LUMBAR BACK PAIN: ICD-10-CM

## 2023-03-20 DIAGNOSIS — M25.552 ACUTE PAIN OF BOTH HIPS: Primary | ICD-10-CM

## 2023-03-20 DIAGNOSIS — M25.551 ACUTE PAIN OF BOTH HIPS: Primary | ICD-10-CM

## 2023-03-20 LAB — GLUCOSE SERPL-MCNC: 106 MG/DL (ref 65–140)

## 2023-03-20 RX ORDER — METHYLPREDNISOLONE 4 MG/1
TABLET ORAL
Qty: 21 EACH | Refills: 0 | Status: SHIPPED | OUTPATIENT
Start: 2023-03-20 | End: 2023-04-01

## 2023-03-20 NOTE — ASSESSMENT & PLAN NOTE
Patient with pain in back which is worse during changing positions  Patient was seen in the ED for hip pain, which has spread across the back  Patient would like referral to orthopedics and physical therapy at this time  Will trial steroids to help with inflammation  Continue Voltaren gel and Tylenol as needed  Discussed the use of heat and ice  Discussed red flag warnings

## 2023-03-20 NOTE — PATIENT INSTRUCTIONS
Lower Back Exercises   WHAT YOU NEED TO KNOW:   Lower back exercises help heal and strengthen your back muscles to prevent another injury  Ask your healthcare provider if you need to see a physical therapist for more advanced exercises  DISCHARGE INSTRUCTIONS:   Return to the emergency department if:   You have severe pain that prevents you from moving  Call your doctor if:   Your pain becomes worse  You have new pain  You have questions or concerns about your condition or care  Do lower back exercises safely:   Do the exercises on a mat or firm surface (not on a bed)  A firm surface will support your spine and prevent low back pain  Move slowly and smoothly  Avoid fast or jerky motions  Breathe normally  Do not hold your breath  Stop if you feel pain  It is normal to feel some discomfort at first, but you should not feel pain  Regular exercise will help decrease your discomfort over time  Lower back exercises: Your healthcare provider may recommend that you do back exercises 10 to 30 minutes each day  He or she may also recommend that you do exercises 1 to 3 times each day  Ask your provider which exercises are best for you and how often to do them  Ankle pumps:  Lie on your back  Move your foot up (with your toes pointing toward your head)  Then, move your foot down (with your toes pointing away from you)  Repeat this exercise 10 times on each side  Heel slides:  Lie on your back  Slowly bend one leg and then straighten it  Next, bend the other leg and then straighten it  Repeat 10 times on each side  Pelvic tilt:  Lie on your back with your knees bent and feet flat on the floor  Place your arms in a relaxed position beside your body  Tighten the muscles of your abdomen and flatten your back against the floor  Hold for 5 seconds  Repeat 5 times  Back stretch:  Lie on your back with your hands behind your head   Bend your knees and turn the lower half of your body to one side  Hold this position for 10 seconds  Repeat 3 times on each side  Straight leg raises:  Lie on your back with one leg straight  Bend the other knee  Tighten your abdomen and then slowly lift the straight leg up about 6 to 12 inches off the floor  Hold for 1 to 5 seconds  Lower your leg slowly  Repeat 10 times on each leg  Knee-to-chest:  Lie on your back with your knees bent and feet flat on the floor  Pull one of your knees toward your chest and hold it there for 5 seconds  Return your leg to the starting position  Lift the other knee toward your chest and hold for 5 seconds  Do this 5 times on each side  Cat and camel:  Place your hands and knees on the floor  Arch your back upward toward the ceiling and lower your head  Round out your spine as much as you can  Hold for 5 seconds  Lift your head upward and push your chest downward toward the floor  Hold for 5 seconds  Do 3 sets or as directed  Wall squats:  Stand with your back against a wall  Tighten the muscles of your abdomen  Slowly lower your body until your knees are bent at a 45 degree angle  Hold this position for 5 seconds  Slowly move back up to a standing position  Repeat 10 times  Curl up:  Lie on your back with your knees bent and feet flat on the floor  Place your hands, palms down, underneath the curve in your lower back  Next, with your elbows on the floor, lift your shoulders and chest 2 to 3 inches  Keep your head in line with your shoulders  Hold this position for 5 seconds  When you can do this exercise without pain for 10 to 15 seconds, you may add a rotation  While your shoulders and chest are lifted off the ground, turn slightly to the left and hold  Repeat on the other side  Bird dog:  Place your hands and knees on the floor  Keep your wrists directly below your shoulders and your knees directly below your hips  Pull your belly button in toward your spine   Do not flatten or arch your back  Tighten your abdominal muscles  Raise one arm straight out so that it is aligned with your head  Next, raise the leg opposite your arm  Hold this position for 15 seconds  Lower your arm and leg slowly and change sides  Do 5 sets  Follow up with your doctor as directed:  Write down your questions so you remember to ask them during your visits  © Copyright [employer] 2022 Information is for End User's use only and may not be sold, redistributed or otherwise used for commercial purposes  The above information is an  only  It is not intended as medical advice for individual conditions or treatments  Talk to your doctor, nurse or pharmacist before following any medical regimen to see if it is safe and effective for you  Hip Pain   WHAT YOU NEED TO KNOW:   Hip pain can be caused by a number of conditions, such as bursitis, arthritis, or muscle or tendon strain  You may have swelling in the fluid-filled sacs that protect your muscles and tendons  Hip pain can also be caused by a lower back problem  Hip pain may be caused by trauma, playing sports, or running  Pain may start in your hip and go to your thigh, buttock, or groin  DISCHARGE INSTRUCTIONS:   Medicines:   NSAIDs , such as ibuprofen, help decrease swelling, pain, and fever  This medicine is available with or without a doctor's order  NSAIDs can cause stomach bleeding or kidney problems in certain people  If you take blood thinner medicine, always ask your healthcare provider if NSAIDs are safe for you  Always read the medicine label and follow directions  Take your medicine as directed  Contact your healthcare provider if you think your medicine is not helping or if you have side effects  Tell your provider if you are allergic to any medicine  Keep a list of the medicines, vitamins, and herbs you take  Include the amounts, and when and why you take them  Bring the list or the pill bottles to follow-up visits   Carry your medicine list with you in case of an emergency  Return to the emergency department if:   Your pain gets worse  You have numbness in your leg or toes  You cannot put any weight on or move your hip  Contact your healthcare provider if:   You have a fever  Your pain does not decrease, even after treatment  You have questions or concerns about your condition or care  Follow up with your healthcare provider as directed: You may need physical therapy, an injection, or more testing  You may need to see an orthopedic specialist  Write down your questions so you remember to ask them during your visits  Manage your hip pain:   Rest  your injured hip so that it can heal  You may need to avoid putting any weight on your hip for at least 48 hours  Return to normal activities as directed  Ice  the injury for 20 minutes every 4 hours, or as directed  Use an ice pack, or put crushed ice in a plastic bag  Cover it with a towel to protect your skin  Ice helps prevent tissue damage and decreases swelling and pain  Elevate  your injured hip above the level of your heart as often as you can  This will help decrease swelling and pain  If possible, prop your hip and leg on pillows or blankets to keep the area elevated comfortably  Maintain a healthy weight  Extra body weight can cause pressure and pain in your hip, knee, and ankle joints  Ask your healthcare provider how much you should weigh  Ask him or her to help you create a weight loss plan if you are overweight  Use assistive devices as directed  You may need to use a cane or crutches  Assistive devices help decrease pain and pressure on your hip when you walk  Ask your healthcare provider for more information about assistive devices and how to use them correctly  © Copyright Deanna Mcgarry 2022 Information is for End User's use only and may not be sold, redistributed or otherwise used for commercial purposes    The above information is an  only  It is not intended as medical advice for individual conditions or treatments  Talk to your doctor, nurse or pharmacist before following any medical regimen to see if it is safe and effective for you

## 2023-03-20 NOTE — PROGRESS NOTES
Name: Claire Fields      : 1955      MRN: [id number]  Encounter Provider: JONE Smith  Encounter Date: 3/20/2023   Encounter department: [place] [address]     1  Acute pain of both hips  Assessment & Plan:  Patient with pain in back which is worse during changing positions  Patient was seen in the ED for hip pain, which has spread across the back  Patient would like referral to orthopedics and physical therapy at this time  Will trial steroids to help with inflammation  Continue Voltaren gel and Tylenol as needed  Discussed the use of heat and ice  Discussed red flag warnings  Orders:  -     Ambulatory Referral to Physical Therapy; Future  -     Ambulatory Referral to Orthopedic Surgery; Future  -     methylPREDNISolone 4 MG tablet therapy pack; Use as directed on package    2  Lumbar back pain  Assessment & Plan:  Patient with pain in back which is worse during changing positions  Patient was seen in the ED for hip pain, which has spread across the back  Patient would like referral to orthopedics and physical therapy at this time  Will trial steroids to help with inflammation  Continue Voltaren gel and Tylenol as needed  Discussed the use of heat and ice  Discussed red flag warnings  Orders:  -     Ambulatory Referral to Physical Therapy; Future  -     Ambulatory Referral to Orthopedic Surgery; Future  -     methylPREDNISolone 4 MG tablet therapy pack; Use as directed on package         Subjective      Patient here today to follow-up on ED visit for right hip pain  Patient reports that on 3/16 she woke up and when she lifted her legs out of bed she felt a sharp 10 out of 10 pain in her right hip  Patient denies injury to the area, and went by ambulance to the ED  Imaging was benign  Patient was discharged from the ED and told possible arthritis    Patient is here today and notes that the pain in her right hip has spread across her lower back into the left hip  Patient reports the pain has improved with Voltaren gel  Patient states the pain is worst, 7/10, when changing positions from sitting to standing  Patient reports when she starts walking the pain does ease to a 3 out of 10  Patient has been using Voltaren gel, and Tylenol which does seem to help pain  Patient was walking with a cane previously, but is in today with a rolling walker  Patient would like referral to physical therapy and orthopedics at this time  Review of Systems   Constitutional: Negative  HENT: Negative  Eyes: Negative  Respiratory: Negative  Cardiovascular: Negative  Gastrointestinal: Negative  Genitourinary: Negative  Musculoskeletal: Positive for back pain (lumbar)  Bilateral hip pain   Neurological: Negative  Psychiatric/Behavioral: Negative  Current Outpatient Medications on File Prior to Visit   Medication Sig   • acetaminophen (TYLENOL) 500 mg tablet Take 500 mg by mouth every 6 (six) hours as needed   • Diclofenac Sodium (VOLTAREN) 1 % Apply 2 g topically 4 (four) times a day   • escitalopram (LEXAPRO) 20 mg tablet Take 1 tablet (20 mg total) by mouth daily   • furosemide (LASIX) 40 mg tablet Take 2 tablets (80 mg total) by mouth daily AND 1 tablet (40 mg total) in the evening  Take before meals   (Patient taking differently: Take 120mg in AM)   • lansoprazole (PREVACID) 30 mg capsule Take 1 capsule (30 mg total) by mouth daily   • metoprolol succinate (TOPROL-XL) 25 mg 24 hr tablet Take 1 tablet by mouth 2 (two) times a day   • Ozempic, 0 25 or 0 5 MG/DOSE, 2 MG/1 5ML SOPN inject 0 5 milligrams subcutaneously every week   • spironolactone (ALDACTONE) 50 mg tablet Take 3 tablets (150 mg total) by mouth daily (Patient taking differently: Take 150 mg by mouth 2 (two) times a day 150 mg in am)   • [DISCONTINUED] meloxicam (MOBIC) 7 5 mg tablet Take 1 tablet (7 5 mg total) by mouth daily for 7 days       Objective     BP 132/84 (BP Location: Left arm, Patient Position: Sitting, Cuff Size: Large)   Temp 98 3 °F (36 8 °C) (Tympanic)   Resp 18   Ht 5' 4" (1 626 m)   Wt 95 4 kg (210 lb 5 1 oz)   BMI 36 10 kg/m²     Physical Exam  Vitals and nursing note reviewed  Constitutional:       General: She is not in acute distress  Appearance: Normal appearance  She is not ill-appearing, toxic-appearing or diaphoretic  HENT:      Head: Normocephalic and atraumatic  Right Ear: External ear normal       Left Ear: External ear normal    Eyes:      General:         Right eye: No discharge  Left eye: No discharge  Conjunctiva/sclera: Conjunctivae normal    Cardiovascular:      Rate and Rhythm: Normal rate and regular rhythm  Heart sounds: Normal heart sounds  Pulmonary:      Effort: Pulmonary effort is normal       Breath sounds: Normal breath sounds  Abdominal:      General: Bowel sounds are normal       Palpations: Abdomen is soft  Musculoskeletal:         General: No swelling, deformity or signs of injury  Lumbar back: Tenderness present  No swelling, edema, signs of trauma, spasms or bony tenderness  Right lower leg: No edema  Left lower leg: No edema  Comments: TTP over greater trochanter bilaterally, pain with ROM    Skin:     General: Skin is warm and dry  Neurological:      Mental Status: She is alert and oriented to person, place, and time  Psychiatric:         Mood and Affect: Mood normal          Behavior: Behavior normal          Thought Content:  Thought content normal          Judgment: Judgment normal        JONE Garrett

## 2023-03-23 ENCOUNTER — OFFICE VISIT (OUTPATIENT)
Age: 68
End: 2023-03-23

## 2023-03-23 VITALS
WEIGHT: 209.8 LBS | DIASTOLIC BLOOD PRESSURE: 70 MMHG | OXYGEN SATURATION: 99 % | HEART RATE: 70 BPM | HEIGHT: 64 IN | SYSTOLIC BLOOD PRESSURE: 110 MMHG | BODY MASS INDEX: 35.82 KG/M2

## 2023-03-23 DIAGNOSIS — M25.552 GREATER TROCHANTERIC PAIN SYNDROME OF BOTH LOWER EXTREMITIES: Primary | ICD-10-CM

## 2023-03-23 DIAGNOSIS — M25.552 ACUTE PAIN OF BOTH HIPS: ICD-10-CM

## 2023-03-23 DIAGNOSIS — M25.551 ACUTE PAIN OF BOTH HIPS: ICD-10-CM

## 2023-03-23 DIAGNOSIS — M54.50 LUMBAR BACK PAIN: ICD-10-CM

## 2023-03-23 DIAGNOSIS — M25.551 GREATER TROCHANTERIC PAIN SYNDROME OF BOTH LOWER EXTREMITIES: Primary | ICD-10-CM

## 2023-03-23 RX ORDER — METHYLPREDNISOLONE ACETATE 40 MG/ML
1 INJECTION, SUSPENSION INTRA-ARTICULAR; INTRALESIONAL; INTRAMUSCULAR; SOFT TISSUE
Status: COMPLETED | OUTPATIENT
Start: 2023-03-23 | End: 2023-03-23

## 2023-03-23 RX ORDER — BUPIVACAINE HYDROCHLORIDE 2.5 MG/ML
2 INJECTION, SOLUTION INFILTRATION; PERINEURAL
Status: COMPLETED | OUTPATIENT
Start: 2023-03-23 | End: 2023-03-23

## 2023-03-23 RX ADMIN — METHYLPREDNISOLONE ACETATE 1 ML: 40 INJECTION, SUSPENSION INTRA-ARTICULAR; INTRALESIONAL; INTRAMUSCULAR; SOFT TISSUE at 12:06

## 2023-03-23 RX ADMIN — BUPIVACAINE HYDROCHLORIDE 2 ML: 2.5 INJECTION, SOLUTION INFILTRATION; PERINEURAL at 12:06

## 2023-03-23 NOTE — PROGRESS NOTES
1  Greater trochanteric pain syndrome of both lower extremities  Large joint arthrocentesis: bilateral greater trochanteric bursa      2  Acute pain of both hips  Ambulatory Referral to Orthopedic Surgery    Large joint arthrocentesis: bilateral greater trochanteric bursa      3  Lumbar back pain  Ambulatory Referral to Orthopedic Surgery        Orders Placed This Encounter   Procedures   • Large joint arthrocentesis: bilateral greater trochanteric bursa        Imaging Studies (I personally reviewed images in PACS and report):    • X-ray right hip 3/16/2023: No acute osseous abnormalities  No significant degenerative changes  There is a right pelvic neurostimulator with lead terminating in the region of the bladder  IMPRESSION:  • Atraumatic bilateral hip pain, right worse than left -ongoing since last Thursday  • Acute axial low back pain  • Gait dysfunction requiring use of rolling walker  • Aggravated when attempting to transition from sitting to standing  • Clinical exam and history consistent with trochanteric syndrome of bilateral hips likely exacerbating the pain of her low back (given she has history of lumbar degenerative disc disease)      Other factors:  • Liver cirrhosis/liver cell carcinoma with routine paracentesis  • Type 2 diabetes  • COPD  • CKD 3  • BMI 36    PLAN:    • Clinical exam and radiographic imaging reviewed with patient today, with impression as per above  I have discussed with the patient the pathophysiology of this diagnosis and reviewed how the examination correlates with this diagnosis  • Prior imaging reviewed with patient today as noted above  • Treatment options were discussed at length and after discussing these treatment options, including risk/benefits and patient was agreeable to bilateral greater trochanteric bursa injections with cortisone as per procedure note below    I counseled if she were to have this injection today, she needs to discontinue oral prednisone treatment  I counseled that I can repeat this injection every 3 months as needed in regards to pain control  • I concurred with her other providers in regards to attending physical therapy going forward  I counseled attending physical therapy for at least a minimum of 6 weeks for even considering transitioning to a home exercise program   • In regards to gait dysfunction, recommend continued use of her four-point walker as needed  Return in about 4 weeks (around 4/20/2023)  I have spent a total time of 40 minutes on 03/23/23 in caring for this patient including Diagnostic results, Prognosis, Risks and benefits of tx options, Instructions for management, Patient and family education, Importance of tx compliance, Risk factor reductions, Impressions, Counseling / Coordination of care, Documenting in the medical record, Reviewing / ordering tests, medicine, procedures   and Obtaining or reviewing history    Portions of the record may have been created with voice recognition software  Occasional wrong word or "sound a like" substitutions may have occurred due to the inherent limitations of voice recognition software  Read the chart carefully and recognize, using context, where substitutions have occurred  CHIEF COMPLAINT:  Bilateral hip pain    HPI:  Colletta Primmer is a 77 y o  female  who presents in regards to referral from [facility] for       Visit 3/23/2023:  Initial evaluation of bilateral hip pain: Right worse than left  Ongoing since last Thursday  She states upon awakening she was attempting to transition from lying down to standing and felt a severe pain over the lateral aspect of her right hip that radiated to her gluteus  Denies popping sensation  She describes a sharp/throbbing pain of severe, 10/10 pain  She states she eventually was able to stand up and start ambulating again with use of a walker  She states that the motion minimally improve the pain    However she was unable to tolerate lying on her right hip and most range of motion movements of her right hip  She states she has never had pain like this before  Due to her gait dysfunction, she eventually started developing pain across her low back and into her left lateral hip  Denies prior hip surgeries in the past   Denies similar pain in the past   In regards to pain control she has reportedly been using topical Voltaren which provides some relief  She states that there was consideration for meloxicam but was told not to take this medication  She has also been using ice with some relief  She does not take any acetaminophen given her medical history  She reports that she has an upcoming physical therapy appointment but does not believe she could tolerate doing therapy due to the severe lateral hip pain she is experiencing  She is never had an injection of her hip previously  She is currently on oral prednisone but does not feel is providing any relief  Of note, at baseline patient uses a cane but has been required to use a four-point walker for mobility    Medical, Surgical, Family, and Social History    Past Medical History:   Diagnosis Date   • Anxiety    • Anxiety and depression 9/29/2015   • Arthritis    • Asthma    • Asthma without status asthmaticus 4/26/2011   • Cirrhosis ([facility] )    • Depression    • Diabetes ([facility] )    • Disease of thyroid gland     nodules   • Diverticulitis 2011   • Diverticulosis    • DVT (deep venous thrombosis) ([facility] ) 2013   • GERD (gastroesophageal reflux disease)    • Liver disease     cirrhosis   • Obesity, morbid, BMI 40 0-49 9 ([facility] ) 8/16/2017   • Pneumonia    • Portal hypertensive gastropathy ([facility] )    • Sleep apnea    • Vertigo    • Vitreous hemorrhage of left eye ([facility] ) 1/20/2023     Past Surgical History:   Procedure Laterality Date   • BLADDER SUSPENSION     • CHOLECYSTECTOMY     • COLONOSCOPY  05/24/2019    had multiple with last being 16006   • COLONOSCOPY  10/07/2004    Carla Jaime  [facility] Judd D  / Diverticulosis   • COLONOSCOPY  06/14/2011    [facility] Foster D  / Diverticulosis   • EGD  02/28/2013    [facility] Anderson  / Mild Portal Hypertensive Gastropathy   • EGD  10/01/2019    [facility] Foster D  / Gastritis   • EGD AND COLONOSCOPY  09/03/2015    [facility] Foster  / Sanjay Nova  Diverticulosis  • FLEXIBLE SIGMOIDOSCOPY  03/04/2013    Zandra Kayser, M D  / Hemorrhoidal Bleeding   • FLEXIBLE SIGMOIDOSCOPY  05/30/2018    Zandra Kayser, M D  / Internal hemorrhoids  biopsied     • HERNIA REPAIR     • IR PARACENTESIS  08/23/2018   • IR PARACENTESIS  11/05/2018   • IR PARACENTESIS  01/11/2019   • IR PARACENTESIS  02/27/2019   • IR PARACENTESIS  04/15/2019   • IR PARACENTESIS  06/03/2019   • IR PARACENTESIS  07/10/2019   • IR PARACENTESIS  08/16/2019   • IR PARACENTESIS  09/10/2019   • IR PARACENTESIS  10/07/2019   • IR PARACENTESIS  11/05/2019   • IR PARACENTESIS  11/22/2019   • IR PARACENTESIS  12/17/2019   • IR PARACENTESIS  01/07/2020   • IR PARACENTESIS  01/28/2020   • IR PARACENTESIS  02/18/2020   • IR PARACENTESIS  03/10/2020   • IR PARACENTESIS  03/31/2020   • IR PARACENTESIS  04/21/2020   • IR PARACENTESIS  05/12/2020   • IR PARACENTESIS  06/02/2020   • IR PARACENTESIS  06/30/2020   • IR PARACENTESIS  07/20/2020   • IR PARACENTESIS  08/18/2020   • IR PARACENTESIS  11/12/2020   • IR PARACENTESIS  03/11/2021   • IR PARACENTESIS  2/8/2022   • IR PARACENTESIS  8/24/2022   • IR PARACENTESIS  11/14/2022   • IR PARACENTESIS  12/21/2022   • IR PARACENTESIS  2/1/2023   • IR PARACENTESIS  3/7/2023   • TONSILLECTOMY     • TRIGGER FINGER RELEASE Bilateral      Social History   Social History     Substance and Sexual Activity   Alcohol Use Yes    Comment: rarely     Social History     Substance and Sexual Activity   Drug Use Never     Social History     Tobacco Use   Smoking Status Never   Smokeless Tobacco Never     Family History   Problem Relation Age of Onset   • COPD Mother • Heart attack Sister    • Lymphoma Brother    • Colon cancer Maternal Grandmother    • Cancer Maternal Aunt         unknown     Allergies   Allergen Reactions   • Medical Tape Other (See Comments)     Skin tears  Per patient, Red -"rips my skin"  Per patient, Skin tears   • Penicillins Itching and Rash     rash     • Nsaids Other (See Comments)     Cirrhosis of liver- pt reported starting Mobic 3/16/23   • Pseudoephedrine-Guaifenesin Myalgia and Other (See Comments)     Entex- "jittery"   • Attapulgite    • Cephalexin      ?ithcy   • Clioquinol    • Diclofenac Sodium Itching     Patient reported tolerating Voltaren gel without any reaction started 3/16/23   • Meloxicam Itching   • Nabumetone Other (See Comments)     rash   • Phenylalanine    • Pseudoephedrine-Guaifenesin Other (See Comments)     Entex- "gittery"   • Streptomycin    • Celecoxib Rash   • Penicillin G Rash   • Rofecoxib Rash     skin reaction          Physical Exam  There were no vitals taken for this visit  Constitutional:  see vital signs  Gen: Abdominal distention consistent with history of liver cirrhosis, normocephalic/atraumatic, well-groomed  Pulmonary/Chest: Effort normal  No respiratory distress  Gait: Patient requiring use of four-point walker  She has significant difficulty transitioning from sitting to standing as she attempts to avoid leaning on her left and right hips  When she is able to stand and use her four-point walker she is able to demonstrate weightbearing with slow, small steps  Right Hip Exam     Tenderness   The patient is experiencing tenderness in the lateral and greater trochanter      Range of Motion   External rotation: 40   Internal rotation: 10     Muscle Strength   Abduction: 4/5   Adduction: 4/5   Flexion: 4/5     Tests   MIC: negative (Aggravates lateral hip pain)    Other   Erythema: absent    Comments:  Logroll: Negative but does aggravate lateral hip pain      Left Hip Exam     Tenderness   The patient is experiencing tenderness in the lateral and greater trochanter  Range of Motion   External rotation: 40   Internal rotation: 10     Muscle Strength   Abduction: 4/5   Adduction: 4/5   Flexion: 4/5     Tests   MIC: negative (Aggravates lateral hip pain)    Other   Erythema: absent    Comments:  Logroll: Negative but does aggravate lateral hip pain              Large joint arthrocentesis: bilateral greater trochanteric bursa  Universal Protocol:  Consent: Verbal consent obtained  Risks and benefits: risks, benefits and alternatives were discussed  Consent given by: patient  Patient understanding: patient states understanding of the procedure being performed  Site marked: the operative site was marked  Radiology Images displayed and confirmed   If images not available, report reviewed: imaging studies available  Required items: required blood products, implants, devices, and special equipment available  Patient identity confirmed: verbally with patient    Supporting Documentation  Indications: pain   Procedure Details  Location: hip - bilateral greater trochanteric bursa  Preparation: Patient was prepped and draped in the usual sterile fashion  Needle size: 22 G (3 5 inch spinal needle)  Ultrasound guidance: no  Approach: lateral (perpindicular over greater trochanter at site of maximal tenderness)    Medications (Right): 2 mL bupivacaine 0 25 %; 1 mL methylPREDNISolone acetate 40 mg/mLMedications (Left): 2 mL bupivacaine 0 25 %; 1 mL methylPREDNISolone acetate 40 mg/mL   Patient tolerance: patient tolerated the procedure well with no immediate complications  Dressing:  Sterile dressing applied

## 2023-03-23 NOTE — PATIENT INSTRUCTIONS

## 2023-03-24 PROBLEM — Z13.820 SCREENING FOR OSTEOPOROSIS: Status: RESOLVED | Noted: 2023-01-23 | Resolved: 2023-03-24

## 2023-03-27 ENCOUNTER — EVALUATION (OUTPATIENT)
Age: 68
End: 2023-03-27

## 2023-03-27 DIAGNOSIS — M25.551 ACUTE PAIN OF BOTH HIPS: Primary | ICD-10-CM

## 2023-03-27 DIAGNOSIS — M54.50 LUMBAR BACK PAIN: ICD-10-CM

## 2023-03-27 DIAGNOSIS — M25.552 ACUTE PAIN OF BOTH HIPS: Primary | ICD-10-CM

## 2023-03-27 DIAGNOSIS — M62.81 GENERALIZED MUSCLE WEAKNESS: ICD-10-CM

## 2023-03-27 NOTE — PROGRESS NOTES
PT Evaluation     Today's date: 3/27/2023  Patient name: Emiliano Polanco  : 1955  MRN: [id number]  Referring provider: JONE Lynn  Dx:   Encounter Diagnosis     ICD-10-CM    1  Acute pain of both hips  M25 551     M25 552       2  Lumbar back pain  M54 50       3  Generalized muscle weakness  M62 81           Start Time: 1400  Stop Time: 1500  Total time in clinic (min): 60 minutes    Assessment  Assessment details: Patient is 77year old female presenting with signs an symptoms of insidious onset acute b/l hip pain R>L and low back pain secondary to history of arthritis  Upon examination, noted impairments include gross generalized LE strength R>L, impaired hip ROM b/l R>L, impaired lumbar ROM poor cardiovascular endurance, impaired gait mechanics, and difficulty with transfers and bed mobility  Pt is currently ambulating with rolling walker with step through pattern with wide TAI, decreased stride and marci, decreased terminal hip extension and impaired heel off and toe off  Pt is only able to tolerate 15 minutes of walking and standing  Relief of b/l hip and back pain is achieved with prolonged sitting  Pt demonstrated difficulty with supine to sit, sit to stand, and stand to sit transfers, requiring MOD A and elevated table height to stand up  Pt was dizzy upon supine to sit transfer requiring 3-5 min of recovery time  Pt required heavy verbal and tactile cueing to scoot on the table and perform sit to stand transfer  PT will address impairments noted through gross generalized LE strengthening, stretching, neuromuscular control, gait training, postural training, cardiovascular and muscular endurance training, in addition to introducing an appropriate HEP  PT is recommended 2x per week for 6-8 weeks  Prognosis is guarded due to complex medical history and PLOF    Impairments: abnormal coordination, abnormal gait, abnormal muscle firing, abnormal muscle tone, abnormal or restricted ROM, "activity intolerance, impaired balance, impaired physical strength, lacks appropriate home exercise program, pain with function, safety issue, weight-bearing intolerance, poor posture  and poor body mechanics    Symptom irritability: highUnderstanding of Dx/Px/POC: good   Prognosis: fair    Goals  STG: in 4 weeks pt will be able to    1  Demonstrate decrease in pain of the R hip to 5/10 or less with standing, walking, dressing and transfers in order to improve QOL  2  Demonstrate improved gross generalized LE strength to 4-/5 in order to improve safety with transfers, dressing, and ambulation in and around the community  3  Demonstrate ability to walk for 5 minutes without increased SOB and moderate to minimal gait impairments with the RW  4  Demonstrate ability to activate core with sustained contraction of 3\" in supine  5  Demonstrate ability to scoot side to side in bed in supine and in sitting with minimal assistance from therapist and min-mod verbal cueing  6  Improve Loma 6-clicks score to 72/79 to improve safety with transfers and ambulation  LTG: in 8 weeks pt will be able to    1   Demonstrate decrease in pain of the R hip to 3/10 or less with standing, walking, dressing and transfers in order to improve QOL  2  Demonstrate improved gross generalized LE strength to 4/5 in order to improve safety with transfers, dressing, and ambulation in and around the community  3  Demonstrate ability to walk for 10 minutes without increased SOB and moderate to minimal gait impairments with the RW  4  Demonstrate ability to activate core with sustained contraction of 3\" in sitting  5  Demonstrate ability to scoot side to side in bed in supine and in sitting without assistance from therapist and minimal to no verbal cueing  6  Improve LE flexibility in order to be able to don and doff shoes and socks safely and independently    7  Improve Loma 6-clicks score to 79/95 to improve safety with transfers and " ambulation  8  Be (I) with HEP  Plan  Patient would benefit from: PT eval and skilled physical therapy  Planned modality interventions: biofeedback, cryotherapy and thermotherapy: hydrocollator packs  Planned therapy interventions: abdominal trunk stabilization, activity modification, ADL retraining, ADL training, balance, balance/weight bearing training, behavior modification, IADL retraining, joint mobilization, manual therapy, massage, [place] taping, motor coordination training, transfer training, therapeutic exercise, therapeutic activities, stretching, strengthening, sensory integrative techniques, self care, postural training, patient education, neuromuscular re-education, body mechanics training, breathing training, coordination, community reintegration, flexibility, functional ROM exercises, gait training and home exercise program  Frequency: 2x week  Duration in visits: 16  Duration in weeks: 8  Plan of Care beginning date: 3/27/2023  Plan of Care expiration date: 5/22/2023  Treatment plan discussed with: patient        Subjective Evaluation    History of Present Illness  Mechanism of injury: Pt reports c/o hip pain b/l insidious onset  Notes pain in the groin on the R side  Pt reports pain onset 10 days ago getting out of bed  Pt was unable to get up off of the toilet and required assistance from life alert  Pt reports she has a history of arthritis  Pain improves once getting up in the morning  Back pain better at night  Pt purchased a lift chair and notes she uses about 75% from the chair to get up  Pt reports she used a single point cane prior  Pt reports difficulty getting out of the chair over the past 10 days  Walking and standing about 10-15 minutes increases back and hip pain  Prior to hip pain pt was able to stand and ambulate with a SPC for 30 minutes  Pt sleeps in a bed but notes when getting into bed and relaxing she gets muscle pains/ cramps in her mid back to her hips   Pt sleeps on her back and then rolls to R side  Pt is able to grocery shop but has increased pain after  Pt reports she has urinary urgency  Pt notes she does not drink much water but does drink at least  2 mugs of ice tea a day in addition to diet sodas            Recurrent probem    Quality of life: fair    Pain  Current pain ratin (5/10 R, 1/10 L, back pain 3/10)  At best pain ratin (2/10 R, 1/10 L, back 2/10)  At worst pain ratin (8/10 R out of bed, 1/10 L, back 2/10)  Location: spasms into LE b/l  Quality: dull ache and sharp  Relieving factors: rest, medications and heat (tylenol)  Aggravating factors: sitting, standing, walking and stair climbing  Progression: worsening    Social Support  Steps to enter house: yes (1 DENEEN no railing )  Stairs in house: no   Lives in: apartment  Lives with: alone    Employment status: not working  Hand dominance: right      Diagnostic Tests  X-ray: normal  Treatments  Previous treatment: injection treatment and physical therapy (cortisone b/l 3/23/23, fallouts and posterior pelvic tilt helped, double knee to chest did not feel good)  Patient Goals  Patient goals for therapy: decreased pain, improved balance, increased motion, increased strength and independence with ADLs/IADLs  Patient goal: be able to get up from the floor safely, improve LE strength, be able to put on shoes and socks independtley and safely,        Objective     Active Range of Motion     Lumbar   Flexion:  Restriction level: moderate  Extension:  Restriction level: maximal  Left lateral flexion:  Restriction level: moderate  Right lateral flexion:  with pain Restriction level: moderate  Left rotation:  Restriction level: maximal  Right rotation:  Restriction level: maximal  Left Hip   Flexion: 58 degrees   Abduction: 41 degrees     Right Hip   Flexion: 60 degrees with pain  Abduction: 23 degrees with pain    Passive Range of Motion   Left Hip   Flexion: 100 degrees     Right Hip   Flexion: 106 degrees with pain    Strength/Myotome Testing     Left Hip   Planes of Motion   Flexion: 3+  Abduction: 4-  Adduction: 3    Right Hip   Planes of Motion   Flexion: 3  Abduction: 4-  Adduction: 3+ (R hip pain (groin))    Left Knee   Flexion: 4  Extension: 4    Right Knee   Flexion: 3+ (Pain in R hip)  Extension: 4-    Left Ankle/Foot   Dorsiflexion: 4+  Great toe extension: 3+    Right Ankle/Foot   Dorsiflexion: 4  Great toe extension: 3+    Muscle Activation     Additional Muscle Activation Details  Patient able to hold a 1 TAC with a diaphragmatic breath  Patient noted increased pain in the R low back and required moderate cues  Functional Assessment        Comments  Initial Evaluation 3/27/23:  TUG: not performed today due to irritability but will perform nv  AM-PAC [facility] SPGS 6-clicks): 96/93      Flowsheet Rows    Flowsheet Row Most Recent Value   PT/OT G-Codes    Current Score 19   Projected Score 0             Precautions: cirrhosis, hx of liver cancer, diabetes, COPD, HTN, anxiety, depression, OA, and urinary incontinence        Manuals 3/27            Hip PROM             Mobilizations of hip (inferior, medial, distraction)                                       Neuro Re-Ed             HEP EDU  RR EDU on sleeping position, transfers, and dressing, and hydration            DB nv            DB + TAC nv            DB + TAC + marching             DB + TAC + fallouts             Ball roll outs nv            Open books             Lumbar rotation nv                         Ther Ex             Seated marches nv            Seated hip ABD nv            Seated hip ADD nv                                                                             Ther Activity                                       Gait Training             6 minute walk test nv            Transfer training             Modalities

## 2023-03-29 ENCOUNTER — APPOINTMENT (EMERGENCY)
Dept: CT IMAGING | Facility: HOSPITAL | Age: 68
End: 2023-03-29

## 2023-03-29 ENCOUNTER — HOSPITAL ENCOUNTER (INPATIENT)
Facility: HOSPITAL | Age: 68
LOS: 3 days | Discharge: HOME HEALTH | End: 2023-04-01
Attending: EMERGENCY MEDICINE | Admitting: INTERNAL MEDICINE

## 2023-03-29 ENCOUNTER — TELEPHONE (OUTPATIENT)
Dept: OBGYN CLINIC | Facility: HOSPITAL | Age: 68
End: 2023-03-29

## 2023-03-29 DIAGNOSIS — K74.60 HEPATIC CIRRHOSIS (HCC): ICD-10-CM

## 2023-03-29 DIAGNOSIS — R26.2 AMBULATORY DYSFUNCTION: ICD-10-CM

## 2023-03-29 DIAGNOSIS — Z87.19 HISTORY OF CIRRHOSIS: ICD-10-CM

## 2023-03-29 DIAGNOSIS — M19.90 OSTEOARTHROSIS: ICD-10-CM

## 2023-03-29 DIAGNOSIS — M25.551 RIGHT HIP PAIN: Primary | ICD-10-CM

## 2023-03-29 DIAGNOSIS — Z86.39 HISTORY OF DIABETES MELLITUS: ICD-10-CM

## 2023-03-29 DIAGNOSIS — R18.8 ASCITES: ICD-10-CM

## 2023-03-29 DIAGNOSIS — D69.6 THROMBOCYTOPENIA (HCC): ICD-10-CM

## 2023-03-29 PROBLEM — M25.559 HIP PAIN: Status: ACTIVE | Noted: 2023-03-20

## 2023-03-29 LAB
ALBUMIN SERPL BCP-MCNC: 3.4 G/DL (ref 3.5–5)
ALP SERPL-CCNC: 110 U/L (ref 34–104)
ALT SERPL W P-5'-P-CCNC: 35 U/L (ref 7–52)
ANION GAP SERPL CALCULATED.3IONS-SCNC: 8 MMOL/L (ref 4–13)
AST SERPL W P-5'-P-CCNC: 25 U/L (ref 13–39)
BASOPHILS # BLD AUTO: 0.01 THOUSANDS/ÂΜL (ref 0–0.1)
BASOPHILS NFR BLD AUTO: 0 % (ref 0–1)
BILIRUB SERPL-MCNC: 1.47 MG/DL (ref 0.2–1)
BUN SERPL-MCNC: 31 MG/DL (ref 5–25)
CALCIUM ALBUM COR SERPL-MCNC: 9.7 MG/DL (ref 8.3–10.1)
CALCIUM SERPL-MCNC: 9.2 MG/DL (ref 8.4–10.2)
CHLORIDE SERPL-SCNC: 100 MMOL/L (ref 96–108)
CO2 SERPL-SCNC: 27 MMOL/L (ref 21–32)
CREAT SERPL-MCNC: 0.96 MG/DL (ref 0.6–1.3)
EOSINOPHIL # BLD AUTO: 0.04 THOUSAND/ÂΜL (ref 0–0.61)
EOSINOPHIL NFR BLD AUTO: 1 % (ref 0–6)
ERYTHROCYTE [DISTWIDTH] IN BLOOD BY AUTOMATED COUNT: 15.8 % (ref 11.6–15.1)
FLUAV RNA RESP QL NAA+PROBE: NEGATIVE
FLUBV RNA RESP QL NAA+PROBE: NEGATIVE
GFR SERPL CREATININE-BSD FRML MDRD: 60 ML/MIN/1.73SQ M
GLUCOSE SERPL-MCNC: 110 MG/DL (ref 65–140)
GLUCOSE SERPL-MCNC: 119 MG/DL (ref 65–140)
HCT VFR BLD AUTO: 40.7 % (ref 34.8–46.1)
HGB BLD-MCNC: 13.4 G/DL (ref 11.5–15.4)
IMM GRANULOCYTES # BLD AUTO: 0.04 THOUSAND/UL (ref 0–0.2)
IMM GRANULOCYTES NFR BLD AUTO: 1 % (ref 0–2)
LYMPHOCYTES # BLD AUTO: 0.37 THOUSANDS/ÂΜL (ref 0.6–4.47)
LYMPHOCYTES NFR BLD AUTO: 5 % (ref 14–44)
MCH RBC QN AUTO: 30.5 PG (ref 26.8–34.3)
MCHC RBC AUTO-ENTMCNC: 32.9 G/DL (ref 31.4–37.4)
MCV RBC AUTO: 93 FL (ref 82–98)
MONOCYTES # BLD AUTO: 0.43 THOUSAND/ÂΜL (ref 0.17–1.22)
MONOCYTES NFR BLD AUTO: 6 % (ref 4–12)
NEUTROPHILS # BLD AUTO: 6.25 THOUSANDS/ÂΜL (ref 1.85–7.62)
NEUTS SEG NFR BLD AUTO: 87 % (ref 43–75)
NRBC BLD AUTO-RTO: 0 /100 WBCS
PLATELET # BLD AUTO: 94 THOUSANDS/UL (ref 149–390)
PMV BLD AUTO: 9.8 FL (ref 8.9–12.7)
POTASSIUM SERPL-SCNC: 4.1 MMOL/L (ref 3.5–5.3)
PROT SERPL-MCNC: 6.9 G/DL (ref 6.4–8.4)
RBC # BLD AUTO: 4.4 MILLION/UL (ref 3.81–5.12)
RSV RNA RESP QL NAA+PROBE: NEGATIVE
SARS-COV-2 RNA RESP QL NAA+PROBE: NEGATIVE
SODIUM SERPL-SCNC: 135 MMOL/L (ref 135–147)
WBC # BLD AUTO: 7.14 THOUSAND/UL (ref 4.31–10.16)

## 2023-03-29 RX ORDER — METOPROLOL SUCCINATE 25 MG/1
25 TABLET, EXTENDED RELEASE ORAL EVERY 12 HOURS SCHEDULED
Status: DISCONTINUED | OUTPATIENT
Start: 2023-03-29 | End: 2023-04-01 | Stop reason: HOSPADM

## 2023-03-29 RX ORDER — INSULIN LISPRO 100 [IU]/ML
1-5 INJECTION, SOLUTION INTRAVENOUS; SUBCUTANEOUS
Status: DISCONTINUED | OUTPATIENT
Start: 2023-03-29 | End: 2023-04-01 | Stop reason: HOSPADM

## 2023-03-29 RX ORDER — ONDANSETRON 2 MG/ML
4 INJECTION INTRAMUSCULAR; INTRAVENOUS EVERY 6 HOURS PRN
Status: DISCONTINUED | OUTPATIENT
Start: 2023-03-29 | End: 2023-04-01 | Stop reason: HOSPADM

## 2023-03-29 RX ORDER — DOCUSATE SODIUM 100 MG/1
100 CAPSULE, LIQUID FILLED ORAL 2 TIMES DAILY
Status: DISCONTINUED | OUTPATIENT
Start: 2023-03-29 | End: 2023-04-01 | Stop reason: HOSPADM

## 2023-03-29 RX ORDER — PANTOPRAZOLE SODIUM 40 MG/1
40 TABLET, DELAYED RELEASE ORAL
Status: DISCONTINUED | OUTPATIENT
Start: 2023-03-30 | End: 2023-04-01 | Stop reason: HOSPADM

## 2023-03-29 RX ORDER — METHOCARBAMOL 500 MG/1
500 TABLET, FILM COATED ORAL ONCE
Status: COMPLETED | OUTPATIENT
Start: 2023-03-29 | End: 2023-03-29

## 2023-03-29 RX ORDER — FUROSEMIDE 40 MG/1
80 TABLET ORAL DAILY
Status: DISCONTINUED | OUTPATIENT
Start: 2023-03-30 | End: 2023-04-01 | Stop reason: HOSPADM

## 2023-03-29 RX ORDER — SPIRONOLACTONE 50 MG/1
150 TABLET, FILM COATED ORAL DAILY
Status: DISCONTINUED | OUTPATIENT
Start: 2023-03-30 | End: 2023-04-01 | Stop reason: HOSPADM

## 2023-03-29 RX ORDER — ESCITALOPRAM OXALATE 20 MG/1
20 TABLET ORAL DAILY
Status: DISCONTINUED | OUTPATIENT
Start: 2023-03-30 | End: 2023-04-01 | Stop reason: HOSPADM

## 2023-03-29 RX ORDER — OXYCODONE HYDROCHLORIDE 10 MG/1
10 TABLET ORAL EVERY 4 HOURS PRN
Status: DISCONTINUED | OUTPATIENT
Start: 2023-03-29 | End: 2023-03-30

## 2023-03-29 RX ORDER — HEPARIN SODIUM 5000 [USP'U]/ML
5000 INJECTION, SOLUTION INTRAVENOUS; SUBCUTANEOUS EVERY 8 HOURS SCHEDULED
Status: DISCONTINUED | OUTPATIENT
Start: 2023-03-29 | End: 2023-04-01 | Stop reason: HOSPADM

## 2023-03-29 RX ORDER — INSULIN LISPRO 100 [IU]/ML
1-5 INJECTION, SOLUTION INTRAVENOUS; SUBCUTANEOUS
Status: DISCONTINUED | OUTPATIENT
Start: 2023-03-30 | End: 2023-04-01 | Stop reason: HOSPADM

## 2023-03-29 RX ORDER — OXYCODONE HYDROCHLORIDE 5 MG/1
5 TABLET ORAL EVERY 4 HOURS PRN
Status: DISCONTINUED | OUTPATIENT
Start: 2023-03-29 | End: 2023-03-30

## 2023-03-29 RX ORDER — FUROSEMIDE 40 MG/1
40 TABLET ORAL EVERY EVENING
Status: DISCONTINUED | OUTPATIENT
Start: 2023-03-29 | End: 2023-04-01 | Stop reason: HOSPADM

## 2023-03-29 RX ORDER — LIDOCAINE 50 MG/G
1 PATCH TOPICAL ONCE
Status: COMPLETED | OUTPATIENT
Start: 2023-03-29 | End: 2023-03-30

## 2023-03-29 RX ORDER — LIDOCAINE 50 MG/G
1 PATCH TOPICAL DAILY
Status: DISCONTINUED | OUTPATIENT
Start: 2023-03-30 | End: 2023-03-29

## 2023-03-29 RX ORDER — ACETAMINOPHEN 325 MG/1
975 TABLET ORAL ONCE
Status: COMPLETED | OUTPATIENT
Start: 2023-03-29 | End: 2023-03-29

## 2023-03-29 RX ADMIN — FUROSEMIDE 40 MG: 40 TABLET ORAL at 21:43

## 2023-03-29 RX ADMIN — METHOCARBAMOL 500 MG: 500 TABLET ORAL at 13:46

## 2023-03-29 RX ADMIN — ACETAMINOPHEN 975 MG: 325 TABLET ORAL at 13:46

## 2023-03-29 RX ADMIN — METOPROLOL SUCCINATE 25 MG: 25 TABLET, EXTENDED RELEASE ORAL at 21:43

## 2023-03-29 RX ADMIN — HEPARIN SODIUM 5000 UNITS: 5000 INJECTION INTRAVENOUS; SUBCUTANEOUS at 21:44

## 2023-03-29 RX ADMIN — OXYCODONE HYDROCHLORIDE 10 MG: 10 TABLET ORAL at 21:43

## 2023-03-29 RX ADMIN — LIDOCAINE 5% 1 PATCH: 700 PATCH TOPICAL at 13:47

## 2023-03-29 NOTE — ASSESSMENT & PLAN NOTE
Lab Results   Component Value Date    HGBA1C 5 8 (H) 08/18/2022       No results for input(s): POCGLU in the last 72 hours      Blood Sugar Average: Last 72 hrs:     SSI; Subcutaneous Insulin Order Set  Blood Glucose checks TIDWM and QHS (Q6H for NPO patients)  Hold oral medications  Blood Glucose goal while inpatient is 140-180  Reduce basal insulin by 25-50% while inpatient  Consistent Carbohydrate Diet

## 2023-03-29 NOTE — ED PROVIDER NOTES
History  Chief Complaint   Patient presents with   • Hip Pain     Pt reports 2 weeks of R hip pain that migrates across to L hip  Cortisone shots helped with L but not with R   Pt sent here from ortho due to lack of pain control  Denies recent falls  Pt is a 65yo F who presents for right hip pain  Patient reports that approximately 2 weeks ago she went to go get out of bed and had significant right hip pain  She denies any trauma or falls prior to the onset of pain  Patient states she had difficulty ambulating with her cane which was her baseline at the time and was seen here in the ED  Patient reports that she had an x-ray done that was negative and she was discharged with meloxicam and a walker  Patient reports she also saw her primary care provider who prescribed her a Medrol Dosepak as she was not having improvement of her pain  Patient reports that on the 23rd she saw orthopedics who recommended stopping the Medrol Dosepak as well as meloxicam due to patient's comorbidities  They recommended Tylenol and Voltaren gel  Patient also received bilateral hip injections at that time because 2 to 3 days prior to that she began having left hip pain as well  Patient reports that her left hip pain improved following the injection, however the right hip pain has continued to worsen  Patient states she has difficulty getting off the toilet as well as getting out of bed  Patient currently lives alone  Patient did start physical therapy outpatient  Patient states that she has not been able to sleep for the past 2 days secondary to the pain  Patient also reports history of ascites secondary to cirrhosis and states that she feels as though she is building up fluid again and may require a paracentesis  Patient reports her last paracentesis was approximately 1 month ago  Prior to Admission Medications   Prescriptions Last Dose Informant Patient Reported? Taking?    Diclofenac Sodium (VOLTAREN) 1 % 3/29/2023  No Yes   Sig: Apply 2 g topically 4 (four) times a day   Ozempic, 0 25 or 0 5 MG/DOSE, 2 MG/1 5ML SOPN 3/28/2023  Yes Yes   Sig: inject 0 5 milligrams subcutaneously every week   acetaminophen (TYLENOL) 500 mg tablet   Yes Yes   Sig: Take 500 mg by mouth every 6 (six) hours as needed   escitalopram (LEXAPRO) 20 mg tablet 3/28/2023  No Yes   Sig: Take 1 tablet (20 mg total) by mouth daily   furosemide (LASIX) 40 mg tablet 3/28/2023  No Yes   Sig: Take 2 tablets (80 mg total) by mouth daily AND 1 tablet (40 mg total) in the evening  Take before meals     Patient taking differently: Take 120mg in AM   lansoprazole (PREVACID) 30 mg capsule 3/28/2023  No Yes   Sig: Take 1 capsule (30 mg total) by mouth daily   methylPREDNISolone 4 MG tablet therapy pack Not Taking  No No   Sig: Use as directed on package   Patient not taking: Reported on 3/29/2023   metoprolol succinate (TOPROL-XL) 25 mg 24 hr tablet 3/28/2023  Yes Yes   Sig: Take 1 tablet by mouth 2 (two) times a day   spironolactone (ALDACTONE) 50 mg tablet 3/28/2023  No Yes   Sig: Take 3 tablets (150 mg total) by mouth daily   Patient taking differently: Take 150 mg by mouth 2 (two) times a day 150 mg in am      Facility-Administered Medications: None       Past Medical History:   Diagnosis Date   • Anxiety    • Anxiety and depression 9/29/2015   • Arthritis    • Asthma    • Asthma without status asthmaticus 4/26/2011   • Cirrhosis ([facility] )    • Depression    • Diabetes ([facility] )    • Disease of thyroid gland     nodules   • Diverticulitis 2011   • Diverticulosis    • DVT (deep venous thrombosis) ([facility] ) 2013   • GERD (gastroesophageal reflux disease)    • Liver disease     cirrhosis   • Obesity, morbid, BMI 40 0-49 9 ([facility] ) 8/16/2017   • Pneumonia    • Portal hypertensive gastropathy ([facility] )    • Sleep apnea    • Vertigo    • Vitreous hemorrhage of left eye ([facility] ) 1/20/2023       Past Surgical History:   Procedure Laterality Date   • BLADDER SUSPENSION     • CHOLECYSTECTOMY     • COLONOSCOPY  05/24/2019    had multiple with last being [id number]   • COLONOSCOPY  10/07/2004    LOUIS Shipman D  / Diverticulosis   • COLONOSCOPY  06/14/2011    LOUIS Shipman D  / Diverticulosis   • EGD  02/28/2013    LOUIS Regan D  / Mild Portal Hypertensive Gastropathy   • EGD  10/01/2019    LOUIS Shipman D  / Gastritis   • EGD AND COLONOSCOPY  09/03/2015    LOUIS Shipman D  / Larna Soja  Diverticulosis  • FLEXIBLE SIGMOIDOSCOPY  03/04/2013    LOUIS Rogers  / Hemorrhoidal Bleeding   • FLEXIBLE SIGMOIDOSCOPY  05/30/2018    LOUIS Rogers D  / Internal hemorrhoids  biopsied     • HERNIA REPAIR     • IR PARACENTESIS  08/23/2018   • IR PARACENTESIS  11/05/2018   • IR PARACENTESIS  01/11/2019   • IR PARACENTESIS  02/27/2019   • IR PARACENTESIS  04/15/2019   • IR PARACENTESIS  06/03/2019   • IR PARACENTESIS  07/10/2019   • IR PARACENTESIS  08/16/2019   • IR PARACENTESIS  09/10/2019   • IR PARACENTESIS  10/07/2019   • IR PARACENTESIS  11/05/2019   • IR PARACENTESIS  11/22/2019   • IR PARACENTESIS  12/17/2019   • IR PARACENTESIS  01/07/2020   • IR PARACENTESIS  01/28/2020   • IR PARACENTESIS  02/18/2020   • IR PARACENTESIS  03/10/2020   • IR PARACENTESIS  03/31/2020   • IR PARACENTESIS  04/21/2020   • IR PARACENTESIS  05/12/2020   • IR PARACENTESIS  06/02/2020   • IR PARACENTESIS  06/30/2020   • IR PARACENTESIS  07/20/2020   • IR PARACENTESIS  08/18/2020   • IR PARACENTESIS  11/12/2020   • IR PARACENTESIS  03/11/2021   • IR PARACENTESIS  2/8/2022   • IR PARACENTESIS  8/24/2022   • IR PARACENTESIS  11/14/2022   • IR PARACENTESIS  12/21/2022   • IR PARACENTESIS  2/1/2023   • IR PARACENTESIS  3/7/2023   • TONSILLECTOMY     • TRIGGER FINGER RELEASE Bilateral        Family History   Problem Relation Age of Onset   • COPD Mother    • Heart attack Sister    • Lymphoma Brother    • Colon cancer Maternal Grandmother    • Cancer Maternal Aunt         unknown     I have reviewed and agree with the history as documented  E-Cigarette/Vaping   • E-Cigarette Use Never User      E-Cigarette/Vaping Substances     Social History     Tobacco Use   • Smoking status: Never   • Smokeless tobacco: Never   Vaping Use   • Vaping Use: Never used   Substance Use Topics   • Alcohol use: Yes     Comment: rarely   • Drug use: Never        Review of Systems   Constitutional: Positive for chills  Gastrointestinal: Positive for abdominal distention  Musculoskeletal: Positive for arthralgias (Right hip), back pain (Lower right) and gait problem (Secondary to hip pain)  All other systems reviewed and are negative  Physical Exam  ED Triage Vitals   Temperature Pulse Respirations Blood Pressure SpO2   03/29/23 1259 03/29/23 1255 03/29/23 1255 03/29/23 1255 03/29/23 1255   98 4 °F (36 9 °C) 65 18 126/59 99 %      Temp Source Heart Rate Source Patient Position - Orthostatic VS BP Location FiO2 (%)   03/29/23 1259 03/29/23 1255 03/29/23 1255 03/29/23 1255 --   Oral Monitor Lying Left arm       Pain Score       03/29/23 1346       6             Orthostatic Vital Signs  Vitals:    03/29/23 1255 03/29/23 1410 03/29/23 1500 03/29/23 1707   BP: 126/59 132/62 111/59 129/60   Pulse: 65 64 73 75   Patient Position - Orthostatic VS: Lying Lying  Lying       Physical Exam  Vitals reviewed  Constitutional:       General: She is not in acute distress  Appearance: She is well-developed  She is not toxic-appearing or diaphoretic  HENT:      Head: Normocephalic and atraumatic  Right Ear: External ear normal       Left Ear: External ear normal       Nose: Nose normal       Mouth/Throat:      Pharynx: Oropharynx is clear  Eyes:      Extraocular Movements: Extraocular movements intact  Pupils: Pupils are equal, round, and reactive to light  Cardiovascular:      Rate and Rhythm: Normal rate and regular rhythm        Pulses:           Dorsalis pedis pulses are 2+ on the right side and 2+ on the left side  Heart sounds: Normal heart sounds  No murmur heard  Pulmonary:      Effort: Pulmonary effort is normal  No respiratory distress  Breath sounds: Normal breath sounds  No wheezing or rales  Abdominal:      General: There is distension  Palpations: Abdomen is soft  Tenderness: There is no abdominal tenderness  There is no guarding or rebound  Musculoskeletal:      Cervical back: Normal range of motion and neck supple  No tenderness or bony tenderness  Thoracic back: No tenderness or bony tenderness  Lumbar back: Tenderness and bony tenderness present  Back:       Right hip: Tenderness present  No deformity, lacerations or crepitus  Decreased range of motion (Secondary to pain)  Right lower leg: No edema  Left lower leg: No edema  Legs:       Comments: CMS intact distally in bilateral lower extremities   Skin:     General: Skin is warm and dry  Capillary Refill: Capillary refill takes less than 2 seconds  Neurological:      Mental Status: She is alert and oriented to person, place, and time  Psychiatric:         Speech: Speech normal          Behavior: Behavior is cooperative           ED Medications  Medications   lidocaine (LIDODERM) 5 % patch 1 patch (1 patch Topical Medication Applied 3/29/23 1347)   methocarbamol (ROBAXIN) tablet 500 mg (500 mg Oral Given 3/29/23 1346)   acetaminophen (TYLENOL) tablet 975 mg (975 mg Oral Given 3/29/23 1346)       Diagnostic Studies  Results Reviewed     Procedure Component Value Units Date/Time    CBC and differential [[id number]]  (Abnormal) Collected: 03/29/23 1358    Lab Status: Final result Specimen: Blood from Arm, Right Updated: 03/29/23 5685     WBC 7 14 Thousand/uL      RBC 4 40 Million/uL      Hemoglobin 13 4 g/dL      Hematocrit 40 7 %      MCV 93 fL      MCH 30 5 pg      MCHC 32 9 g/dL      RDW 15 8 %      MPV 9 8 fL      Platelets 94 Thousands/uL      nRBC 0 /100 WBCs      Neutrophils Relative 87 %      Immat GRANS % 1 %      Lymphocytes Relative 5 %      Monocytes Relative 6 %      Eosinophils Relative 1 %      Basophils Relative 0 %      Neutrophils Absolute 6 25 Thousands/µL      Immature Grans Absolute 0 04 Thousand/uL      Lymphocytes Absolute 0 37 Thousands/µL      Monocytes Absolute 0 43 Thousand/µL      Eosinophils Absolute 0 04 Thousand/µL      Basophils Absolute 0 01 Thousands/µL     Comprehensive metabolic panel [[id number]]  (Abnormal) Collected: 03/29/23 1358    Lab Status: Final result Specimen: Blood from Arm, Right Updated: 03/29/23 1442     Sodium 135 mmol/L      Potassium 4 1 mmol/L      Chloride 100 mmol/L      CO2 27 mmol/L      ANION GAP 8 mmol/L      BUN 31 mg/dL      Creatinine 0 96 mg/dL      Glucose 119 mg/dL      Calcium 9 2 mg/dL      Corrected Calcium 9 7 mg/dL      AST 25 U/L      ALT 35 U/L      Alkaline Phosphatase 110 U/L      Total Protein 6 9 g/dL      Albumin 3 4 g/dL      Total Bilirubin 1 47 mg/dL      eGFR 60 ml/min/1 73sq m     Narrative:      [place] guidelines for Chronic Kidney Disease (CKD):   •  Stage 1 with normal or high GFR (GFR > 90 mL/min/1 73 square meters)  •  Stage 2 Mild CKD (GFR = 60-89 mL/min/1 73 square meters)  •  Stage 3A Moderate CKD (GFR = 45-59 mL/min/1 73 square meters)  •  Stage 3B Moderate CKD (GFR = 30-44 mL/min/1 73 square meters)  •  Stage 4 Severe CKD (GFR = 15-29 mL/min/1 73 square meters)  •  Stage 5 End Stage CKD (GFR <15 mL/min/1 73 square meters)  Note: GFR calculation is accurate only with a steady state creatinine                 CT abdomen pelvis wo contrast   ED Interpretation by DO Boo (03/29 1614)   Interpreted by me as large ascites, otherwise no obvious abnormalities      Final Result by Thai Dickerson MD (03/29 1612)      Moderate to large ascites  Cirrhotic changes in the liver with splenomegaly suggesting portal hypertension        Workstation performed: [id number]               Procedures  Procedures      ED Course  ED Course as of 03/29/23 1716   Wed Mar 29, 2023   1445 TOTAL BILIRUBIN(!): 1 47  Elevated but stable from baseline  1445 Comprehensive metabolic panel(!)  Reviewed and without actionable derangement  1505 CBC and differential(!)  Reviewed and without actionable derangement  1505 WBC: 7 14  WNL   1615 CT abdomen pelvis wo contrast  No acute fracture or destructive osseous lesion  Moderate to large ascites  Cirrhotic changes in the liver with splenomegaly suggesting portal hypertension  1620 Discussed all results with pt  Joint decision making used and pt opting for admission  Will reach out to [address] [facility] [address] contacted for admission  SBIRT 20yo+    Flowsheet Row Most Recent Value   SBIRT (23 yo +)    In order to provide better care to our patients, we are screening all of our patients for alcohol and drug use  Would it be okay to ask you these screening questions? No Filed at: 03/29/2023 1300                Medical Decision Making  Pt is a 67yo F who presents with right hip pain  Exam pertinent for lower back tenderness as well as right hip tenderness and decreased range of motion secondary to pain  Differential diagnosis to include but not limited to fracture, arthritis, degenerative change  Will plan for CT to rule out occult fracture  Discussed at length with patient whether she felt safe at home with her current ambulatory state and she stated that she does not always feel safe  Discussed that medication options are limited due to patient's age and comorbidities and that if pain is not well controlled on her current regimen, might benefit from admission  Patient stating that she cannot control her pain at home and is agreeable to admission for pain control and therapy evaluation  See ED course for results and details  Plan to [facility] pt to [facility]   Pt discussed with admitting team and admission orders placed  Pt admitted without incident  Amount and/or Complexity of Data Reviewed  Labs: ordered  Decision-making details documented in ED Course  Radiology: ordered  Decision-making details documented in ED Course  Risk  OTC drugs  Prescription drug management  Decision regarding hospitalization  Disposition  Final diagnoses:   Right hip pain   Ambulatory dysfunction   Ascites   History of cirrhosis   History of diabetes mellitus     Time reflects when diagnosis was documented in both MDM as applicable and the Disposition within this note     Time User Action Codes Description Comment    3/29/2023  4:28 PM Sherman Lopez Right hip pain     3/29/2023  4:28 PM Manasa Guthrie 46 [R26 2] Ambulatory dysfunction     3/29/2023  4:29 PM Nissa Taylor [R18 8] Ascites     3/29/2023  4:29 PM Nissa Taylor [Z87 19] History of cirrhosis     3/29/2023  4:30 PM Autumn Chowdhury [Z86 39] History of diabetes mellitus       ED Disposition     ED Disposition   Admit    Condition   Stable    Date/Time   Wed Mar 29, 2023  5:01 PM    Comment   Case was discussed with CHARAN and the patient's admission status was agreed to be Admission Status: inpatient status to the service of Dr Shanti Menjivar  Follow-up Information    None         Patient's Medications   Discharge Prescriptions    No medications on file     No discharge procedures on file  PDMP Review     None           ED Provider  Attending physically available and evaluated Karl Gonsalez I managed the patient along with the ED Attending      Electronically Signed by         Greer Bajwa MD  03/29/23 9787

## 2023-03-29 NOTE — TELEPHONE ENCOUNTER
Caller: Patient    Doctor: Ember Miguel    Reason for call: Patient was seen on 3/23/23 and received B/L hip injections  Left hip resolved but right hip is having more pain than before the injection  Pain in entire hip and into the groin  Pain 10/10  Please advise      Call back#: [phone]

## 2023-03-29 NOTE — Clinical Note
Case was discussed with CHARAN and the patient's admission status was agreed to be Admission Status: observation status to the service of Dr Simon Azar

## 2023-03-29 NOTE — ASSESSMENT & PLAN NOTE
Lab Results   Component Value Date    EGFR 60 03/29/2023    EGFR 74 03/16/2023    EGFR 61 01/20/2023    CREATININE 0 96 03/29/2023    CREATININE 0 80 03/16/2023    CREATININE 0 94 01/20/2023     Patient appears to be at baseline creatinine; monitor and avoid nephrotoxic medications and hypotension

## 2023-03-29 NOTE — ED ATTENDING ATTESTATION
3/29/2023  Boo PEDERSON DO, saw and evaluated the patient  I have discussed the patient with the resident/non-physician practitioner and agree with the resident's/non-physician practitioner's findings, Plan of Care, and MDM as documented in the resident's/non-physician practitioner's note, except where noted  All available labs and Radiology studies were reviewed  I was present for key portions of any procedure(s) performed by the resident/non-physician practitioner and I was immediately available to provide assistance  At this point I agree with the current assessment done in the Emergency Department  I have conducted an independent evaluation of this patient a history and physical is as follows:    ED Course     77 y o  F p/w right hip pain x 2 weeks  Denies injury  Pt was evaluated here for the same complaint on 3/16  Had negative hip xray  Discharged on meloxicam   Saw PCP on 3/20 for same complaint  Placed on medrol dose leon  Ortho on 3/23, dx'd with greater trochanteric pain syndrome and given b/l hip injections  Helped with left hip pain and still having right hip pain  Trouble walking due to pain  Denies new trauma  Called ortho today and was referred to the ER  Pt having pain from right groin that wraps laterally to right hip  Sharp, constant, a little better with walking  Denies F/C  Plan: Labs, CT A/P, re-eval for dispo      Critical Care Time  Procedures

## 2023-03-29 NOTE — ASSESSMENT & PLAN NOTE
Patient reports to the ED for evaluation of worsening right hip pain X 2 weeks  She reports it affects her ADLs and has been seen by orthopedics in the outpatient setting as well as primary care  Also reports left hip pain, but that was alleviated by injections received at orthopedics  Right hip however has failed conservative therapy with Mobic, steroids, and injections  X-ray and CT without fracture; patient denies trauma to the affected area  Supportive care, pain medications, PT evaluation  Aqua K-pad, Lidoderm patches, consult orthopedics Universal Safety Interventions

## 2023-03-29 NOTE — ASSESSMENT & PLAN NOTE
Patient with history of liver cirrhosis; receives scheduled outpatient paracentesis on a regular basis  Denies any abdominal pain; 0/4 SIRS criteria  Continue p o   Lasix, beta-blocker, Aldactone  Consult to IR for elective paracentesis

## 2023-03-30 ENCOUNTER — APPOINTMENT (OUTPATIENT)
Age: 68
End: 2023-03-30

## 2023-03-30 ENCOUNTER — APPOINTMENT (INPATIENT)
Dept: RADIOLOGY | Facility: HOSPITAL | Age: 68
End: 2023-03-30
Attending: INTERNAL MEDICINE

## 2023-03-30 LAB
ALBUMIN SERPL BCP-MCNC: 3.1 G/DL (ref 3.5–5)
ALP SERPL-CCNC: 98 U/L (ref 34–104)
ALT SERPL W P-5'-P-CCNC: 28 U/L (ref 7–52)
ANION GAP SERPL CALCULATED.3IONS-SCNC: 6 MMOL/L (ref 4–13)
AST SERPL W P-5'-P-CCNC: 21 U/L (ref 13–39)
BASOPHILS # BLD AUTO: 0.01 THOUSANDS/ÂΜL (ref 0–0.1)
BASOPHILS NFR BLD AUTO: 0 % (ref 0–1)
BILIRUB SERPL-MCNC: 1.32 MG/DL (ref 0.2–1)
BUN SERPL-MCNC: 30 MG/DL (ref 5–25)
CALCIUM ALBUM COR SERPL-MCNC: 9.5 MG/DL (ref 8.3–10.1)
CALCIUM SERPL-MCNC: 8.8 MG/DL (ref 8.4–10.2)
CHLORIDE SERPL-SCNC: 102 MMOL/L (ref 96–108)
CO2 SERPL-SCNC: 27 MMOL/L (ref 21–32)
CREAT SERPL-MCNC: 0.93 MG/DL (ref 0.6–1.3)
EOSINOPHIL # BLD AUTO: 0.06 THOUSAND/ÂΜL (ref 0–0.61)
EOSINOPHIL NFR BLD AUTO: 1 % (ref 0–6)
ERYTHROCYTE [DISTWIDTH] IN BLOOD BY AUTOMATED COUNT: 15.9 % (ref 11.6–15.1)
GFR SERPL CREATININE-BSD FRML MDRD: 62 ML/MIN/1.73SQ M
GLUCOSE SERPL-MCNC: 118 MG/DL (ref 65–140)
GLUCOSE SERPL-MCNC: 123 MG/DL (ref 65–140)
GLUCOSE SERPL-MCNC: 136 MG/DL (ref 65–140)
GLUCOSE SERPL-MCNC: 141 MG/DL (ref 65–140)
GLUCOSE SERPL-MCNC: 146 MG/DL (ref 65–140)
HCT VFR BLD AUTO: 38.3 % (ref 34.8–46.1)
HGB BLD-MCNC: 12.3 G/DL (ref 11.5–15.4)
IMM GRANULOCYTES # BLD AUTO: 0.03 THOUSAND/UL (ref 0–0.2)
IMM GRANULOCYTES NFR BLD AUTO: 1 % (ref 0–2)
LYMPHOCYTES # BLD AUTO: 0.36 THOUSANDS/ÂΜL (ref 0.6–4.47)
LYMPHOCYTES NFR BLD AUTO: 6 % (ref 14–44)
MAGNESIUM SERPL-MCNC: 2.5 MG/DL (ref 1.9–2.7)
MCH RBC QN AUTO: 30.1 PG (ref 26.8–34.3)
MCHC RBC AUTO-ENTMCNC: 32.1 G/DL (ref 31.4–37.4)
MCV RBC AUTO: 94 FL (ref 82–98)
MONOCYTES # BLD AUTO: 0.46 THOUSAND/ÂΜL (ref 0.17–1.22)
MONOCYTES NFR BLD AUTO: 8 % (ref 4–12)
NEUTROPHILS # BLD AUTO: 4.8 THOUSANDS/ÂΜL (ref 1.85–7.62)
NEUTS SEG NFR BLD AUTO: 84 % (ref 43–75)
NRBC BLD AUTO-RTO: 0 /100 WBCS
PHOSPHATE SERPL-MCNC: 3.8 MG/DL (ref 2.3–4.1)
PLATELET # BLD AUTO: 81 THOUSANDS/UL (ref 149–390)
PLATELET # BLD AUTO: 94 THOUSANDS/UL (ref 149–390)
PMV BLD AUTO: 10.1 FL (ref 8.9–12.7)
PMV BLD AUTO: 9.8 FL (ref 8.9–12.7)
POTASSIUM SERPL-SCNC: 4.4 MMOL/L (ref 3.5–5.3)
PROT SERPL-MCNC: 6.4 G/DL (ref 6.4–8.4)
RBC # BLD AUTO: 4.08 MILLION/UL (ref 3.81–5.12)
SODIUM SERPL-SCNC: 135 MMOL/L (ref 135–147)
WBC # BLD AUTO: 5.72 THOUSAND/UL (ref 4.31–10.16)

## 2023-03-30 RX ORDER — ALBUMIN (HUMAN) 12.5 G/50ML
12.5 SOLUTION INTRAVENOUS ONCE
Status: COMPLETED | OUTPATIENT
Start: 2023-03-30 | End: 2023-03-30

## 2023-03-30 RX ORDER — OXYCODONE HYDROCHLORIDE 5 MG/1
5 TABLET ORAL EVERY 4 HOURS PRN
Status: DISCONTINUED | OUTPATIENT
Start: 2023-03-30 | End: 2023-04-01 | Stop reason: HOSPADM

## 2023-03-30 RX ORDER — LIDOCAINE 50 MG/G
1 PATCH TOPICAL DAILY
Status: DISCONTINUED | OUTPATIENT
Start: 2023-03-30 | End: 2023-04-01 | Stop reason: HOSPADM

## 2023-03-30 RX ORDER — LIDOCAINE HYDROCHLORIDE 10 MG/ML
INJECTION, SOLUTION EPIDURAL; INFILTRATION; INTRACAUDAL; PERINEURAL AS NEEDED
Status: COMPLETED | OUTPATIENT
Start: 2023-03-30 | End: 2023-03-30

## 2023-03-30 RX ADMIN — ESCITALOPRAM OXALATE 20 MG: 20 TABLET, FILM COATED ORAL at 10:41

## 2023-03-30 RX ADMIN — LIDOCAINE HYDROCHLORIDE 10 ML: 10 INJECTION, SOLUTION EPIDURAL; INFILTRATION; INTRACAUDAL; PERINEURAL at 09:12

## 2023-03-30 RX ADMIN — PANTOPRAZOLE SODIUM 40 MG: 40 TABLET, DELAYED RELEASE ORAL at 10:41

## 2023-03-30 RX ADMIN — DOCUSATE SODIUM 100 MG: 100 CAPSULE, LIQUID FILLED ORAL at 17:21

## 2023-03-30 RX ADMIN — HEPARIN SODIUM 5000 UNITS: 5000 INJECTION INTRAVENOUS; SUBCUTANEOUS at 13:06

## 2023-03-30 RX ADMIN — ALBUMIN (HUMAN) 12.5 G: 0.25 INJECTION, SOLUTION INTRAVENOUS at 12:05

## 2023-03-30 RX ADMIN — HEPARIN SODIUM 5000 UNITS: 5000 INJECTION INTRAVENOUS; SUBCUTANEOUS at 04:46

## 2023-03-30 RX ADMIN — FUROSEMIDE 40 MG: 40 TABLET ORAL at 17:21

## 2023-03-30 RX ADMIN — HEPARIN SODIUM 5000 UNITS: 5000 INJECTION INTRAVENOUS; SUBCUTANEOUS at 21:41

## 2023-03-30 RX ADMIN — Medication 2.5 MG: at 21:50

## 2023-03-30 RX ADMIN — OXYCODONE HYDROCHLORIDE 10 MG: 10 TABLET ORAL at 04:44

## 2023-03-30 RX ADMIN — LIDOCAINE 5% 1 PATCH: 700 PATCH TOPICAL at 11:44

## 2023-03-30 RX ADMIN — DOCUSATE SODIUM 100 MG: 100 CAPSULE, LIQUID FILLED ORAL at 10:41

## 2023-03-30 RX ADMIN — DICLOFENAC SODIUM 2 G: 10 GEL TOPICAL at 21:51

## 2023-03-30 NOTE — CASE MANAGEMENT
Case Management Assessment & Discharge Planning Note    Patient name Patrica Martinez  Location [facility] 2 /South 2 Elizabeth Saldaña* MRN [id number]  : 1955 Date 3/30/2023       Current Admission Date: 3/29/2023  Current Admission Diagnosis:Right hip pain   Patient Active Problem List    Diagnosis Date Noted   • Right hip pain 2023   • Lumbar back pain 2023   • Post-menopausal 2023   • Liver cell carcinoma ([facility] ) 2023   • Chronic obstructive pulmonary disease, unspecified COPD type ([facility] ) 2023   • Alcohol abuse with other alcohol-induced disorder ([facility] ) 2023   • Stage 3a chronic kidney disease ([facility] ) 2023   • Other forms of angina pectoris ([facility] )    • Hyponatremia 10/07/2021   • OAB (overactive bladder) 2021   • Liver failure without hepatic coma, unspecified chronicity ([facility] ) 2021   • Thrombocytopenia ([facility] ) 2021   • Microscopic colitis 2021   • Diabetes mellitus ([facility] ) 2020   • Shortness of breath 2020   • Increased frequency of urination 2020   • S/P endoscopic carpal tunnel release 2019   • Trigger finger of right thumb 2019   • Cirrhosis ([facility] ) 10/07/2019   • Asthma 10/07/2019   • Sleep apnea 10/07/2019   • Depression, recurrent ([facility] )    • Urinary retention 2019   • Lumbar facet arthropathy 2018   • Decompensated hepatic cirrhosis ([address] ) 2018   • Essential hypertension 2018   • Hypokalemia 2017   • Acute colitis 2017   • Carpal tunnel syndrome of left wrist 2017   • Primary osteoarthritis of first carpometacarpal joint of left hand 2017   • Trigger thumb, left thumb 2017   • SOB (shortness of breath) 2017   • Palpitation 2016   • Microscopic hematuria 2016   • Midline cystocele 2016   • Dizziness 2015   • Mixed incontinence 2015   • Outlet dysfunction constipation 2015   • Pelvic pain in female 2015   • Recurrent UTI (urinary tract infection) 11/12/2015   • Incisional hernia, without obstruction or gangrene 11/09/2015   • GERD (gastroesophageal reflux disease) 09/29/2015   • Ascites due to alcoholic cirrhosis ([facility] ) 03/12/9840   • Portal hypertension ([facility] ) 03/08/2013   • Type 2 diabetes mellitus ([facility] ) 03/08/2013   • Hepatic cirrhosis ([facility] ) 06/25/2012   • Anxiety state 12/08/2011   • DDD (degenerative disc disease), lumbosacral 09/19/2011   • Nontoxic multinodular goiter 07/20/2011   • Other disorders of lung 07/20/2011   • Hemangioma of intra-abdominal structure 02/16/2011   • Allergic rhinitis 02/08/2010   • Mild intermittent asthma 02/08/2010   • Osteoarthrosis 02/08/2010   • Obstructive sleep apnea 02/08/2010   • Chronic nonalcoholic liver disease 21/80/9552      LOS (days): 1  Geometric Mean LOS (GMLOS) (days): 2 50  Days to GMLOS:1 6     OBJECTIVE:    Risk of Unplanned Readmission Score: 18 06         Current admission status: Inpatient       Preferred Pharmacy:   [address] [place] [address] [address] [place] [id number]  Phone: [phone] Fax: [phone]    Primary Care Provider: JONE Ken    Primary Insurance: MEDICARE  Secondary Insurance: [facility]    ASSESSMENT:  Lb Lopez [address], [address] Representative - Son   Primary Phone: [phone] (Mobile)               Advance Directives  Does patient have a [address]?: Yes  Does patient have Advance Directives?: Yes  Advance Directives: Power of  for health care  Primary Contact: Naseem Crouch ([phone])    Readmission Root Cause  30 Day Readmission: No    Patient Information  Admitted from[de-identified] Home  Mental Status: Alert  During Assessment patient was accompanied by: Not accompanied during assessment  Assessment information provided by[de-identified] Patient  Primary Caregiver: Self  Support Systems: Son, Family members  [place] of Residence: [address] [place] [address] Ave do you live in?: [facility] entry access options   Select all that apply : No steps to enter home  Type of Current Residence: Apartment  Floor Level: 1  Upon entering residence, is there a bedroom on the main floor (no further steps)?: Yes  Upon entering residence, is there a bathroom on the main floor (no further steps)?: Yes  In the last 12 months, was there a time when you were not able to pay the mortgage or rent on time?: No  In the last 12 months, how many places have you lived?: 1  In the last 12 months, was there a time when you did not have a steady place to sleep or slept in a shelter (including now)?: No  Homeless/housing insecurity resource given?: N/A  Living Arrangements: Lives Alone    Activities of Daily Living Prior to Admission  Functional Status: Independent (ADLs have been limited by pain in recent days)  Completes ADLs independently?: Yes  Ambulates independently?: Yes  Does patient use assisted devices?: Yes  Assisted Devices (DME) used: Megha Sammly  Does patient currently own DME?: Yes  What DME does the patient currently own?: Bedside Commode, Shower Chair, Straight Cane, Walker  Does patient have a history of Outpatient Therapy (PT/OT)?: Yes  Does the patient have a history of Short-Term Rehab?: No  Does patient have a history of HHC?: No  [facility] ?: No    Patient Information Continued  Income Source: SSI/SSD  Does patient have prescription coverage?: Yes  Within the past 12 months, you worried that your food would run out before you got the money to buy more : Never true  Within the past 12 months, the food you bought just didn't last and you didn't have money to get more : Never true  Food insecurity resource given?: N/A  Does patient receive dialysis treatments?: No  Does patient have a history of substance abuse?: No (Pt declines ETOH history but chart indicates a history of ETOH use)  Does patient have a history of Mental Health Diagnosis?: Yes (Anxiety, Depression)  Is patient receiving treatment for mental health?: Yes (Med management by PCP)  Has patient received inpatient treatment related to mental health in the last 2 years?: No    Means of Transportation  Means of Transport to Appts[de-identified] Drives Self (Drives self when not in pain but family assists if necessary)  In the past 12 months, has lack of transportation kept you from medical appointments or from getting medications?: No  In the past 12 months, has lack of transportation kept you from meetings, work, or from getting things needed for daily living?: No  Was application for public transport provided?: N/A    DISCHARGE DETAILS:    Discharge planning discussed with[de-identified] Patient  Freedom of Choice: Yes  Comments - Freedom of Choice: Pt reports she would like to talk to her son about the recommendation for STR  Pt agreeable to [address] and STR referrals to assess options  CM contacted family/caregiver?: No- see comments  Were Treatment Team discharge recommendations reviewed with patient/caregiver?: Yes  Did patient/caregiver verbalize understanding of patient care needs?: Yes  Were patient/caregiver advised of the risks associated with not following Treatment Team discharge recommendations?: Yes    Contacts  Patient Contacts: Patient  Contact Method:  In Person  Reason/Outcome: Continuity of Care, Emergency Contact, Referral, Discharge [address]         Is the patient interested in [address] at discharge?: Yes  [address] requested[de-identified] Occupational Therapy, Physical [address] Name[de-identified] Other  [address] [place] [address] Provider[de-identified] PCP  Home Health Services Needed[de-identified] Strengthening/Theraputic Exercises to Improve Function, Evaluate Functional Status and Safety, Gait/ADL Training  Homebound Criteria Met[de-identified] Uses an Assist Device (i e  cane, walker, etc)  Supporting Clincal Findings[de-identified] Fatigues Easliy in Short Distances, Limited Endurance    DME Referral Provided  Referral made for DME?: No    Other Referral/Resources/Interventions Provided:  Interventions: HHC, Short Term Rehab  Referral Comments: [facility] and STR referrals placed via [employer]

## 2023-03-30 NOTE — ASSESSMENT & PLAN NOTE
· Patient with history of liver cirrhosis; receives scheduled outpatient paracentesis on a regular basis  · Status post paracentesis with improvement in abdominal bloating and hip pain  · Without abdominal pain; 0/4 SIRS criteria noted on admission  · Continue Lasix 80 mg in am, 40 mg in pm, Aldactone 50 mg by mouth daily

## 2023-03-30 NOTE — ASSESSMENT & PLAN NOTE
· Patient reports 4 months since last alcohol use  · No acute events overnight  · Monitor off CIWA protocol

## 2023-03-30 NOTE — PLAN OF CARE
Problem: Potential for Falls  Goal: Patient will remain free of falls  Description: INTERVENTIONS:  - Educate patient/family on patient safety including physical limitations  - Instruct patient to call for assistance with activity   - Consult OT/PT to assist with strengthening/mobility   - Keep Call bell within reach  - Keep bed low and locked with side rails adjusted as appropriate  - Keep care items and personal belongings within reach  - Initiate and maintain comfort rounds  - Make Fall Risk Sign visible to staff  - Offer Toileting every  Hours, in advance of need  - Initiate/Maintain alarm  - Obtain necessary fall risk management equipment:   - Apply yellow socks and bracelet for high fall risk patients  - Consider moving patient to room near nurses station  Outcome: Progressing     Problem: MOBILITY - ADULT  Goal: Maintain or return to baseline ADL function  Description: INTERVENTIONS:  -  Assess patient's ability to carry out ADLs; assess patient's baseline for ADL function and identify physical deficits which impact ability to perform ADLs (bathing, care of mouth/teeth, toileting, grooming, dressing, etc )  - Assess/evaluate cause of self-care deficits   - Assess range of motion  - Assess patient's mobility; develop plan if impaired  - Assess patient's need for assistive devices and provide as appropriate  - Encourage maximum independence but intervene and supervise when necessary  - Involve family in performance of ADLs  - Assess for home care needs following discharge   - Consider OT consult to assist with ADL evaluation and planning for discharge  - Provide patient education as appropriate  Outcome: Progressing  Goal: Maintains/Returns to pre admission functional level  Description: INTERVENTIONS:  - Perform BMAT or MOVE assessment daily    - Set and communicate daily mobility goal to care team and patient/family/caregiver     - Collaborate with rehabilitation services on mobility goals if consulted  - Perform Range of Motion  times a day  - Reposition patient every  hours    - Dangle patient  times a day  - Stand patient  times a day  - Ambulate patient  times a day  - Out of bed to chair  times a day   - Out of bed for meals  times a day  - Out of bed for toileting  - Record patient progress and toleration of activity level   Outcome: Progressing     Problem: Prexisting or High Potential for Compromised Skin Integrity  Goal: Skin integrity is maintained or improved  Description: INTERVENTIONS:  - Identify patients at risk for skin breakdown  - Assess and monitor skin integrity  - Assess and monitor nutrition and hydration status  - Monitor labs   - Assess for incontinence   - Turn and reposition patient  - Assist with mobility/ambulation  - Relieve pressure over bony prominences  - Avoid friction and shearing  - Provide appropriate hygiene as needed including keeping skin clean and dry  - Evaluate need for skin moisturizer/barrier cream  - Collaborate with interdisciplinary team   - Patient/family teaching  - Consider wound care consult   Outcome: Progressing     Problem: PAIN - ADULT  Goal: Verbalizes/displays adequate comfort level or baseline comfort level  Description: Interventions:  - Encourage patient to monitor pain and request assistance  - Assess pain using appropriate pain scale  - Administer analgesics based on type and severity of pain and evaluate response  - Implement non-pharmacological measures as appropriate and evaluate response  - Consider cultural and social influences on pain and pain management  - Notify physician/advanced practitioner if interventions unsuccessful or patient reports new pain  Outcome: Progressing     Problem: INFECTION - ADULT  Goal: Absence or prevention of progression during hospitalization  Description: INTERVENTIONS:  - Assess and monitor for signs and symptoms of infection  - Monitor lab/diagnostic results  - Monitor all insertion sites, i e  indwelling lines, tubes, and drains  - Monitor endotracheal if appropriate and nasal secretions for changes in amount and color  - [place] appropriate cooling/warming therapies per order  - Administer medications as ordered  - Instruct and encourage patient and family to use good hand hygiene technique  - Identify and instruct in appropriate isolation precautions for identified infection/condition  Outcome: Progressing  Goal: Absence of fever/infection during neutropenic period  Description: INTERVENTIONS:  - Monitor WBC    Outcome: Progressing     Problem: SAFETY ADULT  Goal: Patient will remain free of falls  Description: INTERVENTIONS:  - Educate patient/family on patient safety including physical limitations  - Instruct patient to call for assistance with activity   - Consult OT/PT to assist with strengthening/mobility   - Keep Call bell within reach  - Keep bed low and locked with side rails adjusted as appropriate  - Keep care items and personal belongings within reach  - Initiate and maintain comfort rounds  - Make Fall Risk Sign visible to staff  - Offer Toileting every  Hours, in advance of need  - Initiate/Maintain alarm  - Obtain necessary fall risk management equipment:   - Apply yellow socks and bracelet for high fall risk patients  - Consider moving patient to room near nurses station  Outcome: Progressing  Goal: Maintain or return to baseline ADL function  Description: INTERVENTIONS:  -  Assess patient's ability to carry out ADLs; assess patient's baseline for ADL function and identify physical deficits which impact ability to perform ADLs (bathing, care of mouth/teeth, toileting, grooming, dressing, etc )  - Assess/evaluate cause of self-care deficits   - Assess range of motion  - Assess patient's mobility; develop plan if impaired  - Assess patient's need for assistive devices and provide as appropriate  - Encourage maximum independence but intervene and supervise when necessary  - Involve family in performance of ADLs  - Assess for home care needs following discharge   - Consider OT consult to assist with ADL evaluation and planning for discharge  - Provide patient education as appropriate  Outcome: Progressing  Goal: Maintains/Returns to pre admission functional level  Description: INTERVENTIONS:  - Perform BMAT or MOVE assessment daily    - Set and communicate daily mobility goal to care team and patient/family/caregiver  - Collaborate with rehabilitation services on mobility goals if consulted  - Perform Range of Motion  times a day  - Reposition patient every  hours  - Dangle patient  times a day  - Stand patient  times a day  - Ambulate patient  times a day  - Out of bed to chair  times a day   - Out of bed for meals  times a day  - Out of bed for toileting  - Record patient progress and toleration of activity level   Outcome: Progressing     Problem: DISCHARGE PLANNING  Goal: Discharge to home or other facility with appropriate resources  Description: INTERVENTIONS:  - Identify barriers to discharge w/patient and caregiver  - Arrange for needed discharge resources and transportation as appropriate  - Identify discharge learning needs (meds, wound care, etc )  - Arrange for interpretive services to assist at discharge as needed  - Refer to Case Management Department for coordinating discharge planning if the patient needs post-hospital services based on physician/advanced practitioner order or complex needs related to functional status, cognitive ability, or social support system  Outcome: Progressing     Problem: Knowledge Deficit  Goal: Patient/family/caregiver demonstrates understanding of disease process, treatment plan, medications, and discharge instructions  Description: Complete learning assessment and assess knowledge base    Interventions:  - Provide teaching at level of understanding  - Provide teaching via preferred learning methods  Outcome: Progressing     Problem: SKIN/TISSUE INTEGRITY - ADULT  Goal: Skin Integrity remains intact(Skin Breakdown Prevention)  Description: Assess:  -Perform Gregory assessment every   -Clean and moisturize skin every   -Inspect skin when repositioning, toileting, and assisting with ADLS  -Assess under medical devices such as  every   -Assess extremities for adequate circulation and sensation     Bed Management:  -Have minimal linens on bed & keep smooth, unwrinkled  -Change linens as needed when moist or perspiring  -Avoid sitting or lying in one position for more than  hours while in bed  -Keep HOB at degrees     Toileting:  -Offer bedside commode  -Assess for incontinence every   -Use incontinent care products after each incontinent episode such as     Activity:  -Mobilize patient  times a day  -Encourage activity and walks on unit  -Encourage or provide ROM exercises   -Turn and reposition patient every  Hours  -Use appropriate equipment to lift or move patient in bed  -Instruct/ Assist with weight shifting every  when out of bed in chair  -Consider limitation of chair time  hour intervals    Skin Care:  -Avoid use of baby powder, tape, friction and shearing, hot water or constrictive clothing  -Relieve pressure over bony prominences using   -Do not massage red bony areas    Next Steps:  -Teach patient strategies to minimize risks such as    -Consider consults to  interdisciplinary teams such as   Outcome: Progressing  Goal: Incision(s), wounds(s) or drain site(s) healing without S/S of infection  Description: INTERVENTIONS  - Assess and document dressing, incision, wound bed, drain sites and surrounding tissue  - Provide patient and family education  - Perform skin care/dressing changes every   Outcome: Progressing  Goal: Pressure injury heals and does not worsen  Description: Interventions:  - Implement low air loss mattress or specialty surface (Criteria met)  - Apply silicone foam dressing  - Instruct/assist with weight shifting every  minutes when in chair   - Limit chair time to hour intervals  - Use special pressure reducing interventions such as  when in chair   - Apply fecal or urinary incontinence containment device   - Perform passive or active ROM every   - Turn and reposition patient & offload bony prominences every  hours   - Utilize friction reducing device or surface for transfers   - Consider consults to  interdisciplinary teams such as  - Use incontinent care products after each incontinent episode such as  - Consider nutrition services referral as needed  Outcome: Progressing     Problem: MUSCULOSKELETAL - ADULT  Goal: Maintain or return mobility to safest level of function  Description: INTERVENTIONS:  - Assess patient's ability to carry out ADLs; assess patient's baseline for ADL function and identify physical deficits which impact ability to perform ADLs (bathing, care of mouth/teeth, toileting, grooming, dressing, etc )  - Assess/evaluate cause of self-care deficits   - Assess range of motion  - Assess patient's mobility  - Assess patient's need for assistive devices and provide as appropriate  - Encourage maximum independence but intervene and supervise when necessary  - Involve family in performance of ADLs  - Assess for home care needs following discharge   - Consider OT consult to assist with ADL evaluation and planning for discharge  - Provide patient education as appropriate  Outcome: Progressing  Goal: Maintain proper alignment of affected body part  Description: INTERVENTIONS:  - Support, maintain and protect limb and body alignment  - Provide patient/ family with appropriate education  Outcome: Progressing

## 2023-03-30 NOTE — ASSESSMENT & PLAN NOTE
· Moderate to large ascites present on admission in setting of alcoholic liver cirrhosis, noted on CT abdomen pelvis  · S/p paracentesis 8,150 mL of serous ascites  · Supplement albumin 12 5 g once today, repeat CMP in a m

## 2023-03-30 NOTE — DISCHARGE INSTRUCTIONS
Abdominal Paracentesis     WHAT YOU NEED TO KNOW:   Abdominal paracentesis is a procedure to remove abnormal fluid buildup in your abdomen  Fluid builds up because of liver problems, such as swelling and scarring  Heart failure, kidney disease, a mass, or problems with your pancreas may also cause fluid buildup  DISCHARGE INSTRUCTIONS:     Follow up with your healthcare provider as directed: Write down your questions so you remember to ask them during your visits  Wound care: Remove dressing after 24 hours  Leave glue in place  Return to your normal activities    Contact Interventional Radiology at [phone] Darleen PATIENTS: Contact Interventional Radiology at [phone]) Nelson Urbano PATIENTS: Contact Interventional Radiology at [phone]) if:  You have a fever and your wound is red and swollen  You have yellow, green, or bad-smelling discharge coming from your wound  You have pain or swelling in your abdomen  You have an upset stomach or you vomit  You have sudden, sharp pain in your abdomen  You urinate very little or not at all  You feel confused and more tired than usual    Your arm or leg feels warm, tender, and painful  It may look swollen and red  You suddenly feel lightheaded and have trouble breathing

## 2023-03-30 NOTE — ASSESSMENT & PLAN NOTE
· Blood pressure well controlled on admission  · Monitor s/p paracentesis  · Continue Lasix, Aldactone, metoprolol succinate 25 mg 24-hour tablet by mouth twice daily with hold parameters

## 2023-03-30 NOTE — ASSESSMENT & PLAN NOTE
Lab Results   Component Value Date    HGBA1C 5 8 (H) 08/18/2022       Recent Labs     03/29/23  2140 03/30/23  0753 03/30/23  1058   POCGLU 110 118 136       Blood Sugar Average: Last 72 hrs:  (P) 373 [id number]   · Patient on Ozempic at home  · With adequate blood sugar control  · Continue with SSI with mealtime and nighttime coverage  · Hypoglycemic protocol, Accu-Cheks, diabetic diet

## 2023-03-30 NOTE — ASSESSMENT & PLAN NOTE
· Patient with history of liver disease and thrombocytopenia  · Platelets remain stable at 81 today  · Morning CBC

## 2023-03-30 NOTE — PLAN OF CARE
Problem: Potential for Falls  Goal: Patient will remain free of falls  Description: INTERVENTIONS:  - Educate patient/family on patient safety including physical limitations  - Instruct patient to call for assistance with activity   - Consult OT/PT to assist with strengthening/mobility   - Keep Call bell within reach  - Keep bed low and locked with side rails adjusted as appropriate  - Keep care items and personal belongings within reach  - Initiate and maintain comfort rounds  - Make Fall Risk Sign visible to staff  - Offer Toileting every  Hours, in advance of need  - Initiate/Maintain alarm  - Obtain necessary fall risk management equipment:   - Apply yellow socks and bracelet for high fall risk patients  - Consider moving patient to room near nurses station  Outcome: Progressing     Problem: MOBILITY - ADULT  Goal: Maintain or return to baseline ADL function  Description: INTERVENTIONS:  -  Assess patient's ability to carry out ADLs; assess patient's baseline for ADL function and identify physical deficits which impact ability to perform ADLs (bathing, care of mouth/teeth, toileting, grooming, dressing, etc )  - Assess/evaluate cause of self-care deficits   - Assess range of motion  - Assess patient's mobility; develop plan if impaired  - Assess patient's need for assistive devices and provide as appropriate  - Encourage maximum independence but intervene and supervise when necessary  - Involve family in performance of ADLs  - Assess for home care needs following discharge   - Consider OT consult to assist with ADL evaluation and planning for discharge  - Provide patient education as appropriate  Outcome: Progressing  Goal: Maintains/Returns to pre admission functional level  Description: INTERVENTIONS:  - Perform BMAT or MOVE assessment daily    - Set and communicate daily mobility goal to care team and patient/family/caregiver     - Collaborate with rehabilitation services on mobility goals if consulted  - Perform Range of Motion  times a day  - Reposition patient every  hours    - Dangle patient  times a day  - Stand patient  times a day  - Ambulate patient  times a day  - Out of bed to chair  times a day   - Out of bed for meals  times a day  - Out of bed for toileting  - Record patient progress and toleration of activity level   Outcome: Progressing     Problem: Prexisting or High Potential for Compromised Skin Integrity  Goal: Skin integrity is maintained or improved  Description: INTERVENTIONS:  - Identify patients at risk for skin breakdown  - Assess and monitor skin integrity  - Assess and monitor nutrition and hydration status  - Monitor labs   - Assess for incontinence   - Turn and reposition patient  - Assist with mobility/ambulation  - Relieve pressure over bony prominences  - Avoid friction and shearing  - Provide appropriate hygiene as needed including keeping skin clean and dry  - Evaluate need for skin moisturizer/barrier cream  - Collaborate with interdisciplinary team   - Patient/family teaching  - Consider wound care consult   Outcome: Progressing     Problem: PAIN - ADULT  Goal: Verbalizes/displays adequate comfort level or baseline comfort level  Description: Interventions:  - Encourage patient to monitor pain and request assistance  - Assess pain using appropriate pain scale  - Administer analgesics based on type and severity of pain and evaluate response  - Implement non-pharmacological measures as appropriate and evaluate response  - Consider cultural and social influences on pain and pain management  - Notify physician/advanced practitioner if interventions unsuccessful or patient reports new pain  Outcome: Progressing     Problem: INFECTION - ADULT  Goal: Absence or prevention of progression during hospitalization  Description: INTERVENTIONS:  - Assess and monitor for signs and symptoms of infection  - Monitor lab/diagnostic results  - Monitor all insertion sites, i e  indwelling lines, tubes, and drains  - Monitor endotracheal if appropriate and nasal secretions for changes in amount and color  - [place] appropriate cooling/warming therapies per order  - Administer medications as ordered  - Instruct and encourage patient and family to use good hand hygiene technique  - Identify and instruct in appropriate isolation precautions for identified infection/condition  Outcome: Progressing  Goal: Absence of fever/infection during neutropenic period  Description: INTERVENTIONS:  - Monitor WBC    Outcome: Progressing     Problem: SAFETY ADULT  Goal: Patient will remain free of falls  Description: INTERVENTIONS:  - Educate patient/family on patient safety including physical limitations  - Instruct patient to call for assistance with activity   - Consult OT/PT to assist with strengthening/mobility   - Keep Call bell within reach  - Keep bed low and locked with side rails adjusted as appropriate  - Keep care items and personal belongings within reach  - Initiate and maintain comfort rounds  - Make Fall Risk Sign visible to staff  - Offer Toileting every  Hours, in advance of need  - Initiate/Maintain alarm  - Obtain necessary fall risk management equipment:   - Apply yellow socks and bracelet for high fall risk patients  - Consider moving patient to room near nurses station  Outcome: Progressing  Goal: Maintain or return to baseline ADL function  Description: INTERVENTIONS:  -  Assess patient's ability to carry out ADLs; assess patient's baseline for ADL function and identify physical deficits which impact ability to perform ADLs (bathing, care of mouth/teeth, toileting, grooming, dressing, etc )  - Assess/evaluate cause of self-care deficits   - Assess range of motion  - Assess patient's mobility; develop plan if impaired  - Assess patient's need for assistive devices and provide as appropriate  - Encourage maximum independence but intervene and supervise when necessary  - Involve family in performance of ADLs  - Assess for home care needs following discharge   - Consider OT consult to assist with ADL evaluation and planning for discharge  - Provide patient education as appropriate  Outcome: Progressing  Goal: Maintains/Returns to pre admission functional level  Description: INTERVENTIONS:  - Perform BMAT or MOVE assessment daily    - Set and communicate daily mobility goal to care team and patient/family/caregiver  - Collaborate with rehabilitation services on mobility goals if consulted  - Perform Range of Motion  times a day  - Reposition patient every  hours  - Dangle patient  times a day  - Stand patient  times a day  - Ambulate patient  times a day  - Out of bed to chair  times a day   - Out of bed for meals  times a day  - Out of bed for toileting  - Record patient progress and toleration of activity level   Outcome: Progressing     Problem: DISCHARGE PLANNING  Goal: Discharge to home or other facility with appropriate resources  Description: INTERVENTIONS:  - Identify barriers to discharge w/patient and caregiver  - Arrange for needed discharge resources and transportation as appropriate  - Identify discharge learning needs (meds, wound care, etc )  - Arrange for interpretive services to assist at discharge as needed  - Refer to Case Management Department for coordinating discharge planning if the patient needs post-hospital services based on physician/advanced practitioner order or complex needs related to functional status, cognitive ability, or social support system  Outcome: Progressing     Problem: Knowledge Deficit  Goal: Patient/family/caregiver demonstrates understanding of disease process, treatment plan, medications, and discharge instructions  Description: Complete learning assessment and assess knowledge base    Interventions:  - Provide teaching at level of understanding  - Provide teaching via preferred learning methods  Outcome: Progressing     Problem: SKIN/TISSUE INTEGRITY - ADULT  Goal: Skin Integrity remains intact(Skin Breakdown Prevention)  Description: Assess:  -Perform Gregory assessment every   -Clean and moisturize skin every   -Inspect skin when repositioning, toileting, and assisting with ADLS  -Assess under medical devices such as  every   -Assess extremities for adequate circulation and sensation     Bed Management:  -Have minimal linens on bed & keep smooth, unwrinkled  -Change linens as needed when moist or perspiring  -Avoid sitting or lying in one position for more than  hours while in bed  -Keep HOB at degrees     Toileting:  -Offer bedside commode  -Assess for incontinence every   -Use incontinent care products after each incontinent episode such as     Activity:  -Mobilize patient  times a day  -Encourage activity and walks on unit  -Encourage or provide ROM exercises   -Turn and reposition patient every  Hours  -Use appropriate equipment to lift or move patient in bed  -Instruct/ Assist with weight shifting every  when out of bed in chair  -Consider limitation of chair time  hour intervals    Skin Care:  -Avoid use of baby powder, tape, friction and shearing, hot water or constrictive clothing  -Relieve pressure over bony prominences using   -Do not massage red bony areas    Next Steps:  -Teach patient strategies to minimize risks such as    -Consider consults to  interdisciplinary teams such as   Outcome: Progressing  Goal: Incision(s), wounds(s) or drain site(s) healing without S/S of infection  Description: INTERVENTIONS  - Assess and document dressing, incision, wound bed, drain sites and surrounding tissue  - Provide patient and family education  - Perform skin care/dressing changes every   Outcome: Progressing  Goal: Pressure injury heals and does not worsen  Description: Interventions:  - Implement low air loss mattress or specialty surface (Criteria met)  - Apply silicone foam dressing  - Instruct/assist with weight shifting every  minutes when in chair   - Limit chair time to hour intervals  - Use special pressure reducing interventions such as  when in chair   - Apply fecal or urinary incontinence containment device   - Perform passive or active ROM every   - Turn and reposition patient & offload bony prominences every  hours   - Utilize friction reducing device or surface for transfers   - Consider consults to  interdisciplinary teams such as   - Use incontinent care products after each incontinent episode such as  - Consider nutrition services referral as needed  Outcome: Progressing     Problem: MUSCULOSKELETAL - ADULT  Goal: Maintain or return mobility to safest level of function  Description: INTERVENTIONS:  - Assess patient's ability to carry out ADLs; assess patient's baseline for ADL function and identify physical deficits which impact ability to perform ADLs (bathing, care of mouth/teeth, toileting, grooming, dressing, etc )  - Assess/evaluate cause of self-care deficits   - Assess range of motion  - Assess patient's mobility  - Assess patient's need for assistive devices and provide as appropriate  - Encourage maximum independence but intervene and supervise when necessary  - Involve family in performance of ADLs  - Assess for home care needs following discharge   - Consider OT consult to assist with ADL evaluation and planning for discharge  - Provide patient education as appropriate  Outcome: Progressing  Goal: Maintain proper alignment of affected body part  Description: INTERVENTIONS:  - Support, maintain and protect limb and body alignment  - Provide patient/ family with appropriate education  Outcome: Progressing

## 2023-03-30 NOTE — BRIEF OP NOTE (RAD/CATH)
IR PARACENTESIS Procedure Note    PATIENT NAME: Mor Holguin  : 1955  MRN: [id number]    Pre-op Diagnosis:   1  Right hip pain    2  Ambulatory dysfunction    3  Ascites    4  History of cirrhosis    5  History of diabetes mellitus      Post-op Diagnosis:   1  Right hip pain    2  Ambulatory dysfunction    3  Ascites    4  History of cirrhosis    5   History of diabetes mellitus        Provider:   Wendie Cassidy  Assistants:     No qualified resident was available, Resident is only observing    Estimated Blood Loss: none     Findings: 8150 mL serous ascites, LLQ    Specimens: none    Complications:  None immediate     Anesthesia: local    JONE Trejo     Date: 3/30/2023  Time: 10:22 AM

## 2023-03-30 NOTE — PLAN OF CARE
"  Problem: PHYSICAL THERAPY ADULT  Goal: Performs mobility at highest level of function for planned discharge setting  See evaluation for individualized goals  Description: Treatment/Interventions: Functional transfer training, LE strengthening/ROM, Therapeutic exercise, Endurance training, Patient/family training, Equipment eval/education, Bed mobility, Gait training, Compensatory technique education, Spoke to nursing, OT  Equipment Recommended: Papito Jones (has, may want BSC)       See flowsheet documentation for full assessment, interventions and recommendations  Note: Prognosis: Good  Problem List: Decreased strength, Decreased range of motion, Decreased endurance, Impaired balance, Decreased mobility, Decreased safety awareness, Obesity, Pain  Assessment: pt admitted with exacerbation of chronic R hip pain due to \"bursitis\"  pt dx wtih r hip pain, gait dysfunction and referred to PT  pt was lving alone in apt, no stairs  was using cane until recently, now using rw  pt had 1 fall, slid off chair  pt has family support if needed  ot reoprting 4/10 pain this visit  pt has heating pad in place  pt demonstrated mild to moderate functional limitations due to r hip pain and gradual debility  pt was able to mobilize with supervision but required considerable time to complete task, was incontinent of urine in brief  pt amb 15' using rw  pt has weakness r hip muscles and slightly limited rom  pt will need skilled PT for deficits in rom, strength, balance, gait stability, joint integrity and pain control  pt is pending ortho eval  pt likely has hip joint pathology with weak supporting musculature  since pt lives alone and declining mobility would benefit from rehab  pending on ortho recommendations and progress, may be able to return home with OPPT    Barriers to Discharge: Decreased caregiver support  Barriers to Discharge Comments: recommend OT consult if pt agreeable to rehab  PT Discharge Recommendation: Post acute " rehabilitation services (vs home  with OPPT depending on progress)    See flowsheet documentation for full assessment

## 2023-03-30 NOTE — PROGRESS NOTES
[address] [place] [address]  Progress Note  Name: Delmi Rose  MRN: [id number]  Unit/Bed#: [facility] 2 [facility] 87 [id number] I Date of Admission: 3/29/2023   Date of Service: 3/30/2023 I Hospital Day: 1    Assessment/Plan   * Right hip pain  Assessment & Plan  · Patient reports to the ED for evaluation of worsening right hip pain X 2 weeks  · She reports it affects her ADLs and has been seen by orthopedics in the outpatient setting as well as primary care  · Also reports left hip pain, but that was alleviated by injections received at orthopedics  · Right hip however has failed conservative therapy with Mobic, steroids, and injections  · X-ray and CT without fracture; patient denies trauma to the affected area  · Patient reports improvement in right hip pain today status post paracentesis  · Appreciate orthopedic recommendations, may consider joint steroid injection if pain worsens/continues  · PT recommending STR, patient hesitant, OT consult placed, PT to continue working with patient  · Appreciate CM planning for disposition HHR versus STR  · WBAT  · Continue with pain management - Voltaren gel, lidocaine patch, oxycodone 2 5 mg Q4 hr prn for moderate pain, 5 mg Q4 hr prn for severe pain    Alcohol abuse with other alcohol-induced disorder ([email or web address] )  Assessment & Plan  · Patient reports 4 months since last alcohol use  · No acute events overnight  · Monitor off CIWA protocol    Cirrhosis ([facility] [address] )  Assessment & Plan  · Patient with history of liver cirrhosis; receives scheduled outpatient paracentesis on a regular basis  · Status post paracentesis with improvement in abdominal bloating and hip pain  · Without abdominal pain; 0/4 SIRS criteria noted on admission  · Continue Lasix 80 mg in am, 40 mg in pm, Aldactone 50 mg by mouth daily    Ascites due to alcoholic cirrhosis (HCC)  Assessment & Plan  · Moderate to large ascites present on admission in setting of alcoholic liver cirrhosis, noted on CT abdomen pelvis  · S/p paracentesis 8,150 mL of serous ascites  · Supplement albumin 12 5 g once today, repeat CMP in a m  Thrombocytopenia ([facility] [address] )  Assessment & Plan  · Patient with history of liver disease and thrombocytopenia  · Platelets remain stable at 80 today  · Morning CBC    Chronic obstructive pulmonary disease, unspecified COPD type ([facility] )  Assessment & Plan  · Patient currently 100% SPO2 on room air  · Not in acute exacerbation; monitor    Stage 3a chronic kidney disease [facility])  Assessment & Plan  Lab Results   Component Value Date    EGFR 62 03/30/2023    EGFR 60 03/29/2023    EGFR 74 03/16/2023    CREATININE 0 93 03/30/2023    CREATININE 0 96 03/29/2023    CREATININE 0 80 03/16/2023     · Creatinine appears baseline  · Avoid nephrotoxic medications and hypotension  · Morning blood work    Type 2 diabetes mellitus [facility])  Assessment & Plan  Lab Results   Component Value Date    HGBA1C 5 8 (H) 08/18/2022       Recent Labs     03/29/23  2140 03/30/23  0753 03/30/23  1058   POCGLU 110 118 136       Blood Sugar Average: Last 72 hrs:  (P) 541 [id number]   · Patient on Ozempic at home  · With adequate blood sugar control  · Continue with SSI with mealtime and nighttime coverage  · Hypoglycemic protocol, Accu-Cheks, diabetic diet    Essential hypertension  Assessment & Plan  · Blood pressure well controlled on admission  · Monitor s/p paracentesis  · Continue Lasix, Aldactone, metoprolol succinate 25 mg 24-hour tablet by mouth twice daily with hold parameters    GERD (gastroesophageal reflux disease)  Assessment & Plan  · Continue Protonix 40 mg daily    Sleep apnea  Assessment & Plan  · Follows with sleep medicine in the outpatient setting; currently not on CPAP      VTE Pharmacologic Prophylaxis: VTE Score: 3 Moderate Risk (Score 3-4) - Pharmacological DVT Prophylaxis Ordered: heparin  Patient Centered Rounds: I performed bedside rounds with nursing staff today    Discussions with Specialists or Other Care Team Provider: "Orthopedics    Education and Discussions with Family / Patient: Patient declined call to   Total Time Spent on Date of Encounter in care of patient: 35 minutes This time was spent on one or more of the following: performing physical exam; counseling and coordination of care; obtaining or reviewing history; documenting in the medical record; reviewing/ordering tests, medications or procedures; communicating with other healthcare professionals and discussing with patient's family/caregivers  Current Length of Stay: 1 day(s)  Current Patient Status: Inpatient   Certification Statement: The patient will continue to require additional inpatient hospital stay due to rehab placement, morning BW  Discharge Plan: Anticipate discharge in 24-48 hrs to rehab vs hhr    Code Status: Level 2 - DNAR: but accepts endotracheal intubation    Subjective:   Patient seen and examined  Reports she feels \"much better \"after the paracentesis today  Reports that she is still with some right hip pain but denies any numbness or tingling in the leg, new onset of weakness, or numbness/angling in her groin  She thinks getting fluid removed from her abdomen helped her hip  She is incontinent of urine at baseline  Reports that both of her legs tend to feel weak and she has lost strength over time  Denies any fever, chills, chest pain, shortness of breath, or abdominal pain  Objective:     Vitals:   Temp (24hrs), Av 1 °F (36 7 °C), Min:98 °F (36 7 °C), Max:98 2 °F (36 8 °C)    Temp:  [98 °F (36 7 °C)-98 2 °F (36 8 °C)] 98 2 °F (36 8 °C)  HR:  [68-85] 72  Resp:  [16-20] 16  BP: ()/(46-70) 95/46  SpO2:  [96 %-100 %] 100 %  Body mass index is 36 63 kg/m²  Input and Output Summary (last 24 hours):      Intake/Output Summary (Last 24 hours) at 3/30/2023 1418  Last data filed at 3/30/2023 1001  Gross per 24 hour   Intake --   Output 8150 ml   Net -8150 ml       Physical Exam:   Physical Exam  Vitals and nursing " note reviewed  Constitutional:       General: She is not in acute distress  Appearance: Normal appearance  She is well-developed  She is obese  She is not ill-appearing  HENT:      Head: Normocephalic and atraumatic  Eyes:      Extraocular Movements: Extraocular movements intact  Conjunctiva/sclera: Conjunctivae normal    Cardiovascular:      Rate and Rhythm: Normal rate and regular rhythm  Heart sounds: Normal heart sounds  No murmur heard  Pulmonary:      Effort: Pulmonary effort is normal  No respiratory distress  Breath sounds: Normal breath sounds  Comments: Room air, nonlabored breathing  Abdominal:      General: Bowel sounds are normal  There is no distension  Palpations: Abdomen is soft  Tenderness: There is no abdominal tenderness  Comments: Abdomen without signs of bleeding   Musculoskeletal:      Right lower leg: No edema  Left lower leg: No edema  Skin:     General: Skin is warm and dry  Neurological:      General: No focal deficit present  Mental Status: She is alert        Comments: Decreased range of motion of bilateral hips, baseline for pt   Psychiatric:         Mood and Affect: Mood normal         Additional Data:     Labs:  Results from last 7 days   Lab Units 03/30/23  0512   WBC Thousand/uL 5 72   HEMOGLOBIN g/dL 12 3   HEMATOCRIT % 38 3   PLATELETS Thousands/uL 81*   NEUTROS PCT % 84*   LYMPHS PCT % 6*   MONOS PCT % 8   EOS PCT % 1     Results from last 7 days   Lab Units 03/30/23  0512   SODIUM mmol/L 135   POTASSIUM mmol/L 4 4   CHLORIDE mmol/L 102   CO2 mmol/L 27   BUN mg/dL 30*   CREATININE mg/dL 0 93   ANION GAP mmol/L 6   CALCIUM mg/dL 8 8   ALBUMIN g/dL 3 1*   TOTAL BILIRUBIN mg/dL 1 32*   ALK PHOS U/L 98   ALT U/L 28   AST U/L 21   GLUCOSE RANDOM mg/dL 123         Results from last 7 days   Lab Units 03/30/23  1058 03/30/23  0753 03/29/23  2140   POC GLUCOSE mg/dl 136 118 110       Lines/Drains:  Invasive Devices     Peripheral Intravenous Line  Duration           Peripheral IV 03/29/23 Proximal;Right;Ventral (anterior) Antecubital 1 day                Imaging: Reviewed radiology reports from this admission including: abdominal/pelvic CT and xray(s)    Recent Cultures (last 7 days):     Last 24 Hours Medication List:   Current Facility-Administered Medications   Medication Dose Route Frequency Provider Last Rate   • Diclofenac Sodium  2 g Topical 4x Daily Lizandro Jones MD     • docusate sodium  100 mg Oral BID Lizandro Jones MD     • escitalopram  20 mg Oral Daily Lizandro Jones MD     • furosemide  40 mg Oral QPM Lizandro Jones MD     • furosemide  80 mg Oral Daily Lizandro Jones MD     • heparin (porcine)  5,000 Units Subcutaneous [facility] Lizandro Jones MD     • insulin lispro  1-5 Units Subcutaneous TID [facility] Lizandro Jones MD     • insulin lispro  1-5 Units Subcutaneous HS Lizandro Jones MD     • lidocaine  1 patch Topical Daily Bety Ojeda PA-C     • metoprolol succinate  25 mg Oral Q12H [facility] Lizandro Jones MD     • ondansetron  4 mg Intravenous Q6H PRN Lizandro Jones MD     • oxyCODONE  5 mg Oral Q4H PRN Bety Ojeda PA-C     • oxyCODONE  2 5 mg Oral Q4H PRN Bety Ojeda PA-C     • pantoprazole  40 mg Oral Daily Before Breakfast Lizandro Jones MD     • spironolactone  150 mg Oral Daily Lizandro Jones MD          Today, Patient Was Seen By: Bety Ojeda PA-C    **Please Note: This note may have been constructed using a voice recognition system  **

## 2023-03-30 NOTE — CONSULTS
Orthopedics   Akash Rivers 77 y o  female MRN: [id number]  Unit/Bed#: AL IR      Chief Complaint:   right hip pain    HPI:   77 y  o female presented to the hospital for worsening right hip pain  Patient has previously received treatment for hip pain, and most recently got bilateral greater trochanteric bursa steroid injections (3/23/23)  Patient went for a paracentesis this morning, where they took of 8100cc of fluid  She states that her pain has almost completely resolved at this time  She was unable to stand or ambulate yesterday due to severe pain, but since the paracentesis, she can stand and walk without any pain  She denies any other acute complaints  She denies any injures  Patient has known liver CA and has been getting frequent paracentesis for ascites    Review Of Systems:   · Skin: Normal  · Neuro: See HPI  · Musculoskeletal: See HPI  · 14 point review of systems negative except as stated above     Past Medical History:   Past Medical History:   Diagnosis Date   • Anxiety    • Anxiety and depression 9/29/2015   • Arthritis    • Asthma    • Asthma without status asthmaticus 4/26/2011   • Cirrhosis ([facility] )    • Depression    • Diabetes ([facility] )    • Disease of thyroid gland     nodules   • Diverticulitis 2011   • Diverticulosis    • DVT (deep venous thrombosis) ([facility] ) 2013   • GERD (gastroesophageal reflux disease)    • Liver disease     cirrhosis   • Obesity, morbid, BMI 40 0-49 9 ([facility] ) 8/16/2017   • Pneumonia    • Portal hypertensive gastropathy ([facility] )    • Sleep apnea    • Vertigo    • [facility] 1/20/2023       Past Surgical History:   Past Surgical History:   Procedure Laterality Date   • BLADDER SUSPENSION     • CHOLECYSTECTOMY     • COLONOSCOPY  05/24/2019    had multiple with last being [id number]   • COLONOSCOPY  10/07/2004    LOUIS Gloria  / Diverticulosis   • COLONOSCOPY  06/14/2011    LOUIS Gloria  / Diverticulosis   • EGD  02/28/2013    Ashvin Holland LOUIS  D  / Mild Portal Hypertensive Gastropathy   • EGD  10/01/2019    LOUIS Clarke D  / Gastritis   • EGD AND COLONOSCOPY  09/03/2015    LOUIS Clarke D  / [place]  Diverticulosis  • FLEXIBLE SIGMOIDOSCOPY  03/04/2013    LOUIS Reno D  / Hemorrhoidal Bleeding   • FLEXIBLE SIGMOIDOSCOPY  05/30/2018    LOUIS Reno D  / Internal hemorrhoids  biopsied  • HERNIA REPAIR     • IR PARACENTESIS  08/23/2018   • IR PARACENTESIS  11/05/2018   • IR PARACENTESIS  01/11/2019   • IR PARACENTESIS  02/27/2019   • IR PARACENTESIS  04/15/2019   • IR PARACENTESIS  06/03/2019   • IR PARACENTESIS  07/10/2019   • IR PARACENTESIS  08/16/2019   • IR PARACENTESIS  09/10/2019   • IR PARACENTESIS  10/07/2019   • IR PARACENTESIS  11/05/2019   • IR PARACENTESIS  11/22/2019   • IR PARACENTESIS  12/17/2019   • IR PARACENTESIS  01/07/2020   • IR PARACENTESIS  01/28/2020   • IR PARACENTESIS  02/18/2020   • IR PARACENTESIS  03/10/2020   • IR PARACENTESIS  03/31/2020   • IR PARACENTESIS  04/21/2020   • IR PARACENTESIS  05/12/2020   • IR PARACENTESIS  06/02/2020   • IR PARACENTESIS  06/30/2020   • IR PARACENTESIS  07/20/2020   • IR PARACENTESIS  08/18/2020   • IR PARACENTESIS  11/12/2020   • IR PARACENTESIS  03/11/2021   • IR PARACENTESIS  2/8/2022   • IR PARACENTESIS  8/24/2022   • IR PARACENTESIS  11/14/2022   • IR PARACENTESIS  12/21/2022   • IR PARACENTESIS  2/1/2023   • IR PARACENTESIS  3/7/2023   • TONSILLECTOMY     • TRIGGER FINGER RELEASE Bilateral        Family History:  Family history reviewed and non-contributory  Family History   Problem Relation Age of Onset   • COPD Mother    • Heart attack Sister    • Lymphoma Brother    • Colon cancer Maternal Grandmother    • Cancer Maternal Aunt         unknown       Social History:  Social History     Socioeconomic History   • Marital status:       Spouse name: None   • Number of children: None   • Years of education: None   • Highest education level: None "  Occupational History   • Occupation: Retired - [employer]   Tobacco Use   • Smoking status: Never   • Smokeless tobacco: Never   Vaping Use   • Vaping Use: Never used   Substance and Sexual Activity   • Alcohol use: Yes     Comment: rarely   • Drug use: Never   • Sexual activity: None   Other Topics Concern   • None   Social History Narrative   • None     Social Determinants of Health     Financial Resource Strain: Not on file   Food Insecurity: Not on file   Transportation Needs: Not on file   Physical Activity: Not on file   Stress: Not on file   Social Connections: Not on file   Intimate Partner Violence: Not on file   Housing Stability: Not on file       Allergies:    Allergies   Allergen Reactions   • Medical Tape Other (See Comments)     Skin tears  Per patient, Red -\"rips my skin\"  Per patient, Skin tears   • Penicillins Itching and Rash     rash     • Nsaids Other (See Comments)     Cirrhosis of liver- pt reported starting Mobic 3/16/23   • Pseudoephedrine-Guaifenesin Myalgia and Other (See Comments)     Entex- \"jittery\"   • Attapulgite    • Cephalexin      ?ithcy   • Clioquinol    • Diclofenac Sodium Itching     Patient reported tolerating Voltaren gel without any reaction started 3/16/23   • Meloxicam Itching   • Nabumetone Other (See Comments)     rash   • Phenylalanine    • Pseudoephedrine-Guaifenesin Other (See Comments)     Entex- \"gittery\"   • Streptomycin    • Celecoxib Rash   • Penicillin G Rash   • Rofecoxib Rash     skin reaction           Labs:  0   Lab Value Date/Time    HCT 38 3 03/30/2023 0512    HCT 40 7 03/29/2023 1358    HCT 38 6 03/16/2023 1401    HGB 12 3 03/30/2023 0512    HGB 13 4 03/29/2023 1358    HGB 12 7 03/16/2023 1401    INR 1 19 01/20/2023 1110    WBC 5 72 03/30/2023 0512    WBC 7 14 03/29/2023 1358    WBC 5 27 03/16/2023 1401       Meds:    Current Facility-Administered Medications:   •  Diclofenac Sodium (VOLTAREN) 1 % topical gel 2 g, 2 g, Topical, 4x Daily, Mary Kay Lockwood " "Bee Poole MD  •  docusate sodium (COLACE) capsule 100 mg, 100 mg, Oral, BID, Angela Do MD, 100 mg at 03/30/23 1041  •  escitalopram (LEXAPRO) tablet 20 mg, 20 mg, Oral, Daily, Angela Do MD, 20 mg at 03/30/23 1041  •  furosemide (LASIX) tablet 40 mg, 40 mg, Oral, QPM, Angela Do MD, 40 mg at 03/29/23 2143  •  furosemide (LASIX) tablet 80 mg, 80 mg, Oral, Daily, Angela Do MD  •  heparin (porcine) subcutaneous injection 5,000 Units, 5,000 Units, Subcutaneous, Q8H [address], 5,000 Units at 03/30/23 1306 **AND** [COMPLETED] Platelet count, , , Once, Angela Do MD  •  insulin lispro (HumaLOG) 100 units/mL subcutaneous injection 1-5 Units, 1-5 Units, Subcutaneous, TID AC **AND** Fingerstick Glucose (POCT), , , TID AC, Angela Do MD  •  insulin lispro (HumaLOG) 100 units/mL subcutaneous injection 1-5 Units, 1-5 Units, Subcutaneous, HS, Angela Do MD  •  lidocaine (LIDODERM) 5 % patch 1 patch, 1 patch, Topical, Daily, Odalys Garsia PA-C, 1 patch at 03/30/23 1144  •  metoprolol succinate (TOPROL-XL) 24 hr tablet 25 mg, 25 mg, Oral, Q12H [address], Angela Do MD, 25 mg at 03/29/23 2143  •  ondansetron (ZOFRAN) injection 4 mg, 4 mg, Intravenous, Q6H PRN, Angela Do MD  •  oxyCODONE (ROXICODONE) immediate release tablet 10 mg, 10 mg, Oral, Q4H PRN, Angela Do MD, 10 mg at 03/30/23 0444  •  oxyCODONE (ROXICODONE) IR tablet 5 mg, 5 mg, Oral, Q4H PRN, Angela Do MD  •  pantoprazole (PROTONIX) EC tablet 40 mg, 40 mg, Oral, Daily Before Breakfast, Angela Do MD, 40 mg at 03/30/23 1041  •  spironolactone (ALDACTONE) tablet 150 mg, 150 mg, Oral, Daily, Angela Do MD    Blood Culture:   No results found for: BLOODCX    Wound Culture:   No results found for: WOUNDCULT    Ins and Outs:  I/O last 24 hours:   In: -   Out: 8150 [Other:8150]          Physical Exam:   BP (!) 95/46   Pulse 72   Temp 98 2 °F (36 8 °C) (Oral)   Resp 16   Ht 5' 4\" (1 626 m)   Wt 96 8 kg (213 lb 6 5 oz)   SpO2 100%   BMI 36 63 " kg/m²   Gen: No acute distress, resting comfortably in bed  HEENT: Eyes clear, moist mucus membranes, hearing intact  Respiratory: No audible wheezing or stridor  Cardiovascular: Well Perfused peripherally, 2+ distal pulse  Abdomen: nondistended, no peritoneal signs  Musculoskeletal: right lower extremity  · Skin pink, dry, and intact, no erythema  · Full range of motion of hip joint without pain   · Sensation intact L3-S1  · 5/5 motor strength to hip flexion/extension, knee flexion/extension, ankle dorsi/plantar flexion  · 2+ DP   · Musculature is soft and compressible, no pain with passive stretch  · Leg lengths equal     Radiology:   I personally reviewed the films  X-rays AP/Lateral views of right hip shows degenerative changes to the right hip    _*_*_*_*_*_*_*_*_*_*_*_*_*_*_*_*_*_*_*_*_*_*_*_*_*_*_*_*_*_*_*_*_*_*_*_*_*_*_*_*_*    Assessment:  77 y  o female with right hip osteoarthritis and increased pain due to ascites    Plan:   · WBAT right lower extremity  · PT  · Pain control  · discussed possible hip joint steroid injection if pain returns or continues  · Body mass index is 36 63 kg/m²     · Dispo: Babar Presume for discharge from [address] [place], [place] [address]TIA[place]

## 2023-03-30 NOTE — H&P
[facility]  H&P  Name: Alisa Quezada  MRN: [id number]  Unit/Bed#: [place] [facility] 2 [id number] I Date of Admission: 3/29/2023   Date of Service: 3/29/2023 I Hospital Day: 0      Assessment/Plan   * Right hip pain  Assessment & Plan  Patient reports to the ED for evaluation of worsening right hip pain X 2 weeks  She reports it affects her ADLs and has been seen by orthopedics in the outpatient setting as well as primary care  Also reports left hip pain, but that was alleviated by injections received at orthopedics  Right hip however has failed conservative therapy with Mobic, steroids, and injections  X-ray and CT without fracture; patient denies trauma to the affected area  Supportive care, pain medications, PT evaluation  Aqua K-pad, Lidoderm patches, consult orthopedics    Essential hypertension  Assessment & Plan  Blood pressure well controlled on admission, continue home medication    Stage 3a chronic kidney disease [facility])  Assessment & Plan  Lab Results   Component Value Date    EGFR 60 03/29/2023    EGFR 74 03/16/2023    EGFR 61 01/20/2023    CREATININE 0 96 03/29/2023    CREATININE 0 80 03/16/2023    CREATININE 0 94 01/20/2023     Patient appears to be at baseline creatinine; monitor and avoid nephrotoxic medications and hypotension    Alcohol abuse with other alcohol-induced disorder [facility])  Assessment & Plan  Patient reports 4 months since last alcohol use  Monitor off CIWA protocol    Chronic obstructive pulmonary disease, unspecified COPD type ([facility] [address] )  Assessment & Plan  Patient currently 100% SPO2 on room air  Not in acute exacerbation; monitor    Type 2 diabetes mellitus ([facility] [address] )  Assessment & Plan  Lab Results   Component Value Date    HGBA1C 5 8 (H) 08/18/2022       No results for input(s): POCGLU in the last 72 hours      Blood Sugar Average: Last 72 hrs:     SSI; Subcutaneous Insulin Order Set  Blood Glucose checks TIDWM and QHS (Q6H for NPO patients)  Hold oral medications  Blood Glucose goal while inpatient is 140-180  Reduce basal insulin by 25-50% while inpatient  Consistent Carbohydrate Diet      GERD (gastroesophageal reflux disease)  Assessment & Plan  Continue PPI    Thrombocytopenia ([facility] [address] )  Assessment & Plan  Patient with history of liver disease and thrombocytopenia; platelets stable at 94 K on admission    Sleep apnea  Assessment & Plan  Follows with sleep medicine in the outpatient setting; currently not on CPAP    [facility]  Assessment & Plan  Patient with history of liver cirrhosis; receives scheduled outpatient paracentesis on a regular basis  Denies any abdominal pain; 0/4 SIRS criteria  Continue p o  Lasix, beta-blocker, Aldactone  Consult to IR for elective paracentesis           VTE Prophylaxis: Heparin  / sequential compression device   Code Status: Level 2 DNR  POLST: POLST form is not discussed and not completed at this time  Discussion with family: Care plan discussed with patient who voiced understanding and agrees with recommendations  Anticipated Length of Stay:  Patient will be admitted on an Inpatient basis with an anticipated length of stay of greater than 2 midnights  Justification for Hospital Stay: Right hip pain    Total Time for Visit, including Counseling / Coordination of Care: 60 minutes  Greater than 50% of this total time spent on direct patient counseling and coordination of care  Chief Complaint:   Right hip pain    History of Present Illness:    Omar Connor is a 77 y o  female with past medical history of asthma, COPD, hypertension, type 2 diabetes, GERD, CKD 3, thrombocytopenia, obstructive sleep apnea, and liver cirrhosis presents with several weeks of worsening right hip pain  Patient followed up in the outpatient setting with her PCP and with orthopedics; some relief with pain meds, steroids and injections as far as left hip pain goes  However right hip has been refractory to all interventions    Patient denies any trauma to the hip; x-rays of affected hip as well as CT of affected hip unrevealing  Patient reports that affects her ambulation and ADLs  Patient has no other acute complaints; mild tenderness in her abdomen that is secondary to fluid gain, but denies fever/chills or tenderness to palpation  Patient reports 4 months since she has had any alcohol (a glass of wine on New Year's Steffany); denies tobacco use or illicit drug use  Review of Systems:    Review of Systems   Constitutional: Positive for activity change  Negative for chills and fever  HENT: Negative for ear pain and sore throat  Eyes: Negative for pain and visual disturbance  Respiratory: Negative for cough and shortness of breath  Cardiovascular: Negative for chest pain and palpitations  Gastrointestinal: Positive for abdominal distention  Negative for abdominal pain and vomiting  Genitourinary: Negative for dysuria and hematuria  Musculoskeletal: Positive for arthralgias, back pain and gait problem  Skin: Negative for color change and rash  Neurological: Negative for seizures and syncope  All other systems reviewed and are negative         Past Medical and Surgical History:     Past Medical History:   Diagnosis Date   • Anxiety    • Anxiety and depression 9/29/2015   • Arthritis    • Asthma    • Asthma without status asthmaticus 4/26/2011   • Cirrhosis ([place] [address] )    • Depression    • Diabetes ([place] [address] )    • Disease of thyroid gland     nodules   • Diverticulitis 2011   • Diverticulosis    • DVT (deep venous thrombosis) ([place] [address] ) 2013   • GERD (gastroesophageal reflux disease)    • Liver disease     cirrhosis   • Obesity, morbid, BMI 40 0-49 9 ([place] [address] ) 8/16/2017   • Pneumonia    • Portal hypertensive gastropathy ([place] [address] )    • Sleep apnea    • Vertigo    • Vitreous hemorrhage of left eye ([place] [address] ) 1/20/2023       Past Surgical History:   Procedure Laterality Date   • BLADDER SUSPENSION     • CHOLECYSTECTOMY     • COLONOSCOPY  05/24/2019    had multiple with last being [id number]   • COLONOSCOPY  10/07/2004    LOUIS Bassett  / Diverticulosis   • COLONOSCOPY  06/14/2011    LOUIS Bassett  / Diverticulosis   • EGD  02/28/2013    LOUIS Pedroza  / Mild Portal Hypertensive Gastropathy   • EGD  10/01/2019    LOUIS Bassett  / Gastritis   • EGD AND COLONOSCOPY  09/03/2015    LOUIS Bassett  / Margo Paganini  Diverticulosis  • FLEXIBLE SIGMOIDOSCOPY  03/04/2013    LOUIS Li  / Hemorrhoidal Bleeding   • FLEXIBLE SIGMOIDOSCOPY  05/30/2018    LOUIS Li  / Internal hemorrhoids  biopsied  • HERNIA REPAIR     • IR PARACENTESIS  08/23/2018   • IR PARACENTESIS  11/05/2018   • IR PARACENTESIS  01/11/2019   • IR PARACENTESIS  02/27/2019   • IR PARACENTESIS  04/15/2019   • IR PARACENTESIS  06/03/2019   • IR PARACENTESIS  07/10/2019   • IR PARACENTESIS  08/16/2019   • IR PARACENTESIS  09/10/2019   • IR PARACENTESIS  10/07/2019   • IR PARACENTESIS  11/05/2019   • IR PARACENTESIS  11/22/2019   • IR PARACENTESIS  12/17/2019   • IR PARACENTESIS  01/07/2020   • IR PARACENTESIS  01/28/2020   • IR PARACENTESIS  02/18/2020   • IR PARACENTESIS  03/10/2020   • IR PARACENTESIS  03/31/2020   • IR PARACENTESIS  04/21/2020   • IR PARACENTESIS  05/12/2020   • IR PARACENTESIS  06/02/2020   • IR PARACENTESIS  06/30/2020   • IR PARACENTESIS  07/20/2020   • IR PARACENTESIS  08/18/2020   • IR PARACENTESIS  11/12/2020   • IR PARACENTESIS  03/11/2021   • IR PARACENTESIS  2/8/2022   • IR PARACENTESIS  8/24/2022   • IR PARACENTESIS  11/14/2022   • IR PARACENTESIS  12/21/2022   • IR PARACENTESIS  2/1/2023   • IR PARACENTESIS  3/7/2023   • TONSILLECTOMY     • TRIGGER FINGER RELEASE Bilateral        Meds/Allergies:    Prior to Admission medications    Medication Sig Start Date End Date Taking?  Authorizing Provider   acetaminophen (TYLENOL) 500 mg tablet Take 500 mg by mouth every 6 (six) hours as needed   Yes Historical Provider, MD   Diclofenac Sodium (VOLTAREN) 1 % "Apply 2 g topically 4 (four) times a day 3/16/23  Yes Ann Granger MD   escitalopram (LEXAPRO) 20 mg tablet Take 1 tablet (20 mg total) by mouth daily 1/20/23  Yes JONE Sosa   furosemide (LASIX) 40 mg tablet Take 2 tablets (80 mg total) by mouth daily AND 1 tablet (40 mg total) in the evening  Take before meals  Patient taking differently: Take 120mg in AM 1/16/23  Yes Chyna Gilbert MD   lansoprazole (PREVACID) 30 mg capsule Take 1 capsule (30 mg total) by mouth daily 6/14/21  Yes Carolina Torres MD   metoprolol succinate (TOPROL-XL) 25 mg 24 hr tablet Take 1 tablet by mouth 2 (two) times a day 8/27/18  Yes Historical Provider, MD   Ozempic, 0 25 or 0 5 MG/DOSE, 2 MG/1 5ML SOPN inject 0 5 milligrams subcutaneously every week 5/17/21  Yes Historical Provider, MD   spironolactone (ALDACTONE) 50 mg tablet Take 3 tablets (150 mg total) by mouth daily  Patient taking differently: Take 150 mg by mouth 2 (two) times a day 150 mg in am 1/16/23  Yes Chyna Gilbert MD   methylPREDNISolone 4 MG tablet therapy pack Use as directed on package  Patient not taking: Reported on 3/29/2023 3/20/23   JONE Christensen     I have reviewed home medications using allscripts  Allergies:    Allergies   Allergen Reactions   • Medical Tape Other (See Comments)     Skin tears  Per patient, Red -\"rips my skin\"  Per patient, Skin tears   • Penicillins Itching and Rash     rash     • Nsaids Other (See Comments)     Cirrhosis of liver- pt reported starting Mobic 3/16/23   • Pseudoephedrine-Guaifenesin Myalgia and Other (See Comments)     Entex- \"jittery\"   • Attapulgite    • Cephalexin      ?ithcy   • Clioquinol    • Diclofenac Sodium Itching     Patient reported tolerating Voltaren gel without any reaction started 3/16/23   • Meloxicam Itching   • Nabumetone Other (See Comments)     rash   • Phenylalanine    • Pseudoephedrine-Guaifenesin Other (See Comments)     Entex- \"gittery\"   • Streptomycin    • Celecoxib Rash   • " Penicillin G Rash   • Rofecoxib Rash     skin reaction       Social History:     Marital Status:    Occupation:   Patient Pre-hospital Living Situation: Independently  Patient Pre-hospital Level of Mobility: Restricted secondary to bilateral hip pain  Patient Pre-hospital Diet Restrictions: Diabetic  Substance Use History:   Social History     Substance and Sexual Activity   Alcohol Use Yes    Comment: rarely     Social History     Tobacco Use   Smoking Status Never   Smokeless Tobacco Never     Social History     Substance and Sexual Activity   Drug Use Never       Family History:    Family History   Problem Relation Age of Onset   • COPD Mother    • Heart attack Sister    • Lymphoma Brother    • Colon cancer Maternal Grandmother    • Cancer Maternal Aunt         unknown       Physical Exam:     Vitals:   Blood Pressure: 135/70 (03/29/23 2025)  Pulse: 76 (03/29/23 2025)  Temperature: 98 °F (36 7 °C) (03/29/23 2025)  Temp Source: Oral (03/29/23 1259)  Respirations: 20 (03/29/23 2025)  SpO2: 99 % (03/29/23 2025)    Physical Exam  Vitals and nursing note reviewed  Constitutional:       General: She is not in acute distress  Appearance: She is well-developed  HENT:      Head: Normocephalic and atraumatic  Eyes:      Conjunctiva/sclera: Conjunctivae normal    Cardiovascular:      Rate and Rhythm: Normal rate and regular rhythm  Heart sounds: No murmur heard  Pulmonary:      Effort: Pulmonary effort is normal  No respiratory distress  Breath sounds: Normal breath sounds  Abdominal:      General: There is distension  Palpations: Abdomen is soft  Tenderness: There is no abdominal tenderness  Musculoskeletal:         General: No swelling  Cervical back: Neck supple  Comments: Reduced range of motion bilateral hips   Skin:     General: Skin is warm and dry  Neurological:      Mental Status: She is alert and oriented to person, place, and time     Psychiatric:         Mood and Affect: Mood normal              Additional Data:     Lab Results: I have personally reviewed pertinent reports  Results from last 7 days   Lab Units 03/29/23  1358   WBC Thousand/uL 7 14   HEMOGLOBIN g/dL 13 4   HEMATOCRIT % 40 7   PLATELETS Thousands/uL 94*   NEUTROS PCT % 87*   LYMPHS PCT % 5*   MONOS PCT % 6   EOS PCT % 1     Results from last 7 days   Lab Units 03/29/23  1358   SODIUM mmol/L 135   POTASSIUM mmol/L 4 1   CHLORIDE mmol/L 100   CO2 mmol/L 27   BUN mg/dL 31*   CREATININE mg/dL 0 96   ANION GAP mmol/L 8   CALCIUM mg/dL 9 2   ALBUMIN g/dL 3 4*   TOTAL BILIRUBIN mg/dL 1 47*   ALK PHOS U/L 110*   ALT U/L 35   AST U/L 25   GLUCOSE RANDOM mg/dL 119                       Imaging: I have personally reviewed pertinent reports  CT abdomen pelvis wo contrast   ED Interpretation by Peg Elizabeth DO (03/29 1614)   Interpreted by me as large ascites, otherwise no obvious abnormalities      Final Result by Patrick Chaparro MD (03/29 1612)      Moderate to large ascites  Cirrhotic changes in the liver with splenomegaly suggesting portal hypertension  Workstation performed: [id number]         IR INPATIENT Paracentesis    (Results Pending)       EKG, Pathology, and Other Studies Reviewed on Admission:     Allscripts / Epic Records Reviewed: Yes     ** Please Note: This note has been constructed using a voice recognition system   **

## 2023-03-30 NOTE — ASSESSMENT & PLAN NOTE
· Patient reports to the ED for evaluation of worsening right hip pain X 2 weeks  · She reports it affects her ADLs and has been seen by orthopedics in the outpatient setting as well as primary care  · Also reports left hip pain, but that was alleviated by injections received at orthopedics  · Right hip however has failed conservative therapy with Mobic, steroids, and injections  · X-ray and CT without fracture; patient denies trauma to the affected area  · Patient reports improvement in right hip pain today status post paracentesis  · Appreciate orthopedic recommendations, may consider joint steroid injection if pain worsens/continues  · PT recommending STR, patient hesitant, OT consult placed, PT to continue working with patient  · Appreciate CM planning for disposition HHR versus STR  · WBAT  · Continue with pain management - Voltaren gel, lidocaine patch, oxycodone 2 5 mg Q4 hr prn for moderate pain, 5 mg Q4 hr prn for severe pain

## 2023-03-30 NOTE — ASSESSMENT & PLAN NOTE
Lab Results   Component Value Date    EGFR 62 03/30/2023    EGFR 60 03/29/2023    EGFR 74 03/16/2023    CREATININE 0 93 03/30/2023    CREATININE 0 96 03/29/2023    CREATININE 0 80 03/16/2023     · Creatinine appears baseline  · Avoid nephrotoxic medications and hypotension  · Morning blood work

## 2023-03-30 NOTE — PHYSICAL THERAPY NOTE
Physical Therapy Evaluation    Performed at least 2 patient identifiers during session:  Patient Active Problem List   Diagnosis    Cirrhosis ([facility] )    Asthma    Depression, recurrent ([facility] )    Sleep apnea    Shortness of breath    Diabetes mellitus ([facility] )    Microscopic colitis    Liver failure without hepatic coma, unspecified chronicity (HCC)    Thrombocytopenia (HCC)    Allergic rhinitis    Anxiety state    Ascites    Carpal tunnel syndrome of left wrist    Other forms of angina pectoris (HCC)    DDD (degenerative disc disease), lumbosacral    Dizziness    GERD (gastroesophageal reflux disease)    Hemangioma of intra-abdominal structure    Hypokalemia    Hyponatremia    Incisional hernia, without obstruction or gangrene    Increased frequency of urination    Lumbar facet arthropathy    Microscopic hematuria    Midline cystocele    Mild intermittent asthma    Mixed incontinence    Nontoxic multinodular goiter    OAB (overactive bladder)    Osteoarthrosis    Other disorders of lung    Outlet dysfunction constipation    Palpitation    Pelvic pain in female    Portal hypertension ([facility] )    Primary osteoarthritis of first carpometacarpal joint of left hand    Recurrent UTI (urinary tract infection)    S/P endoscopic carpal tunnel release    Trigger finger of right thumb    Trigger thumb, left thumb    Urinary retention    Obstructive sleep apnea    Chronic nonalcoholic liver disease    Hepatic cirrhosis (HCC)    Decompensated hepatic cirrhosis (HCC)    Type 2 diabetes mellitus (HCC)    Liver cell carcinoma (HCC)    Chronic obstructive pulmonary disease, unspecified COPD type ([facility] )    Alcohol abuse with other alcohol-induced disorder ([facility] )    Stage 3a chronic kidney disease ([facility] )    Post-menopausal    Essential hypertension    Acute colitis    SOB (shortness of breath)    Right hip pain    Lumbar back pain       Past Medical History:   Diagnosis Date    Anxiety     Anxiety and depression 9/29/2015    Arthritis Asthma     Asthma without status asthmaticus 4/26/2011    Cirrhosis ([facility] )     Depression     Diabetes ([place] [address] )     Disease of thyroid gland     nodules    Diverticulitis 2011    Diverticulosis     DVT (deep venous thrombosis) ([facility] ) 2013    GERD (gastroesophageal reflux disease)     Liver disease     cirrhosis    Obesity, morbid, BMI 40 0-49 9 ([facility] ) 8/16/2017    Pneumonia     Portal hypertensive gastropathy ([place] [address] )     Sleep apnea     Vertigo     Vitreous hemorrhage of left eye ([place] [address] ) 1/20/2023       Past Surgical History:   Procedure Laterality Date    BLADDER SUSPENSION      CHOLECYSTECTOMY      COLONOSCOPY  05/24/2019    had multiple with last being [id number]    COLONOSCOPY  10/07/2004    LOUIS Silva  / Diverticulosis    COLONOSCOPY  06/14/2011    LOUIS Silva  / Diverticulosis    EGD  02/28/2013    LOUIS Grijalva  / Mild Portal Hypertensive Gastropathy    EGD  10/01/2019    LOUIS Silva  / Gastritis    EGD AND COLONOSCOPY  09/03/2015    LOUIS Silva  / Merced Sulaiman  Diverticulosis  FLEXIBLE SIGMOIDOSCOPY  03/04/2013    LOUIS Morales  / Hemorrhoidal Bleeding    FLEXIBLE SIGMOIDOSCOPY  05/30/2018    LOUIS Morales  / Internal hemorrhoids  biopsied      HERNIA REPAIR      IR PARACENTESIS  08/23/2018    IR PARACENTESIS  11/05/2018    IR PARACENTESIS  01/11/2019    IR PARACENTESIS  02/27/2019    IR PARACENTESIS  04/15/2019    IR PARACENTESIS  06/03/2019    IR PARACENTESIS  07/10/2019    IR PARACENTESIS  08/16/2019    IR PARACENTESIS  09/10/2019    IR PARACENTESIS  10/07/2019    IR PARACENTESIS  11/05/2019    IR PARACENTESIS  11/22/2019    IR PARACENTESIS  12/17/2019    IR PARACENTESIS  01/07/2020    IR PARACENTESIS  01/28/2020    IR PARACENTESIS  02/18/2020    IR PARACENTESIS  03/10/2020    IR PARACENTESIS  03/31/2020    IR PARACENTESIS  04/21/2020    IR PARACENTESIS  05/12/2020    IR PARACENTESIS  06/02/2020    IR PARACENTESIS  06/30/2020    IR PARACENTESIS 07/20/2020    IR PARACENTESIS  08/18/2020    IR PARACENTESIS  11/12/2020    IR PARACENTESIS  03/11/2021    IR PARACENTESIS  2/8/2022    IR PARACENTESIS  8/24/2022    IR PARACENTESIS  11/14/2022    IR PARACENTESIS  12/21/2022    IR PARACENTESIS  2/1/2023    IR PARACENTESIS  3/7/2023    TONSILLECTOMY      TRIGGER FINGER RELEASE Bilateral         03/30/23 0853   PT Last Visit   PT Visit Date 03/30/23   Note Type   Note type Evaluation   Pain Assessment   Pain Assessment Tool 0-10   Pain Score 4   Pain Location/Orientation Orientation: Right;Location: Groin; Location: Hip;Location: [facility] Pain Intervention(s) Heat applied; Ambulation/increased activity; Emotional support   Restrictions/Precautions   Weight Bearing Precautions Per Order No   Other Precautions Multiple lines; Fall Risk;Pain   Home Living   Type of Home Apartment   Home Layout One level   Bathroom Equipment Grab bars in shower  (has access to bs)   Home Equipment Cane;Walker;Long-handled shoehorn   Additional Comments lift chair   Prior Function   Level of [place] Independent with ADLs; Independent with functional mobility; Independent with IADLS   Lives With Alone   Receives Help From Family  (sister, son)   IADLs Independent with driving; Independent with meal prep; Independent with medication management   Falls in the last 6 months 1 to 4  (1 fall off chair)   Comments pt reports living alone  has had hip pain, progressive, had OPPT eval but not treated kwon to this admission  pt was using cane until recent exacbation, now using rw  pt drives sparingly  pt reports groin pain radiating to lateral hip and buttocks  General   Additional Pertinent History tender to palpation inguinal, lateral hip and thigh (ITB), mid buttock on R   Family/Caregiver Present No   Cognition   Overall Cognitive Status WFL   Orientation Level Oriented X4   Subjective   Subjective hip pain too much to bear  has been limiting mobility due to pain     RUE Assessment "  RUE Assessment WFL   LUE Assessment   LUE Assessment WFL   RLE Assessment   RLE Assessment X  (hip F mild  limited arom, str hip 3+/5, quads 4/5)   LLE Assessment   LLE Assessment X  (str 4+/5)   Coordination   Movements are Fluid and Coordinated 1   Sensation WFL   Bed Mobility   Supine to Sit 6  Modified independent   Additional items Increased time required  (very slow)   Transfers   Sit to Stand 5  Supervision   Additional items Increased time required   Stand to Sit 5  Supervision   Additional items Verbal cues; Increased time required   Toilet transfer 5  Supervision   Additional items Increased time required;Standard toilet  (grab bar)   Ambulation/Elevation   Gait pattern Antalgic;Decreased foot clearance;Decreased R stance; Inconsistent marci; Short stride; Excessively slow   Gait Assistance 5  Supervision   Assistive Device Rolling walker   Distance 15'   Balance   Static Sitting Normal   Dynamic Sitting Good   Static Standing Fair   Dynamic Standing Fair -   Ambulatory Fair -   Endurance Deficit   Endurance Deficit Yes   Endurance Deficit Description hip pain on right   Activity Tolerance   Activity Tolerance Patient limited by pain;Treatment limited secondary to medical complications (Comment)   Nurse Made Aware yes   Assessment   Prognosis Good   Problem List Decreased strength;Decreased range of motion;Decreased endurance; Impaired balance;Decreased mobility; Decreased safety awareness; Obesity;Pain   Assessment pt admitted with exacerbation of chronic R hip pain due to \"bursitis\"  pt dx wtih r hip pain, gait dysfunction and referred to PT  pt was lving alone in apt, no stairs  was using cane until recently, now using rw  pt had 1 fall, slid off chair  pt has family support if needed  ot reoprting 4/10 pain this visit  pt has heating pad in place  pt demonstrated mild to moderate functional limitations due to r hip pain and gradual debility   pt was able to mobilize with supervision but required " considerable time to complete task, was incontinent of urine in brief  pt amb 15' using rw  pt has weakness r hip muscles and slightly limited rom  pt will need skilled PT for deficits in rom, strength, balance, gait stability, joint integrity and pain control  pt is pending ortho eval  pt likely has hip joint pathology with weak supporting musculature  since pt lives alone and declining mobility would benefit from rehab  pending on ortho recommendations and progress, may be able to return home with OPPT  Barriers to Discharge Decreased caregiver support   Barriers to Discharge Comments recommend OT consult if pt agreeable to rehab   Goals   Patient Goals less pain and be able to walk better  STG Expiration Date 04/06/23   Short Term Goal #1 indep with bed mobility, transfers, amb with least restrictive device for > 250'  improve r hip strength and balance  by 1/2 to 1 grade, demonstrate good safety practices and biomechanics for task completion   Plan   Treatment/Interventions Functional transfer training;LE strengthening/ROM; Therapeutic exercise; Endurance training;Patient/family training;Equipment eval/education; Bed mobility;Gait training; Compensatory technique education;Spoke to nursing;OT   PT Frequency 3-5x/wk   Recommendation   PT Discharge Recommendation Post acute rehabilitation services  (vs home  with OPPT depending on progress)   Equipment Recommended Rhoda Foster  (has, may want BSC)   AM-PAC Basic Mobility Inpatient   Turning in Flat Bed Without Bedrails 4   Lying on Back to Sitting on Edge of Flat Bed Without Bedrails 4   Moving Bed to Chair 4   Standing Up From Chair Using Arms 4   Walk in Room 4   Climb 3-5 Stairs With Railing 2   Basic Mobility Inpatient Raw Score 22   Basic Mobility Standardized Score 47 4   Highest Level Of Mobility   JH-HLM Goal 7: Walk 25 feet or more   JH-HLM Achieved 6: Walk 10 steps or more       An AM-PAC Basic Mobility standardized score less than 42 9 suggests the patient may benefit from discharge to post-acute rehab services      History: co - morbidities, fall risk, use of assistive device,   Exam: impairments in locomotion, musculoskeletal, balance, joint integrity,   Clinical: unstable/unpredictable- pain-> fall risk  Complexity:high  Haydee Du, PT

## 2023-03-30 NOTE — DISCHARGE INSTR - AVS FIRST PAGE
Discharge Instructions - Theresa Johnson 77 y o  female MRN: [id number]  Unit/Bed#: [facility]    Weight Bearing Status:                                           Weight bearing as tolerated right LE    DVT prophylaxis  Not required    Pain:  Continue analgesics as directed    Appt Instructions:   Keep your appointment with Dr Lady Acevedo the office sooner if you experience any increased numbness/tingling in the extremities          Please get blood work within 1 week, I will have this sent to your family doctor  Please follow-up with your family doctor within 1 week of discharge to set up an appointment  Please keep your follow-up appointment with GI for April 11  I sent muscle relaxers to the pharmacy in [place], you can take up to 3 a day, avoid driving or using other narcotics with this as it can cause sedation  Can use over-the-counter Salonpas and Voltaren gel over the hips for inflammation  Home health rehab will meet you at your house, referrals have been sent

## 2023-03-31 LAB
ALBUMIN SERPL BCP-MCNC: 3.1 G/DL (ref 3.5–5)
ALP SERPL-CCNC: 95 U/L (ref 34–104)
ALT SERPL W P-5'-P-CCNC: 23 U/L (ref 7–52)
ANION GAP SERPL CALCULATED.3IONS-SCNC: 6 MMOL/L (ref 4–13)
AST SERPL W P-5'-P-CCNC: 19 U/L (ref 13–39)
BILIRUB SERPL-MCNC: 1.26 MG/DL (ref 0.2–1)
BUN SERPL-MCNC: 24 MG/DL (ref 5–25)
CALCIUM ALBUM COR SERPL-MCNC: 9.2 MG/DL (ref 8.3–10.1)
CALCIUM SERPL-MCNC: 8.5 MG/DL (ref 8.4–10.2)
CHLORIDE SERPL-SCNC: 100 MMOL/L (ref 96–108)
CO2 SERPL-SCNC: 27 MMOL/L (ref 21–32)
CREAT SERPL-MCNC: 0.92 MG/DL (ref 0.6–1.3)
ERYTHROCYTE [DISTWIDTH] IN BLOOD BY AUTOMATED COUNT: 15.5 % (ref 11.6–15.1)
GFR SERPL CREATININE-BSD FRML MDRD: 63 ML/MIN/1.73SQ M
GLUCOSE SERPL-MCNC: 106 MG/DL (ref 65–140)
GLUCOSE SERPL-MCNC: 108 MG/DL (ref 65–140)
GLUCOSE SERPL-MCNC: 112 MG/DL (ref 65–140)
GLUCOSE SERPL-MCNC: 115 MG/DL (ref 65–140)
GLUCOSE SERPL-MCNC: 159 MG/DL (ref 65–140)
HCT VFR BLD AUTO: 39.3 % (ref 34.8–46.1)
HGB BLD-MCNC: 12.8 G/DL (ref 11.5–15.4)
MAGNESIUM SERPL-MCNC: 2.2 MG/DL (ref 1.9–2.7)
MCH RBC QN AUTO: 30.6 PG (ref 26.8–34.3)
MCHC RBC AUTO-ENTMCNC: 32.6 G/DL (ref 31.4–37.4)
MCV RBC AUTO: 94 FL (ref 82–98)
PLATELET # BLD AUTO: 67 THOUSANDS/UL (ref 149–390)
PMV BLD AUTO: 10.3 FL (ref 8.9–12.7)
POTASSIUM SERPL-SCNC: 3.8 MMOL/L (ref 3.5–5.3)
PROT SERPL-MCNC: 6.2 G/DL (ref 6.4–8.4)
RBC # BLD AUTO: 4.18 MILLION/UL (ref 3.81–5.12)
SODIUM SERPL-SCNC: 133 MMOL/L (ref 135–147)
WBC # BLD AUTO: 4.57 THOUSAND/UL (ref 4.31–10.16)

## 2023-03-31 PROCEDURE — 0W9G3ZZ DRAINAGE OF PERITONEAL CAVITY, PERCUTANEOUS APPROACH: ICD-10-PCS | Performed by: RADIOLOGY

## 2023-03-31 RX ORDER — NYSTATIN 100000 [USP'U]/G
POWDER TOPICAL 2 TIMES DAILY
Status: DISCONTINUED | OUTPATIENT
Start: 2023-03-31 | End: 2023-03-31

## 2023-03-31 RX ORDER — NYSTATIN 100000 [USP'U]/G
POWDER TOPICAL 2 TIMES DAILY
Status: DISCONTINUED | OUTPATIENT
Start: 2023-03-31 | End: 2023-04-01 | Stop reason: HOSPADM

## 2023-03-31 RX ADMIN — SPIRONOLACTONE 150 MG: 50 TABLET ORAL at 08:19

## 2023-03-31 RX ADMIN — DICLOFENAC SODIUM 2 G: 10 GEL TOPICAL at 21:27

## 2023-03-31 RX ADMIN — NYSTATIN 1 APPLICATION.: 100000 POWDER TOPICAL at 14:37

## 2023-03-31 RX ADMIN — PANTOPRAZOLE SODIUM 40 MG: 40 TABLET, DELAYED RELEASE ORAL at 05:45

## 2023-03-31 RX ADMIN — FUROSEMIDE 40 MG: 40 TABLET ORAL at 17:01

## 2023-03-31 RX ADMIN — FUROSEMIDE 80 MG: 40 TABLET ORAL at 08:19

## 2023-03-31 RX ADMIN — OXYCODONE HYDROCHLORIDE 5 MG: 5 TABLET ORAL at 05:45

## 2023-03-31 RX ADMIN — METOPROLOL SUCCINATE 25 MG: 25 TABLET, EXTENDED RELEASE ORAL at 08:19

## 2023-03-31 RX ADMIN — HEPARIN SODIUM 5000 UNITS: 5000 INJECTION INTRAVENOUS; SUBCUTANEOUS at 13:42

## 2023-03-31 RX ADMIN — LIDOCAINE 5% 1 PATCH: 700 PATCH TOPICAL at 08:20

## 2023-03-31 RX ADMIN — HEPARIN SODIUM 5000 UNITS: 5000 INJECTION INTRAVENOUS; SUBCUTANEOUS at 21:27

## 2023-03-31 RX ADMIN — Medication 2.5 MG: at 20:05

## 2023-03-31 RX ADMIN — ESCITALOPRAM OXALATE 20 MG: 20 TABLET, FILM COATED ORAL at 08:19

## 2023-03-31 RX ADMIN — INSULIN LISPRO 1 UNITS: 100 INJECTION, SOLUTION INTRAVENOUS; SUBCUTANEOUS at 12:10

## 2023-03-31 RX ADMIN — HEPARIN SODIUM 5000 UNITS: 5000 INJECTION INTRAVENOUS; SUBCUTANEOUS at 05:43

## 2023-03-31 NOTE — ASSESSMENT & PLAN NOTE
· Patient reports to the ED for evaluation of worsening right hip pain X 2 weeks  · She reports it affects her ADLs and has been seen by orthopedics in the outpatient setting as well as primary care  · Also reports left hip pain, but that was alleviated by injections received at orthopedics  · Right hip however has failed conservative therapy with Mobic, steroids, and injections  · X-ray and CT without fracture; patient denies trauma to the affected area  · Patient reports improvement in right hip pain today status post paracentesis  · Appreciate orthopedic recommendations, may consider joint steroid injection if pain worsens/continues  · PT recommending STR, patient hesitant, OT consult placed, PT to continue working with patient  · Appreciate CM planning for disposition HHR versus STR  · WBAT  · Continue with pain management - Voltaren gel, lidocaine patch, oxycodone 2 5 mg Q4 hr prn for moderate pain, 5 mg Q4 hr prn for severe pain  · Patient with similar pain today, no worsening or red flag symptoms  · Follow-up OT recommendations today

## 2023-03-31 NOTE — OCCUPATIONAL THERAPY NOTE
Occupational Therapy Evaluation     Patient Name: Beto MAXWELL Date: 3/31/2023  Problem List  Principal Problem:    Right hip pain  Active Problems:    Cirrhosis ([place] [address] )    Sleep apnea    Thrombocytopenia (HCC)    Ascites due to alcoholic cirrhosis (HCC)    GERD (gastroesophageal reflux disease)    Type 2 diabetes mellitus (HCC)    Chronic obstructive pulmonary disease, unspecified COPD type ([place] [address] )    Alcohol abuse with other alcohol-induced disorder ([place] [address] )    Stage 3a chronic kidney disease ([place] [address] )    Essential hypertension    Past Medical History  Past Medical History:   Diagnosis Date    Anxiety     Anxiety and depression 9/29/2015    Arthritis     Asthma     Asthma without status asthmaticus 4/26/2011    Cirrhosis ([place] [address] )     Depression     Diabetes ([place] [address] )     Disease of thyroid gland     nodules    Diverticulitis 2011    Diverticulosis     DVT (deep venous thrombosis) ([place] [address] ) 2013    GERD (gastroesophageal reflux disease)     Liver disease     cirrhosis    Obesity, morbid, BMI 40 0-49 9 ([place] [address] ) 8/16/2017    Pneumonia     Portal hypertensive gastropathy ([place] [address] )     Sleep apnea     Vertigo     Vitreous hemorrhage of left eye ([place] [address] ) 1/20/2023     Past Surgical History  Past Surgical History:   Procedure Laterality Date    BLADDER SUSPENSION      CHOLECYSTECTOMY      COLONOSCOPY  05/24/2019    had multiple with last being 03032    COLONOSCOPY  10/07/2004    [facility]  / Diverticulosis    COLONOSCOPY  06/14/2011    [facility]  / Diverticulosis    EGD  02/28/2013    [facility]  / Mild Portal Hypertensive Gastropathy    EGD  10/01/2019    [facility]  / Gastritis    EGD AND COLONOSCOPY  09/03/2015    [facility]  / Rebeka Mari  Diverticulosis  FLEXIBLE SIGMOIDOSCOPY  03/04/2013    [facility]  / Hemorrhoidal Bleeding    FLEXIBLE SIGMOIDOSCOPY  05/30/2018    [facility]  / Internal hemorrhoids  biopsied      HERNIA REPAIR      IR PARACENTESIS 08/23/2018    IR PARACENTESIS  11/05/2018    IR PARACENTESIS  01/11/2019    IR PARACENTESIS  02/27/2019    IR PARACENTESIS  04/15/2019    IR PARACENTESIS  06/03/2019    IR PARACENTESIS  07/10/2019    IR PARACENTESIS  08/16/2019    IR PARACENTESIS  09/10/2019    IR PARACENTESIS  10/07/2019    IR PARACENTESIS  11/05/2019    IR PARACENTESIS  11/22/2019    IR PARACENTESIS  12/17/2019    IR PARACENTESIS  01/07/2020    IR PARACENTESIS  01/28/2020    IR PARACENTESIS  02/18/2020    IR PARACENTESIS  03/10/2020    IR PARACENTESIS  03/31/2020    IR PARACENTESIS  04/21/2020    IR PARACENTESIS  05/12/2020    IR PARACENTESIS  06/02/2020    IR PARACENTESIS  06/30/2020    IR PARACENTESIS  07/20/2020    IR PARACENTESIS  08/18/2020    IR PARACENTESIS  11/12/2020    IR PARACENTESIS  03/11/2021    IR PARACENTESIS  2/8/2022    IR PARACENTESIS  8/24/2022    IR PARACENTESIS  11/14/2022    IR PARACENTESIS  12/21/2022    IR PARACENTESIS  2/1/2023    IR PARACENTESIS  3/7/2023    IR PARACENTESIS  3/30/2023    TONSILLECTOMY      TRIGGER FINGER RELEASE Bilateral            03/31/23 1000   OT Last Visit   OT Visit Date 03/31/23   Note Type   Note type Evaluation  (and treatment)   Pain Assessment   Pain Assessment Tool 0-10   Pain Score 2   Pain Location/Orientation Location: Back   Patient's Stated Pain Goal No pain   Hospital Pain Intervention(s) Repositioned; Ambulation/increased activity; Emotional support; Rest   Multiple Pain Sites No   Restrictions/Precautions   Weight Bearing Precautions Per Order No   Other Precautions Fall Risk;Pain   Home Living   Type of Home Apartment   Home Layout One level  (0 DENEEN)   Bathroom Shower/Tub Walk-in shower   Bathroom Toilet Standard   Bathroom Equipment Grab bars in shower; Toilet raiser; Other (Comment)  (can borrow BSC and shower chair, no use PTA)   Bathroom Accessibility Accessible   Home Equipment Walker;Cane;Reacher;Long-handled shoehorn   Additional Comments Pt lives alone in a one level apt with 0 DENEEN    Prior Function   Level of [place] Independent with ADLs; Independent with functional mobility; Independent with IADLS   Lives With Alone   Receives Help From Family  (2 brothers, sister, son)   IADLs Independent with meal prep; Independent with driving; Independent with medication management   Falls in the last 6 months 1 to 4  (1- slid out of chair)   Vocational Retired   Comments At baseline, pt was I w/ ADLs, IADLs, and functional transfers/mobility w/ use of SPC, however reports use of RW x2 wks  (+)   +Fall PTA  Lifestyle   Autonomy At baseline, pt was I w/ ADLs, IADLs, and functional transfers/mobility w/ use of SPC, however reports use of RW x2 wks  (+)   +Fall PTA  Reciprocal Relationships 2 brothers, sister, son   Service to Others Retired   Intrinsic Gratification Spending time with family   ADL   Where Assessed Edge of bed   Eating Assistance 7  Independent   Grooming Assistance 5  [address] [facility] 5  Supervision/Setup   LB [address] 5  Supervision/Setup   [address] 5  Supervision/Setup   LB Dressing Assistance 5  [address]  [phone]  [phone] [facility] 5  Supervision/Setup   Bed Mobility   Supine to Sit 5  Supervision   Additional items HOB elevated; Bedrails; Increased time required   Sit to Supine Unable to assess   Additional Comments Pt seated EOB at end of session  Call bell and phone within reach  All needs met and pt reports no further questions for OT at this time     Transfers   Sit to Stand 5  Supervision   Additional items Increased time required;Verbal cues   Stand to Sit 5  Supervision   Additional items Increased time required;Verbal cues   Toilet transfer 5  Supervision   Additional items Increased time required;Verbal cues;Standard toilet  (grab bar use)   Additional Comments Cues for safe technique and hand placement   Functional Mobility   Functional Mobility 5  Supervision Additional Comments Assist x1   Additional items Rolling walker   Balance   Static Sitting Fair +   Dynamic Sitting Fair   Static Standing Fair   Dynamic Standing Fair -   Ambulatory Fair -   Activity Tolerance   Activity Tolerance Patient limited by pain; Patient tolerated treatment well   Medical Staff Made Aware LOLI Wade; Angela Herbert PTA   Nurse Made Aware yes; Maude, RN   RUE Assessment   RUE Assessment WFL  (4/5 throughout)   LUE Assessment   LUE Assessment WFL  (4/5 throughout)   Hand Function   Gross Motor Coordination Functional   Fine Motor Coordination Functional   Sensation   Light Touch No apparent deficits   Proprioception   Proprioception No apparent deficits   Vision-Basic Assessment   Current Vision Wears glasses all the time   Vision - Complex Assessment   Ocular Range of Motion Intact   Acuity Able to read clock/calendar on wall without difficulty; Able to read employee name badge without difficulty   Psychosocial   Psychosocial (WDL) WDL   Perception   Inattention/Neglect Appears intact   Cognition   Overall Cognitive Status WFL   Arousal/Participation Alert; Cooperative   Attention Within functional limits   Orientation Level Oriented X4   Memory Within functional limits   Following Commands Follows all commands and directions without difficulty   Assessment   Limitation Decreased ADL status; Decreased UE strength;Decreased endurance;Decreased self-care trans;Decreased high-level ADLs   Prognosis Good   Assessment Pt is a 77 y o  female seen for OT evaluation s/p adm to [facility] on 3/29/2023 w/ Right hip pain   Ortho consulted: WBAT R LE  Comorbidities affecting pt’s functional performance include a significant PMH of Anxiety, Arthritis, Asthma, Cirrhosis, Depression, Diabetes, DVT, PNA, Vertigo  Pt with active OT orders and activity orders for Up and OOB as tolerated   Pt lives alone in a one level apt with 0 DENEEN   At baseline, pt was I w/ ADLs, IADLs, and functional transfers/mobility w/ use of SPC, however reports use of RW x2 wks  (+)   +Fall PTA  Upon evaluation, pt currently requires Supervision for UB ADLs, Supervision for LB ADLs, Supervision for toileting, Supervision for bed mobility, and Supervision for functional mobility/transfers 2* the following deficits impacting occupational performance: decreased strength, decreased balance, decreased tolerance and increased pain  These impairments, as well at pt’s fall risk, limited home support, difficulty performing ADLS and difficulty performing IADLS  limit pt’s ability to safely engage in all baseline areas of occupation  Based on the aforementioned OT evaluation, functional performance deficits, and assessments, pt has been identified as a Moderate complexity evaluation  Pt to continue to benefit from continued acute OT services during hospital stay to address defined deficits and to maximize level of functional independence in the following Occupational Performance areas: grooming, bathing/shower, toilet hygiene, dressing, medication management, health maintenance, functional mobility, community mobility and clothing management  From OT standpoint, recommend OPPT upon D/C  OT will continue to follow pt 2-3x/wk to address the following goals to  w/in 10-14 days:   Goals   Patient Goals To go home   LTG Time Frame 10-14   Long Term Goal Please refer to LTGs listed below   Plan   Treatment Interventions ADL retraining;Functional transfer training;UE strengthening/ROM; Endurance training;Patient/family training;Equipment evaluation/education; Compensatory technique education;Continued evaluation; Activityengagement   Goal Expiration Date 23   OT Treatment Day 1   OT Frequency 3-5x/wk   Recommendation   OT Discharge Recommendation Home with outpatient rehabilitation  (OPPT)   Additional Comments  The patient's raw score on the AM-PAC Daily Activity Inpatient Short Form is 20   A raw score of greater than or equal to 19 suggests the patient may benefit from discharge to home  Please refer to the recommendation of the Occupational Therapist for safe discharge planning  AM-PAC Daily Activity Inpatient   Lower Body Dressing 3   Bathing 3   Toileting 3   Upper Body Dressing 3   Grooming 4   Eating 4   Daily Activity Raw Score 20   Daily Activity Standardized Score (Calc for Raw Score >=11) 42 03   AM-PAC Applied Cognition Inpatient   Following a Speech/Presentation 4   Understanding Ordinary Conversation 4   Taking Medications 4   Remembering Where Things Are Placed or Put Away 4   Remembering List of 4-5 Errands 4   Taking Care of Complicated Tasks 4   Applied Cognition Raw Score 24   Applied Cognition Standardized Score 62 21   Additional Treatment Session   Start Time 0945   End Time 1000   Treatment Assessment Pt seen for OT treatment session this AM focusing on functional activity tolerance, ADLs, and functional transfers/mobility  Pt seated on standard toilet after completion of initial OT eval  Toileting tasks completed w/ Supervision  Pt able to complete perineal hygiene w/ Supervision while seated on standard toilet  Pt w/ Fair- dynamic standing balance when standing w/o UE support to pull briefs over hips  Grooming tasks completed w/ Supervision while standing at sink to wash/dry hands  Transfers (sit<>stand) completed w/ Supervision w/ verbal cues for safe technique and hand placement  Supervision required for functional mobility w/ no LOBs noted  Pt seated on EOB at end of session  Reports no concerns regarding returning home at D/C  Will recommend OPPT upon D/C  OT to follow pt on caseload     Additional Treatment Day 1       GOALS    Pt will improve activity tolerance to G for min 30 min txment sessions for increase engagement in functional tasks    Pt will complete bed mobility at a Mod I level w/ G balance/safety demonstrated to decrease caregiver assistance required     Pt will complete UB dressing/self care w/ mod I using adaptive device and DME as needed     Pt will complete LB dressing/self care w/ mod I using adaptive device and DME as needed    Pt will complete toileting w/ mod I w/ G hygiene/thoroughness using DME as needed    Pt will improve functional transfers to Mod I on/off all surfaces using DME as needed w/ G balance/safety     Pt will improve functional mobility during ADL/IADL/leisure tasks to Mod I using DME as needed w/ G balance/safety     Pt will be attentive 100% of the time during ongoing cognitive assessment w/ G participation to assist w/ safe d/c planning/recommendations    Pt will demonstrate G carryover of pt/caregiver education and training as appropriate w/o cues w/ good tolerance to increase safety during functional tasks    Pt will demonstrate 100% carryover of energy conservation techniques t/o functional I/ADL/leisure tasks w/o cues s/p skilled education to increase endurance during functional tasks    Pt will increase BUE strength by 1MM grade via AROM exercises to increase independence in ADLs and transfers    Pt will verbalize 3 potential fall hazards and identify appropriate compensatory techniques to decrease fall risk in home environment     Pt will increase standing tolerance to 10-15 mins with Fair+ dynamic standing balance to increase safety during participation in ADLs         Jennifer Steiner, OTR/L

## 2023-03-31 NOTE — ASSESSMENT & PLAN NOTE
Lab Results   Component Value Date    HGBA1C 5 8 (H) 08/18/2022       Recent Labs     03/30/23  1058 03/30/23  1630 03/30/23  2133 03/31/23  0745   POCGLU 136 141* 146* 108       Blood Sugar Average: Last 72 hrs:  (P) 126 5   · Patient on Ozempic at home  · With adequate blood sugar control while inpatient  · Continue with SSI with mealtime and nighttime coverage  · Hypoglycemic protocol, Accu-Cheks, diabetic diet

## 2023-03-31 NOTE — ASSESSMENT & PLAN NOTE
· Blood pressure well controlled, SBP noted in 100's  · Continue Lasix, Aldactone, metoprolol succinate 25 mg 24-hour tablet by mouth twice daily with hold parameters  · Continue to monitor

## 2023-03-31 NOTE — ASSESSMENT & PLAN NOTE
· Patient with history of liver cirrhosis; receives scheduled outpatient paracentesis on a regular basis  · Status post paracentesis 3/30 with improvement in abdominal bloating and hip pain  · Without abdominal pain; 0/4 SIRS criteria noted on admission  · Continue Lasix 80 mg in am, 40 mg in pm, Aldactone 50 mg by mouth daily

## 2023-03-31 NOTE — ASSESSMENT & PLAN NOTE
Lab Results   Component Value Date    EGFR 63 03/31/2023    EGFR 62 03/30/2023    EGFR 60 03/29/2023    CREATININE 0 92 03/31/2023    CREATININE 0 93 03/30/2023    CREATININE 0 96 03/29/2023     · Creatinine appears baseline  · Avoid nephrotoxic medications and hypotension  · Morning blood work

## 2023-03-31 NOTE — ASSESSMENT & PLAN NOTE
· Moderate to large ascites present on admission in setting of alcoholic liver cirrhosis, noted on CT abdomen pelvis  · S/p paracentesis 3/30 8,150 mL of serous ascites  · Supplement albumin 12 5 g 3/30, reviewed CMP today -albumin around baseline at 3 1  · Sodium 133, continue with fluid restriction of 1500 mL, monitor

## 2023-03-31 NOTE — CASE MANAGEMENT
Case Management Discharge Planning Note    Patient name Adelina Paz  Location [place] [address] [address] [place]* MRN [id number]  : 1955 Date 3/31/2023       Current Admission Date: 3/29/2023  Current Admission Diagnosis:Right hip pain   Patient Active Problem List    Diagnosis Date Noted   • Right hip pain 2023   • Lumbar back pain 2023   • Post-menopausal 2023   • Liver cell carcinoma ([facility] ) 2023   • Chronic obstructive pulmonary disease, unspecified COPD type ([facility] ) 2023   • Alcohol abuse with other alcohol-induced disorder ([facility] ) 2023   • Stage 3a chronic kidney disease ([facility] ) 2023   • Other forms of angina pectoris ([facility] )    • Hyponatremia 10/07/2021   • OAB (overactive bladder) 2021   • Liver failure without hepatic coma, unspecified chronicity ([facility] ) 2021   • Thrombocytopenia ([facility] ) 2021   • Microscopic colitis 2021   • Diabetes mellitus ([facility] ) 2020   • Shortness of breath 2020   • Increased frequency of urination 2020   • S/P endoscopic carpal tunnel release 2019   • Trigger finger of right thumb 2019   • Cirrhosis ([facility] ) 10/07/2019   • Asthma 10/07/2019   • Sleep apnea 10/07/2019   • Depression, recurrent ([facility] )    • Urinary retention 2019   • Lumbar facet arthropathy 2018   • Decompensated hepatic cirrhosis ([facility] ) 2018   • Essential hypertension 2018   • Hypokalemia 2017   • Acute colitis 2017   • Carpal tunnel syndrome of left wrist 2017   • Primary osteoarthritis of first carpometacarpal joint of left hand 2017   • Trigger thumb, left thumb 2017   • SOB (shortness of breath) 2017   • Palpitation 2016   • Microscopic hematuria 2016   • Midline cystocele 2016   • Dizziness 2015   • Mixed incontinence 2015   • Outlet dysfunction constipation 2015   • Pelvic pain in female 2015   • Recurrent UTI (urinary tract infection) 11/12/2015   • Incisional hernia, without obstruction or gangrene 11/09/2015   • GERD (gastroesophageal reflux disease) 09/29/2015   • Ascites due to alcoholic cirrhosis ([place] [address] ) 84/72/1124   • Portal hypertension ([place] [address] ) 03/08/2013   • Type 2 diabetes mellitus ([place] [address] ) 03/08/2013   • Hepatic cirrhosis ([place] [address] ) 06/25/2012   • Anxiety state 12/08/2011   • DDD (degenerative disc disease), lumbosacral 09/19/2011   • Nontoxic multinodular goiter 07/20/2011   • Other disorders of lung 07/20/2011   • Hemangioma of intra-abdominal structure 02/16/2011   • Allergic rhinitis 02/08/2010   • Mild intermittent asthma 02/08/2010   • Osteoarthrosis 02/08/2010   • Obstructive sleep apnea 02/08/2010   • Chronic nonalcoholic liver disease 86/83/4670      LOS (days): 2  Geometric Mean LOS (GMLOS) (days): 2 50  Days to GMLOS:0 7     OBJECTIVE:  Risk of Unplanned Readmission Score: 16 1         Current admission status: Inpatient   Preferred Pharmacy:   [address] [place] [id number]  Phone: [phone] Fax: [phone]    Primary Care Provider: JONE Lucia    Primary Insurance: MEDICARE  Secondary Insurance: [facility] DETAILS:    Discharge planning discussed with[de-identified] pt  Freedom of Choice: Yes  Comments - Freedom of Choice: Discussed therapy recommendations with OT stating this day pt did well in treat this a m and could do OPPT- pt agreeable to this and already has it set [facility]  CM contacted family/caregiver?: No- see comments (pt A&Ox4 and will speak to family about post discharge needs independently)  Were Treatment Team discharge recommendations reviewed with patient/caregiver?: Yes  Did patient/caregiver verbalize understanding of patient care needs?: Yes       Contacts  Patient Contacts:  Jef Barnard  Relationship to Patient[de-identified] Family                        Treatment Team Recommendation: Home, Other (OPPT)  Discharge Destination Plan[de-identified] Home, Other (OPPT)  Transport at Discharge : Family                             IMM Given (Date):: 03/31/23  IMM Given to[de-identified] Patient

## 2023-03-31 NOTE — PLAN OF CARE
Problem: Potential for Falls  Goal: Patient will remain free of falls  Description: INTERVENTIONS:  - Educate patient/family on patient safety including physical limitations  - Instruct patient to call for assistance with activity   - Consult OT/PT to assist with strengthening/mobility   - Keep Call bell within reach  - Keep bed low and locked with side rails adjusted as appropriate  - Keep care items and personal belongings within reach  - Initiate and maintain comfort rounds  - Make Fall Risk Sign visible to staff  - Offer Toileting every 2  Hours, in advance of need  - Initiate/Maintain bed alarm  - Obtain necessary fall risk management equipment: alarms   - Apply yellow socks and bracelet for high fall risk patients  - Consider moving patient to room near nurses station  3/31/2023 0144 by Guerline Sommer RN  Outcome: Progressing  3/31/2023 0144 by Guerline Sommer RN  Outcome: Progressing     Problem: MOBILITY - ADULT  Goal: Maintain or return to baseline ADL function  Description: INTERVENTIONS:  -  Assess patient's ability to carry out ADLs; assess patient's baseline for ADL function and identify physical deficits which impact ability to perform ADLs (bathing, care of mouth/teeth, toileting, grooming, dressing, etc )  - Assess/evaluate cause of self-care deficits   - Assess range of motion  - Assess patient's mobility; develop plan if impaired  - Assess patient's need for assistive devices and provide as appropriate  - Encourage maximum independence but intervene and supervise when necessary  - Involve family in performance of ADLs  - Assess for home care needs following discharge   - Consider OT consult to assist with ADL evaluation and planning for discharge  - Provide patient education as appropriate  3/31/2023 0144 by Guerline Sommer RN  Outcome: Progressing  3/31/2023 0144 by Guerline Sommer RN  Outcome: Progressing  Goal: Maintains/Returns to pre admission functional level  Description: INTERVENTIONS:  - Perform BMAT or MOVE assessment daily    - Set and communicate daily mobility goal to care team and patient/family/caregiver  - Collaborate with rehabilitation services on mobility goals if consulted  - Perform Range of Motion 4 times a day  - Reposition patient every 2 hours    - Dangle patient 3 times a day  - Stand patient 3 times a day  - Ambulate patient 3 times a day  - Out of bed to chair 3 times a day   - Out of bed for meals 3 times a day  - Out of bed for toileting  - Record patient progress and toleration of activity level   3/31/2023 0144 by Sheree Tinsley RN  Outcome: Progressing  3/31/2023 0144 by Sheree Tinsley RN  Outcome: Progressing     Problem: Prexisting or High Potential for Compromised Skin Integrity  Goal: Skin integrity is maintained or improved  Description: INTERVENTIONS:  - Identify patients at risk for skin breakdown  - Assess and monitor skin integrity  - Assess and monitor nutrition and hydration status  - Monitor labs   - Assess for incontinence   - Turn and reposition patient  - Assist with mobility/ambulation  - Relieve pressure over bony prominences  - Avoid friction and shearing  - Provide appropriate hygiene as needed including keeping skin clean and dry  - Evaluate need for skin moisturizer/barrier cream  - Collaborate with interdisciplinary team   - Patient/family teaching  - Consider wound care consult   3/31/2023 0144 by Sheree Tinsley RN  Outcome: Progressing  3/31/2023 0144 by Sheree Tinsley RN  Outcome: Progressing     Problem: PAIN - ADULT  Goal: Verbalizes/displays adequate comfort level or baseline comfort level  Description: Interventions:  - Encourage patient to monitor pain and request assistance  - Assess pain using appropriate pain scale  - Administer analgesics based on type and severity of pain and evaluate response  - Implement non-pharmacological measures as appropriate and evaluate response  - Consider cultural and social influences on pain and pain management  - Notify physician/advanced practitioner if interventions unsuccessful or patient reports new pain  3/31/2023 0144 by El Prieto RN  Outcome: Progressing  3/31/2023 0144 by El Prieto RN  Outcome: Progressing     Problem: INFECTION - ADULT  Goal: Absence or prevention of progression during hospitalization  Description: INTERVENTIONS:  - Assess and monitor for signs and symptoms of infection  - Monitor lab/diagnostic results  - Monitor all insertion sites, i e  indwelling lines, tubes, and drains  - Monitor endotracheal if appropriate and nasal secretions for changes in amount and color  - [place] appropriate cooling/warming therapies per order  - Administer medications as ordered  - Instruct and encourage patient and family to use good hand hygiene technique  - Identify and instruct in appropriate isolation precautions for identified infection/condition  3/31/2023 0144 by El Prieto RN  Outcome: Progressing  3/31/2023 0144 by El Prieto RN  Outcome: Progressing     Problem: SAFETY ADULT  Goal: Patient will remain free of falls  Description: INTERVENTIONS:  - Educate patient/family on patient safety including physical limitations  - Instruct patient to call for assistance with activity   - Consult OT/PT to assist with strengthening/mobility   - Keep Call bell within reach  - Keep bed low and locked with side rails adjusted as appropriate  - Keep care items and personal belongings within reach  - Initiate and maintain comfort rounds  - Make Fall Risk Sign visible to staff  - Offer Toileting every 2 Hours, in advance of need  - Initiate/Maintain bed alarm  - Obtain necessary fall risk management equipment: alarms   - Apply yellow socks and bracelet for high fall risk patients  - Consider moving patient to room near nurses station  3/31/2023 0144 by El Prieto RN  Outcome: Progressing  3/31/2023 0144 by El Prieto RN  Outcome: Progressing  Goal: Maintain or return to baseline ADL function  Description: INTERVENTIONS:  -  Assess patient's ability to carry out ADLs; assess patient's baseline for ADL function and identify physical deficits which impact ability to perform ADLs (bathing, care of mouth/teeth, toileting, grooming, dressing, etc )  - Assess/evaluate cause of self-care deficits   - Assess range of motion  - Assess patient's mobility; develop plan if impaired  - Assess patient's need for assistive devices and provide as appropriate  - Encourage maximum independence but intervene and supervise when necessary  - Involve family in performance of ADLs  - Assess for home care needs following discharge   - Consider OT consult to assist with ADL evaluation and planning for discharge  - Provide patient education as appropriate  3/31/2023 0144 by Mikie Warner RN  Outcome: Progressing  3/31/2023 0144 by Mikie Warner RN  Outcome: Progressing  Goal: Maintains/Returns to pre admission functional level  Description: INTERVENTIONS:  - Perform BMAT or MOVE assessment daily    - Set and communicate daily mobility goal to care team and patient/family/caregiver  - Collaborate with rehabilitation services on mobility goals if consulted  - Perform Range of Motion 4 times a day  - Reposition patient every 2 hours    - Dangle patient 3 times a day  - Stand patient 3 times a day  - Ambulate patient 3 times a day  - Out of bed to chair 3 times a day   - Out of bed for meals 3 times a day  - Out of bed for toileting  - Record patient progress and toleration of activity level   3/31/2023 0144 by Mikie Warner RN  Outcome: Progressing  3/31/2023 0144 by Mikie aWrner RN  Outcome: Progressing     Problem: DISCHARGE PLANNING  Goal: Discharge to home or other facility with appropriate resources  Description: INTERVENTIONS:  - Identify barriers to discharge w/patient and caregiver  - Arrange for needed discharge resources and transportation as appropriate  - Identify discharge learning needs (meds, wound care, etc )  - Arrange for interpretive services to assist at discharge as needed  - Refer to Case Management Department for coordinating discharge planning if the patient needs post-hospital services based on physician/advanced practitioner order or complex needs related to functional status, cognitive ability, or social support system  3/31/2023 0144 by Marvin Thomas RN  Outcome: Progressing  3/31/2023 0144 by Marvin Thomas RN  Outcome: Progressing     Problem: Knowledge Deficit  Goal: Patient/family/caregiver demonstrates understanding of disease process, treatment plan, medications, and discharge instructions  Description: Complete learning assessment and assess knowledge base    Interventions:  - Provide teaching at level of understanding  - Provide teaching via preferred learning methods  3/31/2023 0144 by Marvin Thomas RN  Outcome: Progressing  3/31/2023 0144 by Marvin Thomas RN  Outcome: Progressing     Problem: SKIN/TISSUE INTEGRITY - ADULT  Goal: Skin Integrity remains intact(Skin Breakdown Prevention)  Description: Assess:  -Perform Gregory assessment every shift   -Clean and moisturize skin every shift   -Inspect skin when repositioning, toileting, and assisting with ADLS  -Assess under medical devices such as ivs every shift   -Assess extremities for adequate circulation and sensation     Bed Management:  -Have minimal linens on bed & keep smooth, unwrinkled  -Change linens as needed when moist or perspiring  -Avoid sitting or lying in one position for more than 2 hours while in bed  -Keep HOB at 30 degrees     Toileting:  -Offer bedside commode  -Assess for incontinence every shift   -Use incontinent care products after each incontinent episode such as wound care     Activity:  -Mobilize patient 2 times a day  -Encourage activity and walks on unit  -Encourage or provide ROM exercises   -Turn and reposition patient every 2 Hours  -Use appropriate equipment to lift or move patient in bed  -Instruct/ Assist with weight shifting every 2 hours when out of bed in chair  -Consider limitation of chair time 2 hour intervals    Skin Care:  -Avoid use of baby powder, tape, friction and shearing, hot water or constrictive clothing  -Relieve pressure over bony prominences using pillows   -Do not massage red bony areas    Next Steps:  -Teach patient strategies to minimize risks such as wounds    -Consider consults to  interdisciplinary teams such as wound care   3/31/2023 0144 by Daryle Blades, RN  Outcome: Progressing  3/31/2023 0144 by Daryle Blades, RN  Outcome: Progressing  Goal: Incision(s), wounds(s) or drain site(s) healing without S/S of infection  Description: INTERVENTIONS  - Assess and document dressing, incision, wound bed, drain sites and surrounding tissue  - Provide patient and family education  - Perform skin care/dressing changes every shift   3/31/2023 0144 by Daryle Blades, RN  Outcome: Progressing  3/31/2023 0144 by Daryle Blades, RN  Outcome: Progressing  Goal: Pressure injury heals and does not worsen  Description: Interventions:  - Implement low air loss mattress or specialty surface (Criteria met)  - Apply silicone foam dressing  - Instruct/assist with weight shifting every 60 minutes when in chair   - Limit chair time to 2 hour intervals  - Use special pressure reducing interventions such as 2 hours when in chair   - Apply fecal or urinary incontinence containment device   - Perform passive or active ROM every shift   - Turn and reposition patient & offload bony prominences every 2 hours   - Utilize friction reducing device or surface for transfers   - Consider consults to  interdisciplinary teams such as wound care   - Use incontinent care products after each incontinent episode such as foam cleanser   - Consider nutrition services referral as needed  3/31/2023 0144 by Daryle Blades, RN  Outcome: Progressing  3/31/2023 0144 by Daryle Blades, RN  Outcome: Progressing     Problem: MUSCULOSKELETAL - ADULT  Goal: Maintain or return mobility to safest level of function  Description: INTERVENTIONS:  - Assess patient's ability to carry out ADLs; assess patient's baseline for ADL function and identify physical deficits which impact ability to perform ADLs (bathing, care of mouth/teeth, toileting, grooming, dressing, etc )  - Assess/evaluate cause of self-care deficits   - Assess range of motion  - Assess patient's mobility  - Assess patient's need for assistive devices and provide as appropriate  - Encourage maximum independence but intervene and supervise when necessary  - Involve family in performance of ADLs  - Assess for home care needs following discharge   - Consider OT consult to assist with ADL evaluation and planning for discharge  - Provide patient education as appropriate  3/31/2023 0144 by Yelena Madrigal RN  Outcome: Progressing  3/31/2023 0144 by Yelena Madrigal RN  Outcome: Progressing  Goal: Maintain proper alignment of affected body part  Description: INTERVENTIONS:  - Support, maintain and protect limb and body alignment  - Provide patient/ family with appropriate education  3/31/2023 0144 by Yelena Madrigal RN  Outcome: Progressing  3/31/2023 0144 by Yelena Madrigal RN  Outcome: Progressing

## 2023-03-31 NOTE — ASSESSMENT & PLAN NOTE
· Patient with history of liver disease and thrombocytopenia  · Platelets at 67 today  · Without signs of bleeding on exam  · Morning CBC

## 2023-03-31 NOTE — PLAN OF CARE
Problem: Potential for Falls  Goal: Patient will remain free of falls  Description: INTERVENTIONS:  - Educate patient/family on patient safety including physical limitations  - Instruct patient to call for assistance with activity   - Consult OT/PT to assist with strengthening/mobility   - Keep Call bell within reach  - Keep bed low and locked with side rails adjusted as appropriate  - Keep care items and personal belongings within reach  - Initiate and maintain comfort rounds  - Make Fall Risk Sign visible to staff  - Apply yellow socks and bracelet for high fall risk patients  - Consider moving patient to room near nurses station  Outcome: Progressing     Problem: MOBILITY - ADULT  Goal: Maintain or return to baseline ADL function  Description: INTERVENTIONS:  -  Assess patient's ability to carry out ADLs; assess patient's baseline for ADL function and identify physical deficits which impact ability to perform ADLs (bathing, care of mouth/teeth, toileting, grooming, dressing, etc )  - Assess/evaluate cause of self-care deficits   - Assess range of motion  - Assess patient's mobility; develop plan if impaired  - Assess patient's need for assistive devices and provide as appropriate  - Encourage maximum independence but intervene and supervise when necessary  - Involve family in performance of ADLs  - Assess for home care needs following discharge   - Consider OT consult to assist with ADL evaluation and planning for discharge  - Provide patient education as appropriate  Outcome: Progressing  Goal: Maintains/Returns to pre admission functional level  Description: INTERVENTIONS:  - Perform BMAT or MOVE assessment daily    - Set and communicate daily mobility goal to care team and patient/family/caregiver     - Collaborate with rehabilitation services on mobility goals if consulted  - Out of bed for toileting  - Record patient progress and toleration of activity level   Outcome: Progressing     Problem: Prexisting or High Potential for Compromised Skin Integrity  Goal: Skin integrity is maintained or improved  Description: INTERVENTIONS:  - Identify patients at risk for skin breakdown  - Assess and monitor skin integrity  - Assess and monitor nutrition and hydration status  - Monitor labs   - Assess for incontinence   - Turn and reposition patient  - Assist with mobility/ambulation  - Relieve pressure over bony prominences  - Avoid friction and shearing  - Provide appropriate hygiene as needed including keeping skin clean and dry  - Evaluate need for skin moisturizer/barrier cream  - Collaborate with interdisciplinary team   - Patient/family teaching  - Consider wound care consult   Outcome: Progressing     Problem: PAIN - ADULT  Goal: Verbalizes/displays adequate comfort level or baseline comfort level  Description: Interventions:  - Encourage patient to monitor pain and request assistance  - Assess pain using appropriate pain scale  - Administer analgesics based on type and severity of pain and evaluate response  - Implement non-pharmacological measures as appropriate and evaluate response  - Consider cultural and social influences on pain and pain management  - Notify physician/advanced practitioner if interventions unsuccessful or patient reports new pain  Outcome: Progressing     Problem: INFECTION - ADULT  Goal: Absence or prevention of progression during hospitalization  Description: INTERVENTIONS:  - Assess and monitor for signs and symptoms of infection  - Monitor lab/diagnostic results  - Monitor all insertion sites, i e  indwelling lines, tubes, and drains  - Monitor endotracheal if appropriate and nasal secretions for changes in amount and color  - [place] appropriate cooling/warming therapies per order  - Administer medications as ordered  - Instruct and encourage patient and family to use good hand hygiene technique  - Identify and instruct in appropriate isolation precautions for identified infection/condition  Outcome: Progressing     Problem: SAFETY ADULT  Goal: Patient will remain free of falls  Description: INTERVENTIONS:  - Educate patient/family on patient safety including physical limitations  - Instruct patient to call for assistance with activity   - Consult OT/PT to assist with strengthening/mobility   - Keep Call bell within reach  - Keep bed low and locked with side rails adjusted as appropriate  - Keep care items and personal belongings within reach  - Initiate and maintain comfort rounds  - Make Fall Risk Sign visible to staff  - Apply yellow socks and bracelet for high fall risk patients  - Consider moving patient to room near nurses station  Outcome: Progressing  Goal: Maintain or return to baseline ADL function  Description: INTERVENTIONS:  -  Assess patient's ability to carry out ADLs; assess patient's baseline for ADL function and identify physical deficits which impact ability to perform ADLs (bathing, care of mouth/teeth, toileting, grooming, dressing, etc )  - Assess/evaluate cause of self-care deficits   - Assess range of motion  - Assess patient's mobility; develop plan if impaired  - Assess patient's need for assistive devices and provide as appropriate  - Encourage maximum independence but intervene and supervise when necessary  - Involve family in performance of ADLs  - Assess for home care needs following discharge   - Consider OT consult to assist with ADL evaluation and planning for discharge  - Provide patient education as appropriate  Outcome: Progressing  Goal: Maintains/Returns to pre admission functional level  Description: INTERVENTIONS:  - Perform BMAT or MOVE assessment daily    - Set and communicate daily mobility goal to care team and patient/family/caregiver     - Collaborate with rehabilitation services on mobility goals if consulted  - Out of bed for toileting  - Record patient progress and toleration of activity level   Outcome: Progressing     Problem: DISCHARGE PLANNING  Goal: Discharge to home or other facility with appropriate resources  Description: INTERVENTIONS:  - Identify barriers to discharge w/patient and caregiver  - Arrange for needed discharge resources and transportation as appropriate  - Identify discharge learning needs (meds, wound care, etc )  - Arrange for interpretive services to assist at discharge as needed  - Refer to Case Management Department for coordinating discharge planning if the patient needs post-hospital services based on physician/advanced practitioner order or complex needs related to functional status, cognitive ability, or social support system  Outcome: Progressing     Problem: Knowledge Deficit  Goal: Patient/family/caregiver demonstrates understanding of disease process, treatment plan, medications, and discharge instructions  Description: Complete learning assessment and assess knowledge base  Interventions:  - Provide teaching at level of understanding  - Provide teaching via preferred learning methods  Outcome: Progressing     Problem: SKIN/TISSUE INTEGRITY - ADULT  Goal: Skin Integrity remains intact(Skin Breakdown Prevention)  Description: Assess:       Toileting:  -Offer bedside commode      Skin Care:  -Avoid use of baby powder, tape, friction and shearing, hot water or constrictive clothing    Outcome: Progressing  Goal: Incision(s), wounds(s) or drain site(s) healing without S/S of infection  Description: INTERVENTIONS  - Assess and document dressing, incision, wound bed, drain sites and surrounding tissue  - Provide patient and family education  Outcome: Progressing  Goal: Pressure injury heals and does not worsen  Description: Interventions:  - Implement low air loss mattress or specialty surface (Criteria met)  - Apply silicone foam dressing  - Consider nutrition services referral as needed  Outcome: Progressing     Problem: MUSCULOSKELETAL - ADULT  Goal: Maintain or return mobility to safest level of function  Description: INTERVENTIONS:  - Assess patient's ability to carry out ADLs; assess patient's baseline for ADL function and identify physical deficits which impact ability to perform ADLs (bathing, care of mouth/teeth, toileting, grooming, dressing, etc )  - Assess/evaluate cause of self-care deficits   - Assess range of motion  - Assess patient's mobility  - Assess patient's need for assistive devices and provide as appropriate  - Encourage maximum independence but intervene and supervise when necessary  - Involve family in performance of ADLs  - Assess for home care needs following discharge   - Consider OT consult to assist with ADL evaluation and planning for discharge  - Provide patient education as appropriate  Outcome: Progressing  Goal: Maintain proper alignment of affected body part  Description: INTERVENTIONS:  - Support, maintain and protect limb and body alignment  - Provide patient/ family with appropriate education  Outcome: Progressing

## 2023-03-31 NOTE — PROGRESS NOTES
[address] [place] [address]  Progress Note  Name: Tess Little  MRN: [id number]  Unit/Bed#: [place] 2 [facility] 87 222-01 I Date of Admission: 3/29/2023   Date of Service: 3/31/2023 I Hospital Day: 2    Assessment/Plan   * Right hip pain  Assessment & Plan  · Patient reports to the ED for evaluation of worsening right hip pain X 2 weeks  · She reports it affects her ADLs and has been seen by orthopedics in the outpatient setting as well as primary care  · Also reports left hip pain, but that was alleviated by injections received at orthopedics  · Right hip however has failed conservative therapy with Mobic, steroids, and injections  · X-ray and CT without fracture; patient denies trauma to the affected area  · Patient reports improvement in right hip pain today status post paracentesis  · Appreciate orthopedic recommendations, may consider joint steroid injection if pain worsens/continues  · PT recommending STR, patient hesitant, OT consult placed, PT to continue working with patient  · Appreciate CM planning for disposition HHR versus STR  · WBAT  · Continue with pain management - Voltaren gel, lidocaine patch, oxycodone 2 5 mg Q4 hr prn for moderate pain, 5 mg Q4 hr prn for severe pain  · Patient with similar pain today, no worsening or red flag symptoms  · Follow-up OT recommendations today    Alcohol abuse with other alcohol-induced disorder ([facility] 75 )  Assessment & Plan  · Patient reports 4 months since last alcohol use, had 1 glass of wine on New Year's  · Continues without acute events  · Monitor off CIWA protocol    Cirrhosis ([facility] 75 )  Assessment & Plan  · Patient with history of liver cirrhosis; receives scheduled outpatient paracentesis on a regular basis  · Status post paracentesis 3/30 with improvement in abdominal bloating and hip pain  · Without abdominal pain; 0/4 SIRS criteria noted on admission  · Continue Lasix 80 mg in am, 40 mg in pm, Aldactone 50 mg by mouth daily    Ascites due to alcoholic cirrhosis (HCC)  Assessment & Plan  · Moderate to large ascites present on admission in setting of alcoholic liver cirrhosis, noted on CT abdomen pelvis  · S/p paracentesis 3/30 8,150 mL of serous ascites  · Supplement albumin 12 5 g 3/30, reviewed CMP today -albumin around baseline at 3 1  · Sodium 133, continue with fluid restriction of 1500 mL, monitor    Thrombocytopenia ([facility] 75 )  Assessment & Plan  · Patient with history of liver disease and thrombocytopenia  · Platelets at 79 today  · Without signs of bleeding on exam  · Morning CBC    Chronic obstructive pulmonary disease, unspecified COPD type ([facility] 75 )  Assessment & Plan  · Patient currently 100% SPO2 on room air  · Not in acute exacerbation; monitor    Stage 3a chronic kidney disease [facility])  Assessment & Plan  Lab Results   Component Value Date    EGFR 63 03/31/2023    EGFR 62 03/30/2023    EGFR 60 03/29/2023    CREATININE 0 92 03/31/2023    CREATININE 0 93 03/30/2023    CREATININE 0 96 03/29/2023     · Creatinine appears baseline  · Avoid nephrotoxic medications and hypotension  · Morning blood work    Type 2 diabetes mellitus [facility])  Assessment & Plan  Lab Results   Component Value Date    HGBA1C 5 8 (H) 08/18/2022       Recent Labs     03/30/23  1058 03/30/23  1630 03/30/23  2133 03/31/23  0745   POCGLU 136 141* 146* 108       Blood Sugar Average: Last 72 hrs:  (P) 126 5   · Patient on Ozempic at home  · With adequate blood sugar control while inpatient  · Continue with SSI with mealtime and nighttime coverage  · Hypoglycemic protocol, Accu-Cheks, diabetic diet    Essential hypertension  Assessment & Plan  · Blood pressure well controlled, SBP noted in 100's  · Continue Lasix, Aldactone, metoprolol succinate 25 mg 24-hour tablet by mouth twice daily with hold parameters  · Continue to monitor    GERD (gastroesophageal reflux disease)  Assessment & Plan  · Continue Protonix 40 mg daily    Sleep apnea  Assessment & Plan  · Follows with sleep medicine in the outpatient setting; currently not on CPAP    VTE Pharmacologic Prophylaxis: VTE Score: 3 Low Risk (Score 0-2) - Encourage Ambulation  Patient Centered Rounds: I performed bedside rounds with nursing staff today  Discussions with Specialists or Other Care Team Provider: Orthopedics, PT/OT    Education and Discussions with Family / Patient: Patient declined call to   Total Time Spent on Date of Encounter in care of patient: 35 minutes This time was spent on one or more of the following: performing physical exam; counseling and coordination of care; obtaining or reviewing history; documenting in the medical record; reviewing/ordering tests, medications or procedures; communicating with other healthcare professionals and discussing with patient's family/caregivers  Current Length of Stay: 2 day(s)  Current Patient Status: Inpatient   Certification Statement: The patient will continue to require additional inpatient hospital stay due to pending OT recs, monitoring bw  Discharge Plan: Anticipate discharge in 24-48 hrs to STR vs HHR    Code Status: Level 2 - DNAR: but accepts endotracheal intubation    Subjective:   Patient seen and examined  Reports she is still with right hip pain but denies it worsening  She did not sleep well last night  Denies any numbness or tingling down the right leg, new onset of weakness, and groin numbness or tingling  She feels after the paracentesis yesterday her abdomen is less bloated, does not feel like she is retaining fluid today  She denies any chest pain, shortness of breath, heart palpitations, or signs of bleeding on exam such as increased bruising, blood in her urine or coughing up blood  She has not had a bowel movement but reports sometimes that is normal, she does not want a stool softener at this time      Objective:     Vitals:   Temp (24hrs), Av 9 °F (36 6 °C), Min:97 5 °F (36 4 °C), Max:98 2 °F (36 8 °C)    Temp:  [97 5 °F (36 4 °C)-98 2 °F (36 8 °C)] 97 5 °F (36 4 °C)  HR:  [58-72] 58  Resp:  [16-18] 18  BP: ()/(46-58) 110/58  SpO2:  [93 %-100 %] 93 %  Body mass index is 35 5 kg/m²  Input and Output Summary (last 24 hours): Intake/Output Summary (Last 24 hours) at 3/31/2023 0958  Last data filed at 3/30/2023 1830  Gross per 24 hour   Intake 1200 ml   Output 8150 ml   Net -6950 ml       Physical Exam:   Physical Exam  Vitals and nursing note reviewed  Constitutional:       General: She is not in acute distress  Appearance: Normal appearance  She is well-developed  She is obese  She is not ill-appearing  Comments: Laying in bed comfortably   HENT:      Head: Normocephalic and atraumatic  Eyes:      Extraocular Movements: Extraocular movements intact  Conjunctiva/sclera: Conjunctivae normal    Cardiovascular:      Rate and Rhythm: Normal rate and regular rhythm  Heart sounds: Normal heart sounds  No murmur heard  Pulmonary:      Effort: Pulmonary effort is normal  No respiratory distress  Breath sounds: Normal breath sounds  Comments: Room air, nonlabored breathing  Abdominal:      General: Bowel sounds are normal  There is no distension  Palpations: Abdomen is soft  Tenderness: There is no abdominal tenderness  Comments: Abdomen with mild amount of fluid, soft to palpation   Musculoskeletal:      Right lower leg: No edema  Left lower leg: No edema  Comments: Decreased range of motion of bilateral hips, baseline for patient   Skin:     General: Skin is warm and dry  Neurological:      Mental Status: She is alert     Psychiatric:         Mood and Affect: Mood normal         Additional Data:     Labs:  Results from last 7 days   Lab Units 03/31/23  0550 03/30/23  0512   WBC Thousand/uL 4 57 5 72   HEMOGLOBIN g/dL 12 8 12 3   HEMATOCRIT % 39 3 38 3   PLATELETS Thousands/uL 67* 81*   NEUTROS PCT %  --  84*   LYMPHS PCT %  --  6*   MONOS PCT %  --  8   EOS PCT %  --  1     Results from last 7 days   Lab Units 03/31/23  0550   SODIUM mmol/L 133*   POTASSIUM mmol/L 3 8   CHLORIDE mmol/L 100   CO2 mmol/L 27   BUN mg/dL 24   CREATININE mg/dL 0 92   ANION GAP mmol/L 6   CALCIUM mg/dL 8 5   ALBUMIN g/dL 3 1*   TOTAL BILIRUBIN mg/dL 1 26*   ALK PHOS U/L 95   ALT U/L 23   AST U/L 19   GLUCOSE RANDOM mg/dL 106         Results from last 7 days   Lab Units 03/31/23  0745 03/30/23  2133 03/30/23  1630 03/30/23  1058 03/30/23  0753 03/29/23  2140   POC GLUCOSE mg/dl 108 146* 141* 136 118 110     Lines/Drains:  Invasive Devices     Peripheral Intravenous Line  Duration           Peripheral IV 03/29/23 Proximal;Right;Ventral (anterior) Antecubital 1 day              Imaging: Reviewed radiology reports from this admission including: abdominal/pelvic CT    Recent Cultures (last 7 days):     Last 24 Hours Medication List:   Current Facility-Administered Medications   Medication Dose Route Frequency Provider Last Rate   • Diclofenac Sodium  2 g Topical 4x Daily Ferny Davis MD     • docusate sodium  100 mg Oral BID Ferny Davis MD     • escitalopram  20 mg Oral Daily Ferny Davis MD     • furosemide  40 mg Oral QPM Ferny Davis MD     • furosemide  80 mg Oral Daily Ferny Davis MD     • heparin (porcine)  5,000 Units Subcutaneous [facility] Ferny Davis MD     • insulin lispro  1-5 Units Subcutaneous TID [facility] Ferny Davis MD     • insulin lispro  1-5 Units Subcutaneous HS Ferny Davis MD     • lidocaine  1 patch Topical Daily Aundrea Retana PA-C     • metoprolol succinate  25 mg Oral Q12H [facility] Ferny Davis MD     • ondansetron  4 mg Intravenous Q6H PRN Ferny Davis MD     • oxyCODONE  5 mg Oral Q4H PRN Aundrea Retana PA-C     • oxyCODONE  2 5 mg Oral Q4H PRN Aundrea Retana PA-C     • pantoprazole  40 mg Oral Daily Before Breakfast Ferny Davis MD     • spironolactone  150 mg Oral Daily Ferny Davis MD          Today, Patient Was Seen By: Aundrea Retana PA-C    **Please Note: This note may have been constructed using a voice recognition system  **

## 2023-03-31 NOTE — PLAN OF CARE
Problem: PHYSICAL THERAPY ADULT  Goal: Performs mobility at highest level of function for planned discharge setting  See evaluation for individualized goals  Description: Treatment/Interventions: Functional transfer training, LE strengthening/ROM, Therapeutic exercise, Endurance training, Patient/family training, Equipment eval/education, Bed mobility, Gait training, Compensatory technique education, Spoke to nursing, OT  Equipment Recommended: Ree Soriano (has, may want BSC)       See flowsheet documentation for full assessment, interventions and recommendations  Outcome: Progressing  Note: Prognosis: Good  Problem List: Decreased mobility, Pain, Obesity  Assessment: Pt  progressing well with overall mobility  pt  needed no hands on assist for any mobility  Increased time and effort noted for supine to sit transfer  Pt  able to perform pericare with S  Pt  reported her pain is much better and currently she just has soreness  Pt  improved well with her mobility and noted no LOB t/o session  Recommend to continue with OPPT upon DC  Will continue to follow per PT POC during hosptial stay to improve functional mobility  Recommended patient may ambulate with staff on unit t/o the day  Barriers to Discharge: None  Barriers to Discharge Comments: recommend OT consult if pt agreeable to rehab  PT Discharge Recommendation: Home with outpatient rehabilitation    See flowsheet documentation for full assessment

## 2023-03-31 NOTE — ASSESSMENT & PLAN NOTE
· Patient reports 4 months since last alcohol use, had 1 glass of wine on New Year's  · Continues without acute events  · Monitor off CIWA protocol

## 2023-03-31 NOTE — PLAN OF CARE
Problem: OCCUPATIONAL THERAPY ADULT  Goal: Performs self-care activities at highest level of function for planned discharge setting  See evaluation for individualized goals  Description: Treatment Interventions: ADL retraining, Functional transfer training, UE strengthening/ROM, Endurance training, Patient/family training, Equipment evaluation/education, Compensatory technique education, Continued evaluation, Activityengagement          See flowsheet documentation for full assessment, interventions and recommendations  Note: Limitation: Decreased ADL status, Decreased UE strength, Decreased endurance, Decreased self-care trans, Decreased high-level ADLs  Prognosis: Good  Assessment: Pt is a 77 y o  female seen for OT evaluation s/p adm to [facility] on 3/29/2023 w/ Right hip pain   Ortho consulted: WBAT R LE  Comorbidities affecting pt’s functional performance include a significant PMH of Anxiety, Arthritis, Asthma, Cirrhosis, Depression, Diabetes, DVT, PNA, Vertigo  Pt with active OT orders and activity orders for Up and OOB as tolerated   Pt lives alone in a one level apt with 0 DENEEN  At baseline, pt was I w/ ADLs, IADLs, and functional transfers/mobility w/ use of SPC, however reports use of RW x2 wks  (+)   +Fall PTA  Upon evaluation, pt currently requires Supervision for UB ADLs, Supervision for LB ADLs, Supervision for toileting, Supervision for bed mobility, and Supervision for functional mobility/transfers 2* the following deficits impacting occupational performance: decreased strength, decreased balance, decreased tolerance and increased pain  These impairments, as well at pt’s fall risk, limited home support, difficulty performing ADLS and difficulty performing IADLS  limit pt’s ability to safely engage in all baseline areas of occupation   Based on the aforementioned OT evaluation, functional performance deficits, and assessments, pt has been identified as a Moderate complexity evaluation  Pt to continue to benefit from continued acute OT services during hospital stay to address defined deficits and to maximize level of functional independence in the following Occupational Performance areas: grooming, bathing/shower, toilet hygiene, dressing, medication management, health maintenance, functional mobility, community mobility and clothing management  From OT standpoint, recommend OPPT upon D/C   OT will continue to follow pt 2-3x/wk to address the following goals to  w/in 10-14 days:     OT Discharge Recommendation: Home with outpatient rehabilitation (OPPT)

## 2023-03-31 NOTE — PHYSICAL THERAPY NOTE
Physical Therapy Treatment Note     03/31/23 1212   PT Last Visit   PT Visit Date 03/31/23   Pain Assessment   Pain Assessment Tool 0-10   Pain Score 2   Pain Location/Orientation Orientation: Right;Location: Groin   Restrictions/Precautions   Weight Bearing Precautions Per Order No   Other Precautions Fall Risk;Pain   Subjective   Subjective Pt  agreeable to PT   Bed Mobility   Supine to Sit 5  Supervision   Additional items HOB elevated; Bedrails; Increased time required   Transfers   Sit to Stand 5  Supervision   Additional items Increased time required   Stand to Sit 5  Supervision   Additional items Increased time required   Stand pivot 5  Supervision   Toilet transfer 5  Supervision   Additional items Increased time required;Standard toilet  (grab bar)   Ambulation/Elevation   Gait pattern Improper Weight shift; Excessively slow; Short stride   Gait Assistance 5  Supervision   Assistive Device Rolling walker   Distance 280ft   Balance   Static Sitting Good   Dynamic Sitting Fair +   Static Standing Fair   Dynamic Standing Fair -   Ambulatory Fair -   Endurance Deficit   Endurance Deficit No   Activity Tolerance   Activity Tolerance Patient tolerated treatment well   Nurse Made Aware Yes   Assessment   Prognosis Good   Problem List Decreased mobility;Pain;Obesity   Assessment Pt  progressing well with overall mobility  pt  needed no hands on assist for any mobility  Increased time and effort noted for supine to sit transfer  Pt  able to perform pericare with S  Pt  reported her pain is much better and currently she just has soreness  Pt  improved well with her mobility and noted no LOB t/o session  Recommend to continue with OPPT upon DC  Will continue to follow per PT POC during hosptial stay to improve functional mobility  Recommended patient may ambulate with staff on unit t/o the day     Barriers to Discharge None   Goals   Patient Goals None reported   STG Expiration Date 04/06/23   PT Treatment Day 1   Plan Treatment/Interventions Functional transfer training;Gait training;Bed mobility; Equipment eval/education;Patient/family training;OT;Spoke to nursing   Progress Progressing toward goals   PT Frequency 3-5x/wk   Recommendation   PT Discharge Recommendation Home with outpatient rehabilitation   Equipment Recommended Walker   AM-PAC Basic Mobility Inpatient   Turning in Flat Bed Without Bedrails 4   Lying on Back to Sitting on Edge of Flat Bed Without Bedrails 4   Moving Bed to Chair 4   Standing Up From Chair Using Arms 4   Walk in Room 4   Climb 3-5 Stairs With Railing 3   Basic Mobility Inpatient Raw Score 23   Basic Mobility Standardized Score 50 88   Highest Level Of Mobility   JH-HLM Goal 7: Walk 25 feet or more   JH-HLM Achieved 8: Walk 250 feet ot more   End of Consult   Patient Position at End of Consult Seated edge of bed; All needs within reach;Bed/Chair alarm activated           Fernando Bettencourt PTA    An AM-PAC basic mobility standardized score less than 42 9 suggest the patient may benefit from discharge to post-acute rehab services

## 2023-04-01 VITALS
RESPIRATION RATE: 18 BRPM | HEART RATE: 64 BPM | WEIGHT: 195.11 LBS | TEMPERATURE: 97.2 F | OXYGEN SATURATION: 98 % | SYSTOLIC BLOOD PRESSURE: 108 MMHG | BODY MASS INDEX: 33.31 KG/M2 | DIASTOLIC BLOOD PRESSURE: 53 MMHG | HEIGHT: 64 IN

## 2023-04-01 LAB
ALBUMIN SERPL BCP-MCNC: 3 G/DL (ref 3.5–5)
ALP SERPL-CCNC: 100 U/L (ref 34–104)
ALT SERPL W P-5'-P-CCNC: 21 U/L (ref 7–52)
ANION GAP SERPL CALCULATED.3IONS-SCNC: 7 MMOL/L (ref 4–13)
AST SERPL W P-5'-P-CCNC: 18 U/L (ref 13–39)
BILIRUB SERPL-MCNC: 1.05 MG/DL (ref 0.2–1)
BUN SERPL-MCNC: 21 MG/DL (ref 5–25)
CALCIUM ALBUM COR SERPL-MCNC: 9.3 MG/DL (ref 8.3–10.1)
CALCIUM SERPL-MCNC: 8.5 MG/DL (ref 8.4–10.2)
CHLORIDE SERPL-SCNC: 98 MMOL/L (ref 96–108)
CO2 SERPL-SCNC: 27 MMOL/L (ref 21–32)
CREAT SERPL-MCNC: 0.81 MG/DL (ref 0.6–1.3)
ERYTHROCYTE [DISTWIDTH] IN BLOOD BY AUTOMATED COUNT: 15.7 % (ref 11.6–15.1)
GFR SERPL CREATININE-BSD FRML MDRD: 73 ML/MIN/1.73SQ M
GLUCOSE SERPL-MCNC: 117 MG/DL (ref 65–140)
GLUCOSE SERPL-MCNC: 118 MG/DL (ref 65–140)
GLUCOSE SERPL-MCNC: 124 MG/DL (ref 65–140)
HCT VFR BLD AUTO: 35.7 % (ref 34.8–46.1)
HGB BLD-MCNC: 12 G/DL (ref 11.5–15.4)
MCH RBC QN AUTO: 30.6 PG (ref 26.8–34.3)
MCHC RBC AUTO-ENTMCNC: 33.6 G/DL (ref 31.4–37.4)
MCV RBC AUTO: 91 FL (ref 82–98)
PLATELET # BLD AUTO: 65 THOUSANDS/UL (ref 149–390)
PMV BLD AUTO: 11.3 FL (ref 8.9–12.7)
POTASSIUM SERPL-SCNC: 3.6 MMOL/L (ref 3.5–5.3)
PROT SERPL-MCNC: 5.9 G/DL (ref 6.4–8.4)
RBC # BLD AUTO: 3.92 MILLION/UL (ref 3.81–5.12)
SODIUM SERPL-SCNC: 132 MMOL/L (ref 135–147)
WBC # BLD AUTO: 5.79 THOUSAND/UL (ref 4.31–10.16)

## 2023-04-01 RX ORDER — METHOCARBAMOL 500 MG/1
500 TABLET, FILM COATED ORAL 3 TIMES DAILY PRN
Qty: 21 TABLET | Refills: 0 | Status: SHIPPED | OUTPATIENT
Start: 2023-04-01

## 2023-04-01 RX ADMIN — ESCITALOPRAM OXALATE 20 MG: 20 TABLET, FILM COATED ORAL at 08:11

## 2023-04-01 RX ADMIN — LIDOCAINE 5% 1 PATCH: 700 PATCH TOPICAL at 08:11

## 2023-04-01 RX ADMIN — PANTOPRAZOLE SODIUM 40 MG: 40 TABLET, DELAYED RELEASE ORAL at 06:05

## 2023-04-01 RX ADMIN — NYSTATIN 1 APPLICATION.: 100000 POWDER TOPICAL at 08:12

## 2023-04-01 RX ADMIN — HEPARIN SODIUM 5000 UNITS: 5000 INJECTION INTRAVENOUS; SUBCUTANEOUS at 06:05

## 2023-04-01 RX ADMIN — OXYCODONE HYDROCHLORIDE 5 MG: 5 TABLET ORAL at 02:09

## 2023-04-01 NOTE — PLAN OF CARE
Problem: Potential for Falls  Goal: Patient will remain free of falls  Description: INTERVENTIONS:  - Educate patient/family on patient safety including physical limitations  - Instruct patient to call for assistance with activity   - Consult OT/PT to assist with strengthening/mobility   - Keep Call bell within reach  - Keep bed low and locked with side rails adjusted as appropriate  - Keep care items and personal belongings within reach  - Initiate and maintain comfort rounds  - Make Fall Risk Sign visible to staff  - Offer Toileting every 2 Hours, in advance of need  - Initiate/Maintain bed alarm  - Obtain necessary fall risk management equipment  - Apply yellow socks and bracelet for high fall risk patients  - Consider moving patient to room near nurses station  Outcome: Progressing     Problem: MOBILITY - ADULT  Goal: Maintain or return to baseline ADL function  Description: INTERVENTIONS:  -  Assess patient's ability to carry out ADLs; assess patient's baseline for ADL function and identify physical deficits which impact ability to perform ADLs (bathing, care of mouth/teeth, toileting, grooming, dressing, etc )  - Assess/evaluate cause of self-care deficits   - Assess range of motion  - Assess patient's mobility; develop plan if impaired  - Assess patient's need for assistive devices and provide as appropriate  - Encourage maximum independence but intervene and supervise when necessary  - Involve family in performance of ADLs  - Assess for home care needs following discharge   - Consider OT consult to assist with ADL evaluation and planning for discharge  - Provide patient education as appropriate  Outcome: Progressing  Goal: Maintains/Returns to pre admission functional level  Description: INTERVENTIONS:  - Perform BMAT or MOVE assessment daily    - Set and communicate daily mobility goal to care team and patient/family/caregiver     - Collaborate with rehabilitation services on mobility goals if consulted  - Perform Range of Motion 2 times a day  - Reposition patient every 2 hours    - Dangle patient 2 times a day  - Stand patient 2 times a day  - Ambulate patient 2 times a day  - Out of bed to chair 2 times a day   - Out of bed for meals 2 times a day  - Out of bed for toileting  - Record patient progress and toleration of activity level   Outcome: Progressing     Problem: Prexisting or High Potential for Compromised Skin Integrity  Goal: Skin integrity is maintained or improved  Description: INTERVENTIONS:  - Identify patients at risk for skin breakdown  - Assess and monitor skin integrity  - Assess and monitor nutrition and hydration status  - Monitor labs   - Assess for incontinence   - Turn and reposition patient  - Assist with mobility/ambulation  - Relieve pressure over bony prominences  - Avoid friction and shearing  - Provide appropriate hygiene as needed including keeping skin clean and dry  - Evaluate need for skin moisturizer/barrier cream  - Collaborate with interdisciplinary team   - Patient/family teaching  - Consider wound care consult   Outcome: Progressing     Problem: PAIN - ADULT  Goal: Verbalizes/displays adequate comfort level or baseline comfort level  Description: Interventions:  - Encourage patient to monitor pain and request assistance  - Assess pain using appropriate pain scale  - Administer analgesics based on type and severity of pain and evaluate response  - Implement non-pharmacological measures as appropriate and evaluate response  - Consider cultural and social influences on pain and pain management  - Notify physician/advanced practitioner if interventions unsuccessful or patient reports new pain  Outcome: Progressing     Problem: INFECTION - ADULT  Goal: Absence or prevention of progression during hospitalization  Description: INTERVENTIONS:  - Assess and monitor for signs and symptoms of infection  - Monitor lab/diagnostic results  - Monitor all insertion sites, i e  indwelling lines, tubes, and drains  - Monitor endotracheal if appropriate and nasal secretions for changes in amount and color  - [place] appropriate cooling/warming therapies per order  - Administer medications as ordered  - Instruct and encourage patient and family to use good hand hygiene technique  - Identify and instruct in appropriate isolation precautions for identified infection/condition  Outcome: Progressing     Problem: SAFETY ADULT  Goal: Patient will remain free of falls  Description: INTERVENTIONS:  - Educate patient/family on patient safety including physical limitations  - Instruct patient to call for assistance with activity   - Consult OT/PT to assist with strengthening/mobility   - Keep Call bell within reach  - Keep bed low and locked with side rails adjusted as appropriate  - Keep care items and personal belongings within reach  - Initiate and maintain comfort rounds  - Make Fall Risk Sign visible to staff  - Offer Toileting every 2 Hours, in advance of need  - Initiate/Maintain bed alarm  - Obtain necessary fall risk management equipment    - Apply yellow socks and bracelet for high fall risk patients  - Consider moving patient to room near nurses station  Outcome: Progressing  Goal: Maintain or return to baseline ADL function  Description: INTERVENTIONS:  -  Assess patient's ability to carry out ADLs; assess patient's baseline for ADL function and identify physical deficits which impact ability to perform ADLs (bathing, care of mouth/teeth, toileting, grooming, dressing, etc )  - Assess/evaluate cause of self-care deficits   - Assess range of motion  - Assess patient's mobility; develop plan if impaired  - Assess patient's need for assistive devices and provide as appropriate  - Encourage maximum independence but intervene and supervise when necessary  - Involve family in performance of ADLs  - Assess for home care needs following discharge   - Consider OT consult to assist with ADL evaluation and planning for discharge  - Provide patient education as appropriate  Outcome: Progressing  Goal: Maintains/Returns to pre admission functional level  Description: INTERVENTIONS:  - Perform BMAT or MOVE assessment daily    - Set and communicate daily mobility goal to care team and patient/family/caregiver  - Collaborate with rehabilitation services on mobility goals if consulted  - Perform Range of Motion 2 times a day  - Reposition patient every 2 hours  - Dangle patient 2 times a day  - Stand patient 2 times a day  - Ambulate patient 2 times a day  - Out of bed to chair 2 times a day   - Out of bed for meals 2 times a day  - Out of bed for toileting  - Record patient progress and toleration of activity level   Outcome: Progressing     Problem: DISCHARGE PLANNING  Goal: Discharge to home or other facility with appropriate resources  Description: INTERVENTIONS:  - Identify barriers to discharge w/patient and caregiver  - Arrange for needed discharge resources and transportation as appropriate  - Identify discharge learning needs (meds, wound care, etc )  - Arrange for interpretive services to assist at discharge as needed  - Refer to Case Management Department for coordinating discharge planning if the patient needs post-hospital services based on physician/advanced practitioner order or complex needs related to functional status, cognitive ability, or social support system  Outcome: Progressing     Problem: Knowledge Deficit  Goal: Patient/family/caregiver demonstrates understanding of disease process, treatment plan, medications, and discharge instructions  Description: Complete learning assessment and assess knowledge base    Interventions:  - Provide teaching at level of understanding  - Provide teaching via preferred learning methods  Outcome: Progressing     Problem: SKIN/TISSUE INTEGRITY - ADULT  Goal: Skin Integrity remains intact(Skin Breakdown Prevention)  Description: Assess:  -Perform Gregory assessment   -Clean and moisturize skin   -Inspect skin when repositioning, toileting, and assisting with ADLS  -Assess under medical devices    -Assess extremities for adequate circulation and sensation     Bed Management:  -Have minimal linens on bed & keep smooth, unwrinkled  -Change linens as needed when moist or perspiring  -Avoid sitting or lying in one position for more than 2 hours while in bed  -Keep HOB at 30 degrees     Toileting:  -Offer bedside commode  -Assess for incontinence  -Use incontinent care products after each incontinent episode  Activity:  -Mobilize patient 2 times a day  -Encourage activity and walks on unit  -Encourage or provide ROM exercises   -Turn and reposition patient every 2 Hours  -Use appropriate equipment to lift or move patient in bed  -Instruct/ Assist with weight shifting every 2 when out of bed in chair  -Consider limitation of chair time 2 hour intervals    Skin Care:  -Avoid use of baby powder, tape, friction and shearing, hot water or constrictive clothing  -Relieve pressure over bony prominences   -Do not massage red bony areas    Next Steps:  -Teach patient strategies to minimize risks  -Consider consults to  interdisciplinary teams,  Outcome: Progressing  Goal: Incision(s), wounds(s) or drain site(s) healing without S/S of infection  Description: INTERVENTIONS  - Assess and document dressing, incision, wound bed, drain sites and surrounding tissue  - Provide patient and family education  - Perform skin care/dressing changes  Outcome: Progressing  Goal: Pressure injury heals and does not worsen  Description: Interventions:  - Implement low air loss mattress or specialty surface (Criteria met)  - Apply silicone foam dressing  - Instruct/assist with weight shifting every 2 minutes when in chair   - Limit chair time to 2 hour intervals  - Use special pressure reducing interventions     - Apply fecal or urinary incontinence containment device   - Perform passive or active ROM every 2  - Turn and reposition patient & offload bony prominences every 2 hours   - Utilize friction reducing device or surface for transfers   - Consider consults to  interdisciplinary teams   - Use incontinent care products after each incontinent episode    - Consider nutrition services referral as needed  Outcome: Progressing     Problem: MUSCULOSKELETAL - ADULT  Goal: Maintain or return mobility to safest level of function  Description: INTERVENTIONS:  - Assess patient's ability to carry out ADLs; assess patient's baseline for ADL function and identify physical deficits which impact ability to perform ADLs (bathing, care of mouth/teeth, toileting, grooming, dressing, etc )  - Assess/evaluate cause of self-care deficits   - Assess range of motion  - Assess patient's mobility  - Assess patient's need for assistive devices and provide as appropriate  - Encourage maximum independence but intervene and supervise when necessary  - Involve family in performance of ADLs  - Assess for home care needs following discharge   - Consider OT consult to assist with ADL evaluation and planning for discharge  - Provide patient education as appropriate  Outcome: Progressing  Goal: Maintain proper alignment of affected body part  Description: INTERVENTIONS:  - Support, maintain and protect limb and body alignment  - Provide patient/ family with appropriate education  Outcome: Progressing

## 2023-04-01 NOTE — ASSESSMENT & PLAN NOTE
· Follows with sleep medicine in the outpatient setting; currently not on CPAP  · Continue scheduled follow-ups

## 2023-04-01 NOTE — ASSESSMENT & PLAN NOTE
Lab Results   Component Value Date    EGFR 73 04/01/2023    EGFR 63 03/31/2023    EGFR 62 03/30/2023    CREATININE 0 81 04/01/2023    CREATININE 0 92 03/31/2023    CREATININE 0 93 03/30/2023     · Creatinine appears baseline  · Repeat BMP in 1 week postdischarge

## 2023-04-01 NOTE — DISCHARGE SUMMARY
[address]  Discharge- Coit Ignacio 1955, 77 y o  female MRN: [id number]  Unit/Bed#: [facility] 2 [facility] [id number] Encounter: [id number]  Primary Care Provider: JONE Jasmine   Date and time admitted to hospital: 3/29/2023 12:52 PM    * Right hip pain  Assessment & Plan  · Patient reports to the ED for evaluation of worsening right hip pain X 2 weeks  · She reports it affects her ADLs and has been seen by orthopedics in the outpatient setting as well as primary care  · Also reports left hip pain, but that was alleviated by injections received at orthopedics  · Right hip however has failed conservative therapy with Mobic, steroids, and injections  · X-ray and CT without fracture; patient denies trauma to the affected area  · Patient reports improvement in right hip pain status post paracentesis  · Appreciate orthopedic recommendations, may consider joint steroid injection if pain worsens/continues  · Continue with pain management - Voltaren gel, Salon pas patch, and robaxin 500 mg TID prn on discharge  · Discussed sedating effects of Robaxin, avoid prior to driving and taking with other sedating medications and/or alcohol  · Patient with similar pain today, no worsening or red flag symptoms  · WBAT, home with home health rehab  · Outpatient rehab and orthopedic follow-up scheduled    Alcohol abuse with other alcohol-induced disorder ([facility] )  Assessment & Plan  · Patient reports 4 months since last alcohol use, had 1 glass of wine on New Year's  · Continues without acute events  · Monitor off CIWA protocol    Cirrhosis ([facility] )  Assessment & Plan  · Patient with history of liver cirrhosis; receives scheduled outpatient paracentesis on a regular basis  · Status post paracentesis 3/30 with improvement in abdominal bloating and hip pain  · Without abdominal pain; 0/4 SIRS criteria noted on admission  · Continue Lasix 80 mg in am, 40 mg in pm, Aldactone 50 mg by mouth daily discharge  · Continue scheduled follow-up with GI outpatient    Ascites due to alcoholic cirrhosis (HCC)  Assessment & Plan  · Moderate to large ascites present on admission in setting of alcoholic liver cirrhosis, noted on CT abdomen pelvis  · S/p paracentesis 3/30 8,150 mL of serous ascites  · Supplement albumin 12 5 g 3/30, reviewed CMP - albumin around baseline  · Repeat BMP in 1 week to assess electrolytes/sodium    Thrombocytopenia ([address] )  Assessment & Plan  · Patient with history of liver disease and thrombocytopenia  · Platelets consistent at 65 today  · Without signs of bleeding on exam  · Repeat CBC in 1 week postdischarge    Stage 3a chronic kidney disease [facility])  Assessment & Plan  Lab Results   Component Value Date    EGFR 73 04/01/2023    EGFR 63 03/31/2023    EGFR 62 03/30/2023    CREATININE 0 81 04/01/2023    CREATININE 0 92 03/31/2023    CREATININE 0 93 03/30/2023     · Creatinine appears baseline  · Repeat BMP in 1 week postdischarge    Type 2 diabetes mellitus [facility])  Assessment & Plan  Lab Results   Component Value Date    HGBA1C 5 8 (H) 08/18/2022       Recent Labs     03/31/23  1611 03/31/23  2057 04/01/23  0737 04/01/23  1122   POCGLU 115 112 118 117       Blood Sugar Average: Last 72 hrs:  (P) 592 [id number]   · Continue with home Ozempic and diabetic diet    Essential hypertension  Assessment & Plan  · SBP noted in 100's while inpatient  · Continue Lasix, Aldactone, metoprolol succinate 25 mg 24-hour tablet by mouth twice daily    GERD (gastroesophageal reflux disease)  Assessment & Plan  · Continue home PPI    Sleep apnea  Assessment & Plan  · Follows with sleep medicine in the outpatient setting; currently not on CPAP  · Continue scheduled follow-ups    Medical Problems     Resolved Problems  Date Reviewed: 4/1/2023   None       Discharging Physician / Practitioner: Zac Foreman PA-C  PCP: Venancio Jones  Admission Date:   Admission Orders (From admission, onward)     Ordered        03/29/23 1701 INPATIENT ADMISSION  Once                      Discharge Date: 04/01/23    Consultations During Hospital Stay:  · Orthopedics, interventional radiology, PT, OT    Procedures Performed:   · Abdominal Paracentesis on 3/30/2023    Significant Findings / Test Results:     CT abdomen pelvis wo contrast  Result Date: 3/29/2023  Impression: Moderate to large ascites  Cirrhotic changes in the liver with splenomegaly suggesting portal hypertension  XR hip/pelv 2-3 vws right  Result Date: 3/17/2023  Impression: No acute osseous abnormality  IR INPATIENT Paracentesis  Result Date: 3/31/2023  FINDINGS: 8150 mL of serous ascites was removed  Impression: Therapeutic paracentesis    Incidental Findings:   · None    Test Results Pending at Discharge (will require follow up): · None     Outpatient Tests Requested:  · Repeat CBC and BMP in 1 week postdischarge    Complications:  None    Reason for Admission: Right hip pain    Hospital Course:   Antionette Araya is a 77 y o  female patient who originally presented to the hospital on 3/29/2023 due to sitting right hip pain for the past 2 weeks  She was seen in the orthopedics office in the outpatient setting and it was noted that her pain failed conservative therapy with steroids and injections  Imaging findings here were without acute fractures and patient denied trauma to the affected area  Patient was put on a multimodal pain regimen and was seen in consultation with orthopedics who suggested symptomatic treatment, weightbearing as tolerated, analgesics as needed  They suggest that she follow-up in the outpatient setting  She was seen by PT and OT multiple times during hospitalization who ultimately suggested outpatient PT versus home health rehab  Patient had both home health rehab and outpatient PT set up  She had a abdominal paracentesis for moderate to large ascites that was noted on admission on March 30, 2023    She is known for getting therapeutic abdominal "paracentesis taps as needed for fluid overload  Patient did well post tap, blood pressures were measured  She got IV albumin x1  She was feeling well on discharge and will have regular follow-up with her primary care doctor, GI and orthopedics outpatient  Please see above list of diagnoses and related plan for additional information  Condition at Discharge: good    Discharge Day Visit / Exam:   Subjective: Patient seen and examined  Reports that she is feeling well today and still with right-sided hip pain  Reports the pain has improved since admission with pain regimen provided here  She denies any new numbness, tingling, weakness in the right leg  She denies any shortness of breath, chest pain, chest pressure, heart palpitations, dizziness, denies any signs of bleeding such as increased bruising or blood in her urine  She has been eating, drinking and using the restroom without difficulties  She is open to home health rehab and feels that it would be a good start to gain strength in her hip  Vitals: Blood Pressure: 108/53 (04/01/23 0740)  Pulse: 64 (04/01/23 0740)  Temperature: (!) 97 2 °F (36 2 °C) (04/01/23 0740)  Temp Source: Oral (03/31/23 1437)  Respirations: 18 (04/01/23 0740)  Height: 5' 4\" (162 6 cm) (03/29/23 2135)  Weight - Scale: 88 5 kg (195 lb 1 7 oz) (04/01/23 0600)  SpO2: 98 % (04/01/23 0740)     Exam:   Physical Exam  Vitals and nursing note reviewed  Constitutional:       General: She is not in acute distress  Appearance: Normal appearance  She is well-developed  She is obese  She is not ill-appearing  Comments: Laying in bed comfortably   HENT:      Head: Normocephalic and atraumatic  Eyes:      Extraocular Movements: Extraocular movements intact  Conjunctiva/sclera: Conjunctivae normal    Cardiovascular:      Rate and Rhythm: Normal rate and regular rhythm  Heart sounds: Normal heart sounds  No murmur heard    Pulmonary:      Effort: Pulmonary effort is " normal  No respiratory distress  Breath sounds: Normal breath sounds  Comments: Room air, breathing unlabored  Abdominal:      General: Bowel sounds are normal       Palpations: Abdomen is soft  Tenderness: There is no abdominal tenderness  Musculoskeletal:      Right lower leg: No edema  Left lower leg: No edema  Comments: Decreased range of motion of bilateral hips, baseline for patient  No new signs of weakness, baseline motor strength intact   Skin:     General: Skin is warm and dry  Capillary Refill: Capillary refill takes less than 2 seconds  Findings: Bruising present  Neurological:      Mental Status: She is alert  Psychiatric:         Mood and Affect: Mood normal         Discussion with Family: Attempted to update  (son) via phone  Left voicemail  Discharge instructions/Information to patient and family:   See after visit summary for information provided to patient and family  Provisions for Follow-Up Care:  See after visit summary for information related to follow-up care and any pertinent home health orders  Disposition:   Home with VNA Services (Reminder: Complete face to face encounter)    Planned Readmission: No     Discharge Statement:  I spent 60 minutes discharging the patient  This time was spent on the day of discharge  I had direct contact with the patient on the day of discharge  Greater than 50% of the total time was spent examining patient, answering all patient questions, arranging and discussing plan of care with patient as well as directly providing post-discharge instructions  Additional time then spent on discharge activities  Discharge Medications:  See after visit summary for reconciled discharge medications provided to patient and/or family        **Please Note: This note may have been constructed using a voice recognition system**

## 2023-04-01 NOTE — PLAN OF CARE
Problem: Potential for Falls  Goal: Patient will remain free of falls  Description: INTERVENTIONS:  - Educate patient/family on patient safety including physical limitations  - Instruct patient to call for assistance with activity   - Consult OT/PT to assist with strengthening/mobility   - Keep Call bell within reach  - Keep bed low and locked with side rails adjusted as appropriate  - Keep care items and personal belongings within reach  - Initiate and maintain comfort rounds  - Make Fall Risk Sign visible to staff  - Apply yellow socks and bracelet for high fall risk patients  - Consider moving patient to room near nurses station  Outcome: Progressing     Problem: MOBILITY - ADULT  Goal: Maintain or return to baseline ADL function  Description: INTERVENTIONS:  -  Assess patient's ability to carry out ADLs; assess patient's baseline for ADL function and identify physical deficits which impact ability to perform ADLs (bathing, care of mouth/teeth, toileting, grooming, dressing, etc )  - Assess/evaluate cause of self-care deficits   - Assess range of motion  - Assess patient's mobility; develop plan if impaired  - Assess patient's need for assistive devices and provide as appropriate  - Encourage maximum independence but intervene and supervise when necessary  - Involve family in performance of ADLs  - Assess for home care needs following discharge   - Consider OT consult to assist with ADL evaluation and planning for discharge  - Provide patient education as appropriate  Outcome: Progressing  Goal: Maintains/Returns to pre admission functional level  Description: INTERVENTIONS:  - Perform BMAT or MOVE assessment daily    - Set and communicate daily mobility goal to care team and patient/family/caregiver     - Collaborate with rehabilitation services on mobility goals if consulted  - Out of bed for toileting  - Record patient progress and toleration of activity level   Outcome: Progressing     Problem: Prexisting or High Potential for Compromised Skin Integrity  Goal: Skin integrity is maintained or improved  Description: INTERVENTIONS:  - Identify patients at risk for skin breakdown  - Assess and monitor skin integrity  - Assess and monitor nutrition and hydration status  - Monitor labs   - Assess for incontinence   - Turn and reposition patient  - Assist with mobility/ambulation  - Relieve pressure over bony prominences  - Avoid friction and shearing  - Provide appropriate hygiene as needed including keeping skin clean and dry  - Evaluate need for skin moisturizer/barrier cream  - Collaborate with interdisciplinary team   - Patient/family teaching  - Consider wound care consult   Outcome: Progressing     Problem: PAIN - ADULT  Goal: Verbalizes/displays adequate comfort level or baseline comfort level  Description: Interventions:  - Encourage patient to monitor pain and request assistance  - Assess pain using appropriate pain scale  - Administer analgesics based on type and severity of pain and evaluate response  - Implement non-pharmacological measures as appropriate and evaluate response  - Consider cultural and social influences on pain and pain management  - Notify physician/advanced practitioner if interventions unsuccessful or patient reports new pain  Outcome: Progressing     Problem: INFECTION - ADULT  Goal: Absence or prevention of progression during hospitalization  Description: INTERVENTIONS:  - Assess and monitor for signs and symptoms of infection  - Monitor lab/diagnostic results  - Monitor all insertion sites, i e  indwelling lines, tubes, and drains  - Monitor endotracheal if appropriate and nasal secretions for changes in amount and color  - [place] appropriate cooling/warming therapies per order  - Administer medications as ordered  - Instruct and encourage patient and family to use good hand hygiene technique  - Identify and instruct in appropriate isolation precautions for identified infection/condition  Outcome: Progressing     Problem: SAFETY ADULT  Goal: Patient will remain free of falls  Description: INTERVENTIONS:  - Educate patient/family on patient safety including physical limitations  - Instruct patient to call for assistance with activity   - Consult OT/PT to assist with strengthening/mobility   - Keep Call bell within reach  - Keep bed low and locked with side rails adjusted as appropriate  - Keep care items and personal belongings within reach  - Initiate and maintain comfort rounds  - Make Fall Risk Sign visible to staff  - Apply yellow socks and bracelet for high fall risk patients  - Consider moving patient to room near nurses station  Outcome: Progressing  Goal: Maintain or return to baseline ADL function  Description: INTERVENTIONS:  -  Assess patient's ability to carry out ADLs; assess patient's baseline for ADL function and identify physical deficits which impact ability to perform ADLs (bathing, care of mouth/teeth, toileting, grooming, dressing, etc )  - Assess/evaluate cause of self-care deficits   - Assess range of motion  - Assess patient's mobility; develop plan if impaired  - Assess patient's need for assistive devices and provide as appropriate  - Encourage maximum independence but intervene and supervise when necessary  - Involve family in performance of ADLs  - Assess for home care needs following discharge   - Consider OT consult to assist with ADL evaluation and planning for discharge  - Provide patient education as appropriate  Outcome: Progressing  Goal: Maintains/Returns to pre admission functional level  Description: INTERVENTIONS:  - Perform BMAT or MOVE assessment daily    - Set and communicate daily mobility goal to care team and patient/family/caregiver     - Collaborate with rehabilitation services on mobility goals if consulted  - Out of bed for toileting  - Record patient progress and toleration of activity level   Outcome: Progressing     Problem: DISCHARGE PLANNING  Goal: Discharge to home or other facility with appropriate resources  Description: INTERVENTIONS:  - Identify barriers to discharge w/patient and caregiver  - Arrange for needed discharge resources and transportation as appropriate  - Identify discharge learning needs (meds, wound care, etc )  - Arrange for interpretive services to assist at discharge as needed  - Refer to Case Management Department for coordinating discharge planning if the patient needs post-hospital services based on physician/advanced practitioner order or complex needs related to functional status, cognitive ability, or social support system  Outcome: Progressing     Problem: Knowledge Deficit  Goal: Patient/family/caregiver demonstrates understanding of disease process, treatment plan, medications, and discharge instructions  Description: Complete learning assessment and assess knowledge base  Interventions:  - Provide teaching at level of understanding  - Provide teaching via preferred learning methods  Outcome: Progressing     Problem: SKIN/TISSUE INTEGRITY - ADULT  Goal: Skin Integrity remains intact(Skin Breakdown Prevention)  Description: Assess:     Toileting:  -Offer bedside commode    Outcome: Progressing  Goal: Pressure injury heals and does not worsen  Description: Interventions:  - Implement low air loss mattress or specialty surface (Criteria met)  - Apply silicone foam dressing  - Consider nutrition services referral as needed  Outcome: Progressing     Problem: MUSCULOSKELETAL - ADULT  Goal: Maintain or return mobility to safest level of function  Description: INTERVENTIONS:  - Assess patient's ability to carry out ADLs; assess patient's baseline for ADL function and identify physical deficits which impact ability to perform ADLs (bathing, care of mouth/teeth, toileting, grooming, dressing, etc )  - Assess/evaluate cause of self-care deficits   - Assess range of motion  - Assess patient's mobility  - Assess patient's need for assistive devices and provide as appropriate  - Encourage maximum independence but intervene and supervise when necessary  - Involve family in performance of ADLs  - Assess for home care needs following discharge   - Consider OT consult to assist with ADL evaluation and planning for discharge  - Provide patient education as appropriate  Outcome: Progressing  Goal: Maintain proper alignment of affected body part  Description: INTERVENTIONS:  - Support, maintain and protect limb and body alignment  - Provide patient/ family with appropriate education  Outcome: Progressing

## 2023-04-01 NOTE — CASE MANAGEMENT
Case Management Discharge Planning Note    Patient name Nani Naqvi  Location [place] [address] 2 /[place] 2 Torrence Osler* MRN [id number]  : 1955 Date 2023       Current Admission Date: 3/29/2023  Current Admission Diagnosis:Right hip pain   Patient Active Problem List    Diagnosis Date Noted   • Right hip pain 2023   • Lumbar back pain 2023   • Post-menopausal 2023   • Liver cell carcinoma ([facility] ) 2023   • Chronic obstructive pulmonary disease, unspecified COPD type ([facility] ) 2023   • Alcohol abuse with other alcohol-induced disorder ([facility] ) 2023   • Stage 3a chronic kidney disease ([facility] ) 2023   • Other forms of angina pectoris ([facility] )    • Hyponatremia 10/07/2021   • OAB (overactive bladder) 2021   • Liver failure without hepatic coma, unspecified chronicity ([facility] ) 2021   • Thrombocytopenia ([facility] ) 2021   • Microscopic colitis 2021   • Diabetes mellitus ([facility] ) 2020   • Shortness of breath 2020   • Increased frequency of urination 2020   • S/P endoscopic carpal tunnel release 2019   • Trigger finger of right thumb 2019   • Cirrhosis ([facility] ) 10/07/2019   • Asthma 10/07/2019   • Sleep apnea 10/07/2019   • Depression, recurrent ([facility] )    • Urinary retention 2019   • Lumbar facet arthropathy 2018   • Decompensated hepatic cirrhosis ([facility] ) 2018   • Essential hypertension 2018   • Hypokalemia 2017   • Acute colitis 2017   • Carpal tunnel syndrome of left wrist 2017   • Primary osteoarthritis of first carpometacarpal joint of left hand 2017   • Trigger thumb, left thumb 2017   • SOB (shortness of breath) 2017   • Palpitation 2016   • Microscopic hematuria 2016   • Midline cystocele 2016   • Dizziness 2015   • Mixed incontinence 2015   • Outlet dysfunction constipation 2015   • Pelvic pain in female 2015   • Recurrent UTI (urinary tract infection) 11/12/2015   • Incisional hernia, without obstruction or gangrene 11/09/2015   • GERD (gastroesophageal reflux disease) 09/29/2015   • Ascites due to alcoholic cirrhosis ([place] [address] ) 96/26/0312   • Portal hypertension ([place] [address] ) 03/08/2013   • Type 2 diabetes mellitus ([place] [address] ) 03/08/2013   • Hepatic cirrhosis ([place] [address] ) 06/25/2012   • Anxiety state 12/08/2011   • DDD (degenerative disc disease), lumbosacral 09/19/2011   • Nontoxic multinodular goiter 07/20/2011   • Other disorders of lung 07/20/2011   • Hemangioma of intra-abdominal structure 02/16/2011   • Allergic rhinitis 02/08/2010   • Mild intermittent asthma 02/08/2010   • Osteoarthrosis 02/08/2010   • Obstructive sleep apnea 02/08/2010   • Chronic nonalcoholic liver disease 94/74/0571      LOS (days): 3  Geometric Mean LOS (GMLOS) (days): 2 50  Days to GMLOS:-0 2     OBJECTIVE:  Risk of Unplanned Readmission Score: 16 47         Current admission status: Inpatient   Preferred Pharmacy:   [address] [place] [address]  Phone: [phone] Fax: [phone]    Primary Care Provider: JONE Land    Primary Insurance: MEDICARE  Secondary Insurance: [address]  DETAILS:    Discharge planning discussed with[de-identified] Patient  Freedom of Choice: Yes  Comments - Freedom of Choice: Home health choice list reviewed   Decided on [address]         Is the patient interested in [address] at discharge?: Yes  [address] requested[de-identified] Occupational Therapy, Physical [address] Name[de-identified] Other Marlene Sprain)  [facility] Provider[de-identified] PCP  Home Health Services Needed[de-identified] Evaluate Functional Status and Safety, Strengthening/Theraputic Exercises to Improve Function, Gait/ADL Training  Homebound Criteria Met[de-identified] Uses an Assist Device (i e  cane, walker, etc)  Supporting Clincal Findings[de-identified] Limited Endurance, Fatigues Easliy in [employer]

## 2023-04-01 NOTE — ASSESSMENT & PLAN NOTE
· Patient with history of liver cirrhosis; receives scheduled outpatient paracentesis on a regular basis  · Status post paracentesis 3/30 with improvement in abdominal bloating and hip pain  · Without abdominal pain; 0/4 SIRS criteria noted on admission  · Continue Lasix 80 mg in am, 40 mg in pm, Aldactone 50 mg by mouth daily discharge  · Continue scheduled follow-up with GI outpatient

## 2023-04-01 NOTE — CASE MANAGEMENT
Case Management Discharge Planning Note    Patient name Shahla Lea  Location [place] [address] 2 /[address] 2 Raynold Erb* MRN [id number]  : 1955 Date 2023       Current Admission Date: 3/29/2023  Current Admission Diagnosis:Right hip pain   Patient Active Problem List    Diagnosis Date Noted   • Right hip pain 2023   • Lumbar back pain 2023   • Post-menopausal 2023   • Liver cell carcinoma ([facility] ) 2023   • Chronic obstructive pulmonary disease, unspecified COPD type ([facility] ) 2023   • Alcohol abuse with other alcohol-induced disorder ([facility] ) 2023   • Stage 3a chronic kidney disease ([facility] ) 2023   • Other forms of angina pectoris ([facility] )    • Hyponatremia 10/07/2021   • OAB (overactive bladder) 2021   • Liver failure without hepatic coma, unspecified chronicity ([facility] ) 2021   • Thrombocytopenia ([facility] ) 2021   • Microscopic colitis 2021   • Diabetes mellitus ([facility] ) 2020   • Shortness of breath 2020   • Increased frequency of urination 2020   • S/P endoscopic carpal tunnel release 2019   • Trigger finger of right thumb 2019   • Cirrhosis ([facility] ) 10/07/2019   • Asthma 10/07/2019   • Sleep apnea 10/07/2019   • Depression, recurrent ([facility] )    • Urinary retention 2019   • Lumbar facet arthropathy 2018   • Decompensated hepatic cirrhosis ([address] ) 2018   • Essential hypertension 2018   • Hypokalemia 2017   • Acute colitis 2017   • Carpal tunnel syndrome of left wrist 2017   • Primary osteoarthritis of first carpometacarpal joint of left hand 2017   • Trigger thumb, left thumb 2017   • SOB (shortness of breath) 2017   • Palpitation 2016   • Microscopic hematuria 2016   • Midline cystocele 2016   • Dizziness 2015   • Mixed incontinence 2015   • Outlet dysfunction constipation 2015   • Pelvic pain in female 2015   • Recurrent UTI (urinary tract infection) 11/12/2015   • Incisional hernia, without obstruction or gangrene 11/09/2015   • GERD (gastroesophageal reflux disease) 09/29/2015   • Ascites due to alcoholic cirrhosis ([place] [address] ) 51/45/2215   • Portal hypertension ([place] [address] ) 03/08/2013   • Type 2 diabetes mellitus ([place] [address] ) 03/08/2013   • Hepatic cirrhosis ([place] [address] ) 06/25/2012   • Anxiety state 12/08/2011   • DDD (degenerative disc disease), lumbosacral 09/19/2011   • Nontoxic multinodular goiter 07/20/2011   • Other disorders of lung 07/20/2011   • Hemangioma of intra-abdominal structure 02/16/2011   • Allergic rhinitis 02/08/2010   • Mild intermittent asthma 02/08/2010   • Osteoarthrosis 02/08/2010   • Obstructive sleep apnea 02/08/2010   • Chronic nonalcoholic liver disease 04/25/8378      LOS (days): 3  Geometric Mean LOS (GMLOS) (days): 2 50  Days to GMLOS:-0 2     OBJECTIVE:  Risk of Unplanned Readmission Score: 16 47         Current admission status: Inpatient   Preferred Pharmacy:   [address] [place] [id number]  Phone: [phone] Fax: [phone]    Primary Care Provider: JONE Phelan    Primary Insurance: MEDICARE  Secondary Insurance: [facility] DETAILS:    Discharge planning discussed with[de-identified] Patient  Freedom of Choice: Yes  Comments - Freedom of Choice: Pt would like to start with home PT in the home until her hip pain decreases as she drives herself and does not feel comfortable driving yet   Referrals placed  CM contacted family/caregiver?: No- see comments (Pt reported no need)  Were Treatment Team discharge recommendations reviewed with patient/caregiver?: Yes  Did patient/caregiver verbalize understanding of patient care needs?: Yes  Were patient/caregiver advised of the risks associated with not following Treatment Team discharge recommendations?: Yes         Requested [address]         Is the patient interested in [address] at discharge?: Yes  Home Health Discipline requested[de-identified] Occupational Therapy, Physical [address] Name[de-identified] Other  [address] Provider[de-identified] PCP  Home Health Services Needed[de-identified] Evaluate Functional Status and Safety, Gait/ADL Training, Strengthening/Theraputic Exercises to Improve Function  Homebound Criteria Met[de-identified] Uses an Assist Device (i e  cane, walker, etc)  Supporting Clincal Findings[de-identified] Limited Endurance, Fatigues Easliy in Short Distances    DME Referral Provided  Referral made for DME?: No    Other Referral/Resources/Interventions Provided:  Interventions: C  Referral Comments: [facility] referrals placed via aidin         Treatment Team Recommendation: Home (OPPT)  Discharge Destination Plan[de-identified] Home with [place] at Discharge : Family                       Accompanied by: Family member              Additional Comments: CM spoke with pt regarding home health therapy  Pt reported that she would like to have therapy in the home to start as she is nervous about driving with her hip pain  Pt is hoping to have therapy in the home and once her hip pain begins to subside would transition to OPPT  CM placed referrals for [facility] via aidin

## 2023-04-01 NOTE — ASSESSMENT & PLAN NOTE
Lab Results   Component Value Date    HGBA1C 5 8 (H) 08/18/2022       Recent Labs     03/31/23  1611 03/31/23 2057 04/01/23  0737 04/01/23  1122   POCGLU 115 112 118 117       Blood Sugar Average: Last 72 hrs:  (P) 059 [id number]   · Continue with home Ozempic and diabetic diet

## 2023-04-01 NOTE — PLAN OF CARE
Problem: Potential for Falls  Goal: Patient will remain free of falls  Description: INTERVENTIONS:  - Educate patient/family on patient safety including physical limitations  - Instruct patient to call for assistance with activity   - Consult OT/PT to assist with strengthening/mobility   - Keep Call bell within reach  - Keep bed low and locked with side rails adjusted as appropriate  - Keep care items and personal belongings within reach  - Initiate and maintain comfort rounds  - Make Fall Risk Sign visible to staff  - Apply yellow socks and bracelet for high fall risk patients  - Consider moving patient to room near nurses station  4/1/2023 1339 by Brad Singletary RN  Outcome: Adequate for Discharge  4/1/2023 0751 by Brad Singletary RN  Outcome: Progressing     Problem: MOBILITY - ADULT  Goal: Maintain or return to baseline ADL function  Description: INTERVENTIONS:  -  Assess patient's ability to carry out ADLs; assess patient's baseline for ADL function and identify physical deficits which impact ability to perform ADLs (bathing, care of mouth/teeth, toileting, grooming, dressing, etc )  - Assess/evaluate cause of self-care deficits   - Assess range of motion  - Assess patient's mobility; develop plan if impaired  - Assess patient's need for assistive devices and provide as appropriate  - Encourage maximum independence but intervene and supervise when necessary  - Involve family in performance of ADLs  - Assess for home care needs following discharge   - Consider OT consult to assist with ADL evaluation and planning for discharge  - Provide patient education as appropriate  4/1/2023 1339 by Brad Singletary RN  Outcome: Adequate for Discharge  4/1/2023 0751 by Brad Singletary RN  Outcome: Progressing  Goal: Maintains/Returns to pre admission functional level  Description: INTERVENTIONS:  - Perform BMAT or MOVE assessment daily    - Set and communicate daily mobility goal to care team and patient/family/caregiver     - Collaborate with rehabilitation services on mobility goals if consulted  - Out of bed for toileting  - Record patient progress and toleration of activity level   4/1/2023 1339 by Antonella De RN  Outcome: Adequate for Discharge  4/1/2023 0751 by Antonella De RN  Outcome: Progressing     Problem: Prexisting or High Potential for Compromised Skin Integrity  Goal: Skin integrity is maintained or improved  Description: INTERVENTIONS:  - Identify patients at risk for skin breakdown  - Assess and monitor skin integrity  - Assess and monitor nutrition and hydration status  - Monitor labs   - Assess for incontinence   - Turn and reposition patient  - Assist with mobility/ambulation  - Relieve pressure over bony prominences  - Avoid friction and shearing  - Provide appropriate hygiene as needed including keeping skin clean and dry  - Evaluate need for skin moisturizer/barrier cream  - Collaborate with interdisciplinary team   - Patient/family teaching  - Consider wound care consult   4/1/2023 1339 by Antonella De RN  Outcome: Adequate for Discharge  4/1/2023 0751 by Antonella De RN  Outcome: Progressing     Problem: PAIN - ADULT  Goal: Verbalizes/displays adequate comfort level or baseline comfort level  Description: Interventions:  - Encourage patient to monitor pain and request assistance  - Assess pain using appropriate pain scale  - Administer analgesics based on type and severity of pain and evaluate response  - Implement non-pharmacological measures as appropriate and evaluate response  - Consider cultural and social influences on pain and pain management  - Notify physician/advanced practitioner if interventions unsuccessful or patient reports new pain  4/1/2023 1339 by Antonella De RN  Outcome: Adequate for Discharge  4/1/2023 0751 by Antonella De RN  Outcome: Progressing     Problem: INFECTION - ADULT  Goal: Absence or prevention of progression during hospitalization  Description: INTERVENTIONS:  - Assess and monitor for signs and symptoms of infection  - Monitor lab/diagnostic results  - Monitor all insertion sites, i e  indwelling lines, tubes, and drains  - Monitor endotracheal if appropriate and nasal secretions for changes in amount and color  - [place] appropriate cooling/warming therapies per order  - Administer medications as ordered  - Instruct and encourage patient and family to use good hand hygiene technique  - Identify and instruct in appropriate isolation precautions for identified infection/condition  4/1/2023 1339 by Elizabeth Morley RN  Outcome: Adequate for Discharge  4/1/2023 0751 by Elizabeth Morley RN  Outcome: Progressing     Problem: SAFETY ADULT  Goal: Patient will remain free of falls  Description: INTERVENTIONS:  - Educate patient/family on patient safety including physical limitations  - Instruct patient to call for assistance with activity   - Consult OT/PT to assist with strengthening/mobility   - Keep Call bell within reach  - Keep bed low and locked with side rails adjusted as appropriate  - Keep care items and personal belongings within reach  - Initiate and maintain comfort rounds  - Make Fall Risk Sign visible to staff  - Apply yellow socks and bracelet for high fall risk patients  - Consider moving patient to room near nurses station  4/1/2023 1339 by Elizabeth Morley RN  Outcome: Adequate for Discharge  4/1/2023 0751 by Elizabeth Morley RN  Outcome: Progressing  Goal: Maintain or return to baseline ADL function  Description: INTERVENTIONS:  -  Assess patient's ability to carry out ADLs; assess patient's baseline for ADL function and identify physical deficits which impact ability to perform ADLs (bathing, care of mouth/teeth, toileting, grooming, dressing, etc )  - Assess/evaluate cause of self-care deficits   - Assess range of motion  - Assess patient's mobility; develop plan if impaired  - Assess patient's need for assistive devices and provide as appropriate  - Encourage maximum independence but intervene and supervise when necessary  - Involve family in performance of ADLs  - Assess for home care needs following discharge   - Consider OT consult to assist with ADL evaluation and planning for discharge  - Provide patient education as appropriate  4/1/2023 1339 by Rickie Rey RN  Outcome: Adequate for Discharge  4/1/2023 0751 by Rickie Rey RN  Outcome: Progressing  Goal: Maintains/Returns to pre admission functional level  Description: INTERVENTIONS:  - Perform BMAT or MOVE assessment daily    - Set and communicate daily mobility goal to care team and patient/family/caregiver  - Collaborate with rehabilitation services on mobility goals if consulted  - Out of bed for toileting  - Record patient progress and toleration of activity level   4/1/2023 1339 by Rickie Rey RN  Outcome: Adequate for Discharge  4/1/2023 0751 by Rickie Rey RN  Outcome: Progressing     Problem: DISCHARGE PLANNING  Goal: Discharge to home or other facility with appropriate resources  Description: INTERVENTIONS:  - Identify barriers to discharge w/patient and caregiver  - Arrange for needed discharge resources and transportation as appropriate  - Identify discharge learning needs (meds, wound care, etc )  - Arrange for interpretive services to assist at discharge as needed  - Refer to Case Management Department for coordinating discharge planning if the patient needs post-hospital services based on physician/advanced practitioner order or complex needs related to functional status, cognitive ability, or social support system  4/1/2023 1339 by Rickie Rey RN  Outcome: Adequate for Discharge  4/1/2023 0751 by Rickie Rey RN  Outcome: Progressing     Problem: Knowledge Deficit  Goal: Patient/family/caregiver demonstrates understanding of disease process, treatment plan, medications, and discharge instructions  Description: Complete learning assessment and assess knowledge base    Interventions:  - Provide teaching at level of understanding  - Provide teaching via preferred learning methods  4/1/2023 1339 by Shawn Moncada RN  Outcome: Adequate for Discharge  4/1/2023 0751 by Shawn Moncada RN  Outcome: Progressing     Problem: SKIN/TISSUE INTEGRITY - ADULT  Goal: Skin Integrity remains intact(Skin Breakdown Prevention)  Description: Assess:  -Perform Gregory assessment every     -Do not massage red bony areas    4/1/2023 1339 by Shawn Moncada RN  Outcome: Adequate for Discharge  4/1/2023 0751 by Shawn Moncada RN  Outcome: Progressing  Goal: Incision(s), wounds(s) or drain site(s) healing without S/S of infection  Description: INTERVENTIONS  - Assess and document dressing, incision, wound bed, drain sites and surrounding tissue  4/1/2023 1339 by Shawn Moncada RN  Outcome: Adequate for Discharge  4/1/2023 0751 by Shawn Moncada RN  Outcome: Progressing  Goal: Pressure injury heals and does not worsen  Description: Interventions:  - Implement low air loss mattress or specialty surface (Criteria met)  - Apply silicone foam dressing  - Consider nutrition services referral as needed  4/1/2023 1339 by Shawn Moncada RN  Outcome: Adequate for Discharge  4/1/2023 0751 by Shawn Moncada RN  Outcome: Progressing     Problem: MUSCULOSKELETAL - ADULT  Goal: Maintain or return mobility to safest level of function  Description: INTERVENTIONS:  - Assess patient's ability to carry out ADLs; assess patient's baseline for ADL function and identify physical deficits which impact ability to perform ADLs (bathing, care of mouth/teeth, toileting, grooming, dressing, etc )  - Assess/evaluate cause of self-care deficits   - Assess range of motion  - Assess patient's mobility  - Assess patient's need for assistive devices and provide as appropriate  - Encourage maximum independence but intervene and supervise when necessary  - Involve family in performance of ADLs  - Assess for home care needs following discharge   - Consider OT consult to assist with ADL evaluation and planning for discharge  - Provide patient education as appropriate  4/1/2023 1339 by Rossana Lopez RN  Outcome: Adequate for Discharge  4/1/2023 0751 by Rossana Lopez RN  Outcome: Progressing  Goal: Maintain proper alignment of affected body part  Description: INTERVENTIONS:  - Support, maintain and protect limb and body alignment  - Provide patient/ family with appropriate education  4/1/2023 1339 by Rossana Lopez RN  Outcome: Adequate for Discharge  4/1/2023 0751 by Rossana Lopez RN  Outcome: Progressing

## 2023-04-01 NOTE — ASSESSMENT & PLAN NOTE
· Moderate to large ascites present on admission in setting of alcoholic liver cirrhosis, noted on CT abdomen pelvis  · S/p paracentesis 3/30 8,150 mL of serous ascites  · Supplement albumin 12 5 g 3/30, reviewed CMP - albumin around baseline  · Repeat BMP in 1 week to assess electrolytes/sodium

## 2023-04-01 NOTE — ASSESSMENT & PLAN NOTE
· SBP noted in 100's while inpatient  · Continue Lasix, Aldactone, metoprolol succinate 25 mg 24-hour tablet by mouth twice daily

## 2023-04-01 NOTE — ASSESSMENT & PLAN NOTE
· Patient with history of liver disease and thrombocytopenia  · Platelets consistent at 65 today  · Without signs of bleeding on exam  · Repeat CBC in 1 week postdischarge

## 2023-04-01 NOTE — ASSESSMENT & PLAN NOTE
· Patient reports to the ED for evaluation of worsening right hip pain X 2 weeks  · She reports it affects her ADLs and has been seen by orthopedics in the outpatient setting as well as primary care  · Also reports left hip pain, but that was alleviated by injections received at orthopedics  · Right hip however has failed conservative therapy with Mobic, steroids, and injections  · X-ray and CT without fracture; patient denies trauma to the affected area  · Patient reports improvement in right hip pain status post paracentesis  · Appreciate orthopedic recommendations, may consider joint steroid injection if pain worsens/continues  · Continue with pain management - Voltaren gel, Salon pas patch, and robaxin 500 mg TID prn on discharge  · Discussed sedating effects of Robaxin, avoid prior to driving and taking with other sedating medications and/or alcohol  · Patient with similar pain today, no worsening or red flag symptoms  · WBAT, home with home health rehab  · Outpatient rehab and orthopedic follow-up scheduled

## 2023-04-01 NOTE — NURSING NOTE
Patient discharged 4/1/2023 1:43 PM  AVS and discharge instructions reviewed with patient  All questions answered  Patient verbalized having all belongings for discharge  Patient taken to exit by RN in wheelchair to be taken home by jasvir Cutler 4/1/2023 1:44 PM

## 2023-04-03 ENCOUNTER — PATIENT OUTREACH (OUTPATIENT)
Age: 68
End: 2023-04-03

## 2023-04-03 ENCOUNTER — TRANSITIONAL CARE MANAGEMENT (OUTPATIENT)
Age: 68
End: 2023-04-03

## 2023-04-03 ENCOUNTER — TELEPHONE (OUTPATIENT)
Age: 68
End: 2023-04-03

## 2023-04-03 DIAGNOSIS — Z71.89 COMPLEX CARE COORDINATION: Primary | ICD-10-CM

## 2023-04-05 ENCOUNTER — PATIENT OUTREACH (OUTPATIENT)
Age: 68
End: 2023-04-05

## 2023-04-05 NOTE — PROGRESS NOTES
Outpatient Care Management Note:    New [facility] referral received  Care manager called Danielle Beltran  She states that she is doing well at home  She was seen by Bruno MARTINEZ this morning  She will be having home Physical and Occupational therapy  She will be canceling her outpatient therapy until her VNA services end  Danielle Beltran states that she ambulates with a walker  She notes that once she gets out of bed her mobility improves  She is using the robaxin at night  CM did review that it can make her groggy, so she needs to be careful and use her walker at all times  Danielle Beltran has all follow up appts scheduled  She states that her sister usually transports her, but she can drive  CM encouraged her to have her sister take her until therapy feels she is safe to drive  Danielle Beltran checks her weights daily, but she forgot to do it today  If her weight is trending upwards and she is getting short of breath, she schedules a paracentesis  She states she has a standing order  She denies increased abdominal swelling or shortness of breath presently  She did note that she is to be following a fluid restriction of 4-8 ounce glasses of fluid per day  Danielle Beltran is to have blood work completed in 1 week  She will do it tomorrow at [facility]  Med review completed  Danielle Beltran is diabetic  Her blood sugar this morning was 110  Her last [id number]  CM gave Danielle Beltran my contact information  She knows to call her PCP office directly for any immediate questions or concerns

## 2023-04-06 ENCOUNTER — TELEPHONE (OUTPATIENT)
Age: 68
End: 2023-04-06

## 2023-04-06 ENCOUNTER — APPOINTMENT (OUTPATIENT)
Age: 68
End: 2023-04-06

## 2023-04-06 ENCOUNTER — OFFICE VISIT (OUTPATIENT)
Age: 68
End: 2023-04-06

## 2023-04-06 VITALS
HEART RATE: 72 BPM | WEIGHT: 197.53 LBS | SYSTOLIC BLOOD PRESSURE: 116 MMHG | BODY MASS INDEX: 33.72 KG/M2 | DIASTOLIC BLOOD PRESSURE: 62 MMHG | RESPIRATION RATE: 16 BRPM | OXYGEN SATURATION: 100 % | HEIGHT: 64 IN | TEMPERATURE: 98.5 F

## 2023-04-06 DIAGNOSIS — D69.6 THROMBOCYTOPENIA (HCC): ICD-10-CM

## 2023-04-06 DIAGNOSIS — M25.551 RIGHT HIP PAIN: Primary | ICD-10-CM

## 2023-04-06 DIAGNOSIS — K70.31 ASCITES DUE TO ALCOHOLIC CIRRHOSIS (HCC): ICD-10-CM

## 2023-04-06 DIAGNOSIS — M54.50 LUMBAR BACK PAIN: ICD-10-CM

## 2023-04-06 DIAGNOSIS — K74.60 HEPATIC CIRRHOSIS (HCC): ICD-10-CM

## 2023-04-06 DIAGNOSIS — E11.9 TYPE 2 DIABETES MELLITUS WITHOUT COMPLICATION, WITHOUT LONG-TERM CURRENT USE OF INSULIN (HCC): ICD-10-CM

## 2023-04-06 DIAGNOSIS — E87.1 HYPONATREMIA: ICD-10-CM

## 2023-04-06 LAB
ANION GAP SERPL CALCULATED.3IONS-SCNC: 5 MMOL/L (ref 4–13)
BUN SERPL-MCNC: 23 MG/DL (ref 5–25)
CALCIUM SERPL-MCNC: 8.9 MG/DL (ref 8.3–10.1)
CHLORIDE SERPL-SCNC: 99 MMOL/L (ref 96–108)
CO2 SERPL-SCNC: 27 MMOL/L (ref 21–32)
CREAT SERPL-MCNC: 1.02 MG/DL (ref 0.6–1.3)
ERYTHROCYTE [DISTWIDTH] IN BLOOD BY AUTOMATED COUNT: 16.3 % (ref 11.6–15.1)
GFR SERPL CREATININE-BSD FRML MDRD: 55 ML/MIN/1.73SQ M
GLUCOSE P FAST SERPL-MCNC: 130 MG/DL (ref 65–99)
HCT VFR BLD AUTO: 39.3 % (ref 34.8–46.1)
HGB BLD-MCNC: 12.8 G/DL (ref 11.5–15.4)
MCH RBC QN AUTO: 30.3 PG (ref 26.8–34.3)
MCHC RBC AUTO-ENTMCNC: 32.6 G/DL (ref 31.4–37.4)
MCV RBC AUTO: 93 FL (ref 82–98)
PLATELET # BLD AUTO: 93 THOUSANDS/UL (ref 149–390)
PMV BLD AUTO: 12.2 FL (ref 8.9–12.7)
POTASSIUM SERPL-SCNC: 4.1 MMOL/L (ref 3.5–5.3)
RBC # BLD AUTO: 4.23 MILLION/UL (ref 3.81–5.12)
SODIUM SERPL-SCNC: 131 MMOL/L (ref 135–147)
WBC # BLD AUTO: 6.19 THOUSAND/UL (ref 4.31–10.16)

## 2023-04-06 NOTE — TELEPHONE ENCOUNTER
Spoke with Edwin Adhikari at [facility], informed me that patient is scheduled to be seen tomorrow for an evaluation and that the patient will be contacted tonight to confirm a time  Patient notified via phone of update

## 2023-04-06 NOTE — ASSESSMENT & PLAN NOTE
"Reports getting \"muscle spasms in the back    daily and depends on how I move\"  Taking Robaxin prn     "

## 2023-04-06 NOTE — PROGRESS NOTES
"Assessment & Plan     See [facility] specific charting below    Patient had a CBC and BMP drawn this morning to reassess hyponatremia and thrombocytopenia that was noted while hospitalized  Currently awaiting results  Patient has a follow-up with Dr Jenny Ren on the 11th (GI team)    Next follow-up to be determined pending lab results  Patient canceled her visit on April 20th, reports being \"overwhelmed with all of my visits\"  1  Right hip pain  Assessment & Plan:  Currently taking tylenol and robaxin prn for pain, did not take this morning  Currently rates pain 5/10, improved with walking and worse in the morning  Currently walking 1-2 blocks on her own, waiting for [facility] to call to schedule home PT/OT  Patient has a follow-up with Dr Gosia Anderson of orthopedics on April 24th, patient was last seen by their team on March 23rd  Patient received steroid injections at that time  Current plan is to continue prn Tylenol and Robaxin for pain control  I will personally reach out to [facility] to assess when home care will start  2  Lumbar back pain  Assessment & Plan:  Reports getting \"muscle spasms in the back    daily and depends on how I move\"  Taking Robaxin prn        3  Hyponatremia    4  Thrombocytopenia ([address] )    5  Ascites due to alcoholic cirrhosis ([facility] [address] )    6  Type 2 diabetes mellitus without complication, without long-term current use of insulin [facility])  Assessment & Plan:    Advised patient to re-schedule f/u with Dr Ruthanne Eisenmenger, last visit was cancelled  Lab Results   Component Value Date    HGBA1C 5 8 (H) 08/18/2022              Subjective     Transitional Care Management Review:   Daniela Olivares is a 77 y o  female here for TCM follow up       During the TCM phone call patient stated:  TCM Call     Date and time call was made  4/3/2023  9:45 AM    Hospital care reviewed  Records reviewed    Patient was hospitialized at  [facility] - CLOSED    Date of Admission  03/29/23    Date of discharge  04/01/23    " "Diagnosis  R Hip Pain ([address])    Disposition  Home    Current Symptoms  --  Hip Pain      TCM Call     Post hospital issues  None    Should patient be enrolled in anticoag monitoring? No    Scheduled for follow up? Yes    Patients specialists  Other (comment)    Did you obtain your prescribed medications  Yes    Do you need help managing your prescriptions or medications  No    Is transportation to your appointment needed  Yes    Specify why  Can not drive currently    Support System  Family    The type of support provided  Emotional; Physical    Do you have social support  Yes, as much as I need    Are you recieving any outpatient services  Yes    What type of services  PT/OT    Are you using any community resources  No    Current waiver services  No    Have you fallen in the last 12 months  No    Interperter language line needed  No    Counseling  Patient        TCM visit today, hospitalized on 3/29/23 for R hip pain  Patient had paracentesis while admitted  Review of Systems   Constitutional: Negative  HENT: Negative  Eyes: Negative  Respiratory: Negative  Cardiovascular: Negative  Negative for leg swelling  Gastrointestinal: Negative  Negative for abdominal pain  Endocrine: Negative  Genitourinary: Negative  Musculoskeletal: Positive for arthralgias and back pain  Skin: Negative  Allergic/Immunologic: Negative  Neurological: Negative  Hematological: Negative  Psychiatric/Behavioral: Negative  Objective     /62 (BP Location: Left arm, Patient Position: Sitting, Cuff Size: Large)   Pulse 72   Temp 98 5 °F (36 9 °C) (Tympanic)   Resp 16   Ht 5' 4\" (1 626 m)   Wt 89 6 kg (197 lb 8 5 oz)   SpO2 100%   BMI 33 91 kg/m²      Physical Exam  Constitutional:       General: She is not in acute distress  Appearance: Normal appearance  She is not ill-appearing  HENT:      Nose: Nose normal    Eyes:      Extraocular Movements: Extraocular movements intact   " "     Conjunctiva/sclera: Conjunctivae normal    Cardiovascular:      Rate and Rhythm: Normal rate and regular rhythm  Heart sounds: Normal heart sounds  Pulmonary:      Effort: Pulmonary effort is normal  No respiratory distress  Breath sounds: Normal breath sounds  No wheezing  Abdominal:      General: Abdomen is flat  There is no distension  Musculoskeletal:      Cervical back: Normal range of motion  Lumbar back: Tenderness present  No bony tenderness  Back:       Right hip: Tenderness and bony tenderness present  No deformity or crepitus  Decreased range of motion  Comments: Area circled: \"soreness\" to palpation   Skin:     Findings: No erythema or rash  Neurological:      Mental Status: She is alert and oriented to person, place, and time     Psychiatric:         Mood and Affect: Mood normal          Behavior: Behavior normal        Medications have been reviewed by provider in current encounter    JONE Lim       "

## 2023-04-06 NOTE — ASSESSMENT & PLAN NOTE
Currently taking tylenol and robaxin prn for pain, did not take this morning  Currently rates pain 5/10, improved with walking and worse in the morning  Currently walking 1-2 blocks on her own, waiting for [facility] to call to schedule home PT/OT  Patient has a follow-up with Dr Branden Byrd of orthopedics on April 24th, patient was last seen by their team on March 23rd  Patient received steroid injections at that time  Current plan is to continue prn Tylenol and Robaxin for pain control  I will personally reach out to [facility] to assess when home care will start

## 2023-04-06 NOTE — ASSESSMENT & PLAN NOTE
Advised patient to re-schedule f/u with Dr Sebastien Bustillos, last visit was cancelled       Lab Results   Component Value Date    HGBA1C 5 8 (H) 08/18/2022

## 2023-04-06 NOTE — PATIENT INSTRUCTIONS
- Call Dr Monique Limon to schedule a diabetes follow-up visit  - Continue daily walks, I will call therapy to check on when they plan to start  - We will contact you if a follow-up is needed sooner based on lab results     - Scheduled to Dr Misael Howard on 4/11/23

## 2023-04-24 ENCOUNTER — OFFICE VISIT (OUTPATIENT)
Age: 68
End: 2023-04-24

## 2023-04-24 ENCOUNTER — APPOINTMENT (OUTPATIENT)
Age: 68
End: 2023-04-24

## 2023-04-24 VITALS
OXYGEN SATURATION: 100 % | HEART RATE: 68 BPM | SYSTOLIC BLOOD PRESSURE: 106 MMHG | WEIGHT: 209.44 LBS | BODY MASS INDEX: 35.95 KG/M2 | TEMPERATURE: 96.9 F | DIASTOLIC BLOOD PRESSURE: 94 MMHG

## 2023-04-24 VITALS — DIASTOLIC BLOOD PRESSURE: 80 MMHG | HEART RATE: 72 BPM | SYSTOLIC BLOOD PRESSURE: 110 MMHG

## 2023-04-24 DIAGNOSIS — I20.8 OTHER FORMS OF ANGINA PECTORIS (HCC): ICD-10-CM

## 2023-04-24 DIAGNOSIS — F33.9 DEPRESSION, RECURRENT (HCC): ICD-10-CM

## 2023-04-24 DIAGNOSIS — K70.31 ASCITES DUE TO ALCOHOLIC CIRRHOSIS (HCC): ICD-10-CM

## 2023-04-24 DIAGNOSIS — D69.6 THROMBOCYTOPENIA (HCC): ICD-10-CM

## 2023-04-24 DIAGNOSIS — E11.9 TYPE 2 DIABETES MELLITUS WITHOUT COMPLICATION, WITHOUT LONG-TERM CURRENT USE OF INSULIN (HCC): ICD-10-CM

## 2023-04-24 DIAGNOSIS — M25.551 RIGHT HIP PAIN: ICD-10-CM

## 2023-04-24 DIAGNOSIS — M25.551 ACUTE RIGHT HIP PAIN: Primary | ICD-10-CM

## 2023-04-24 DIAGNOSIS — E87.1 HYPONATREMIA: ICD-10-CM

## 2023-04-24 DIAGNOSIS — M25.551 GREATER TROCHANTERIC PAIN SYNDROME OF RIGHT LOWER EXTREMITY: ICD-10-CM

## 2023-04-24 DIAGNOSIS — M25.552 ACUTE HIP PAIN, LEFT: ICD-10-CM

## 2023-04-24 DIAGNOSIS — E87.1 HYPONATREMIA: Primary | ICD-10-CM

## 2023-04-24 PROBLEM — R06.02 SHORTNESS OF BREATH: Status: RESOLVED | Noted: 2020-07-19 | Resolved: 2023-04-24

## 2023-04-24 LAB
ANION GAP SERPL CALCULATED.3IONS-SCNC: 8 MMOL/L (ref 4–13)
BUN SERPL-MCNC: 20 MG/DL (ref 5–25)
CALCIUM SERPL-MCNC: 9.6 MG/DL (ref 8.3–10.1)
CHLORIDE SERPL-SCNC: 101 MMOL/L (ref 96–108)
CO2 SERPL-SCNC: 25 MMOL/L (ref 21–32)
CREAT SERPL-MCNC: 1.24 MG/DL (ref 0.6–1.3)
GFR SERPL CREATININE-BSD FRML MDRD: 44 ML/MIN/1.73SQ M
GLUCOSE SERPL-MCNC: 129 MG/DL (ref 65–140)
OSMOLALITY UR/SERPL-RTO: 292 MMOL/KG (ref 282–298)
POTASSIUM SERPL-SCNC: 4.6 MMOL/L (ref 3.5–5.3)
SODIUM SERPL-SCNC: 134 MMOL/L (ref 135–147)

## 2023-04-24 RX ADMIN — TRIAMCINOLONE ACETONIDE 40 MG: 40 INJECTION, SUSPENSION INTRA-ARTICULAR; INTRAMUSCULAR at 11:07

## 2023-04-24 RX ADMIN — BUPIVACAINE HYDROCHLORIDE 2 ML: 2.5 INJECTION, SOLUTION INFILTRATION; PERINEURAL at 11:07

## 2023-04-24 NOTE — ASSESSMENT & PLAN NOTE
"Saw Dr Warren Blood this morning, had a repeat cortisone injection in R hip  Will return to his office in 2 weeks for L hip injection  \"It's better but not where I want to be  \"  Patient is continuing to do home physical therapy 2 times a week    "

## 2023-04-24 NOTE — PATIENT INSTRUCTIONS

## 2023-04-24 NOTE — LETTER
"2023       Patient: Haritha Castro   YOB: 1955   Date of Visit: 2023       Dear care team,    Below are my office notes for our mutual patient Johana Fothergill  Please feel free to contact me with any questions, concerns, or guidance for further management  Thank you  Sincerely,        JONE Bowman        CC: MD Marvel Gonzalez MD Georgie Pippins, CRNP  2023 12:50 PM  Sign when Signing Visit  Name: Haritha Castro      : 1955      MRN: [id number]  Encounter Provider: JONE Bowman  Encounter Date: 2023   Encounter department: [facility]     1  Hyponatremia  Assessment & Plan:  Primary concern today is patient's continued hyponatremic state  Multiple factors could be contributing to this condition and is complicated by the patient's history of liver disease and ascites  Patient is on high doses of diuretics and is also on a beta-blocker  Diet could also be a contributing factor  Hyponatremia could be hypervolemic in nature due to the patient's ascites  Patient denies any symptoms today consistent with severe hyponatremia  Will continue work-up with a repeat BMP as well as a serum osmolarity  Pending results, will contact patient's gastroenterology team and/or cardiology team to discuss possible medication changes  Orders:  -     Osmolality-\"If this is regarding a toxic alcohol, STOP  Test is not routinely indicated  Please consult medical  on call for further guidance  \"; Future  -     Basic metabolic panel; Future    2  Thrombocytopenia ([address] )  Assessment & Plan:  Chronic issue per patient report, noted in chart as far back as   Platelet count improved since last checked, will continue to monitor  3  Right hip pain  Assessment & Plan:  Saw Dr Luca Cintron this morning, had a repeat cortisone injection in R hip   Will return to his office in 2 weeks for L hip " "injection  \"It's better but not where I want to be  \"  Patient is continuing to do home physical therapy 2 times a week  4  Ascites due to alcoholic cirrhosis [facility])  Assessment & Plan:  Patient has gained 10+ pounds since her last visit and has significant abdominal distention  Patient is scheduled for a paracentesis on Wednesday and a GI follow-up on May 9th  5  Type 2 diabetes mellitus without complication, without long-term current use of insulin [facility])  Assessment & Plan:  Patient has a follow-up with endocrinology tomorrow  Patient requested to have an A1c added to her lab work  Lab Results   Component Value Date    HGBA1C 5 8 (H) 08/18/2022       Orders:  -     HEMOGLOBIN A1C W/ EAG ESTIMATION; Future    6  Depression, recurrent ([facility] [address] )  Assessment & Plan:  Well-controlled on Lexapro 20 mg daily per patient report  7  Other forms of angina pectoris [facility])  Assessment & Plan:  Patient denies chest pain today  Patient has a follow-up with cardiology on May 2nd  I have spent a total time of 40 minutes on 04/24/23 in caring for this patient including Patient and family education, Importance of tx compliance, Risk factor reductions, Impressions, Documenting in the medical record and Obtaining or reviewing history    Subjective      LAB FOLLOW-UP VISIT    4/6/23: platelets 93 (trending up) and sodium 131 (trending down)      S/S of Hyponatremia  - Nausea : no  - Malaise: no  - Headache: no  - Lethargy: no  - Seizures, coma, and respiratory arrest: no    - Diet and fluid restriction: \"I'm on a low sodium diet and fluid restriction of 4-8 glasses per day\"    \"Fruit and english muffin for lunch, low sodium SPAM and veggies for lunch\"    - GI f/u and paracentesis protocol: Patient has gained 10+ pounds since her last visit, abdominal distention present  Patient is scheduled for a paracentesis on Wednesday and a GI follow-up on 5/9  Review of Systems   Constitutional: Negative    " "Negative for fatigue  HENT: Negative  Eyes: Negative  Respiratory: Positive for chest tightness  Negative for shortness of breath  \"stomach pushing up on chest\"   Cardiovascular: Negative  Negative for chest pain  Gastrointestinal: Positive for abdominal distention  Negative for nausea  Endocrine: Negative  Genitourinary: Negative  Musculoskeletal: Positive for arthralgias  Skin: Negative  Allergic/Immunologic: Negative  Neurological: Negative  Negative for tremors, seizures, weakness and headaches  Hematological: Negative  Psychiatric/Behavioral: Negative  Current Outpatient Medications on File Prior to Visit   Medication Sig   • acetaminophen (TYLENOL) 500 mg tablet Take 500 mg by mouth every 6 (six) hours as needed   • Diclofenac Sodium (VOLTAREN) 1 % Apply 2 g topically 4 (four) times a day   • escitalopram (LEXAPRO) 20 mg tablet Take 1 tablet (20 mg total) by mouth daily   • furosemide (LASIX) 40 mg tablet Take 2 tablets (80 mg total) by mouth daily AND 1 tablet (40 mg total) in the evening  Take before meals  (Patient taking differently: Take 120mg in AM)   • lansoprazole (PREVACID) 30 mg capsule Take 1 capsule (30 mg total) by mouth daily   • metoprolol succinate (TOPROL-XL) 25 mg 24 hr tablet Take 1 tablet by mouth daily   • Ozempic, 0 25 or 0 5 MG/DOSE, 2 MG/1 5ML SOPN inject 0 5 milligrams subcutaneously every week   • spironolactone (ALDACTONE) 50 mg tablet Take 3 tablets (150 mg total) by mouth daily   • [DISCONTINUED] methocarbamol (ROBAXIN) 500 mg tablet Take 1 tablet (500 mg total) by mouth 3 (three) times a day as needed for muscle spasms       Objective     /94 (BP Location: Right arm, Patient Position: Sitting)   Pulse 68   Temp (!) 96 9 °F (36 1 °C)   Wt 95 kg (209 lb 7 oz)   SpO2 100%   BMI 35 95 kg/m²     Physical Exam  Constitutional:       General: She is not in acute distress  Appearance: Normal appearance   She is not " ill-appearing, toxic-appearing or diaphoretic  HENT:      Nose: Nose normal    Eyes:      Extraocular Movements: Extraocular movements intact  Conjunctiva/sclera: Conjunctivae normal    Cardiovascular:      Rate and Rhythm: Normal rate and regular rhythm  Heart sounds: Normal heart sounds  Pulmonary:      Effort: Pulmonary effort is normal  No respiratory distress  Breath sounds: Normal breath sounds  No wheezing  Abdominal:      General: Abdomen is flat  There is distension  Musculoskeletal:         General: No swelling or deformity  Normal range of motion  Cervical back: Normal range of motion  Skin:     Findings: No erythema or rash  Comments: Areas circled: Patchy purple discolorations present   Neurological:      Mental Status: She is alert and oriented to person, place, and time     Psychiatric:         Mood and Affect: Mood normal          Behavior: Behavior normal        JONE Guan

## 2023-04-24 NOTE — PROGRESS NOTES
1  Acute right hip pain  Large joint arthrocentesis: R greater trochanteric bursa      2  Acute hip pain, left        3  Greater trochanteric pain syndrome of right lower extremity  Large joint arthrocentesis: R greater trochanteric bursa        Orders Placed This Encounter   Procedures   • Large joint arthrocentesis: R greater trochanteric bursa        Imaging Studies (I personally reviewed images in PACS and report):    • CT abdomen pelvis without contrast 3/29/2023: Moderate to large ascites  Cirrhotic changes in the liver with splenomegaly suggesting portal hypertension  In regards to osseous fracture there were no reported acute fracture or destructive osseous lesion seen  • X-ray right hip 3/16/2023: No acute osseous abnormalities  No significant degenerative changes  There is a right pelvic neurostimulator with lead terminating in the region of the bladder  IMPRESSION:  • Atraumatic bilateral hip pain, right worse than left -ongoing since last Thursday  • Acute axial low back pain  • Gait dysfunction requiring use of rolling walker  • Aggravated when attempting to transition from sitting to standing  • Clinical exam and history consistent with trochanteric syndrome of bilateral hips likely exacerbating the pain of her low back (given she has history of lumbar degenerative disc disease)      Other factors:  • Liver cirrhosis/liver cell carcinoma with routine paracentesis  • Type 2 diabetes  • COPD  • CKD 3  • BMI 36 - likely contributed by ascites exacerbating pain of low back and hip     PLAN:    • Clinical exam and radiographic imaging reviewed with patient today, with impression as per above  I have discussed with the patient the pathophysiology of this diagnosis and reviewed how the examination correlates with this diagnosis      • Given patient had brief/limited response to palpation guided greater trochanteric bursa injection with cortisone, I did offer an ultrasound-guided greater trochanteric "bursa injection to improve accuracy going forward  I discussed the risk/benefits and patient was agreeable with this procedure as per below  Due to time constraints, I will perform an injection on her right hip today and subsequently performed a left-sided ultrasound-guided injection in 1 to 2 weeks  • I had given patient home exercises for this issue as well that I recommended adherence  I counseled that while these injections can provide brief relief, they do not improve her overall strength or stability of the muscles that insert along the trochanteric aspect of her hips  Would benefit her to attend formal physical therapy or attend home PT/OT  According to her note on 4/6/2023, patient is trying to coordinate home PT/OT    Return in about 2 weeks (around 5/8/2023) for Follow up for left hip USGI 1-2 weeks  Portions of the record may have been created with voice recognition software  Occasional wrong word or \"sound a like\" substitutions may have occurred due to the inherent limitations of voice recognition software  Read the chart carefully and recognize, using context, where substitutions have occurred  CHIEF COMPLAINT:  Right hip pain    HPI:  Cordell Beal is a 77 y o  female  who presents for    Visit 4/24/2023: Follow up b/l hip pain:  Of note, h/o liver cirrhosis/liver cell carcinoma with routine paracentesis, COPD, CKD 3, BMI 36, type 2 diabetes  Last seen on 3/23/2023 in which she was given b/l palpation guided GTB CSI   Patient precipitating event which exacerbated her pain about a month ago when she was transitioning from lying down to standing upon awakening and felt a severe pain over the lateral aspect of her right hip that radiated to her gluteus    Patient reports her pain is not as severe as it was previously but is still present and affects her ability to transition from lying/sitting to standing  Pain improves once she is standing and mobile    She states most of her pain " is over the lateral aspects of her hip bilaterally, right worse than left  Pain can radiate to her right buttock/low back  Denies N/T of lower extremities  She continues to use 4 point walker for mobility (at baseline uses a cane prior to hip pain)    She had attended one session of formal PT based on chart review but has been coordinating home PT/OT  Medical, Surgical, Family, and Social History    Past Medical History:   Diagnosis Date   • Anxiety    • Anxiety and depression 9/29/2015   • Arthritis    • Asthma    • Asthma without status asthmaticus 4/26/2011   • Cirrhosis ([place] [address] )    • Depression    • Diabetes ([place] [address] )    • Disease of thyroid gland     nodules   • Diverticulitis 2011   • Diverticulosis    • DVT (deep venous thrombosis) ([place] [address] ) 2013   • GERD (gastroesophageal reflux disease)    • Liver disease     cirrhosis   • Obesity, morbid, BMI 40 0-49 9 ([place] [address] ) 8/16/2017   • Pneumonia    • Portal hypertensive gastropathy ([place] [address] )    • Sleep apnea    • Vertigo    • Vitreous hemorrhage of left eye ([place] [address] ) 1/20/2023     Past Surgical History:   Procedure Laterality Date   • BLADDER SUSPENSION     • CHOLECYSTECTOMY     • COLONOSCOPY  05/24/2019    had multiple with last being 13056   • COLONOSCOPY  10/07/2004    L[facility] Soni  / Diverticulosis   • COLONOSCOPY  06/14/2011    LOU[facility] Soni  / Diverticulosis   • EGD  02/28/2013    LOU[facility] Wells  / Mild Portal Hypertensive Gastropathy   • EGD  10/01/2019    LOUIS Soni  / Gastritis   • EGD AND COLONOSCOPY  09/03/2015    LOUIS Soni  / Cassie Izaguirre  Diverticulosis  • FLEXIBLE SIGMOIDOSCOPY  03/04/2013    LOUIS Rousseau  / Hemorrhoidal Bleeding   • FLEXIBLE SIGMOIDOSCOPY  05/30/2018    LOUIS Rousseau  / Internal hemorrhoids  biopsied     • HERNIA REPAIR     • IR PARACENTESIS  08/23/2018   • IR PARACENTESIS  11/05/2018   • IR PARACENTESIS  01/11/2019   • IR PARACENTESIS  02/27/2019   • IR PARACENTESIS  04/15/2019   • IR "PARACENTESIS  06/03/2019   • IR PARACENTESIS  07/10/2019   • IR PARACENTESIS  08/16/2019   • IR PARACENTESIS  09/10/2019   • IR PARACENTESIS  10/07/2019   • IR PARACENTESIS  11/05/2019   • IR PARACENTESIS  11/22/2019   • IR PARACENTESIS  12/17/2019   • IR PARACENTESIS  01/07/2020   • IR PARACENTESIS  01/28/2020   • IR PARACENTESIS  02/18/2020   • IR PARACENTESIS  03/10/2020   • IR PARACENTESIS  03/31/2020   • IR PARACENTESIS  04/21/2020   • IR PARACENTESIS  05/12/2020   • IR PARACENTESIS  06/02/2020   • IR PARACENTESIS  06/30/2020   • IR PARACENTESIS  07/20/2020   • IR PARACENTESIS  08/18/2020   • IR PARACENTESIS  11/12/2020   • IR PARACENTESIS  03/11/2021   • IR PARACENTESIS  2/8/2022   • IR PARACENTESIS  8/24/2022   • IR PARACENTESIS  11/14/2022   • IR PARACENTESIS  12/21/2022   • IR PARACENTESIS  2/1/2023   • IR PARACENTESIS  3/7/2023   • IR PARACENTESIS  3/30/2023   • TONSILLECTOMY     • TRIGGER FINGER RELEASE Bilateral      Social History   Social History     Substance and Sexual Activity   Alcohol Use Yes    Comment: rarely     Social History     Substance and Sexual Activity   Drug Use Never     Social History     Tobacco Use   Smoking Status Never   Smokeless Tobacco Never     Family History   Problem Relation Age of Onset   • COPD Mother    • Heart attack Sister    • Lymphoma Brother    • Colon cancer Maternal Grandmother    • Cancer Maternal Aunt         unknown     Allergies   Allergen Reactions   • Medical Tape Other (See Comments)     Skin tears  Per patient, Red -\"rips my skin\"  Per patient, Skin tears   • Penicillins Itching and Rash     rash     • Nsaids Other (See Comments)     Cirrhosis of liver- pt reported starting Mobic 3/16/23   • Pseudoephedrine-Guaifenesin Myalgia and Other (See Comments)     Entex- \"jittery\"   • Attapulgite    • Cephalexin      ?ithcy   • Clioquinol    • Diclofenac Sodium Itching     Patient reported tolerating Voltaren gel without any reaction started 3/16/23   • Meloxicam " "Itching   • Nabumetone Other (See Comments)     rash   • Phenylalanine    • Pseudoephedrine-Guaifenesin Other (See Comments)     Entex- \"gittery\"   • Streptomycin    • Celecoxib Rash   • Penicillin G Rash   • Rofecoxib Rash     skin reaction          Physical Exam  /80   Pulse 72     Constitutional:  see vital signs  Gen: Abdominal distention consistent with history of liver cirrhosis, normocephalic/atraumatic, well-groomed  Pulmonary/Chest: Effort normal  No respiratory distress  Gait: Mobility through use of four-point rolling walker  She has difficulty transitioning from sitting to standing as she attempts to avoid leaning on her left and right hips  When she is able to stand and use her four-point walker she is able to demonstrate weightbearing with slow, small steps and reports hip pain mild/tolerable while ambulating  Right Hip Exam     Tenderness   The patient is experiencing tenderness in the lateral and greater trochanter  Range of Motion   External rotation: 40   Internal rotation: 10     Muscle Strength   Abduction: 4/5   Adduction: 4/5   Flexion: 4/5     Tests   MIC: negative (Aggravates lateral hip pain)    Other   Erythema: absent    Comments:  Logroll: Negative but does aggravate lateral hip pain      Left Hip Exam     Tenderness   The patient is experiencing tenderness in the lateral and greater trochanter      Range of Motion   External rotation: 40   Internal rotation: 10     Muscle Strength   Abduction: 4/5   Adduction: 4/5   Flexion: 4/5     Tests   MIC: negative (Aggravates lateral hip pain)    Other   Erythema: absent    Comments:  Logroll: Negative but does aggravate lateral hip pain        BACK EXAM:  BACK TENDERNESS:  Spinous Processes: no  Paraspinal Muscles: +b/l paralumbar  SI Joint: no  Sacrum: no    ROM:  Flexion: 60  Extension: 10  Lateral flexion: 30 b/l  Rotation: intact: 30 b/l    DERMATOMAL SENSATION:  L1: normal   L2: normal   L3: normal   L4: normal   L5: " normal   S1: normal    BACK:   SUPINE STRAIGHT LEG: negative        Large joint arthrocentesis: R greater trochanteric bursa  Universal Protocol:  Consent: Verbal consent obtained  Risks and benefits: risks, benefits and alternatives were discussed  Consent given by: patient  Patient understanding: patient states understanding of the procedure being performed  Site marked: the operative site was marked  Radiology Images displayed and confirmed   If images not available, report reviewed: imaging studies available  Required items: required blood products, implants, devices, and special equipment available  Patient identity confirmed: verbally with patient    Supporting Documentation  Indications: pain and diagnostic evaluation   Procedure Details  Location: hip - R greater trochanteric bursa  Preparation: Patient was prepped and draped in the usual sterile fashion  Needle size: 22 G (3 5 inch spinal needle)  Ultrasound guidance: yes  Approach: lateral  Medications administered: 40 mg triamcinolone acetonide 40 mg/mL; 2 mL bupivacaine 0 25 %    Patient tolerance: patient tolerated the procedure well with no immediate complications  Dressing:  Sterile dressing applied

## 2023-04-24 NOTE — ASSESSMENT & PLAN NOTE
Patient has gained 10+ pounds since her last visit and has significant abdominal distention  Patient is scheduled for a paracentesis on Wednesday and a GI follow-up on May 9th

## 2023-04-24 NOTE — ASSESSMENT & PLAN NOTE
Patient has a follow-up with endocrinology tomorrow  Patient requested to have an A1c added to her lab work        Lab Results   Component Value Date    HGBA1C 5 8 (H) 08/18/2022

## 2023-04-24 NOTE — ASSESSMENT & PLAN NOTE
Primary concern today is patient's continued hyponatremic state  Multiple factors could be contributing to this condition and is complicated by the patient's history of liver disease and ascites  Patient is on high doses of diuretics and is also on a beta-blocker  Diet could also be a contributing factor  Hyponatremia could be hypervolemic in nature due to the patient's ascites  Patient denies any symptoms today consistent with severe hyponatremia  Will continue work-up with a repeat BMP as well as a serum osmolarity  Pending results, will contact patient's gastroenterology team and/or cardiology team to discuss possible medication changes

## 2023-04-24 NOTE — PROGRESS NOTES
"Name: Peter Villa      : 1955      MRN: [id number]  Encounter Provider: Artist Gaucher, CRNP  Encounter Date: 2023   Encounter department: [facility]     1  Hyponatremia  Assessment & Plan:  Primary concern today is patient's continued hyponatremic state  Multiple factors could be contributing to this condition and is complicated by the patient's history of liver disease and ascites  Patient is on high doses of diuretics and is also on a beta-blocker  Diet could also be a contributing factor  Hyponatremia could be hypervolemic in nature due to the patient's ascites  Patient denies any symptoms today consistent with severe hyponatremia  Will continue work-up with a repeat BMP as well as a serum osmolarity  Pending results, will contact patient's gastroenterology team and/or cardiology team to discuss possible medication changes  Orders:  -     Osmolality-\"If this is regarding a toxic alcohol, STOP  Test is not routinely indicated  Please consult medical  on call for further guidance  \"; Future  -     Basic metabolic panel; Future    2  Thrombocytopenia ([address] )  Assessment & Plan:  Chronic issue per patient report, noted in chart as far back as   Platelet count improved since last checked, will continue to monitor  3  Right hip pain  Assessment & Plan:  Saw Dr Deana Mcwilliams this morning, had a repeat cortisone injection in R hip  Will return to his office in 2 weeks for L hip injection  \"It's better but not where I want to be  \"  Patient is continuing to do home physical therapy 2 times a week  4  Ascites due to alcoholic cirrhosis [facility])  Assessment & Plan:  Patient has gained 10+ pounds since her last visit and has significant abdominal distention  Patient is scheduled for a paracentesis on Wednesday and a GI follow-up on May 9th         5  Type 2 diabetes mellitus without complication, without long-term current use of insulin " "([place] [address] )  Assessment & Plan:  Patient has a follow-up with endocrinology tomorrow  Patient requested to have an A1c added to her lab work  Lab Results   Component Value Date    HGBA1C 5 8 (H) 08/18/2022       Orders:  -     HEMOGLOBIN A1C W/ EAG ESTIMATION; Future    6  Depression, recurrent ([facility] [place] [facility] )  Assessment & Plan:  Well-controlled on Lexapro 20 mg daily per patient report  7  Other forms of angina pectoris [facility])  Assessment & Plan:  Patient denies chest pain today  Patient has a follow-up with cardiology on May 2nd  I have spent a total time of 40 minutes on 04/24/23 in caring for this patient including Patient and family education, Importance of tx compliance, Risk factor reductions, Impressions, Documenting in the medical record and Obtaining or reviewing history    Subjective      LAB FOLLOW-UP VISIT    4/6/23: platelets 93 (trending up) and sodium 131 (trending down)      S/S of Hyponatremia  - Nausea : no  - Malaise: no  - Headache: no  - Lethargy: no  - Seizures, coma, and respiratory arrest: no    - Diet and fluid restriction: \"I'm on a low sodium diet and fluid restriction of 4-8 glasses per day\"    \"Fruit and english muffin for lunch, low sodium SPAM and veggies for lunch\"    - GI f/u and paracentesis protocol: Patient has gained 10+ pounds since her last visit, abdominal distention present  Patient is scheduled for a paracentesis on Wednesday and a GI follow-up on 5/9  Review of Systems   Constitutional: Negative  Negative for fatigue  HENT: Negative  Eyes: Negative  Respiratory: Positive for chest tightness  Negative for shortness of breath  \"stomach pushing up on chest\"   Cardiovascular: Negative  Negative for chest pain  Gastrointestinal: Positive for abdominal distention  Negative for nausea  Endocrine: Negative  Genitourinary: Negative  Musculoskeletal: Positive for arthralgias  Skin: Negative  Allergic/Immunologic: Negative    " Neurological: Negative  Negative for tremors, seizures, weakness and headaches  Hematological: Negative  Psychiatric/Behavioral: Negative  Current Outpatient Medications on File Prior to Visit   Medication Sig   • acetaminophen (TYLENOL) 500 mg tablet Take 500 mg by mouth every 6 (six) hours as needed   • Diclofenac Sodium (VOLTAREN) 1 % Apply 2 g topically 4 (four) times a day   • escitalopram (LEXAPRO) 20 mg tablet Take 1 tablet (20 mg total) by mouth daily   • furosemide (LASIX) 40 mg tablet Take 2 tablets (80 mg total) by mouth daily AND 1 tablet (40 mg total) in the evening  Take before meals  (Patient taking differently: Take 120mg in AM)   • lansoprazole (PREVACID) 30 mg capsule Take 1 capsule (30 mg total) by mouth daily   • metoprolol succinate (TOPROL-XL) 25 mg 24 hr tablet Take 1 tablet by mouth daily   • Ozempic, 0 25 or 0 5 MG/DOSE, 2 MG/1 5ML SOPN inject 0 5 milligrams subcutaneously every week   • spironolactone (ALDACTONE) 50 mg tablet Take 3 tablets (150 mg total) by mouth daily   • [DISCONTINUED] methocarbamol (ROBAXIN) 500 mg tablet Take 1 tablet (500 mg total) by mouth 3 (three) times a day as needed for muscle spasms       Objective     /94 (BP Location: Right arm, Patient Position: Sitting)   Pulse 68   Temp (!) 96 9 °F (36 1 °C)   Wt 95 kg (209 lb 7 oz)   SpO2 100%   BMI 35 95 kg/m²     Physical Exam  Constitutional:       General: She is not in acute distress  Appearance: Normal appearance  She is not ill-appearing, toxic-appearing or diaphoretic  HENT:      Nose: Nose normal    Eyes:      Extraocular Movements: Extraocular movements intact  Conjunctiva/sclera: Conjunctivae normal    Cardiovascular:      Rate and Rhythm: Normal rate and regular rhythm  Heart sounds: Normal heart sounds  Pulmonary:      Effort: Pulmonary effort is normal  No respiratory distress  Breath sounds: Normal breath sounds  No wheezing     Abdominal:      General: Abdomen is flat  There is distension  Musculoskeletal:         General: No swelling or deformity  Normal range of motion  Cervical back: Normal range of motion  Skin:     Findings: No erythema or rash  Comments: Areas circled: Patchy purple discolorations present   Neurological:      Mental Status: She is alert and oriented to person, place, and time     Psychiatric:         Mood and Affect: Mood normal          Behavior: Behavior normal        JONE Blanchard

## 2023-04-24 NOTE — ASSESSMENT & PLAN NOTE
Chronic issue per patient report, noted in chart as far back as 2020  Platelet count improved since last checked, will continue to monitor

## 2023-04-25 ENCOUNTER — TELEPHONE (OUTPATIENT)
Age: 68
End: 2023-04-25

## 2023-04-25 LAB
EST. AVERAGE GLUCOSE BLD GHB EST-MCNC: 103 MG/DL
HBA1C MFR BLD: 5.2 %

## 2023-04-25 NOTE — TELEPHONE ENCOUNTER
----- Message from Venancio Guan sent at 4/25/2023 11:41 AM EDT -----  Regarding: A1C Result  Please let the patient know her A1C test was 5 2%, which is excellent and shows her blood sugar is well controlled       Thank you!      ----- Message -----  From: Lab, Background User  Sent: 4/24/2023   5:11 PM EDT  To: JONE Guan

## 2023-04-26 ENCOUNTER — HOSPITAL ENCOUNTER (OUTPATIENT)
Dept: RADIOLOGY | Facility: HOSPITAL | Age: 68
Discharge: HOME | End: 2023-04-26
Attending: INTERNAL MEDICINE | Admitting: RADIOLOGY

## 2023-04-26 VITALS
SYSTOLIC BLOOD PRESSURE: 97 MMHG | DIASTOLIC BLOOD PRESSURE: 51 MMHG | RESPIRATION RATE: 16 BRPM | HEART RATE: 67 BPM | OXYGEN SATURATION: 96 %

## 2023-04-26 DIAGNOSIS — R18.8 CIRRHOSIS OF LIVER WITH ASCITES, UNSPECIFIED HEPATIC CIRRHOSIS TYPE (HCC): ICD-10-CM

## 2023-04-26 DIAGNOSIS — K74.60 CIRRHOSIS OF LIVER WITH ASCITES, UNSPECIFIED HEPATIC CIRRHOSIS TYPE (HCC): ICD-10-CM

## 2023-04-26 RX ORDER — ALBUMIN (HUMAN) 12.5 G/50ML
SOLUTION INTRAVENOUS
Status: COMPLETED | OUTPATIENT
Start: 2023-04-26 | End: 2023-04-26

## 2023-04-26 RX ORDER — BUPIVACAINE HYDROCHLORIDE 2.5 MG/ML
2 INJECTION, SOLUTION INFILTRATION; PERINEURAL
Status: COMPLETED | OUTPATIENT
Start: 2023-04-24 | End: 2023-04-24

## 2023-04-26 RX ORDER — TRIAMCINOLONE ACETONIDE 40 MG/ML
40 INJECTION, SUSPENSION INTRA-ARTICULAR; INTRAMUSCULAR
Status: COMPLETED | OUTPATIENT
Start: 2023-04-24 | End: 2023-04-24

## 2023-04-26 RX ADMIN — ALBUMIN (HUMAN) 75 G: 0.25 INJECTION, SOLUTION INTRAVENOUS at 09:41

## 2023-04-26 NOTE — BRIEF OP NOTE (RAD/CATH)
IR PARACENTESIS Procedure Note    PATIENT NAME: Shahla Lea  : 1955  MRN: [id number]    Pre-op Diagnosis:   1  Cirrhosis of liver with ascites, unspecified hepatic cirrhosis type (HCC)      Post-op Diagnosis:   1   Cirrhosis of liver with ascites, unspecified hepatic cirrhosis type [facility])        Provider:   JONE Richardson  Assistants:     No qualified resident was available, Resident is only observing    Estimated Blood Loss: none     Findings: 9200 mL serous ascites, RLQ    Specimens: none    Complications:  None immediate     Anesthesia: local    JONE Richardson     Date: 2023  Time: 10:00 AM

## 2023-04-26 NOTE — SEDATION DOCUMENTATION
Aprox 9200 ml clear yellow fluid was removed , patient tolerated procedure well with no complications  A dry sterile dressing was applied to puncture site on right

## 2023-05-03 ENCOUNTER — PATIENT OUTREACH (OUTPATIENT)
Age: 68
End: 2023-05-03

## 2023-05-03 NOTE — PROGRESS NOTES
Outpatient Care Management Note:    Care manager called Santiago Calixto  She states that she received an injection in her right hip and will be getting one in her left hip next week  She feels the injection helped her pain symptoms  Santiago Calixto continues with physical and occupational therapy through [facility]  She is using a walker and/or cane at all times  Initial assessment completed and care plan updated  Santiago Calixto states that she is waiting on receiving a cpap machine  She thinks that cardiology ordered it  She states that she was to get it after everyone else gets their recall machines replaced  I encouraged her to follow up with whoever ordered it, to be sure she receives it  Lois monitors daily weights  She weighed 197 lb today  She had a paracentesis completed on 4/26 and weighed 191 lb when she left  She will monitor her weight and breathing closely and will call IR if she needs a paracentesis scheduled  Santiago Calixto continues to watch her fluid intake  Santiago Calixto also checks blood sugars  Her recent TlS1O=5 2  She states that the highest her sugars have been is 128  Santiago Calixto has my contact information and will call with any questions

## 2023-05-08 ENCOUNTER — OFFICE VISIT (OUTPATIENT)
Age: 68
End: 2023-05-08

## 2023-05-08 ENCOUNTER — APPOINTMENT (OUTPATIENT)
Age: 68
End: 2023-05-08

## 2023-05-08 VITALS
WEIGHT: 203 LBS | SYSTOLIC BLOOD PRESSURE: 118 MMHG | TEMPERATURE: 100 F | BODY MASS INDEX: 34.84 KG/M2 | DIASTOLIC BLOOD PRESSURE: 60 MMHG

## 2023-05-08 DIAGNOSIS — G89.29 CHRONIC LEFT HIP PAIN: ICD-10-CM

## 2023-05-08 DIAGNOSIS — M25.561 ACUTE PAIN OF RIGHT KNEE: ICD-10-CM

## 2023-05-08 DIAGNOSIS — M85.661 OTHER CYST OF BONE, RIGHT LOWER LEG: ICD-10-CM

## 2023-05-08 DIAGNOSIS — E04.9 GOITER: ICD-10-CM

## 2023-05-08 DIAGNOSIS — M25.552 CHRONIC LEFT HIP PAIN: ICD-10-CM

## 2023-05-08 DIAGNOSIS — M25.552 GREATER TROCHANTERIC PAIN SYNDROME OF LEFT LOWER EXTREMITY: Primary | ICD-10-CM

## 2023-05-08 LAB — TSH SERPL DL<=0.05 MIU/L-ACNC: 1.08 UIU/ML (ref 0.45–4.5)

## 2023-05-08 RX ORDER — TRIAMCINOLONE ACETONIDE 40 MG/ML
40 INJECTION, SUSPENSION INTRA-ARTICULAR; INTRAMUSCULAR
Status: COMPLETED | OUTPATIENT
Start: 2023-05-08 | End: 2023-05-08

## 2023-05-08 RX ORDER — BUPIVACAINE HYDROCHLORIDE 2.5 MG/ML
2 INJECTION, SOLUTION INFILTRATION; PERINEURAL
Status: COMPLETED | OUTPATIENT
Start: 2023-05-08 | End: 2023-05-08

## 2023-05-08 RX ADMIN — BUPIVACAINE HYDROCHLORIDE 2 ML: 2.5 INJECTION, SOLUTION INFILTRATION; PERINEURAL at 11:50

## 2023-05-08 RX ADMIN — TRIAMCINOLONE ACETONIDE 40 MG: 40 INJECTION, SUSPENSION INTRA-ARTICULAR; INTRAMUSCULAR at 11:50

## 2023-05-08 NOTE — PROGRESS NOTES
1  Greater trochanteric pain syndrome of left lower extremity  Large joint arthrocentesis: L greater trochanteric bursa      2  Acute pain of right knee  US MSK limited      3  Other cyst of bone, right lower leg  US MSK limited    Skin lesion/cystic structure along lateral aspect of knee at fibular head      4  Chronic left hip pain  Large joint arthrocentesis: L greater trochanteric bursa        Orders Placed This Encounter   Procedures   • Large joint arthrocentesis: L greater trochanteric bursa   • US MSK limited        Imaging Studies (I personally reviewed images in PACS and report):    • CT abdomen pelvis without contrast 3/29/2023: Moderate to large ascites  Cirrhotic changes in the liver with splenomegaly suggesting portal hypertension  In regards to osseous fracture there were no reported acute fracture or destructive osseous lesion seen  • X-ray right hip 3/16/2023: No acute osseous abnormalities  No significant degenerative changes  There is a right pelvic neurostimulator with lead terminating in the region of the bladder  IMPRESSION:  • Atraumatic chronic bilateral hip pain  • Acute axial low back pain  • Gait dysfunction requiring use of rolling walker  • Aggravated when attempting to transition from sitting to standing  • Clinical exam and history consistent with trochanteric syndrome of bilateral hips likely exacerbating the pain of her low back (given she has history of lumbar degenerative disc disease, abdominal as)  • Improving right hip pain from USGI last visit  • Here today for left sided USGI GTB CSi  • Separate issue addressed: Subacute right lateral knee pain, localized firm mass/cystic structure along proximal fibula aspect of knee   Atraumatic       Other factors:  • Liver cirrhosis/liver cell carcinoma with routine paracentesis  • Type 2 diabetes  • COPD  • CKD 3  • BMI >30 - varies based on abdominal edema from ascites  •   PLAN:    • Clinical exam and radiographic imaging "reviewed with patient today, with impression as per above  I have discussed with the patient the pathophysiology of this diagnosis and reviewed how the examination correlates with this diagnosis  Treatment options were discussed at length, including risks and benefits; after discussing these treatment options, the patient elected for a left-sided greater trochanteric bursa cortisone injection as per procedure note below  Counseled that we can repeat injections every 3 months as needed in regards to pain control  • Separately, in regards to her right lateral knee pain, swelling-there is a cystic structure that is noted over the proximal fibular aspect of her knee that is tender to palpation  I have ordered an ultrasound evaluation of her right lateral knee for further evaluation of the structure  Due to limited time I was able to fully evaluate her right knee given her ultrasound-guided procedure of her hip today  I will follow-up after the ultrasound of her right knee  Return for Follow up after U/S of right knee   Can consider radiographs of right knee if ultrasound unremarkable  Portions of the record may have been created with voice recognition software  Occasional wrong word or \"sound a like\" substitutions may have occurred due to the inherent limitations of voice recognition software  Read the chart carefully and recognize, using context, where substitutions have occurred  CHIEF COMPLAINT:  Chief Complaint   Patient presents with   • Left Hip - Follow-up   New issue: right knee pain      HPI:  Ryan Eisenberg is a 77 y o  female  who presents for       Visit 5/8/2023 :  Initial evaluation of left hip pain:  Of note patient was recently seen for an ultrasound-guided right hip greater trochanteric bursa cortisone injection  She states that she did have significant relief with this injection for a brief amount of time    She noticed as her abdominal swelling increased, she was experiencing " "worsening right lateral hip and thigh pain especially when transitioning from sitting to standing and walking  She states the pain is not as bad as it used to be previously  She is here today for an ultrasound-guided left greater trochanteric bursa injection today with cortisone    Separately, at the end of the procedure, patient wishes to address a right lateral knee pain/localized swelling that has been occurring for the past 2 months  She does not recall any specific precipitating injury  She reports there is a soft tissue \"swelling\" over the lateral aspect of her knee that feels like a ball  She states it is sensitive to palpation but otherwise she is able to tolerate weightbearing on her right lower extremity  Pain is aggravated with range of motion movements as well  There is no overlying discoloration  She denies prior surgeries of her right knee in the past       Medical, Surgical, Family, and Social History    Past Medical History:   Diagnosis Date   • Anxiety    • Anxiety and depression 9/29/2015   • Arthritis    • Asthma    • Asthma without status asthmaticus 4/26/2011   • Cirrhosis ([facility] )    • Depression    • Diabetes ([facility] )    • Disease of thyroid gland     nodules   • Diverticulitis 2011   • Diverticulosis    • DVT (deep venous thrombosis) ([facility] ) 2013   • GERD (gastroesophageal reflux disease)    • Liver disease     cirrhosis   • Obesity, morbid, BMI 40 0-49 9 ([facility] ) 8/16/2017   • Pneumonia    • Portal hypertensive gastropathy ([facility] )    • Sleep apnea    • Vertigo    • Vitreous hemorrhage of left eye ([facility] ) 1/20/2023     Past Surgical History:   Procedure Laterality Date   • BLADDER SUSPENSION     • CHOLECYSTECTOMY     • COLONOSCOPY  05/24/2019    had multiple with last being [id number]   • COLONOSCOPY  10/07/2004    LOUIS Brady  / Diverticulosis   • COLONOSCOPY  06/14/2011    LOUIS Brady  / Diverticulosis   • EGD  02/28/2013    LOUIS Rodriguez  / Mild Portal Hypertensive " Gastropathy   • EGD  10/01/2019    LOUIS Mackenzie D  / Gastritis   • EGD AND COLONOSCOPY  09/03/2015    LOUIS Mackenzie D  / Keesha Ask  Diverticulosis  • FLEXIBLE SIGMOIDOSCOPY  03/04/2013    Jacinto Pickdavid, M D  / Hemorrhoidal Bleeding   • FLEXIBLE SIGMOIDOSCOPY  05/30/2018    Jacinto Pickup, M D  / Internal hemorrhoids  biopsied     • HERNIA REPAIR     • IR PARACENTESIS  08/23/2018   • IR PARACENTESIS  11/05/2018   • IR PARACENTESIS  01/11/2019   • IR PARACENTESIS  02/27/2019   • IR PARACENTESIS  04/15/2019   • IR PARACENTESIS  06/03/2019   • IR PARACENTESIS  07/10/2019   • IR PARACENTESIS  08/16/2019   • IR PARACENTESIS  09/10/2019   • IR PARACENTESIS  10/07/2019   • IR PARACENTESIS  11/05/2019   • IR PARACENTESIS  11/22/2019   • IR PARACENTESIS  12/17/2019   • IR PARACENTESIS  01/07/2020   • IR PARACENTESIS  01/28/2020   • IR PARACENTESIS  02/18/2020   • IR PARACENTESIS  03/10/2020   • IR PARACENTESIS  03/31/2020   • IR PARACENTESIS  04/21/2020   • IR PARACENTESIS  05/12/2020   • IR PARACENTESIS  06/02/2020   • IR PARACENTESIS  06/30/2020   • IR PARACENTESIS  07/20/2020   • IR PARACENTESIS  08/18/2020   • IR PARACENTESIS  11/12/2020   • IR PARACENTESIS  03/11/2021   • IR PARACENTESIS  2/8/2022   • IR PARACENTESIS  8/24/2022   • IR PARACENTESIS  11/14/2022   • IR PARACENTESIS  12/21/2022   • IR PARACENTESIS  2/1/2023   • IR PARACENTESIS  3/7/2023   • IR PARACENTESIS  3/30/2023   • IR PARACENTESIS  4/26/2023   • TONSILLECTOMY     • TRIGGER FINGER RELEASE Bilateral      Social History   Social History     Substance and Sexual Activity   Alcohol Use Yes    Comment: rarely     Social History     Substance and Sexual Activity   Drug Use Never     Social History     Tobacco Use   Smoking Status Never   Smokeless Tobacco Never     Family History   Problem Relation Age of Onset   • COPD Mother    • Heart attack Sister    • Lymphoma Brother    • Colon cancer Maternal Grandmother    • Cancer Maternal Aunt "        unknown     Allergies   Allergen Reactions   • Medical Tape Other (See Comments)     Skin tears  Per patient, Red -\"rips my skin\"  Per patient, Skin tears   • Penicillins Itching and Rash     rash     • Nsaids Other (See Comments)     Cirrhosis of liver- pt reported starting Mobic 3/16/23   • Pseudoephedrine-Guaifenesin Myalgia and Other (See Comments)     Entex- \"jittery\"   • Attapulgite    • Cephalexin      ?ithcy   • Clioquinol    • Diclofenac Sodium Itching     Patient reported tolerating Voltaren gel without any reaction started 3/16/23   • Meloxicam Itching   • Nabumetone Other (See Comments)     rash   • Phenylalanine    • Pseudoephedrine-Guaifenesin Other (See Comments)     Entex- \"gittery\"   • Streptomycin    • Celecoxib Rash   • Penicillin G Rash   • Rofecoxib Rash     skin reaction          Physical Exam  /60   Temp 100 °F (37 8 °C)   Wt 92 1 kg (203 lb)   BMI 34 84 kg/m²     Constitutional:  see vital signs  Gen: Abdominal distention consistent with history of cirrhosis, normocephalic/atraumatic, well-groomed  Pulmonary/Chest: Effort normal  No respiratory distress  Ortho Exam  Right Knee Exam:  Erythema: no  Swelling: +localized 3-4 cm firm, circumscribed, rounded mobile nodule over lateral aspect of knee just superior to fibular head  Increased Warmth: no  Tenderness: +localized over nodule  ROM: 0-130  Knee flexion strength: 5/5  Knee extension strength: 5/5      Large joint arthrocentesis: L greater trochanteric bursa  Universal Protocol:  Consent: Verbal consent obtained  Risks and benefits: risks, benefits and alternatives were discussed  Consent given by: patient  Patient understanding: patient states understanding of the procedure being performed  Site marked: the operative site was marked  Radiology Images displayed and confirmed   If images not available, report reviewed: imaging studies available  Required items: required blood products, implants, devices, and special " equipment available  Patient identity confirmed: verbally with patient    Supporting Documentation  Indications: pain   Procedure Details  Location: hip - L greater trochanteric bursa  Preparation: Patient was prepped and draped in the usual sterile fashion  Needle size: 22 G (3 5 inch spinal needle)  Ultrasound guidance: no  Approach: lateral (perpindicular over greater trochanter at site of maximal tenderness)  Medications administered: 40 mg triamcinolone acetonide 40 mg/mL; 2 mL bupivacaine 0 25 %    Patient tolerance: patient tolerated the procedure well with no immediate complications  Dressing:  Sterile dressing applied

## 2023-05-08 NOTE — PATIENT INSTRUCTIONS

## 2023-05-16 ENCOUNTER — APPOINTMENT (OUTPATIENT)
Age: 68
End: 2023-05-16

## 2023-05-16 DIAGNOSIS — R18.8 CIRRHOSIS OF LIVER WITH ASCITES, UNSPECIFIED HEPATIC CIRRHOSIS TYPE (HCC): ICD-10-CM

## 2023-05-16 DIAGNOSIS — K74.60 CIRRHOSIS OF LIVER WITH ASCITES, UNSPECIFIED HEPATIC CIRRHOSIS TYPE (HCC): ICD-10-CM

## 2023-05-16 LAB
ANION GAP SERPL CALCULATED.3IONS-SCNC: 5 MMOL/L (ref 4–13)
BUN SERPL-MCNC: 16 MG/DL (ref 5–25)
CALCIUM SERPL-MCNC: 9.1 MG/DL (ref 8.3–10.1)
CHLORIDE SERPL-SCNC: 101 MMOL/L (ref 96–108)
CO2 SERPL-SCNC: 26 MMOL/L (ref 21–32)
CREAT SERPL-MCNC: 0.95 MG/DL (ref 0.6–1.3)
GFR SERPL CREATININE-BSD FRML MDRD: 60 ML/MIN/1.73SQ M
GLUCOSE P FAST SERPL-MCNC: 128 MG/DL (ref 65–99)
POTASSIUM SERPL-SCNC: 4.7 MMOL/L (ref 3.5–5.3)
SODIUM SERPL-SCNC: 132 MMOL/L (ref 135–147)

## 2023-05-18 ENCOUNTER — HOSPITAL ENCOUNTER (OUTPATIENT)
Dept: RADIOLOGY | Facility: HOSPITAL | Age: 68
Discharge: HOME | End: 2023-05-18
Attending: INTERNAL MEDICINE | Admitting: RADIOLOGY

## 2023-05-18 ENCOUNTER — PATIENT OUTREACH (OUTPATIENT)
Age: 68
End: 2023-05-18

## 2023-05-18 VITALS
HEART RATE: 70 BPM | DIASTOLIC BLOOD PRESSURE: 65 MMHG | SYSTOLIC BLOOD PRESSURE: 125 MMHG | OXYGEN SATURATION: 100 % | RESPIRATION RATE: 14 BRPM

## 2023-05-18 DIAGNOSIS — K74.60 CIRRHOSIS OF LIVER WITH ASCITES, UNSPECIFIED HEPATIC CIRRHOSIS TYPE (HCC): ICD-10-CM

## 2023-05-18 DIAGNOSIS — R18.8 CIRRHOSIS OF LIVER WITH ASCITES, UNSPECIFIED HEPATIC CIRRHOSIS TYPE (HCC): ICD-10-CM

## 2023-05-18 RX ORDER — ALBUMIN (HUMAN) 12.5 G/50ML
SOLUTION INTRAVENOUS
Status: COMPLETED | OUTPATIENT
Start: 2023-05-18 | End: 2023-05-18

## 2023-05-18 RX ADMIN — ALBUMIN (HUMAN) 50 G: 0.25 INJECTION, SOLUTION INTRAVENOUS at 10:12

## 2023-05-18 NOTE — BRIEF OP NOTE (RAD/CATH)
Left IR PARACENTESIS Procedure Note    PATIENT NAME: Murphy Lynch  : 1955  MRN: [id number]    Pre-op Diagnosis:   1  Cirrhosis of liver with ascites, unspecified hepatic cirrhosis type (HCC)      Post-op Diagnosis:   1  Cirrhosis of liver with ascites, unspecified hepatic cirrhosis type [facility])        Provider:  Dorina Marroquin PA-C     No qualified resident was available, Resident is only observing    Estimated Blood Loss: minimal    Findings: left sided paracentesis  8700cc clear yellow ascites removed       Specimens: none    Complications:  None immediate    Anesthesia: local    Dorina Marroquin PA-C     Date: 2023  Time: 11:44 AM

## 2023-05-18 NOTE — PROGRESS NOTES
Outpatient Care Management Note:    Care manager called Joana Hsieh  She had a paracentesis completed today  They drained over 8700 cc  She states that she weighed 203 lbs when she left  Her breathing is improved but she is exhausted  Joana Hsieh will continue to monitor her weights and will get paracentesis as needed  She did increase her spironalactone to 200 mg daily per GI  She is scheduled for an ultrasound on 5/24 and has her pre instructions  Joana Hsieh states that her mobility is severely affected when she retains fluid  She is using a walker for safety  [facility] therapy has ended  Joana Hsieh still has not heard any more about her CPAP  CM does not see an order in epic  She states it was all done through [facility]  She will call them to follow up  Joana Hsieh has my contact information and will call with any questions

## 2023-05-18 NOTE — DISCHARGE INSTRUCTIONS
Abdominal Paracentesis     WHAT YOU NEED TO KNOW:   Abdominal paracentesis is a procedure to remove abnormal fluid buildup in your abdomen  Fluid builds up because of liver problems, such as swelling and scarring  Heart failure, kidney disease, a mass, or problems with your pancreas may also cause fluid buildup  DISCHARGE INSTRUCTIONS:     Follow up with your healthcare provider as directed: Write down your questions so you remember to ask them during your visits  Wound care: Remove dressing after 24 hours  Leave glue in place  Return to your normal activities    Contact Interventional Radiology at [phone] Darleen PATIENTS: Contact Interventional Radiology at [phone]) Andrew Chan PATIENTS: Contact Interventional Radiology at [phone]) if:  You have a fever and your wound is red and swollen  You have yellow, green, or bad-smelling discharge coming from your wound  You have pain or swelling in your abdomen  You have an upset stomach or you vomit  You have sudden, sharp pain in your abdomen  You urinate very little or not at all  You feel confused and more tired than usual    Your arm or leg feels warm, tender, and painful  It may look swollen and red  You suddenly feel lightheaded and have trouble breathing  Abdominal Paracentesis     WHAT YOU NEED TO KNOW:   Abdominal paracentesis is a procedure to remove abnormal fluid buildup in your abdomen  Fluid builds up because of liver problems, such as swelling and scarring  Heart failure, kidney disease, a mass, or problems with your pancreas may also cause fluid buildup  DISCHARGE INSTRUCTIONS:     Follow up with your healthcare provider as directed: Write down your questions so you remember to ask them during your visits  Wound care: Remove dressing after 24 hours  Leave glue in place      Return to your normal activities    Contact Interventional Radiology at [phone] Darleen PATIENTS: Contact Interventional Radiology at [phone]) Sarah Alanis PATIENTS: Contact Interventional Radiology at [phone]) if:  You have a fever and your wound is red and swollen  You have yellow, green, or bad-smelling discharge coming from your wound  You have pain or swelling in your abdomen  You have an upset stomach or you vomit  You have sudden, sharp pain in your abdomen  You urinate very little or not at all  You feel confused and more tired than usual    Your arm or leg feels warm, tender, and painful  It may look swollen and red  You suddenly feel lightheaded and have trouble breathing

## 2023-05-18 NOTE — SEDATION DOCUMENTATION
8,700 cc clear yellow ascites removed  Pt tolerated well  Albumin infused  Pt transport to car with her brother via wheelchair

## 2023-05-22 ENCOUNTER — TELEPHONE (OUTPATIENT)
Dept: OBGYN CLINIC | Facility: HOSPITAL | Age: 68
End: 2023-05-22

## 2023-05-22 DIAGNOSIS — M25.551 GREATER TROCHANTERIC PAIN SYNDROME OF RIGHT LOWER EXTREMITY: ICD-10-CM

## 2023-05-22 DIAGNOSIS — M25.551 ACUTE RIGHT HIP PAIN: ICD-10-CM

## 2023-05-22 DIAGNOSIS — G89.29 CHRONIC LEFT HIP PAIN: ICD-10-CM

## 2023-05-22 DIAGNOSIS — M25.552 CHRONIC LEFT HIP PAIN: ICD-10-CM

## 2023-05-22 DIAGNOSIS — M25.552 GREATER TROCHANTERIC PAIN SYNDROME OF LEFT LOWER EXTREMITY: Primary | ICD-10-CM

## 2023-05-22 NOTE — TELEPHONE ENCOUNTER
Cipriano Candace agreed to P T  and alternating hot and cold packs  Patient complained of getting into the and out of the car  I gave her number to [facility] physical therapy office

## 2023-05-22 NOTE — TELEPHONE ENCOUNTER
Caller: patient  Doctor: Yelena Rader    Reason for call: patient would like a cb from Dr Yelena Rader regarding the pain in her l hip and getting a muscle relaxer, declined scheduling at this time      Call back#: [phone]

## 2023-06-02 ENCOUNTER — PATIENT OUTREACH (OUTPATIENT)
Age: 68
End: 2023-06-02

## 2023-06-02 NOTE — PROGRESS NOTES
Outpatient Care Management Note:    Care manager called Denice Martin  She has been having difficulty getting to hospitals  She is unable to get in and out of her car  She is able to get in and out of her son's and other family members cars, because they are higher  Today her son took her to the food store and drug store  Her brother and sister will be available later this month to transport her to hospitals  Denice Salazar declined applying for [employer]  We did discuss [employer] as an option also  She will call central scheduling and see if her ultrasound testing can be completed in [place]  She is scheduled to start physical therapy on Tuesday and her son will be taking her  Currently devi is using a walker or cane for stability  She denies difficulty managing her daily care needs at home  Denice Salazar is aware that she needs to reschedule her GI appt  She states that her weight is up 8 lbs since her last paracentesis  She is monitoring her weight and breathing closely  She will call to schedule another paracentesis soon  She states that Dr Catalina Farris, GI, will not approve regularly scheduled paracentesis procedures  Denice Salazar has my contact information and will call me with an update regarding scheduling her ultrasounds

## 2023-06-06 ENCOUNTER — EVALUATION (OUTPATIENT)
Age: 68
End: 2023-06-06
Payer: MEDICARE

## 2023-06-06 ENCOUNTER — RA CDI HCC (OUTPATIENT)
Dept: OTHER | Facility: HOSPITAL | Age: 68
End: 2023-06-06

## 2023-06-06 DIAGNOSIS — R26.81 GAIT INSTABILITY: ICD-10-CM

## 2023-06-06 DIAGNOSIS — M25.551 GREATER TROCHANTERIC PAIN SYNDROME OF RIGHT LOWER EXTREMITY: ICD-10-CM

## 2023-06-06 DIAGNOSIS — G89.29 CHRONIC LEFT HIP PAIN: ICD-10-CM

## 2023-06-06 DIAGNOSIS — M25.552 CHRONIC LEFT HIP PAIN: ICD-10-CM

## 2023-06-06 DIAGNOSIS — M25.552 GREATER TROCHANTERIC PAIN SYNDROME OF LEFT LOWER EXTREMITY: Primary | ICD-10-CM

## 2023-06-06 DIAGNOSIS — M25.551 ACUTE RIGHT HIP PAIN: ICD-10-CM

## 2023-06-06 PROCEDURE — 97161 PT EVAL LOW COMPLEX 20 MIN: CPT

## 2023-06-06 PROCEDURE — 97112 NEUROMUSCULAR REEDUCATION: CPT

## 2023-06-06 PROCEDURE — 97116 GAIT TRAINING THERAPY: CPT

## 2023-06-06 NOTE — PROGRESS NOTES
[facility]  coding opportunities          Chart Reviewed number of suggestions sent to Provider: 1   [id number] [id number]    Patients Insurance     Medicare Insurance: Medicare

## 2023-06-06 NOTE — PROGRESS NOTES
PT Evaluation     Today's date: 2023  Patient name: Jennifer Cullen  : 1955  MRN: [id number]  Referring provider: Nito Winslow MD  Dx:   Encounter Diagnosis     ICD-10-CM    1  Greater trochanteric pain syndrome of left lower extremity  M25 552       2  Greater trochanteric pain syndrome of right lower extremity  M25 551       3  Chronic left hip pain  M25 552     G89 29       4  Acute right hip pain  M25 551       5  Gait instability  R26 81           Start Time: 730  Stop Time: 815  Total time in clinic (min): 45 minutes    Assessment  Assessment details: Patient is a 76 yo female presenting to physical therapy with symptoms consistent with L >R hip pain and deconditioning that started about 3 months ago  Patient presents with decreased hip and knee strength, decreased endurance, amb with a RW & SPC and impaired balance  Patient has increased difficulty with walking, sit to stand transfers and car transfers  Patient is a Fall risk due to 30 second sit to stand test as well as due to strength and previous falls within the year  PT will address the noted impairments by performing hip and knee strengthening, stretching, balance, functional activities and manual techniques to allow the patient to return to her PLOF  PT recommended 2x/week for 6-8 weeks c a good prognosis 2* PLOF  Impairments: abnormal gait, abnormal or restricted ROM, activity intolerance, impaired balance, impaired physical strength, lacks appropriate home exercise program, pain with function, safety issue, weight-bearing intolerance, poor posture  and poor body mechanics    Symptom irritability: moderateUnderstanding of Dx/Px/POC: good   Prognosis: good    Goals  STG: In four weeks the patient will:    1  Be (I) with her HEP  2  Increase hip and knee strength to 4/5 MMT score to assist c ADLs  3  Increase hip ROM by 10 degrees to assist c dressing and stairs  LTG: In eight weeks, the patient will:    1   Increase FOTO score to 59 to demonstrate improvements in symptoms and function  2  Perform 5-8 sit to stands in 30 seconds to demonstrate an improvement with strength and endurance without use of hands  3  Improve 6 MWT by 50ft to demonstrate an improvement with endurance  4  Increase hip and knee strength to 4+/5 MMT score to assist c prolonged activities  5  Perform a car transfer in her car to drive to appointments  Plan  Patient would benefit from: PT eval and skilled physical therapy  Planned modality interventions: cryotherapy and thermotherapy: hydrocollator packs  Planned therapy interventions: abdominal trunk stabilization, balance, balance/weight bearing training, IADL retraining, joint mobilization, manual therapy, massage, Chamorro taping, transfer training, therapeutic exercise, therapeutic activities, stretching, strengthening, postural training, patient education, neuromuscular re-education, body mechanics training, breathing training, flexibility, functional ROM exercises, gait training and home exercise program  Frequency: 2x week  Duration in visits: 16  Duration in weeks: 8  Plan of Care beginning date: 6/6/2023  Plan of Care expiration date: 8/1/2023  Treatment plan discussed with: patient        Subjective Evaluation    History of Present Illness  Mechanism of injury: Patient noted a few days after her physical therapy evaluation in March she went to the [facility] for hip pain  Patient noted she was dx with buritis  Patient had home PT for 6-8 weeks 2x/week  Patient then followed up with ortho and had an injection in the L hip  Patient noted a week or two later the pain decreased  Patient noted increased difficulty with sit to stands, bending forward to pick something up  Patient noted in her home she is using her [facility] and in the community she is using a   Patient noted her limit for walking is about 10 minutes   Patient noted she is able to wash dishes for 30 minutes, however she has increased "fatigue  Patient noted she is able to transfer (I) in a SUV, however she has not tried driving and is unsure able transferring to a lower car  Patient noted her pain is mainly on the L side of the hip  Recurrent probem    Quality of life: fair    Pain  Current pain ratin  At best pain ratin  At worst pain rating: 3  Location: L anteriolateral pain  Quality: dull ache (sharp at times)  Relieving factors: rest, medications and heat (tylenol - has not taken for 4 days)  Aggravating factors: sitting, standing, walking and stair climbing  Progression: improved    Social Support  Steps to enter house: yes (1 DENEEN no railing )  Stairs in house: no   Lives in: apartment  Lives with: alone    Employment status: not working  Hand dominance: right    Treatments  Previous treatment: injection treatment and physical therapy  Patient Goals  Patient goals for therapy: decreased pain, improved balance, increased motion, increased strength and independence with ADLs/IADLs  Patient goal: \"to transfer in and out of the car (I) so she can drive  \" \"to perform a sit to stand without increased effort  \"         Objective     Active Range of Motion   Left Hip   Flexion: 58 degrees   Abduction: 41 degrees     Right Hip   Flexion: 60 degrees with pain  Abduction: 23 degrees with pain    Passive Range of Motion   Left Hip   Flexion: 100 degrees     Right Hip   Flexion: 106 degrees with pain    Strength/Myotome Testing     Left Hip   Planes of Motion   Flexion: 3  Abduction: 4  Adduction: 3    Right Hip   Planes of Motion   Flexion: 3  Abduction: 4  Adduction: 3    Left Knee   Flexion: 4  Extension: 4    Right Knee   Flexion: 4-  Extension: 4    Left Ankle/Foot   Dorsiflexion: 4    Right Ankle/Foot   Dorsiflexion: 4    Muscle Activation     Additional Muscle Activation Details  Patient able to hold a 1 TAC with a diaphragmatic breath  Patient noted increased pain in the R low back and required moderate cues      Functional " Assessment        Comments        Initial Eval 6/6/23:  6MWT: 559 ft with RW; 1/10 fatigue  30 seconds sit to stand: used 2 hands; 3 times; achy in the L hip              Precautions: cirrhosis, hx of liver cancer, diabetes, COPD, HTN, anxiety, depression, OA, and urinary incontinence        Manuals 6/6                                                                Neuro Re-Ed             AD education MW             3 way hip in standing nv            Tandem stance nv            FTEO nv            FTEC nv                                                                Ther Ex             Nustep nv            Ball roll outs nv            Hip add nv            Hip abd nv            Standing marching nv            Heel raises nv            Toe raises nv                         Ther Activity             Sit to stands nv            Step ups nv            Gait Training             6 minute walk test performed                         Modalities

## 2023-06-07 ENCOUNTER — OFFICE VISIT (OUTPATIENT)
Age: 68
End: 2023-06-07
Payer: MEDICARE

## 2023-06-07 DIAGNOSIS — G89.29 CHRONIC LEFT HIP PAIN: ICD-10-CM

## 2023-06-07 DIAGNOSIS — M25.552 GREATER TROCHANTERIC PAIN SYNDROME OF LEFT LOWER EXTREMITY: Primary | ICD-10-CM

## 2023-06-07 DIAGNOSIS — M25.552 CHRONIC LEFT HIP PAIN: ICD-10-CM

## 2023-06-07 DIAGNOSIS — R26.81 GAIT INSTABILITY: ICD-10-CM

## 2023-06-07 DIAGNOSIS — M25.551 ACUTE RIGHT HIP PAIN: ICD-10-CM

## 2023-06-07 DIAGNOSIS — M25.551 GREATER TROCHANTERIC PAIN SYNDROME OF RIGHT LOWER EXTREMITY: ICD-10-CM

## 2023-06-07 PROCEDURE — 97530 THERAPEUTIC ACTIVITIES: CPT

## 2023-06-07 PROCEDURE — 97112 NEUROMUSCULAR REEDUCATION: CPT

## 2023-06-07 NOTE — PROGRESS NOTES
Daily Note     Today's date: 2023  Patient name: Jocelyne Patel  : 1955  MRN: [id number]  Referring provider: Clarence Scott MD  Dx:   Encounter Diagnosis     ICD-10-CM    1  Greater trochanteric pain syndrome of left lower extremity  M25 552       2  Greater trochanteric pain syndrome of right lower extremity  M25 551       3  Chronic left hip pain  M25 552     G89 29       4  Acute right hip pain  M25 551       5  Gait instability  R26 81           Start Time: 730  Stop Time: 816  Total time in clinic (min): 46 minutes    Subjective: Patient noted that she did a lot yesterday and is a little tired this morning  Objective: See treatment diary below      Assessment: Patient performed Nustep aerobic exercise to increase blood flow to the area being treated, prepare the muscles for strength training and stretching, improve overall tolerance to activity, and aerobic endurance  Patient amb with a SPC during the session mod (I) with no LOB  Patient performed lateral stepping at the barre with maximal cues for form  Patient uses her hip flexors instead of hip abductors to perform a lateral step  Patient performed standing and seated exercises with min VCs for form  PT educated the patient on her HEP  Patient would benefit from continued PT to allow the patient to return to her PLOF  Plan: Continue per plan of care  Precautions: cirrhosis, hx of liver cancer, diabetes, COPD, HTN, anxiety, depression, OA, and urinary incontinence        Manuals                                                                Neuro Re-Ed             AD education MW             3 way hip in standing nv abd and ext  x20 ea  @ step           Tandem stance nv            FTEO nv            FTEC nv            Lateral stepping at barre 2 HHA  1 lap  Mod cues                                                  Ther Ex             Nustep (seat 8) nv 10'  lvl 2           Ball roll outs nv            Hip add nv Hip abd nv            Standing marching nv x20 ea           Heel raises nv seated and standing  x20 ea           Toe raises nv seated  x20 ea           LAQ  x20 ea                        Seated marching  x20 ea           Ther Activity             Sit to stands nv x7                        Forward rocking to prep for sit to stands  x10           Step ups nv            Gait Training             6 minute walk test performed            amb with SPC  1/2 lap           Modalities

## 2023-06-13 ENCOUNTER — OFFICE VISIT (OUTPATIENT)
Age: 68
End: 2023-06-13
Payer: MEDICARE

## 2023-06-13 DIAGNOSIS — M25.551 GREATER TROCHANTERIC PAIN SYNDROME OF RIGHT LOWER EXTREMITY: ICD-10-CM

## 2023-06-13 DIAGNOSIS — M25.552 GREATER TROCHANTERIC PAIN SYNDROME OF LEFT LOWER EXTREMITY: Primary | ICD-10-CM

## 2023-06-13 DIAGNOSIS — G89.29 CHRONIC LEFT HIP PAIN: ICD-10-CM

## 2023-06-13 DIAGNOSIS — M25.552 CHRONIC LEFT HIP PAIN: ICD-10-CM

## 2023-06-13 DIAGNOSIS — M25.551 ACUTE RIGHT HIP PAIN: ICD-10-CM

## 2023-06-13 DIAGNOSIS — R26.81 GAIT INSTABILITY: ICD-10-CM

## 2023-06-13 PROCEDURE — 97110 THERAPEUTIC EXERCISES: CPT

## 2023-06-13 PROCEDURE — 97112 NEUROMUSCULAR REEDUCATION: CPT

## 2023-06-13 NOTE — PROGRESS NOTES
Daily Note     Today's date: 2023  Patient name: Destinee Ca  : 1955  MRN: [id number]  Referring provider: Jatin Mobley MD  Dx:   Encounter Diagnosis     ICD-10-CM    1  Greater trochanteric pain syndrome of left lower extremity  M25 552       2  Greater trochanteric pain syndrome of right lower extremity  M25 551       3  Chronic left hip pain  M25 552     G89 29       4  Acute right hip pain  M25 551       5  Gait instability  R26 81           Start Time: 730  Stop Time: 815  Total time in clinic (min): 45 minutes    Subjective: Patient noted some muscle soreness after last session  Patient noted some pain at the start of the session in the L hip  Objective: See treatment diary below      Assessment: Patient performed Nustep aerobic exercise to increase blood flow to the area being treated, prepare the muscles for strength training and stretching, improve overall tolerance to activity, and aerobic endurance  Patient amb around the clinic for 3 laps mod (I) with a RW  Patient improved with lateral stepping due to less cues  Patient performed sit to stands on a chair without arms with min A x 1 and VCs for rocking and weight shifting  Patient noted some pain in the hip with standing marching, with modifications this improved  PT introduced seated hip add and abd to assist c strengthening  PT added to her HEP  Patient would benefit from continued PT to allow the patient to return to her PLOF  Plan: Continue per plan of care  Precautions: cirrhosis, hx of liver cancer, diabetes, COPD, HTN, anxiety, depression, OA, and urinary incontinence        Manuals                                                               Neuro Re-Ed             AD education MW             3 way hip in standing nv abd and ext  x20 ea  @ step abd and ext  x20 ea @ barre          Tandem stance nv            FTEO nv            FTEC nv            Lateral stepping at barre 2 HHA  1 lap  Mod cues 2 laps                                                 Ther Ex             Nustep (seat 8) nv 10'  lvl 2 10'  lvl 2          Ball roll outs nv            Hip add nv  x20 seated          Hip abd nv  GTB  x20 seated          Standing marching nv x20 ea x20 ea foot propped          Heel raises nv seated and standing  x20 ea standing  x20          Toe raises nv seated  x20 ea seated  x20 ea          LAQ  x20 ea x20 ea                       Seated marching  x20 ea 20 ea          Ther Activity             Sit to stands nv x7 x7                       Forward rocking to prep for sit to stands  x10 x10          Step ups nv            Gait Training             6 minute walk test performed            Walking with RW   3 lap          amb with SPC  1/2 lap           Modalities

## 2023-06-14 ENCOUNTER — OFFICE VISIT (OUTPATIENT)
Age: 68
End: 2023-06-14
Payer: MEDICARE

## 2023-06-14 DIAGNOSIS — M25.551 GREATER TROCHANTERIC PAIN SYNDROME OF RIGHT LOWER EXTREMITY: ICD-10-CM

## 2023-06-14 DIAGNOSIS — R26.81 GAIT INSTABILITY: ICD-10-CM

## 2023-06-14 DIAGNOSIS — M25.552 GREATER TROCHANTERIC PAIN SYNDROME OF LEFT LOWER EXTREMITY: Primary | ICD-10-CM

## 2023-06-14 DIAGNOSIS — M25.552 CHRONIC LEFT HIP PAIN: ICD-10-CM

## 2023-06-14 DIAGNOSIS — G89.29 CHRONIC LEFT HIP PAIN: ICD-10-CM

## 2023-06-14 DIAGNOSIS — M25.551 ACUTE RIGHT HIP PAIN: ICD-10-CM

## 2023-06-14 PROCEDURE — 97110 THERAPEUTIC EXERCISES: CPT

## 2023-06-14 PROCEDURE — 97530 THERAPEUTIC ACTIVITIES: CPT

## 2023-06-14 NOTE — PROGRESS NOTES
Daily Note     Today's date: 2023  Patient name: Rachell Morse  : 1955  MRN: [id number]  Referring provider: Eduardo Herron MD  Dx:   Encounter Diagnosis     ICD-10-CM    1  Greater trochanteric pain syndrome of left lower extremity  M25 552       2  Greater trochanteric pain syndrome of right lower extremity  M25 551       3  Chronic left hip pain  M25 552     G89 29       4  Acute right hip pain  M25 551       5  Gait instability  R26 81           Start Time: 730  Stop Time: 815  Total time in clinic (min): 45 minutes    Subjective: Patient noted she feels okay today  Objective: See treatment diary below      Assessment: Patient performed Recumbent bike aerobic exercise to increase blood flow to the area being treated, prepare the muscles for strength training and stretching, improve overall tolerance to activity, and aerobic endurance  Patient amb 1 lap around the clinic with a [facility] with 2 seated educational breaks  The educational breaks were focused on showering posture and AD that can help her navigate her home and be less of a fall risk  Patient performed sit to stands with improved weight shifting and form  PT educated the patient on her HEP  Patient would benefit from continued PT to allow the patient to return to her PLOF  Plan: Continue per plan of care  Precautions: cirrhosis, hx of liver cancer, diabetes, COPD, HTN, anxiety, depression, OA, and urinary incontinence        Manuals                                                              Neuro Re-Ed             AD education MW             3 way hip in standing nv abd and ext  x20 ea  @ step abd and ext  x20 ea @ barre abd and ext  x20 ea @ barre         Tandem stance nv            FTEO nv            FTEC nv            Lateral stepping at barre 2 HHA  1 lap  Mod cues 2 laps                                                 Ther Ex             Nustep (seat 8) nv 10'  lvl 2 10'  lvl 2 10'  lvl 2 Ball roll outs nv   x15 ea         Hip add nv  x20 seated x20 seated         Hip abd nv  GTB  x20 seated GTB  x20 seated         Standing marching nv x20 ea x20 ea foot propped x20 ea          Heel raises nv seated and standing  x20 ea standing  x20 standing  x20         Toe raises nv seated  x20 ea seated  x20 ea seated  x20 ea         LAQ  x20 ea x20 ea                       Seated marching  x20 ea 20 ea          Ther Activity             Sit to stands nv x7 x7 x8                      Forward rocking to prep for sit to stands  x10 x10 x10         Step ups nv            Gait Training             6 minute walk test performed            Walking with RW   3 lap 1 lap         amb with SPC  1/2 lap  1 lap  2 breaks         Modalities

## 2023-06-15 ENCOUNTER — PATIENT OUTREACH (OUTPATIENT)
Age: 68
End: 2023-06-15

## 2023-06-15 NOTE — PROGRESS NOTES
Outpatient Care Management Note:    Care manager called Tegan Cedjoseph  She still has not been able to schedule several ultrasound testing or GI follow up  She did call the [facility] site, but her ultrasound tests are not offered there  She has 2 ultrasound tests scheduled  CM suggested she call to see if she could complete other ultrasounds at the same time  Tegan Street declined, stating that she can not tie her son up that long  He works nights and needs to sleep  She continues to have transportation difficulties  She declined [employer] or [employer]  Her brother and sister will be back from vacation on Sunday  She will ask them to assist  CM reinforced the importance of proactive care  Tegan Street is attending physical therapy and states that they tell her she is improving  She did mention that they will be working on getting in and out of her car  Tegan Yenifer is monitoring daily weights  She states her weight has increased 10-12 lb since her last paracentesis  She is experiencing coughing related to increased fluid  She states she does not need a paracentesis yet  She states that she knows when it is needed, and will call Interventional Radiology when ready  Tegan Street states her blood sugars are ranging from 104-132  Tegan Street has my contact information  She will call with any questions  We did review upcoming appts

## 2023-06-16 ENCOUNTER — TELEPHONE (OUTPATIENT)
Dept: ADMINISTRATIVE | Facility: OTHER | Age: 68
End: 2023-06-16

## 2023-06-16 NOTE — TELEPHONE ENCOUNTER
----- Message from Jackie Beverly sent at 6/16/2023  7:46 AM EDT -----  Regarding: Care Gap Request  06/16/23 7:46 AM    Hello, our patient attached above has had Diabetic Eye Exam completed/performed  Please assist in updating the patient chart by making an External outreach to the office of Dr Gilson Booker facility located in [place], [place]  The date of service is 11/2022      Thank you,  JONE Beverly  [facility] [id number] [facility]

## 2023-06-16 NOTE — TELEPHONE ENCOUNTER
Upon review of the In Basket request and the patient's chart, initial outreach has been made via telephone call to facility  Please see Contacts section for details        x1 eye    Thank you  Shanti Vieyra

## 2023-06-18 ENCOUNTER — TELEPHONE (OUTPATIENT)
Dept: OTHER | Facility: OTHER | Age: 68
End: 2023-06-18

## 2023-06-18 NOTE — TELEPHONE ENCOUNTER
Patient is calling regarding cancelling an appointment      Date/Time: 06/19/2023    Patient was rescheduled: YES [] NO [x]    Patient requesting call back to reschedule: YES [] NO []

## 2023-06-19 NOTE — TELEPHONE ENCOUNTER
Called patient to reschedule  Patient does not have a way to get here as her family that would bring her is away  Patient will call back to reschedule

## 2023-06-20 NOTE — TELEPHONE ENCOUNTER
Upon review of the In Basket request we were able to locate, review, and update the patient chart as requested for Diabetic Eye Exam     Any additional questions or concerns should be emailed to the Practice Liaisons via the appropriate education email address, please do not reply via In Basket      Thank you  Gerald Griffith

## 2023-06-21 ENCOUNTER — TELEPHONE (OUTPATIENT)
Age: 68
End: 2023-06-21

## 2023-06-21 NOTE — TELEPHONE ENCOUNTER
----- Message from Venancio Alarcon sent at 6/21/2023  7:08 AM EDT -----  Regarding: Reschedule AWV  Please call patient today to reschedule her Medicare annual wellness visit that she missed on Monday  Thank you very much!

## 2023-06-22 ENCOUNTER — OFFICE VISIT (OUTPATIENT)
Age: 68
End: 2023-06-22
Payer: MEDICARE

## 2023-06-22 DIAGNOSIS — M25.551 GREATER TROCHANTERIC PAIN SYNDROME OF RIGHT LOWER EXTREMITY: ICD-10-CM

## 2023-06-22 DIAGNOSIS — M25.552 CHRONIC LEFT HIP PAIN: ICD-10-CM

## 2023-06-22 DIAGNOSIS — G89.29 CHRONIC LEFT HIP PAIN: ICD-10-CM

## 2023-06-22 DIAGNOSIS — M25.552 GREATER TROCHANTERIC PAIN SYNDROME OF LEFT LOWER EXTREMITY: Primary | ICD-10-CM

## 2023-06-22 DIAGNOSIS — M25.551 ACUTE RIGHT HIP PAIN: ICD-10-CM

## 2023-06-22 DIAGNOSIS — R26.81 GAIT INSTABILITY: ICD-10-CM

## 2023-06-22 PROCEDURE — 97112 NEUROMUSCULAR REEDUCATION: CPT

## 2023-06-22 PROCEDURE — 97110 THERAPEUTIC EXERCISES: CPT

## 2023-06-22 NOTE — TELEPHONE ENCOUNTER
Spoke to Ofelia Echeverria to reschedule canceled wellness appointment  Ofelia cEheverria is not driving and needs to speak to her son to see when he can bring her  Ofelia Echeverria will check with him and call us back

## 2023-06-22 NOTE — PROGRESS NOTES
Daily Note     Today's date: 2023  Patient name: Yesi Tai  : 1955  MRN: [id number]  Referring provider: Janelle Torres MD  Dx:   Encounter Diagnosis     ICD-10-CM    1  Greater trochanteric pain syndrome of left lower extremity  M25 552       2  Greater trochanteric pain syndrome of right lower extremity  M25 551       3  Chronic left hip pain  M25 552     G89 29       4  Acute right hip pain  M25 551       5  Gait instability  R26 81           Start Time: 730  Stop Time: 815  Total time in clinic (min): 45 minutes    Subjective: Patient noted that she feels okay today  Patient noted no pain after last session  Objective: See treatment diary below      Assessment: Patient performed Nustep aerobic exercise to increase blood flow to the area being treated, prepare the muscles for strength training and stretching, improve overall tolerance to activity, and aerobic endurance  Patient continues to improve with strength 2* increased walking distance with minimal standing breaks  Patient continues to improve with sit to stand form  PT educated the patient on her HEP  Patient would benefit from continued PT to allow the patient to return to her PLOF  Plan: Continue per plan of care  Precautions: cirrhosis, hx of liver cancer, diabetes, COPD, HTN, anxiety, depression, OA, and urinary incontinence        Manuals                                                             Neuro Re-Ed             AD education MW             3 way hip in standing nv abd and ext  x20 ea  @ step abd and ext  x20 ea @ barre abd and ext  x20 ea @ barre abd and ext  x20 ea @ barre        Tandem stance nv            FTEO nv            FTEC nv            Lateral stepping at barre 2 HHA  1 lap  Mod cues 2 laps  2 laps                                               Ther Ex             Nustep (seat 8) nv 10'  lvl 2 10'  lvl 2 10'  lvl 2 11' lvl 2        Ball roll outs nv   x15 ea x15 ea Hip add nv  x20 seated x20 seated x20 seated        Hip abd nv  GTB  x20 seated GTB  x20 seated         Standing marching nv x20 ea x20 ea foot propped x20 ea  x20 ea        Heel raises nv seated and standing  x20 ea standing  x20 standing  x20         Toe raises nv seated  x20 ea seated  x20 ea seated  x20 ea         LAQ  x20 ea x20 ea  x20 ea                     Seated marching  x20 ea 20 ea  x20 ea        Ther Activity             Sit to stands nv x7 x7 x8                      Forward rocking to prep for sit to stands  x10 x10 x10         Step ups nv            Gait Training             6 minute walk test performed            Walking with RW   3 lap 1 lap 1 lap        amb with SPC  1/2 lap  1 lap  2 breaks 2 5 laps  1 break        Modalities

## 2023-06-23 ENCOUNTER — HOSPITAL ENCOUNTER (OUTPATIENT)
Dept: ULTRASOUND IMAGING | Facility: MEDICAL CENTER | Age: 68
Discharge: HOME | End: 2023-06-23
Payer: MEDICARE

## 2023-06-23 DIAGNOSIS — M25.561 ACUTE PAIN OF RIGHT KNEE: ICD-10-CM

## 2023-06-23 DIAGNOSIS — M85.661 OTHER CYST OF BONE, RIGHT LOWER LEG: ICD-10-CM

## 2023-06-23 PROCEDURE — 76882 US LMTD JT/FCL EVL NVASC XTR: CPT

## 2023-06-27 ENCOUNTER — OFFICE VISIT (OUTPATIENT)
Age: 68
End: 2023-06-27
Payer: MEDICARE

## 2023-06-27 DIAGNOSIS — M25.551 ACUTE RIGHT HIP PAIN: ICD-10-CM

## 2023-06-27 DIAGNOSIS — M25.552 GREATER TROCHANTERIC PAIN SYNDROME OF LEFT LOWER EXTREMITY: Primary | ICD-10-CM

## 2023-06-27 DIAGNOSIS — M25.552 CHRONIC LEFT HIP PAIN: ICD-10-CM

## 2023-06-27 DIAGNOSIS — R26.81 GAIT INSTABILITY: ICD-10-CM

## 2023-06-27 DIAGNOSIS — M25.551 GREATER TROCHANTERIC PAIN SYNDROME OF RIGHT LOWER EXTREMITY: ICD-10-CM

## 2023-06-27 DIAGNOSIS — G89.29 CHRONIC LEFT HIP PAIN: ICD-10-CM

## 2023-06-27 PROCEDURE — 97112 NEUROMUSCULAR REEDUCATION: CPT

## 2023-06-27 PROCEDURE — 97110 THERAPEUTIC EXERCISES: CPT

## 2023-06-27 NOTE — PROGRESS NOTES
"Daily Note     Today's date: 2023  Patient name: Claudene Alm  : 1955  MRN: [id number]  Referring provider: Troy Ludwig MD  Dx:   Encounter Diagnosis     ICD-10-CM    1  Greater trochanteric pain syndrome of left lower extremity  M25 552       2  Greater trochanteric pain syndrome of right lower extremity  M25 551       3  Chronic left hip pain  M25 552     G89 29       4  Acute right hip pain  M25 551       5  Gait instability  R26 81           Start Time: 730  Stop Time: 815  Total time in clinic (min): 45 minutes    Subjective: Patient noted that the cane was challenging yesterday  Patient noted that she feels a little off today  Patient denied dizzy symptoms or light headed  Patient noted she thinks it is the weather  Objective: See treatment diary below      Assessment: Patient performed Nustep aerobic exercise to increase blood flow to the area being treated, prepare the muscles for strength training and stretching, improve overall tolerance to activity, and aerobic endurance  Patient continues to improve with strength 2* less rocking required to perform a sit to stand  Patient amb with a RW mod (I) with no LOB  Patient performed lateral stepping with a step over a 6\" savana to assist c navigating her home  Patient performed with moderate cues, however no LOB  Patient would benefit from continued PT to allow the patient to return to her PLOF  Plan: Continue per plan of care  Precautions: cirrhosis, hx of liver cancer, diabetes, COPD, HTN, anxiety, depression, OA, and urinary incontinence        Manuals                                                            Neuro Re-Ed             AD education MW             3 way hip in standing nv abd and ext  x20 ea  @ step abd and ext  x20 ea @ barre abd and ext  x20 ea @ barre abd and ext  x20 ea @ barre abd and ext  x20 ea @ barre       Tandem stance nv            FTEO nv            FTEC nv          " "  Lateral stepping at barre 2 HHA  1 lap  Mod cues 2 laps  2 laps 4 laps  (1) 6\" savana                                              Ther Ex             Nustep (seat 8) nv 10'  lvl 2 10'  lvl 2 10'  lvl 2 11' lvl 2 10'  lvl 3 lvl 4      Ball roll outs nv   x15 ea x15 ea        Hip add nv  x20 seated x20 seated x20 seated        Hip abd nv  GTB  x20 seated GTB  x20 seated         Standing marching nv x20 ea x20 ea foot propped x20 ea  x20 ea x20 ea       Heel raises nv seated and standing  x20 ea standing  x20 standing  x20  standing  x20       Toe raises nv seated  x20 ea seated  x20 ea seated  x20 ea  standing  x20       LAQ  x20 ea x20 ea  x20 ea x20 ea                    Seated marching  x20 ea 20 ea  x20 ea x20 ea       Ther Activity             Sit to stands nv x7 x7 x8  x8                    Forward rocking to prep for sit to stands  x10 x10 x10         Step ups nv            Gait Training             6 minute walk test performed            Walking with RW   3 lap 1 lap 1 lap 4 laps       amb with SPC  1/2 lap  1 lap  2 breaks 2 5 laps  1 break        Modalities                                            "

## 2023-06-29 ENCOUNTER — OFFICE VISIT (OUTPATIENT)
Age: 68
End: 2023-06-29
Payer: MEDICARE

## 2023-06-29 DIAGNOSIS — M25.552 GREATER TROCHANTERIC PAIN SYNDROME OF LEFT LOWER EXTREMITY: Primary | ICD-10-CM

## 2023-06-29 DIAGNOSIS — M25.552 CHRONIC LEFT HIP PAIN: ICD-10-CM

## 2023-06-29 DIAGNOSIS — M25.551 ACUTE RIGHT HIP PAIN: ICD-10-CM

## 2023-06-29 DIAGNOSIS — G89.29 CHRONIC LEFT HIP PAIN: ICD-10-CM

## 2023-06-29 DIAGNOSIS — M25.551 GREATER TROCHANTERIC PAIN SYNDROME OF RIGHT LOWER EXTREMITY: ICD-10-CM

## 2023-06-29 DIAGNOSIS — R26.81 GAIT INSTABILITY: ICD-10-CM

## 2023-06-29 PROCEDURE — 97116 GAIT TRAINING THERAPY: CPT

## 2023-06-29 PROCEDURE — 97110 THERAPEUTIC EXERCISES: CPT

## 2023-06-29 NOTE — PROGRESS NOTES
Daily Note     Today's date: 2023  Patient name: Nely Okeefe  : 1955  MRN: [id number]  Referring provider: Liam Sebastian MD  Dx:   Encounter Diagnosis     ICD-10-CM    1  Greater trochanteric pain syndrome of left lower extremity  M25 552       2  Greater trochanteric pain syndrome of right lower extremity  M25 551       3  Chronic left hip pain  M25 552     G89 29       4  Acute right hip pain  M25 551       5  Gait instability  R26 81           Start Time: 730  Stop Time: 815  Total time in clinic (min): 45 minutes    Subjective: Patient noted no muscle soreness from last session  Patient noted sit to stands are getting easier  Patient noted that she has been walking in her home with the cane, however feels more comfortable with the RW in the community  Objective: See treatment diary below      Assessment: Patient performed Nustep aerobic exercise to increase blood flow to the area being treated, prepare the muscles for strength training and stretching, improve overall tolerance to activity, and aerobic endurance  Patient improved with endurance 2* increased resistance on the Nustep, increased weight on the LAQ and increased laps walked with RW  Patient required min VCs for form throughout the session  Patient noted no increased pain post session, however noted difficulty with seated marching  Patient went to the bathroom at th end of the session and had a tough time getting off the toilet  Patient was able to get off the toilet (I), however she noted fatigue and she was shaky  Patient was able to amb out of the clinic with her son on a RW  Patient would benefit from continued PT to allow the patient to return to her PLOF  Patient noted she woke up with rectal bleeding this morning  PT educated the patient to follow up with her PCP today about this  Plan: Continue per plan of care        Precautions: cirrhosis, hx of liver cancer, diabetes, COPD, HTN, anxiety, depression, OA, "and urinary incontinence        Manuals 6/6 6/7 6/13 6/14 6/22 6/27 6/29                                                          Neuro Re-Ed             AD education MW             3 way hip in standing nv abd and ext  x20 ea  @ step abd and ext  x20 ea @ barre abd and ext  x20 ea @ barre abd and ext  x20 ea @ barre abd and ext  x20 ea @ barre       Tandem stance nv            FTEO nv            FTEC nv            Lateral stepping at barre 2 HHA  1 lap  Mod cues 2 laps  2 laps 4 laps  (1) 6\" savana                                              Ther Ex             Nustep (seat 8) nv 10'  lvl 2 10'  lvl 2 10'  lvl 2 11' lvl 2 10'  lvl 3 lvl 4  10'      Ball roll outs nv   x15 ea x15 ea  x15 ea      Hip add nv  x20 seated x20 seated x20 seated        Hip abd nv  GTB  x20 seated GTB  x20 seated         Standing marching nv x20 ea x20 ea foot propped x20 ea  x20 ea x20 ea x20 ea      Heel raises nv seated and standing  x20 ea standing  x20 standing  x20  standing  x20 standing  x20      Toe raises nv seated  x20 ea seated  x20 ea seated  x20 ea  standing  x20 standing  x20      LAQ  x20 ea x20 ea  x20 ea x20 ea 2#  x20 ea                   Seated marching  x20 ea 20 ea  x20 ea x20 ea x20 ea      Ther Activity             Sit to stands nv x7 x7 x8  x8                    Forward rocking to prep for sit to stands  x10 x10 x10         Step ups nv            Gait Training             6 minute walk test performed            Walking with RW   3 lap 1 lap 1 lap 4 laps 3 laps beginning      amb with SPC  1/2 lap  1 lap  2 breaks 2 5 laps  1 break        Modalities                                            "

## 2023-06-30 ENCOUNTER — PATIENT OUTREACH (OUTPATIENT)
Age: 68
End: 2023-06-30

## 2023-06-30 NOTE — PROGRESS NOTES
"Outpatient Care Management Note:    Care manager called Andrea Marroquin  She is attending physical therapy  She states that yesterday she had trouble getting off the toilet at physical therapy  After that, her legs didn't want to hold her up  She states that this happens sometimes  She noted that she feels much stronger today  CM instructed her to call JONE Shankar if she has ongoing issues  Andrea Marroquin did complete 2 of her ultrasounds  She will work to schedule her right upper quadrant with liver dopplers next  Once completed, she will schedule with GI  CM reviewed that she also needs to reschedule her PCP appt  Andrea Marroquin states her weights have been stable  This morning she weighed 193 lbs  She denies needing a paracentesis  Her last one was in May  Andrea Marroquin did note that she gets \"dry heaves\" each morning  This is not a new problem and has been ongoing for a long time  Lois checks daily blood sugars and states they have been stable between 105-130  Andrea Marroquin never heard anymore about her cpap  She states that she did have a sleep study done at [facility] and was told she needed a cpap  The testing was ordered by cardiology  Andrea Marroquin states she is not due to see cardiology until next year  She denies any issues with her breathing at night and did not want to pursue it at this time  Andrea Marroquin has my contact information  She will call with any questions    "

## 2023-07-03 ENCOUNTER — OFFICE VISIT (OUTPATIENT)
Age: 68
End: 2023-07-03
Payer: MEDICARE

## 2023-07-03 DIAGNOSIS — M25.551 GREATER TROCHANTERIC PAIN SYNDROME OF RIGHT LOWER EXTREMITY: ICD-10-CM

## 2023-07-03 DIAGNOSIS — R26.81 GAIT INSTABILITY: ICD-10-CM

## 2023-07-03 DIAGNOSIS — M25.552 CHRONIC LEFT HIP PAIN: ICD-10-CM

## 2023-07-03 DIAGNOSIS — M25.551 ACUTE RIGHT HIP PAIN: ICD-10-CM

## 2023-07-03 DIAGNOSIS — M25.552 GREATER TROCHANTERIC PAIN SYNDROME OF LEFT LOWER EXTREMITY: Primary | ICD-10-CM

## 2023-07-03 DIAGNOSIS — G89.29 CHRONIC LEFT HIP PAIN: ICD-10-CM

## 2023-07-03 PROCEDURE — 97110 THERAPEUTIC EXERCISES: CPT

## 2023-07-03 PROCEDURE — 97112 NEUROMUSCULAR REEDUCATION: CPT

## 2023-07-03 NOTE — PROGRESS NOTES
Daily Note     Today's date: 7/3/2023  Patient name: Linda Wei  : 1955  MRN: [id number]  Referring provider: Kashif Hidalgo MD  Dx:   Encounter Diagnosis     ICD-10-CM    1. Greater trochanteric pain syndrome of left lower extremity  M25.552       2. Greater trochanteric pain syndrome of right lower extremity  M25.551       3. Chronic left hip pain  M25.552     G89.29       4. Acute right hip pain  M25.551       5. Gait instability  R26.81           Start Time: 730  Stop Time: 815  Total time in clinic (min): 45 minutes    Subjective: Patient noted some muscle soreness in the quad muscle after last time. Objective: See treatment diary below      Assessment: Patient performed Nustep aerobic exercise to increase blood flow to the area being treated, prepare the muscles for strength training and stretching, improve overall tolerance to activity, and aerobic endurance. Patient performed sit to stands with HHA, however her form has improved. Patient performed lateral stepping with moderate cues for form during the session. Patient had no LOB during the session. PT educated the patient and he son to use ankle weights at home for seated marching and LAQ up to 3#s to assist c quad strength. Patient would benefit from continued PT to allow the patient to return to her PLOF. Plan: Continue per plan of care. Precautions: cirrhosis, hx of liver cancer, diabetes, COPD, HTN, anxiety, depression, OA, and urinary incontinence.       Manuals 6/6 6/7 6/13 6/14 6/22 6/27 6/29 7/3                                                         Neuro Re-Ed             AD education MW             3 way hip in standing nv abd and ext  x20 ea  @ step abd and ext  x20 ea @ barre abd and ext  x20 ea @ barre abd and ext  x20 ea @ barre abd and ext  x20 ea @ barre  abd and ext  x20 ea @ barre     Tandem stance nv            FTEO nv            FTEC nv            Lateral stepping at barre 2 HHA  1 lap  Mod cues 2 laps 2 laps 4 laps  (1) 6" savana  4 laps (2) 6" hurdles                                            Ther Ex             Nustep (seat 8) nv 10'  lvl 2 10'  lvl 2 10'  lvl 2 11' lvl 2 10'  lvl 3 lvl 4  10' lvl 4  10'     Ball roll outs nv   x15 ea x15 ea  x15 ea      Hip add nv  x20 seated x20 seated x20 seated   x20 seated     Hip abd nv  GTB  x20 seated GTB  x20 seated         Standing marching nv x20 ea x20 ea foot propped x20 ea  x20 ea x20 ea x20 ea x20 ea     Heel raises nv seated and standing  x20 ea standing  x20 standing  x20  standing  x20 standing  x20 standing  x20     Toe raises nv seated  x20 ea seated  x20 ea seated  x20 ea  standing  x20 standing  x20 standing  x20     LAQ  x20 ea x20 ea  x20 ea x20 ea 2#  x20 ea 2.5#  x20 ea                  Seated marching  x20 ea 20 ea  x20 ea x20 ea x20 ea 2.5#  x20 ea     Ther Activity             Sit to stands nv x7 x7 x8  x8  2x5                  Forward rocking to prep for sit to stands  x10 x10 x10         Step ups nv            Gait Training             6 minute walk test performed            Walking with RW   3 lap 1 lap 1 lap 4 laps 3 laps beginning 3 laps end of session     amb with SPC  1/2 lap  1 lap  2 breaks 2.5 laps  1 break        Modalities

## 2023-07-05 ENCOUNTER — EVALUATION (OUTPATIENT)
Age: 68
End: 2023-07-05
Payer: MEDICARE

## 2023-07-05 DIAGNOSIS — M25.552 CHRONIC LEFT HIP PAIN: ICD-10-CM

## 2023-07-05 DIAGNOSIS — M25.551 ACUTE RIGHT HIP PAIN: ICD-10-CM

## 2023-07-05 DIAGNOSIS — R26.81 GAIT INSTABILITY: ICD-10-CM

## 2023-07-05 DIAGNOSIS — M25.551 GREATER TROCHANTERIC PAIN SYNDROME OF RIGHT LOWER EXTREMITY: ICD-10-CM

## 2023-07-05 DIAGNOSIS — G89.29 CHRONIC LEFT HIP PAIN: ICD-10-CM

## 2023-07-05 DIAGNOSIS — M25.552 GREATER TROCHANTERIC PAIN SYNDROME OF LEFT LOWER EXTREMITY: Primary | ICD-10-CM

## 2023-07-05 PROCEDURE — 97110 THERAPEUTIC EXERCISES: CPT

## 2023-07-05 NOTE — PROGRESS NOTES
Daily Note     Today's date: 2023  Patient name: Michelle Yañez  : 1955  MRN: [id number]  Referring provider: Katie Montanez MD  Dx:   Encounter Diagnosis     ICD-10-CM    1. Greater trochanteric pain syndrome of left lower extremity  M25.552       2. Greater trochanteric pain syndrome of right lower extremity  M25.551       3. Chronic left hip pain  M25.552     G89.29       4. Acute right hip pain  M25.551       5. Gait instability  R26.81           Start Time: 730  Stop Time: 815  Total time in clinic (min): 45 minutes    Subjective: Patient noted no muscle soreness after last session. Objective: See treatment diary below      Assessment:Patient performed Nustep aerobic exercise to increase blood flow to the area being treated, prepare the muscles for strength training and stretching, improve overall tolerance to activity, and aerobic endurance. Patient amb 4 laps during the session with mod (I). Patient performed standing exercises with moderate VCs for form. Patient has improved ROM with the L hip, however her muscles fatigue quickly when compared to the R hip. Patient had no LOB during the session. Patient would benefit from continued PT to allow the patient to return to her PLOF. Plan: Continue per plan of care. Precautions: cirrhosis, hx of liver cancer, diabetes, COPD, HTN, anxiety, depression, OA, and urinary incontinence.       Manuals 6/6 6/7 6/13 6/14 6/22 6/27 6/29 7/3 7/5                                                        Neuro Re-Ed             AD education MW             3 way hip in standing nv abd and ext  x20 ea  @ step abd and ext  x20 ea @ barre abd and ext  x20 ea @ barre abd and ext  x20 ea @ barre abd and ext  x20 ea @ barre  abd and ext  x20 ea @ barre abd and ext  x20 ea @ barre    Tandem stance nv            FTEO nv            FTEC nv            Lateral stepping at barre 2 HHA  1 lap  Mod cues 2 laps  2 laps 4 laps  (1) 6" savana  4 laps (2) 6" hurdles Ther Ex             Nustep (seat 8) nv 10'  lvl 2 10'  lvl 2 10'  lvl 2 11' lvl 2 10'  lvl 3 lvl 4  10' lvl 4  10' lvl 4  10'    Ball roll outs nv   x15 ea x15 ea  x15 ea      Hip add nv  x20 seated x20 seated x20 seated   x20 seated     Hip abd nv  GTB  x20 seated GTB  x20 seated         Standing marching nv x20 ea x20 ea foot propped x20 ea  x20 ea x20 ea x20 ea x20 ea x20 ea    Heel raises nv seated and standing  x20 ea standing  x20 standing  x20  standing  x20 standing  x20 standing  x20 standing  x20    Toe raises nv seated  x20 ea seated  x20 ea seated  x20 ea  standing  x20 standing  x20 standing  x20 standing  x20    LAQ  x20 ea x20 ea  x20 ea x20 ea 2#  x20 ea 2.5#  x20 ea 3#  x20 ea                 Seated marching  x20 ea 20 ea  x20 ea x20 ea x20 ea 2.5#  x20 ea 3#  x20 ea    Ther Activity             Sit to stands nv x7 x7 x8  x8  2x5 x5                 Forward rocking to prep for sit to stands  x10 x10 x10         Step ups nv            Gait Training             6 minute walk test performed            Walking with RW   3 lap 1 lap 1 lap 4 laps 3 laps beginning 3 laps end of session 2 laps beginning; 2 laps end    amb with SPC  1/2 lap  1 lap  2 breaks 2.5 laps  1 break        Modalities

## 2023-07-07 ENCOUNTER — HOSPITAL ENCOUNTER (OUTPATIENT)
Dept: RADIOLOGY | Facility: HOSPITAL | Age: 68
Discharge: HOME | End: 2023-07-07
Attending: INTERNAL MEDICINE
Payer: MEDICARE

## 2023-07-07 VITALS
RESPIRATION RATE: 16 BRPM | OXYGEN SATURATION: 99 % | HEART RATE: 72 BPM | DIASTOLIC BLOOD PRESSURE: 51 MMHG | SYSTOLIC BLOOD PRESSURE: 91 MMHG

## 2023-07-07 DIAGNOSIS — K74.60 CIRRHOSIS OF LIVER WITH ASCITES, UNSPECIFIED HEPATIC CIRRHOSIS TYPE (HCC): ICD-10-CM

## 2023-07-07 DIAGNOSIS — R18.8 CIRRHOSIS OF LIVER WITH ASCITES, UNSPECIFIED HEPATIC CIRRHOSIS TYPE (HCC): ICD-10-CM

## 2023-07-07 PROCEDURE — 49083 ABD PARACENTESIS W/IMAGING: CPT | Performed by: RADIOLOGY

## 2023-07-07 PROCEDURE — 49083 ABD PARACENTESIS W/IMAGING: CPT

## 2023-07-07 RX ORDER — ALBUMIN (HUMAN) 12.5 G/50ML
SOLUTION INTRAVENOUS
Status: COMPLETED | OUTPATIENT
Start: 2023-07-07 | End: 2023-07-07

## 2023-07-07 RX ORDER — LIDOCAINE WITH 8.4% SOD BICARB 0.9%(10ML)
SYRINGE (ML) INJECTION AS NEEDED
Status: COMPLETED | OUTPATIENT
Start: 2023-07-07 | End: 2023-07-07

## 2023-07-07 RX ADMIN — ALBUMIN (HUMAN) 50 G: 0.25 INJECTION, SOLUTION INTRAVENOUS at 09:19

## 2023-07-07 RX ADMIN — Medication 10 ML: at 08:48

## 2023-07-07 NOTE — SEDATION DOCUMENTATION
The patient denied any discomfort, dizziness, or shortness of breath. She transferred to the [facility] with no difficulty.

## 2023-07-11 ENCOUNTER — APPOINTMENT (OUTPATIENT)
Age: 68
End: 2023-07-11
Payer: MEDICARE

## 2023-07-11 NOTE — PROGRESS NOTES
PT Re-Evaluation     Today's date: 2023  Patient name: Agustin Lovell  : 1955  MRN: [id number]  Referring provider: Evelin Calderon MD  Dx:   Encounter Diagnosis     ICD-10-CM    1. Acute right hip pain  M25.551       2. Greater trochanteric pain syndrome of left lower extremity  M25.552       3. Gait instability  R26.81       4. Greater trochanteric pain syndrome of right lower extremity  M25.551       5. Chronic left hip pain  M25.552     G89.29           Start Time: 730  Stop Time: 815  Total time in clinic (min): 45 minutes    Assessment  Assessment details: Patient is a 78 yo female who continues to present to physical therapy with symptoms consistent with L >R hip pain and deconditioning. Patient presents with decreased hip and knee strength, decreased endurance, amb with a RW & SPC and impaired balance. Pt has noted improvements in symptom irritability since starting course of therapy. Pt has made progress with cardiovascular and muscular endurance as evidence with increased ease with sit to stand transfers from a standard height chair and ambulation around the clinic with a RW. Patient is still considered a Fall risk due to 30 second sit to stand test as well as due to strength and previous falls within the year. When ambulating around the clinic step length is short b/l and marci is slow, pt did require frequent verbal cueing in order to prevent the pt from hitting her arm on objects. PT will address the noted impairments by performing hip and knee strengthening, stretching, balance, functional activities and manual techniques to allow the patient to return to her PLOF. PT recommended 2x/week for 6-8 weeks c a good prognosis 2* PLOF. Discussed POC with patient and patient was agreeable.     Impairments: abnormal gait, abnormal or restricted ROM, activity intolerance, impaired balance, impaired physical strength, lacks appropriate home exercise program, pain with function, safety issue, weight-bearing intolerance, poor posture  and poor body mechanics    Symptom irritability: moderateUnderstanding of Dx/Px/POC: good   Prognosis: good    Goals  STG: In four weeks the patient will:    1. Be (I) with her HEP. (MET)  2. Increase hip and knee strength to 4/5 MMT score to assist c ADLs. (in progress)  3. Increase hip ROM by 10 degrees to assist c dressing and stairs. (in progress)      LTG: In eight weeks, the patient will:    1. Increase FOTO score to 59 to demonstrate improvements in symptoms and function. (MET)  2. Perform 5-8 sit to stands in 30 seconds to demonstrate an improvement with strength and endurance without use of hands. (in progress)  3. Improve 6 MWT by 50ft to demonstrate an improvement with endurance. (in progress)  4. Increase hip and knee strength to 4+/5 MMT score to assist c prolonged activities. (in progress)  5. Perform a car transfer in her car to drive to appointments. (in progress)    New LTG: In 6 weeks, the patient will be able to:   1. Perform 1 flight of steps with step to pattern in order to be able negotiate steps in and around the community.             Plan  Patient would benefit from: PT eval and skilled physical therapy  Planned modality interventions: cryotherapy and thermotherapy: hydrocollator packs  Planned therapy interventions: abdominal trunk stabilization, balance, balance/weight bearing training, IADL retraining, joint mobilization, manual therapy, massage, [place] taping, transfer training, therapeutic exercise, therapeutic activities, stretching, strengthening, postural training, patient education, neuromuscular re-education, body mechanics training, breathing training, flexibility, functional ROM exercises, gait training and home exercise program  Frequency: 2x week  Duration in visits: 16  Duration in weeks: 8  Plan of Care beginning date: 7/13/2023  Plan of Care expiration date: 9/7/2023  Treatment plan discussed with: patient        Subjective Evaluation    History of Present Illness  Mechanism of injury: RE 7/13/23:  Since starting course of therapy pt reports 80-90 % improvement. Pt notes that she is moving much better. Pt reports less difficulty with sit to stands and is able to now perform on a lower on height and rely on the lift chair less. Pt notes less difficulty when bending forward to  items but does note she still must perform this task slowly. Pt reports that she is still ambulating in her home with a SPC and around the community with a RW. Pt limitations with walking is roughly 10-15 minutes and is able to perform functional activities such as washing dishes for about 30 minutes without a break but continues to note fatigue. Since starting course of therapy pt denies trying to get into a low car and is currently not driving due to request from her son. Patient noted a few days after her physical therapy evaluation in March she went to the [facility] for hip pain. Patient noted she was dx with buritis. Patient had home PT for 6-8 weeks 2x/week. Patient then followed up with ortho and had an injection in the L hip. Patient noted a week or two later the pain decreased. Patient noted increased difficulty with sit to stands, bending forward to pick something up. Patient noted in her home she is using her [facility] and in the community she is using a RW. Patient noted her limit for walking is about 10 minutes. Patient noted she is able to wash dishes for 30 minutes, however she has increased fatigue. Patient noted she is able to transfer (I) in a SUV, however she has not tried driving and is unsure able transferring to a lower car. Patient noted her pain is mainly on the L side of the hip.               Recurrent probem    Quality of life: fair    Patient Goals  Patient goals for therapy: decreased pain, improved balance, increased motion, increased strength and independence with ADLs/IADLs  Patient goal: "to transfer in and out of the car (I) so she can drive." (in progress) "to perform a sit to stand without increased effort. "(in progress)   Pain  Current pain ratin  At best pain ratin  At worst pain ratin  Location: L anteriolateral pain  Quality: dull ache (sharp at times)  Aggravating factors: sitting, standing, walking and stair climbing  Progression: improved    Social Support  Steps to enter house: yes (1 DENEEN no railing )  Stairs in house: no   Lives in: apartment  Lives with: alone    Employment status: not working  Hand dominance: right    Treatments  Previous treatment: injection treatment and physical therapy        Objective     Active Range of Motion   Left Hip   Flexion: 58 degrees   Abduction: 41 degrees     Right Hip   Flexion: 60 degrees with pain  Abduction: 23 degrees with pain    Passive Range of Motion   Left Hip   Flexion: 100 degrees     Right Hip   Flexion: 106 degrees with pain    Strength/Myotome Testing     Left Hip   Planes of Motion   Flexion: 3  Abduction: 4  Adduction: 3+    Right Hip   Planes of Motion   Flexion: 3  Abduction: 4  Adduction: 4-    Left Knee   Flexion: 4  Extension: 4    Right Knee   Flexion: 4-  Extension: 4    Left Ankle/Foot   Dorsiflexion: 4    Right Ankle/Foot   Dorsiflexion: 4    Muscle Activation     Additional Muscle Activation Details  Patient able to hold a 1 TAC with a diaphragmatic breath. Patient noted increased pain in the R low back and required moderate cues. Functional Assessment        Comments  Initial Eval 23:  6MWT: 559 ft with RW; 1/10 fatigue  30 seconds sit to stand: used 2 hands; 3 times; achy in the L hip     RE 23:  6MWT: 517 ft with RW; 1/10 fatigue, tight turns requiring cueing for safety to prevent hitting her hand on surfaces  30 STS: 3 STS with use of 2 hands 75% assistance from hands             Precautions: cirrhosis, hx of liver cancer, diabetes, COPD, HTN, anxiety, depression, OA, and urinary incontinence.     Manuals  7/3 7/5 7/13   RE          RR   FOTO          59 MET goal                             Neuro Re-Ed             AD education MW             3 way hip in standing nv abd and ext  x20 ea  @ step abd and ext  x20 ea @ barre abd and ext  x20 ea @ barre abd and ext  x20 ea @ barre abd and ext  x20 ea @ barre  abd and ext  x20 ea @ barre abd and ext  x20 ea @ barre    Tandem stance nv            FTEO nv            FTEC nv            Lateral stepping at barre 2 HHA  1 lap  Mod cues 2 laps  2 laps 4 laps  (1) 6" savana  4 laps (2) 6" hurdles                                            Ther Ex             Nustep (seat 8) nv 10'  lvl 2 10'  lvl 2 10'  lvl 2 11' lvl 2 10'  lvl 3 lvl 4  10' lvl 4  10' lvl 4  10' lvl 4 10'   Ball roll outs nv   x15 ea x15 ea  x15 ea      Hip add nv  x20 seated x20 seated x20 seated   x20 seated     Hip abd nv  GTB  x20 seated GTB  x20 seated         Standing marching nv x20 ea x20 ea foot propped x20 ea  x20 ea x20 ea x20 ea x20 ea x20 ea x20 ea   Heel raises nv seated and standing  x20 ea standing  x20 standing  x20  standing  x20 standing  x20 standing  x20 standing  x20 standing x20   Toe raises nv seated  x20 ea seated  x20 ea seated  x20 ea  standing  x20 standing  x20 standing  x20 standing  x20 standing x20   LAQ  x20 ea x20 ea  x20 ea x20 ea 2#  x20 ea 2.5#  x20 ea 3#  x20 ea                 Seated marching  x20 ea 20 ea  x20 ea x20 ea x20 ea 2.5#  x20 ea 3#  x20 ea    Ther Activity             Sit to stands nv x7 x7 x8  x8  2x5 x5 30" STS x3 with hands                 Forward rocking to prep for sit to stands  x10 x10 x10         Step ups nv            Gait Training             6 minute walk test performed         performed 517ft    Walking with RW   3 lap 1 lap 1 lap 4 laps 3 laps beginning 3 laps end of session 2 laps beginning; 2 laps end    amb with SPC  1/2 lap  1 lap  2 breaks 2.5 laps  1 break        Modalities

## 2023-07-13 ENCOUNTER — EVALUATION (OUTPATIENT)
Age: 68
End: 2023-07-13
Payer: MEDICARE

## 2023-07-13 ENCOUNTER — APPOINTMENT (EMERGENCY)
Dept: CT IMAGING | Facility: HOSPITAL | Age: 68
DRG: 552 | End: 2023-07-13
Payer: MEDICARE

## 2023-07-13 ENCOUNTER — HOSPITAL ENCOUNTER (EMERGENCY)
Facility: HOSPITAL | Age: 68
DRG: 552 | End: 2023-07-14
Attending: EMERGENCY MEDICINE
Payer: MEDICARE

## 2023-07-13 VITALS
TEMPERATURE: 98.2 F | SYSTOLIC BLOOD PRESSURE: 97 MMHG | WEIGHT: 188.27 LBS | BODY MASS INDEX: 32.32 KG/M2 | RESPIRATION RATE: 17 BRPM | OXYGEN SATURATION: 98 % | HEART RATE: 72 BPM | DIASTOLIC BLOOD PRESSURE: 55 MMHG

## 2023-07-13 DIAGNOSIS — M25.552 CHRONIC LEFT HIP PAIN: ICD-10-CM

## 2023-07-13 DIAGNOSIS — R26.81 GAIT INSTABILITY: ICD-10-CM
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
## 2023-07-13 DIAGNOSIS — G89.29 CHRONIC LEFT HIP PAIN: ICD-10-CM

## 2023-07-13 DIAGNOSIS — M25.552 GREATER TROCHANTERIC PAIN SYNDROME OF LEFT LOWER EXTREMITY: ICD-10-CM

## 2023-07-13 DIAGNOSIS — M25.551 GREATER TROCHANTERIC PAIN SYNDROME OF RIGHT LOWER EXTREMITY: ICD-10-CM

## 2023-07-13 DIAGNOSIS — M25.551 ACUTE RIGHT HIP PAIN: Primary | ICD-10-CM

## 2023-07-13 DIAGNOSIS — S32.000A LUMBAR COMPRESSION FRACTURE (HCC): ICD-10-CM

## 2023-07-13 LAB
ALBUMIN SERPL BCP-MCNC: 3.6 G/DL (ref 3.5–5)
ALP SERPL-CCNC: 107 U/L (ref 34–104)
ALT SERPL W P-5'-P-CCNC: 26 U/L (ref 7–52)
ANION GAP SERPL CALCULATED.3IONS-SCNC: 7 MMOL/L
APTT PPP: 34 SECONDS (ref 23–37)
AST SERPL W P-5'-P-CCNC: 25 U/L (ref 13–39)
ATRIAL RATE: 66 BPM
BASOPHILS # BLD AUTO: 0.02 THOUSANDS/ÂΜL (ref 0–0.1)
BASOPHILS NFR BLD AUTO: 0 % (ref 0–1)
BILIRUB DIRECT SERPL-MCNC: 0.25 MG/DL (ref 0–0.2)
BILIRUB SERPL-MCNC: 0.87 MG/DL (ref 0.2–1)
BUN SERPL-MCNC: 15 MG/DL (ref 5–25)
CALCIUM SERPL-MCNC: 9.2 MG/DL (ref 8.4–10.2)
CARDIAC TROPONIN I PNL SERPL HS: 3 NG/L
CHLORIDE SERPL-SCNC: 94 MMOL/L (ref 96–108)
CO2 SERPL-SCNC: 27 MMOL/L (ref 21–32)
CREAT SERPL-MCNC: 0.77 MG/DL (ref 0.6–1.3)
EOSINOPHIL # BLD AUTO: 0.03 THOUSAND/ÂΜL (ref 0–0.61)
EOSINOPHIL NFR BLD AUTO: 0 % (ref 0–6)
ERYTHROCYTE [DISTWIDTH] IN BLOOD BY AUTOMATED COUNT: 13.9 % (ref 11.6–15.1)
GFR SERPL CREATININE-BSD FRML MDRD: 78 ML/MIN/1.73SQ M
GLUCOSE SERPL-MCNC: 141 MG/DL (ref 65–140)
HCT VFR BLD AUTO: 35.3 % (ref 34.8–46.1)
HGB BLD-MCNC: 11.8 G/DL (ref 11.5–15.4)
IMM GRANULOCYTES # BLD AUTO: 0.04 THOUSAND/UL (ref 0–0.2)
IMM GRANULOCYTES NFR BLD AUTO: 1 % (ref 0–2)
INR PPP: 1.21 (ref 0.84–1.19)
LIPASE SERPL-CCNC: 36 U/L (ref 11–82)
LYMPHOCYTES # BLD AUTO: 0.34 THOUSANDS/ÂΜL (ref 0.6–4.47)
LYMPHOCYTES NFR BLD AUTO: 4 % (ref 14–44)
MAGNESIUM SERPL-MCNC: 2 MG/DL (ref 1.9–2.7)
MCH RBC QN AUTO: 30.7 PG (ref 26.8–34.3)
MCHC RBC AUTO-ENTMCNC: 33.4 G/DL (ref 31.4–37.4)
MCV RBC AUTO: 92 FL (ref 82–98)
MONOCYTES # BLD AUTO: 0.53 THOUSAND/ÂΜL (ref 0.17–1.22)
MONOCYTES NFR BLD AUTO: 7 % (ref 4–12)
NEUTROPHILS # BLD AUTO: 7.19 THOUSANDS/ÂΜL (ref 1.85–7.62)
NEUTS SEG NFR BLD AUTO: 88 % (ref 43–75)
NRBC BLD AUTO-RTO: 0 /100 WBCS
P AXIS: 72 DEGREES
PLATELET # BLD AUTO: 80 THOUSANDS/UL (ref 149–390)
PMV BLD AUTO: 9.1 FL (ref 8.9–12.7)
POTASSIUM SERPL-SCNC: 4 MMOL/L (ref 3.5–5.3)
PR INTERVAL: 142 MS
PROT SERPL-MCNC: 6.8 G/DL (ref 6.4–8.4)
PROTHROMBIN TIME: 15.3 SECONDS (ref 11.6–14.5)
QRS AXIS: 67 DEGREES
QRSD INTERVAL: 68 MS
QT INTERVAL: 388 MS
QTC INTERVAL: 406 MS
RBC # BLD AUTO: 3.84 MILLION/UL (ref 3.81–5.12)
SODIUM SERPL-SCNC: 128 MMOL/L (ref 135–147)
T WAVE AXIS: 52 DEGREES
VENTRICULAR RATE: 66 BPM
WBC # BLD AUTO: 8.15 THOUSAND/UL (ref 4.31–10.16)

## 2023-07-13 PROCEDURE — 85025 COMPLETE CBC W/AUTO DIFF WBC: CPT

## 2023-07-13 PROCEDURE — 70450 CT HEAD/BRAIN W/O DYE: CPT

## 2023-07-13 PROCEDURE — 84484 ASSAY OF TROPONIN QUANT: CPT

## 2023-07-13 PROCEDURE — 72125 CT NECK SPINE W/O DYE: CPT

## 2023-07-13 PROCEDURE — 74177 CT ABD & PELVIS W/CONTRAST: CPT

## 2023-07-13 PROCEDURE — 80076 HEPATIC FUNCTION PANEL: CPT

## 2023-07-13 PROCEDURE — 99285 EMERGENCY DEPT VISIT HI MDM: CPT

## 2023-07-13 PROCEDURE — 93010 ELECTROCARDIOGRAM REPORT: CPT | Performed by: STUDENT IN AN ORGANIZED HEALTH CARE EDUCATION/TRAINING PROGRAM

## 2023-07-13 PROCEDURE — 71260 CT THORAX DX C+: CPT

## 2023-07-13 PROCEDURE — 85610 PROTHROMBIN TIME: CPT

## 2023-07-13 PROCEDURE — 97164 PT RE-EVAL EST PLAN CARE: CPT

## 2023-07-13 PROCEDURE — 83735 ASSAY OF MAGNESIUM: CPT

## 2023-07-13 PROCEDURE — 85730 THROMBOPLASTIN TIME PARTIAL: CPT

## 2023-07-13 PROCEDURE — 97110 THERAPEUTIC EXERCISES: CPT

## 2023-07-13 PROCEDURE — 99284 EMERGENCY DEPT VISIT MOD MDM: CPT

## 2023-07-13 PROCEDURE — 93005 ELECTROCARDIOGRAM TRACING: CPT

## 2023-07-13 PROCEDURE — 96374 THER/PROPH/DIAG INJ IV PUSH: CPT

## 2023-07-13 PROCEDURE — G1004 CDSM NDSC: HCPCS

## 2023-07-13 PROCEDURE — 83690 ASSAY OF LIPASE: CPT

## 2023-07-13 PROCEDURE — 80048 BASIC METABOLIC PNL TOTAL CA: CPT

## 2023-07-13 PROCEDURE — 36415 COLL VENOUS BLD VENIPUNCTURE: CPT

## 2023-07-13 RX ORDER — ONDANSETRON 2 MG/ML
4 INJECTION INTRAMUSCULAR; INTRAVENOUS ONCE
Status: COMPLETED | OUTPATIENT
Start: 2023-07-13 | End: 2023-07-13

## 2023-07-13 RX ORDER — ACETAMINOPHEN 325 MG/1
975 TABLET ORAL ONCE
Status: COMPLETED | OUTPATIENT
Start: 2023-07-13 | End: 2023-07-13

## 2023-07-13 RX ADMIN — IOHEXOL 100 ML: 350 INJECTION, SOLUTION INTRAVENOUS at 22:36

## 2023-07-13 RX ADMIN — ONDANSETRON 4 MG: 2 INJECTION INTRAMUSCULAR; INTRAVENOUS at 22:06

## 2023-07-13 RX ADMIN — ACETAMINOPHEN 325MG 975 MG: 325 TABLET ORAL at 21:42

## 2023-07-14 ENCOUNTER — PATIENT OUTREACH (OUTPATIENT)
Age: 68
End: 2023-07-14

## 2023-07-14 ENCOUNTER — APPOINTMENT (INPATIENT)
Dept: RADIOLOGY | Facility: HOSPITAL | Age: 68
DRG: 552 | End: 2023-07-14
Payer: MEDICARE

## 2023-07-14 ENCOUNTER — HOSPITAL ENCOUNTER (INPATIENT)
Facility: HOSPITAL | Age: 68
LOS: 3 days | Discharge: NURSING, REHAB OR LONG-TERM CARE | DRG: 552 | End: 2023-07-17
Attending: SURGERY | Admitting: SURGERY
Payer: MEDICARE

## 2023-07-14 DIAGNOSIS — S32.000A COMPRESSION FRACTURE OF LUMBAR VERTEBRA (HCC): ICD-10-CM

## 2023-07-14 DIAGNOSIS — S32.040A COMPRESSION FRACTURE OF L4 VERTEBRA, INITIAL ENCOUNTER (HCC): Primary | ICD-10-CM

## 2023-07-14 DIAGNOSIS — R18.8 CIRRHOSIS OF LIVER WITH ASCITES, UNSPECIFIED HEPATIC CIRRHOSIS TYPE (HCC): ICD-10-CM

## 2023-07-14 DIAGNOSIS — K74.60 CIRRHOSIS OF LIVER WITH ASCITES, UNSPECIFIED HEPATIC CIRRHOSIS TYPE (HCC): ICD-10-CM
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
## 2023-07-14 DIAGNOSIS — S32.000A COMPRESSION FRACTURE OF LUMBAR VERTEBRA, UNSPECIFIED LUMBAR VERTEBRAL LEVEL, INITIAL ENCOUNTER (HCC): ICD-10-CM

## 2023-07-14 PROBLEM — G89.11 ACUTE PAIN DUE TO TRAUMA: Status: ACTIVE | Noted: 2023-07-14

## 2023-07-14 LAB
ANION GAP SERPL CALCULATED.3IONS-SCNC: 3 MMOL/L
BASOPHILS # BLD AUTO: 0.02 THOUSANDS/ÂΜL (ref 0–0.1)
BASOPHILS NFR BLD AUTO: 1 % (ref 0–1)
BUN SERPL-MCNC: 13 MG/DL (ref 5–25)
CALCIUM SERPL-MCNC: 8.8 MG/DL (ref 8.3–10.1)
CHLORIDE SERPL-SCNC: 102 MMOL/L (ref 96–108)
CO2 SERPL-SCNC: 25 MMOL/L (ref 21–32)
CREAT SERPL-MCNC: 0.81 MG/DL (ref 0.6–1.3)
EOSINOPHIL # BLD AUTO: 0.07 THOUSAND/ÂΜL (ref 0–0.61)
EOSINOPHIL NFR BLD AUTO: 2 % (ref 0–6)
ERYTHROCYTE [DISTWIDTH] IN BLOOD BY AUTOMATED COUNT: 13.9 % (ref 11.6–15.1)
GFR SERPL CREATININE-BSD FRML MDRD: 73 ML/MIN/1.73SQ M
GLUCOSE SERPL-MCNC: 115 MG/DL (ref 65–140)
GLUCOSE SERPL-MCNC: 146 MG/DL (ref 65–140)
GLUCOSE SERPL-MCNC: 165 MG/DL (ref 65–140)
GLUCOSE SERPL-MCNC: 85 MG/DL (ref 65–140)
GLUCOSE SERPL-MCNC: 97 MG/DL (ref 65–140)
HCT VFR BLD AUTO: 34.5 % (ref 34.8–46.1)
HGB BLD-MCNC: 11.9 G/DL (ref 11.5–15.4)
IMM GRANULOCYTES # BLD AUTO: 0.02 THOUSAND/UL (ref 0–0.2)
IMM GRANULOCYTES NFR BLD AUTO: 1 % (ref 0–2)
LYMPHOCYTES # BLD AUTO: 0.3 THOUSANDS/ÂΜL (ref 0.6–4.47)
LYMPHOCYTES NFR BLD AUTO: 7 % (ref 14–44)
MCH RBC QN AUTO: 31.6 PG (ref 26.8–34.3)
MCHC RBC AUTO-ENTMCNC: 34.5 G/DL (ref 31.4–37.4)
MCV RBC AUTO: 92 FL (ref 82–98)
MONOCYTES # BLD AUTO: 0.42 THOUSAND/ÂΜL (ref 0.17–1.22)
MONOCYTES NFR BLD AUTO: 10 % (ref 4–12)
NEUTROPHILS # BLD AUTO: 3.49 THOUSANDS/ÂΜL (ref 1.85–7.62)
NEUTS SEG NFR BLD AUTO: 79 % (ref 43–75)
NRBC BLD AUTO-RTO: 0 /100 WBCS
PLATELET # BLD AUTO: 82 THOUSANDS/UL (ref 149–390)
PMV BLD AUTO: 9.8 FL (ref 8.9–12.7)
POTASSIUM SERPL-SCNC: 4 MMOL/L (ref 3.5–5.3)
RBC # BLD AUTO: 3.77 MILLION/UL (ref 3.81–5.12)
SODIUM SERPL-SCNC: 130 MMOL/L (ref 135–147)
WBC # BLD AUTO: 4.32 THOUSAND/UL (ref 4.31–10.16)

## 2023-07-14 PROCEDURE — NC001 PR NO CHARGE: Performed by: EMERGENCY MEDICINE

## 2023-07-14 PROCEDURE — 99223 1ST HOSP IP/OBS HIGH 75: CPT | Performed by: INTERNAL MEDICINE

## 2023-07-14 PROCEDURE — 99284 EMERGENCY DEPT VISIT MOD MDM: CPT

## 2023-07-14 PROCEDURE — 72100 X-RAY EXAM L-S SPINE 2/3 VWS: CPT

## 2023-07-14 PROCEDURE — 97167 OT EVAL HIGH COMPLEX 60 MIN: CPT

## 2023-07-14 PROCEDURE — 82948 REAGENT STRIP/BLOOD GLUCOSE: CPT

## 2023-07-14 PROCEDURE — 85025 COMPLETE CBC W/AUTO DIFF WBC: CPT

## 2023-07-14 PROCEDURE — 97163 PT EVAL HIGH COMPLEX 45 MIN: CPT

## 2023-07-14 PROCEDURE — 99222 1ST HOSP IP/OBS MODERATE 55: CPT | Performed by: SURGERY

## 2023-07-14 PROCEDURE — 80048 BASIC METABOLIC PNL TOTAL CA: CPT

## 2023-07-14 RX ORDER — PANTOPRAZOLE SODIUM 40 MG/1
40 TABLET, DELAYED RELEASE ORAL
Status: DISCONTINUED | OUTPATIENT
Start: 2023-07-14 | End: 2023-07-17 | Stop reason: HOSPADM

## 2023-07-14 RX ORDER — OXYCODONE HYDROCHLORIDE 5 MG/1
5 TABLET ORAL EVERY 4 HOURS PRN
Status: DISCONTINUED | OUTPATIENT
Start: 2023-07-14 | End: 2023-07-17 | Stop reason: HOSPADM

## 2023-07-14 RX ORDER — LIDOCAINE 50 MG/G
2 PATCH TOPICAL DAILY
Status: DISCONTINUED | OUTPATIENT
Start: 2023-07-14 | End: 2023-07-17 | Stop reason: HOSPADM

## 2023-07-14 RX ORDER — METHOCARBAMOL 500 MG/1
500 TABLET, FILM COATED ORAL EVERY 6 HOURS PRN
Status: DISCONTINUED | OUTPATIENT
Start: 2023-07-14 | End: 2023-07-17 | Stop reason: HOSPADM

## 2023-07-14 RX ORDER — ENOXAPARIN SODIUM 100 MG/ML
30 INJECTION SUBCUTANEOUS EVERY 12 HOURS
Status: DISCONTINUED | OUTPATIENT
Start: 2023-07-14 | End: 2023-07-17 | Stop reason: HOSPADM

## 2023-07-14 RX ORDER — ACETAMINOPHEN 325 MG/1
975 TABLET ORAL EVERY 8 HOURS SCHEDULED
Status: DISCONTINUED | OUTPATIENT
Start: 2023-07-14 | End: 2023-07-17 | Stop reason: HOSPADM

## 2023-07-14 RX ORDER — SPIRONOLACTONE 50 MG/1
200 TABLET, FILM COATED ORAL 2 TIMES DAILY
Status: DISCONTINUED | OUTPATIENT
Start: 2023-07-14 | End: 2023-07-17 | Stop reason: HOSPADM

## 2023-07-14 RX ORDER — METOPROLOL SUCCINATE 25 MG/1
25 TABLET, EXTENDED RELEASE ORAL DAILY
Status: DISCONTINUED | OUTPATIENT
Start: 2023-07-14 | End: 2023-07-17 | Stop reason: HOSPADM

## 2023-07-14 RX ORDER — SODIUM CHLORIDE 9 MG/ML
100 INJECTION, SOLUTION INTRAVENOUS CONTINUOUS
Status: DISCONTINUED | OUTPATIENT
Start: 2023-07-14 | End: 2023-07-14

## 2023-07-14 RX ORDER — ESCITALOPRAM OXALATE 20 MG/1
20 TABLET ORAL DAILY
Status: DISCONTINUED | OUTPATIENT
Start: 2023-07-14 | End: 2023-07-17 | Stop reason: HOSPADM

## 2023-07-14 RX ORDER — ONDANSETRON 2 MG/ML
4 INJECTION INTRAMUSCULAR; INTRAVENOUS EVERY 4 HOURS PRN
Status: DISCONTINUED | OUTPATIENT
Start: 2023-07-14 | End: 2023-07-17 | Stop reason: HOSPADM

## 2023-07-14 RX ORDER — INSULIN LISPRO 100 [IU]/ML
1-6 INJECTION, SOLUTION INTRAVENOUS; SUBCUTANEOUS EVERY 6 HOURS SCHEDULED
Status: DISCONTINUED | OUTPATIENT
Start: 2023-07-14 | End: 2023-07-14

## 2023-07-14 RX ORDER — HYDROMORPHONE HCL IN WATER/PF 6 MG/30 ML
0.2 PATIENT CONTROLLED ANALGESIA SYRINGE INTRAVENOUS EVERY 4 HOURS PRN
Status: DISCONTINUED | OUTPATIENT
Start: 2023-07-14 | End: 2023-07-17

## 2023-07-14 RX ORDER — INSULIN LISPRO 100 [IU]/ML
1-6 INJECTION, SOLUTION INTRAVENOUS; SUBCUTANEOUS
Status: DISCONTINUED | OUTPATIENT
Start: 2023-07-14 | End: 2023-07-17 | Stop reason: HOSPADM

## 2023-07-14 RX ORDER — POLYETHYLENE GLYCOL 3350 17 G/17G
17 POWDER, FOR SOLUTION ORAL DAILY
Status: DISCONTINUED | OUTPATIENT
Start: 2023-07-14 | End: 2023-07-17 | Stop reason: HOSPADM

## 2023-07-14 RX ADMIN — PANTOPRAZOLE SODIUM 40 MG: 40 TABLET, DELAYED RELEASE ORAL at 05:14

## 2023-07-14 RX ADMIN — METOPROLOL SUCCINATE 25 MG: 25 TABLET, FILM COATED, EXTENDED RELEASE ORAL at 09:50

## 2023-07-14 RX ADMIN — LIDOCAINE 5% 2 PATCH: 700 PATCH TOPICAL at 09:50

## 2023-07-14 RX ADMIN — ENOXAPARIN SODIUM 30 MG: 30 INJECTION SUBCUTANEOUS at 03:09

## 2023-07-14 RX ADMIN — ACETAMINOPHEN 975 MG: 325 TABLET, FILM COATED ORAL at 05:14

## 2023-07-14 RX ADMIN — OXYCODONE HYDROCHLORIDE 5 MG: 5 TABLET ORAL at 16:49

## 2023-07-14 RX ADMIN — SPIRONOLACTONE 200 MG: 50 TABLET ORAL at 17:02

## 2023-07-14 RX ADMIN — SODIUM CHLORIDE 100 ML/HR: 0.9 INJECTION, SOLUTION INTRAVENOUS at 03:09

## 2023-07-14 RX ADMIN — ESCITALOPRAM OXALATE 20 MG: 20 TABLET, FILM COATED ORAL at 09:50

## 2023-07-14 RX ADMIN — ACETAMINOPHEN 975 MG: 325 TABLET, FILM COATED ORAL at 14:33

## 2023-07-14 RX ADMIN — Medication 2.5 MG: at 04:11

## 2023-07-14 RX ADMIN — ENOXAPARIN SODIUM 30 MG: 30 INJECTION SUBCUTANEOUS at 17:02

## 2023-07-14 NOTE — OCCUPATIONAL THERAPY NOTE
Occupational Therapy Evaluation     Patient Name: Sandeep De La Cruz  FTEQR'U Date: 7/14/2023  Problem List  Principal Problem:    Compression fracture of lumbar vertebra ([address])  Active Problems:    GERD (gastroesophageal reflux disease)    Hyponatremia    Type 2 diabetes mellitus ([address])    Fall    Acute pain due to trauma    Past Medical History  Past Medical History:   Diagnosis Date    Anxiety     Anxiety and depression 9/29/2015    Arthritis     Asthma     Asthma without status asthmaticus 4/26/2011    Cirrhosis ([address])     Depression     Diabetes ([address])     Disease of thyroid gland     nodules    Diverticulitis 2011    Diverticulosis     DVT (deep venous thrombosis) ([address]) 2013    GERD (gastroesophageal reflux disease)     Liver disease     cirrhosis    Obesity, morbid, BMI 40.0-49.9 ([address]) 8/16/2017    Pneumonia     Portal hypertensive gastropathy ([address])     Sleep apnea     Vertigo     Vitreous hemorrhage of left eye ([address]) 1/20/2023     Past Surgical History  Past Surgical History:   Procedure Laterality Date    BLADDER SUSPENSION      CHOLECYSTECTOMY      COLONOSCOPY  05/24/2019    had multiple with last being [id number]    COLONOSCOPY  10/07/2004    Isaac Garg. Mariama Sandoval M.D. / Diverticulosis    COLONOSCOPY  06/14/2011    Isaac Garg. Mariama Sandoval M.D. / Diverticulosis    EGD  02/28/2013    Ramon Ingram M.D. / Mild Portal Hypertensive Gastropathy    EGD  10/01/2019    Isaac Garg. Mariama Sandoval M.D. / Gastritis    EGD AND COLONOSCOPY  09/03/2015    Isaac Garg. Mariama Sandoval M.D. / Laci Rothman. Diverticulosis. FLEXIBLE SIGMOIDOSCOPY  03/04/2013    Sonia Jerome M.D. / Hemorrhoidal Bleeding    FLEXIBLE SIGMOIDOSCOPY  05/30/2018    Sonia Jerome M.D. / Internal hemorrhoids. biopsied.     HERNIA REPAIR      IR PARACENTESIS  08/23/2018    IR PARACENTESIS  11/05/2018    IR PARACENTESIS  01/11/2019    IR PARACENTESIS  02/27/2019    IR PARACENTESIS  04/15/2019    IR PARACENTESIS  06/03/2019    IR PARACENTESIS  07/10/2019    IR PARACENTESIS 08/16/2019    IR PARACENTESIS  09/10/2019    IR PARACENTESIS  10/07/2019    IR PARACENTESIS  11/05/2019    IR PARACENTESIS  11/22/2019    IR PARACENTESIS  12/17/2019    IR PARACENTESIS  01/07/2020    IR PARACENTESIS  01/28/2020    IR PARACENTESIS  02/18/2020    IR PARACENTESIS  03/10/2020    IR PARACENTESIS  03/31/2020    IR PARACENTESIS  04/21/2020    IR PARACENTESIS  05/12/2020    IR PARACENTESIS  06/02/2020    IR PARACENTESIS  06/30/2020    IR PARACENTESIS  07/20/2020    IR PARACENTESIS  08/18/2020    IR PARACENTESIS  11/12/2020    IR PARACENTESIS  03/11/2021    IR PARACENTESIS  2/8/2022    IR PARACENTESIS  8/24/2022    IR PARACENTESIS  11/14/2022    IR PARACENTESIS  12/21/2022    IR PARACENTESIS  2/1/2023    IR PARACENTESIS  3/7/2023    IR PARACENTESIS  3/30/2023    IR PARACENTESIS  4/26/2023    IR PARACENTESIS  5/18/2023    IR PARACENTESIS  7/7/2023    TONSILLECTOMY      TRIGGER FINGER RELEASE Bilateral         07/14/23 1140   OT Last Visit   OT Visit Date 07/14/23   Note Type   Note type Evaluation   Pain Assessment   Pain Assessment Tool 0-10   Pain Score 5   Pain Location/Orientation Orientation: Lower; Location: Back   Hospital Pain Intervention(s) Medication (See MAR); Ambulation/increased activity;Repositioned   Restrictions/Precautions   Weight Bearing Precautions Per Order No   Braces or Orthoses LSO  (Pt to wear LSO brace when OOB and when HOB is >45 degrees.)   Other Precautions Chair Alarm; Bed Alarm;Multiple lines;Telemetry; Fall Risk;Pain;Spinal precautions   Home Living   Type of [address] One level;Performs ADLs on one level  (0 DENEEN)   Bathroom Shower/Tub Walk-in shower   Bathroom Toilet Raised   Bathroom Equipment Grab bars in shower;Grab bars around toilet; Shower chair   Home Equipment Walker;Cane   Additional Comments Pt reports using walker prior to admission. Prior Function   Level of [place] Independent with ADLs; Independent with functional mobility; Needs assistance with IADLS   Lives With Alone   Receives Help From Family   IADLs Family/Friend/Other provides transportation; Independent with medication management; Independent with meal prep   Falls in the last 6 months 1 to 4   Vocational Retired   Lifestyle   Autonomy Pt I with ADLs and functional mobility prior to [facility] ([facility]). Pt I with IADLs but receives assistance driving, grocery shopping, and cleaning. Pt reports she has a cleaning service that comes 1x a week. Reciprocal Relationships Pt receives assistance from supportive son and daughter. Service to Others Pt used to work as a paraprofessional.   Intrinsic Gratification Pt enjoys reading. ADL   Where Assessed Chair   Eating Assistance 7  Independent   Grooming Assistance 5  Supervision/Setup   UB Bathing Assistance 4  Minimal Assistance   LB Bathing Assistance 3  Moderate Assistance   [address] 4  Minimal [place] 3  Moderate [address]  3  Moderate Assistance   Functional Assistance 4  Minimal Assistance   Bed Mobility   Rolling R 5  Supervision   Additional items Increased time required;Verbal cues   Supine to Sit 4  Minimal assistance   Additional items Assist x 1; Increased time required;Verbal cues   Transfers   Sit to Stand 3  Moderate assistance   Additional items Assist x 1; Increased time required;Verbal cues   Stand to Sit 3  Moderate assistance   Additional items Assist x 1; Increased time required;Verbal cues   Additional Comments Pt left in chair with all needs in reach with chair alarm.    Functional Mobility   Functional Mobility 4  Minimal assistance   Additional Comments Assist x1; Increased time required; Verbal cues   Additional items Rolling walker   Balance   Static Sitting Fair   Dynamic Sitting Fair -   Static Standing Fair -   Dynamic Standing Fair -   Ambulatory Poor +   Activity Tolerance   Activity Tolerance Patient tolerated treatment well   Medical Staff Made Aware Sj, OT; Deborah, PT; Shashank, SPT; seen with PT due to pt's current medical status. Nurse Made Aware Pt appropriate to see per nsg. RUE Assessment   RUE Assessment WFL   LUE Assessment   LUE Assessment WFL   Cognition   Overall Cognitive Status WFL   Arousal/Participation Alert; Responsive; Cooperative   Attention Within functional limits   Orientation Level Oriented X4   Memory Within functional limits   Following Commands Follows multistep commands with increased time or repetition   Comments Pt was pleasant and cooperative. G insight to self/condition and safety awareness. Assessment   Limitation Decreased ADL status; Decreased endurance;Decreased self-care trans;Decreased high-level ADLs   Prognosis Good   Assessment Pt admitted to [address] [place] [address] on 7/14/23 from a fall resulting in a compression fracture of L2-L4 lumbar vertebra. Per neurosurgery, pt to wear LSO brace when OOB and HOB is >45 degrees. Pt is also to follow lumbar spinal precautions. Pt has a past medical history of Anxiety, Anxiety and depression, Arthritis, Asthma, Asthma without status asthmaticus, Cirrhosis ([address]), Depression, Diabetes ([address]), Disease of thyroid gland, Diverticulitis, Diverticulosis, DVT (deep venous thrombosis) ([address]), GERD (gastroesophageal reflux disease), Liver disease, Obesity, morbid, BMI 40.0-49.9 ([address]), Pneumonia, Portal hypertensive gastropathy ([address]), Sleep apnea, Vertigo, and Vitreous hemorrhage of left eye ([address] Prior to admission, pt from home alone with and was independent with ADLs and functional mobility. Pt I with IADLs but receives assistance with cleaning, grocery shopping, and driving. Pt is currently min A x1 for bed mobility, functional mobility with RW, and mod A x1 for transfers. Pt min A for UB ADLs and mod A for LB ADLs.  Limitations include decreased dynamic sitting balance, decreased dynamic standing balance, decreased functional mobility, increased dizziness, pain, fall risk, and decreased independence with ADLs. Pt educated on importance of OOB activities, slow controlled movements with functional mobility, spinal precautions, and LSO brace maintenance. The patient's raw score on the AM-PAC Daily Activity Inpatient Short Form is 16. A raw score of less than 19 suggests the patient may benefit from discharge to post-acute rehabilitation services. Please refer to the recommendation of the Occupational Therapist for safe discharge planning. Pt would continue to benefit from additional skilled occupational therapy services. OT recommends pt to be d/c to inpatient rehab. Goals   Patient Goals "to go home"   LT Time Frame 10-14   Long Term Goal #1 See goals listed below   Plan   Treatment Interventions ADL retraining;Functional transfer training; Endurance training;Patient/family training;Equipment evaluation/education; Compensatory technique education; Energy conservation; Activityengagement   Goal Expiration Date 07/28/23   OT Frequency 3-5x/wk   Recommendation   OT Discharge Recommendation Post acute rehabilitation services   [facility] Daily Activity Inpatient   Lower Body Dressing 2   Bathing 2   Toileting 2   Upper Body Dressing 3   Grooming 3   Eating 4   Daily Activity Raw Score 16   Daily Activity Standardized Score (Calc for Raw Score >=11) 35.96   -PAC Applied Cognition Inpatient   Following a Speech/Presentation 4   Understanding Ordinary Conversation 4   Taking Medications 4   Remembering Where Things Are Placed or Put Away 3   Remembering List of 4-5 Errands 3   Taking Care of Complicated Tasks 2   Applied Cognition Raw Score 20   Applied Cognition Standardized Score 41.76     Goals to be achieved within 14 days:  1) Pt will perform bed mobility MOD I to increase activity engagement. 2) Pt will complete functional transfers/mobility MOD I for completion of ADLs. 3) Pt will complete grooming tasks MOD I with G tolerance. 4) Pt will complete UB/LB ADLs MOD I utilizing compensatory techniques.   5) Pt will increase activity tolerance by participating in functional activities for 30 minutes. 6) Pt will complete IADLs MOD I with G tolerance and safety awareness.     Chyna Sommer OTS

## 2023-07-14 NOTE — ASSESSMENT & PLAN NOTE
- secondary to fall leading to L2/L4 compression fractures  - current pain regimen: 975 mg acetaminophen Q8H, lidocaine patch to ribs daily, 2.5 mg oxycodone Q4H PRN, 5 mg oxycodone Q4H PRN, 0.2 mg hydromorphone IV Q4H PRN

## 2023-07-14 NOTE — ASSESSMENT & PLAN NOTE
Lab Results   Component Value Date    HGBA1C 5.2 04/24/2023       No results for input(s): "POCGLU" in the last 72 hours.     Blood Sugar Average: Last 72 hrs:     - start SSI

## 2023-07-14 NOTE — CASE MANAGEMENT
Case Management Assessment & Discharge Planning Note    Patient name Chayo Vallejo  Location [facility] 605/[facility] 651-99 MRN [id number]  : 1955 Date 2023       Current Admission Date: 2023  Current Admission Diagnosis:Compression fracture of lumbar vertebra [facility])   Patient Active Problem List    Diagnosis Date Noted   • Compression fracture of lumbar vertebra ([address]) 2023   • Fall 2023   • Acute pain due to trauma 2023   • Right hip pain 2023   • Lumbar back pain 2023   • Post-menopausal 2023   • Liver cell carcinoma ([address]) 2023   • Chronic obstructive pulmonary disease, unspecified COPD type ([address]) 2023   • Alcohol abuse with other alcohol-induced disorder ([address]) 2023   • Stage 3a chronic kidney disease ([address]) 2023   • Other forms of angina pectoris ([address])    • Hyponatremia 10/07/2021   • OAB (overactive bladder) 2021   • Liver failure without hepatic coma, unspecified chronicity ([address]) 2021   • Thrombocytopenia ([address]) 2021   • Microscopic colitis 2021   • Increased frequency of urination 2020   • S/P endoscopic carpal tunnel release 2019   • Trigger finger of right thumb 2019   • Cirrhosis ([address]) 10/07/2019   • Asthma 10/07/2019   • Sleep apnea 10/07/2019   • Depression, recurrent ([address])    • Urinary retention 2019   • Lumbar facet arthropathy 2018   • Decompensated hepatic cirrhosis ([address]) 2018   • Essential hypertension 2018   • Hypokalemia 2017   • Acute colitis 2017   • Carpal tunnel syndrome of left wrist 2017   • Primary osteoarthritis of first carpometacarpal joint of left hand 2017   • Trigger thumb, left thumb 2017   • SOB (shortness of breath) 2017   • Palpitation 2016   • Microscopic hematuria 2016   • Midline cystocele 2016   • Mixed incontinence 2015   • Outlet dysfunction constipation 2015   • Pelvic pain in female 11/12/2015   • Recurrent UTI (urinary tract infection) 11/12/2015   • Incisional hernia, without obstruction or gangrene 11/09/2015   • GERD (gastroesophageal reflux disease) 09/29/2015   • Ascites due to alcoholic cirrhosis ([address]) 29/66/1469   • Portal hypertension ([address]) 03/08/2013   • Type 2 diabetes mellitus ([address]) 03/08/2013   • Hepatic cirrhosis ([address]) 06/25/2012   • Anxiety state 12/08/2011   • DDD (degenerative disc disease), lumbosacral 09/19/2011   • Nontoxic multinodular goiter 07/20/2011   • Other disorders of lung 07/20/2011   • Hemangioma of intra-abdominal structure 02/16/2011   • Allergic rhinitis 02/08/2010   • Mild intermittent asthma 02/08/2010   • Osteoarthrosis 02/08/2010   • Obstructive sleep apnea 02/08/2010   • Chronic nonalcoholic liver disease 68/10/1441      LOS (days): 0  Geometric Mean LOS (GMLOS) (days): 2.80  Days to GMLOS:2.3     OBJECTIVE:    Risk of Unplanned Readmission Score: 22.12         Current admission status: Inpatient       Preferred Pharmacy:   [address] [place] [id number]  Phone: [phone] Fax: [phone]    Primary Care Provider: JONE Skinner    Primary Insurance: MEDICARE  Secondary Insurance: [employer] [facility]    ASSESSMENT:  [employer]     Latonya, [address]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 Pt lives alone. Pt was active with OP therapy prior to arrival.   Pt was evaluated by OT/PT and recommended for IP rehab. Pt would like a location near her home in [address]. She is in agreement with CM placing blanket referrals. Pt states that upon d/c from IP Rehab, she will have adequate support in the home via friends/family. Referrals placed and will follow up.      CM reviewed d/c planning process including the following: identifying help at home, patient preference for d/c planning needs, Discharge Lounge, [facility] Meds to Bed program, availability of treatment team to discuss questions or concerns patient and/or family may have regarding understanding medications and recognizing signs and symptoms once discharged. CM also encouraged patient to follow up with all recommended appointments after discharge. Patient advised of importance for patient and family to participate in managing patient’s medical well being.

## 2023-07-14 NOTE — EMTALA/ACUTE CARE TRANSFER
[address] [place] [address] [address] [id number]  Dept: [phone]      EMTALA TRANSFER CONSENT    NAME Sukhdeep Callaway                                         1955                              MRN [id number]    I have been informed of my rights regarding examination, treatment, and transfer   by Dr. Мария Haji DO, Ana Jones PA-C    Benefits:      Risks:        Consent for Transfer:  I acknowledge that my medical condition has been evaluated and explained to me by the emergency department physician or other qualified medical person and/or my attending physician, who has recommended that I be transferred to the service of   trauma at  [facility] AND [facility]. The above potential benefits of such transfer, the potential risks associated with such transfer, and the probable risks of not being transferred have been explained to me, and I fully understand them. The doctor has explained that, in my case, the benefits of transfer outweigh the risks. I agree to be transferred. I authorize the performance of emergency medical procedures and treatments upon me in both transit and upon arrival at the receiving facility. Additionally, I authorize the release of any and all medical records to the receiving facility and request they be transported with me, if possible. I understand that the safest mode of transportation during a medical emergency is an ambulance and that the Hospital advocates the use of this mode of transport. Risks of traveling to the receiving facility by car, including absence of medical control, life sustaining equipment, such as oxygen, and medical personnel has been explained to me and I fully understand them. (CUBA CORRECT BOX BELOW)  [  ]  I consent to the stated transfer and to be transported by ambulance/helicopter.   [  ]  I consent to the stated transfer, but refuse transportation by ambulance and accept full responsibility for my transportation by car. I understand the risks of non-ambulance transfers and I exonerate the Hospital and its staff from any deterioration in my condition that results from this refusal.    X___________________________________________    DATE  23  TIME________  Signature of patient or legally responsible individual signing on patient behalf           RELATIONSHIP TO PATIENT_________________________          Provider Certification    NAME Sukhdeep Callaway                                         1955                              MRN [id number]    A medical screening exam was performed on the above named patient. Based on the examination:    Condition Necessitating Transfer The primary encounter diagnosis was Fall, initial encounter. A diagnosis of Lumbar compression fracture (HCC) was also pertinent to this visit. Patient Condition:  Good    Reason for Transfer:  Specialty not available    Transfer Requirements: Facility     · Space available and qualified personnel available for treatment as acknowledged by    · Agreed to accept transfer and to provide appropriate medical treatment as acknowledged by          · Appropriate medical records of the examination and treatment of the patient are provided at the time of transfer   [address] _______  · Transfer will be performed by qualified personnel from    and appropriate transfer equipment as required, including the use of necessary and appropriate life support measures.     Provider Certification: I have examined the patient and explained the following risks and benefits of being transferred/refusing transfer to the patient/family:         Based on these reasonable risks and benefits to the patient and/or the unborn child(deangelo), and based upon the information available at the time of the patient’s examination, I certify that the medical benefits reasonably to be expected from the provision of appropriate medical treatments at another medical facility outweigh the increasing risks, if any, to the individual’s medical condition, and in the case of labor to the unborn child, from effecting the transfer.     X____________________________________________ DATE 07/14/23        TIME_______      ORIGINAL - SEND TO MEDICAL RECORDS   COPY - SEND WITH PATIENT DURING TRANSFER

## 2023-07-14 NOTE — PLAN OF CARE
Problem: MOBILITY - ADULT  Goal: Maintain or return to baseline ADL function  Description: INTERVENTIONS:  -  Assess patient's ability to carry out ADLs; assess patient's baseline for ADL function and identify physical deficits which impact ability to perform ADLs (bathing, care of mouth/teeth, toileting, grooming, dressing, etc.)  - Assess/evaluate cause of self-care deficits   - Assess range of motion  - Assess patient's mobility; develop plan if impaired  - Assess patient's need for assistive devices and provide as appropriate  - Encourage maximum independence but intervene and supervise when necessary  - Involve family in performance of ADLs  - Assess for home care needs following discharge   - Consider OT consult to assist with ADL evaluation and planning for discharge  - Provide patient education as appropriate  Outcome: Progressing  Goal: Maintains/Returns to pre admission functional level  Description: INTERVENTIONS:  - Perform BMAT or MOVE assessment daily.   - Set and communicate daily mobility goal to care team and patient/family/caregiver. - Collaborate with rehabilitation services on mobility goals if consulted  - Perform Range of Motion  times a day. - Reposition patient every  hours.   - Dangle patient  times a day  - Stand patient  times a day  - Ambulate patient times a day  - Out of bed to chair  times a day   - Out of bed for meals  times a day  - Out of bed for toileting  - Record patient progress and toleration of activity level   Outcome: Progressing     Problem: Prexisting or High Potential for Compromised Skin Integrity  Goal: Skin integrity is maintained or improved  Description: INTERVENTIONS:  - Identify patients at risk for skin breakdown  - Assess and monitor skin integrity  - Assess and monitor nutrition and hydration status  - Monitor labs   - Assess for incontinence   - Turn and reposition patient  - Assist with mobility/ambulation  - Relieve pressure over bony prominences  - Avoid friction and shearing  - Provide appropriate hygiene as needed including keeping skin clean and dry  - Evaluate need for skin moisturizer/barrier cream  - Collaborate with interdisciplinary team   - Patient/family teaching  - Consider wound care consult   Outcome: Progressing     Problem: PAIN - ADULT  Goal: Verbalizes/displays adequate comfort level or baseline comfort level  Description: Interventions:  - Encourage patient to monitor pain and request assistance  - Assess pain using appropriate pain scale  - Administer analgesics based on type and severity of pain and evaluate response  - Implement non-pharmacological measures as appropriate and evaluate response  - Consider cultural and social influences on pain and pain management  - Notify physician/advanced practitioner if interventions unsuccessful or patient reports new pain  Outcome: Progressing     Problem: SAFETY ADULT  Goal: Maintain or return to baseline ADL function  Description: INTERVENTIONS:  -  Assess patient's ability to carry out ADLs; assess patient's baseline for ADL function and identify physical deficits which impact ability to perform ADLs (bathing, care of mouth/teeth, toileting, grooming, dressing, etc.)  - Assess/evaluate cause of self-care deficits   - Assess range of motion  - Assess patient's mobility; develop plan if impaired  - Assess patient's need for assistive devices and provide as appropriate  - Encourage maximum independence but intervene and supervise when necessary  - Involve family in performance of ADLs  - Assess for home care needs following discharge   - Consider OT consult to assist with ADL evaluation and planning for discharge  - Provide patient education as appropriate  Outcome: Progressing  Goal: Maintains/Returns to pre admission functional level  Description: INTERVENTIONS:  - Perform BMAT or MOVE assessment daily.   - Set and communicate daily mobility goal to care team and patient/family/caregiver.    - Collaborate with rehabilitation services on mobility goals if consulted  - Perform Range of Motion times a day. - Reposition patient every  hours.   - Dangle patient  times a day  - Stand patient  times a day  - Ambulate patient times a day  - Out of bed to chair  times a day   - Out of bed for meals  times a day  - Out of bed for toileting  - Record patient progress and toleration of activity level   Outcome: Progressing  Goal: Patient will remain free of falls  Description: INTERVENTIONS:  - Educate patient/family on patient safety including physical limitations  - Instruct patient to call for assistance with activity   - Consult OT/PT to assist with strengthening/mobility   - Keep Call bell within reach  - Keep bed low and locked with side rails adjusted as appropriate  - Keep care items and personal belongings within reach  - Initiate and maintain comfort rounds  - Make Fall Risk Sign visible to staff  - Offer Toileting every Hours, in advance of need  - Initiate/Maintain alarm  - Obtain necessary fall risk management equipment:   - Apply yellow socks and bracelet for high fall risk patients  - Consider moving patient to room near nurses station  Outcome: Progressing     Problem: NEUROSENSORY - ADULT  Goal: Achieves stable or improved neurological status  Description: INTERVENTIONS  - Monitor and report changes in neurological status  - Monitor vital signs such as temperature, blood pressure, glucose, and any other labs ordered   - Initiate measures to prevent increased intracranial pressure  - Monitor for seizure activity and implement precautions if appropriate      Outcome: Progressing  Goal: Remains free of injury related to seizures activity  Description: INTERVENTIONS  - Maintain airway, patient safety  and administer oxygen as ordered  - Monitor patient for seizure activity, document and report duration and description of seizure to physician/advanced practitioner  - If seizure occurs,  ensure patient safety during seizure  - Reorient patient post seizure  - Seizure pads on all 4 side rails  - Instruct patient/family to notify RN of any seizure activity including if an aura is experienced  - Instruct patient/family to call for assistance with activity based on nursing assessment  - Administer anti-seizure medications if ordered    Outcome: Progressing  Goal: Achieves maximal functionality and self care  Description: INTERVENTIONS  - Monitor swallowing and airway patency with patient fatigue and changes in neurological status  - Encourage and assist patient to increase activity and self care.    - Encourage visually impaired, hearing impaired and aphasic patients to use assistive/communication devices  Outcome: Progressing     Problem: GENITOURINARY - ADULT  Goal: Maintains or returns to baseline urinary function  Description: INTERVENTIONS:  - Assess urinary function  - Encourage oral fluids to ensure adequate hydration if ordered  - Administer IV fluids as ordered to ensure adequate hydration  - Administer ordered medications as needed  - Offer frequent toileting  - Follow urinary retention protocol if ordered  Outcome: Progressing  Goal: Absence of urinary retention  Description: INTERVENTIONS:  - Assess patient’s ability to void and empty bladder  - Monitor I/O  - Bladder scan as needed  - Discuss with physician/AP medications to alleviate retention as needed  - Discuss catheterization for long term situations as appropriate  Outcome: Progressing  Goal: Urinary catheter remains patent  Description: INTERVENTIONS:  - Assess patency of urinary catheter  - If patient has a chronic hussein, consider changing catheter if non-functioning  - Follow guidelines for intermittent irrigation of non-functioning urinary catheter  Outcome: Progressing     Problem: SKIN/TISSUE INTEGRITY - ADULT  Goal: Skin Integrity remains intact(Skin Breakdown Prevention)  Description: Assess:  -Perform Gregory assessment every   -Clean and moisturize skin every  -Inspect skin when repositioning, toileting, and assisting with ADLS  -Assess under medical devices such as  every   -Assess extremities for adequate circulation and sensation     Bed Management:  -Have minimal linens on bed & keep smooth, unwrinkled  -Change linens as needed when moist or perspiring  -Avoid sitting or lying in one position for more than  hours while in bed  -Keep HOB atdegrees     Toileting:  -Offer bedside commode  -Assess for incontinence every   -Use incontinent care products after each incontinent episode such as     Activity:  -Mobilize patient  times a day  -Encourage activity and walks on unit  -Encourage or provide ROM exercises   -Turn and reposition patient every Hours  -Use appropriate equipment to lift or move patient in bed  -Instruct/ Assist with weight shifting every  when out of bed in chair  -Consider limitation of chair time  hour intervals    Skin Care:  -Avoid use of baby powder, tape, friction and shearing, hot water or constrictive clothing  -Relieve pressure over bony prominences using   -Do not massage red bony areas    Next Steps:  -Teach patient strategies to minimize risks such as   -Consider consults to  interdisciplinary teams such as   Outcome: Progressing  Goal: Incision(s), wounds(s) or drain site(s) healing without S/S of infection  Description: INTERVENTIONS  - Assess and document dressing, incision, wound bed, drain sites and surrounding tissue  - Provide patient and family education  - Perform skin care/dressing changes every   Outcome: Progressing  Goal: Pressure injury heals and does not worsen  Description: Interventions:  - Implement low air loss mattress or specialty surface (Criteria met)  - Apply silicone foam dressing  - Instruct/assist with weight shifting every  minutes when in chair   - Limit chair time to hour intervals  - Use special pressure reducing interventions such as  when in chair   - Apply fecal or urinary incontinence containment device   - Perform passive or active ROM every   - Turn and reposition patient & offload bony prominences every  hours   - Utilize friction reducing device or surface for transfers   - Consider consults to  interdisciplinary teams such as   - Use incontinent care products after each incontinent episode   - Consider nutrition services referral as needed  Outcome: Progressing     Problem: MUSCULOSKELETAL - ADULT  Goal: Maintain or return mobility to safest level of function  Description: INTERVENTIONS:  - Assess patient's ability to carry out ADLs; assess patient's baseline for ADL function and identify physical deficits which impact ability to perform ADLs (bathing, care of mouth/teeth, toileting, grooming, dressing, etc.)  - Assess/evaluate cause of self-care deficits   - Assess range of motion  - Assess patient's mobility  - Assess patient's need for assistive devices and provide as appropriate  - Encourage maximum independence but intervene and supervise when necessary  - Involve family in performance of ADLs  - Assess for home care needs following discharge   - Consider OT consult to assist with ADL evaluation and planning for discharge  - Provide patient education as appropriate  Outcome: Progressing  Goal: Maintain proper alignment of affected body part  Description: INTERVENTIONS:  - Support, maintain and protect limb and body alignment  - Provide patient/ family with appropriate education  Outcome: Progressing

## 2023-07-14 NOTE — H&P
H&P - Trauma   Stephany Roberts 77 y.o. female MRN: [id number]  Unit/Bed#: ED 15 Encounter: [id number]    Trauma Alert: Evaluation; trauma team arrived at [address]: Ambulance    Trauma Team: Attending Sheree Garg, Residents Dontae and Fellow Geoff  Consultants:     Neurosurgery: routine consult; Epic consult order placed; Assessment/Plan   Active Problems / Assessment:   S/p fall at home  L2 and L4 compression fractures  L sided rib pain  Hyponatremia      Plan:   Admit to trauma service, Med surg. Expected LOS>2 midnights  Consult neurosurgery  Lumbar spine precautions in brace  NPO pending neurosurgery evaluation   Normal saline at 100 in setting of hyponatremia, Repeat AM labs   Pain/nausea control PRN. Lidoderm patch to L ribs   DVT ppx: Lovenox   Incentive spirometry  PT/OT when appropriate  Case management for dispo needs     History of Present Illness     Chief Complaint: L rib pain  Mechanism:Fall     HPI:    Stephany Roberts is a 77 y.o. female with a pmhx of non alcoholic cirrhosis, depression, T2DM, GERD, previous DVT, ZEKE who presents as a trauma transfer from [facility] after falling today at home. Patient states she was reaching in a cupboard trying to put paper away when she had acute, sudden onset of severe, sharp pain in her lower spine. She states this was the most severe pain she has ever felt, causing her to fall to the floor. She then pressed her medic alert button and EMS arrived. She denies LOC or head strike, denies syncope. She reports chronic back pain but pain tonight was unlike this. GCS 15. Review of Systems   Constitutional: Positive for activity change. Negative for appetite change, chills, diaphoresis and fever. HENT: Negative for trouble swallowing and voice change. Eyes: Negative for visual disturbance. Respiratory: Negative for chest tightness and shortness of breath. Cardiovascular: Positive for chest pain. Negative for palpitations and leg swelling. Gastrointestinal: Negative for abdominal distention, abdominal pain, nausea and vomiting. Genitourinary:        Incontinence    Musculoskeletal: Positive for back pain. Negative for neck pain. Skin: Positive for color change (BLE venous stasis changes). Negative for rash and wound. Neurological: Negative for dizziness, facial asymmetry, weakness and headaches. Psychiatric/Behavioral: Negative for agitation, behavioral problems and confusion. All other systems reviewed and are negative. 12-point, complete review of systems was reviewed and negative except as stated above. Historical Information     Past Medical History:   Diagnosis Date   • Anxiety    • Anxiety and depression 9/29/2015   • Arthritis    • Asthma    • Asthma without status asthmaticus 4/26/2011   • Cirrhosis ([address])    • Depression    • Diabetes ([address])    • Disease of thyroid gland     nodules   • Diverticulitis 2011   • Diverticulosis    • DVT (deep venous thrombosis) ([address]) 2013   • GERD (gastroesophageal reflux disease)    • Liver disease     cirrhosis   • Obesity, morbid, BMI 40.0-49.9 ([address]) 8/16/2017   • Pneumonia    • Portal hypertensive gastropathy ([address])    • Sleep apnea    • Vertigo    • Vitreous hemorrhage of left eye ([address]) 1/20/2023     Past Surgical History:   Procedure Laterality Date   • BLADDER SUSPENSION     • CHOLECYSTECTOMY     • COLONOSCOPY  05/24/2019    had multiple with last being [id number]   • COLONOSCOPY  10/07/2004    Adele Sicard. Marthe Forehand, M.D. / Diverticulosis   • COLONOSCOPY  06/14/2011    Adele Sicard. Marthe Forehand, M.D. / Diverticulosis   • EGD  02/28/2013    Taras Stevens M.D. / Mild Portal Hypertensive Gastropathy   • EGD  10/01/2019    Adele Sicard. Marthe Forehand, M.D. / Gastritis   • EGD AND COLONOSCOPY  09/03/2015    Adele Sicard. Marthe Forehand, M.D. / Chris Jared. Diverticulosis.    • FLEXIBLE SIGMOIDOSCOPY  03/04/2013    Li Lynch M.D. / Hemorrhoidal Bleeding   • FLEXIBLE SIGMOIDOSCOPY  05/30/2018    Li Lynch M.D. / Internal hemorrhoids. biopsied.    • HERNIA REPAIR     • IR PARACENTESIS  08/23/2018   • IR PARACENTESIS  11/05/2018   • IR PARACENTESIS  01/11/2019   • IR PARACENTESIS  02/27/2019   • IR PARACENTESIS  04/15/2019   • IR PARACENTESIS  06/03/2019   • IR PARACENTESIS  07/10/2019   • IR PARACENTESIS  08/16/2019   • IR PARACENTESIS  09/10/2019   • IR PARACENTESIS  10/07/2019   • IR PARACENTESIS  11/05/2019   • IR PARACENTESIS  11/22/2019   • IR PARACENTESIS  12/17/2019   • IR PARACENTESIS  01/07/2020   • IR PARACENTESIS  01/28/2020   • IR PARACENTESIS  02/18/2020   • IR PARACENTESIS  03/10/2020   • IR PARACENTESIS  03/31/2020   • IR PARACENTESIS  04/21/2020   • IR PARACENTESIS  05/12/2020   • IR PARACENTESIS  06/02/2020   • IR PARACENTESIS  06/30/2020   • IR PARACENTESIS  07/20/2020   • IR PARACENTESIS  08/18/2020   • IR PARACENTESIS  11/12/2020   • IR PARACENTESIS  03/11/2021   • IR PARACENTESIS  2/8/2022   • IR PARACENTESIS  8/24/2022   • IR PARACENTESIS  11/14/2022   • IR PARACENTESIS  12/21/2022   • IR PARACENTESIS  2/1/2023   • IR PARACENTESIS  3/7/2023   • IR PARACENTESIS  3/30/2023   • IR PARACENTESIS  4/26/2023   • IR PARACENTESIS  5/18/2023   • IR PARACENTESIS  7/7/2023   • TONSILLECTOMY     • TRIGGER FINGER RELEASE Bilateral         Social History     Tobacco Use   • Smoking status: Never   • Smokeless tobacco: Never   Vaping Use   • Vaping Use: Never used   Substance Use Topics   • Alcohol use: Not Currently     Comment: rarely   • Drug use: Never     Immunization History   Administered Date(s) Administered   • COVID-19 MODERNA VACC 0.5 ML IM 03/17/2021, 03/17/2021, 04/14/2021, 04/14/2021, 11/12/2021, 11/12/2021, 06/03/2022, 06/03/2022   • COVID-19 Pfizer Vac BIVALENT Alejandro-sucrose 12 Yr+ IM (BOOSTER ONLY) 10/12/2022   • COVID-19, unspecified 03/17/2021, 03/17/2021, 04/14/2021, 04/14/2021, 06/03/2022, 06/03/2022   • H1N1, All Formulations 01/16/2010   • Hep B, adult 12/06/2012   • INFLUENZA 11/07/2005, 11/30/2006, 10/16/2008, 10/01/2009, 10/07/2010, 10/18/2011, 10/04/2012, 10/08/2013, 10/30/2014, 11/04/2015, 10/20/2016, 10/11/2017, 10/18/2017, 09/17/2018, 09/30/2021, 09/30/2021, 10/12/2022, 10/12/2022   • Influenza Quadrivalent Preservative Free 3 years and older IM 11/04/2015, 10/20/2016, 10/11/2017   • Influenza, Seasonal Vaccine, Quadrivalent, Adjuvanted, . 5e 10/01/2020, 09/30/2021, 10/12/2022   • Influenza, high dose seasonal 0.7 mL 10/07/2019   • Pneumococcal Conjugate 13-Valent 02/01/2018, 11/28/2018   • Pneumococcal Polysaccharide PPV23 10/13/2010, 05/26/2012, 04/29/2019   • Tdap 09/05/2010, 06/02/2014, 05/15/2019   • Zoster Vaccine Recombinant 02/01/2018, 04/11/2018   • influenza, trivalent, adjuvanted 09/17/2018, 10/01/2020     Last Tetanus: N/A  Family History: Non-contributory    1. Before the illness or injury that brought you to the Emergency, did you need someone to help you on a regular basis? 0=No   2. Since the illness or injury that brought you to the Emergency, have you needed more help than usual to take care of yourself? 0=No   3. Have you been hospitalized for one or more nights during the past 6 months (excluding a stay in the Emergency Department)? 0=No   4. In general, do you see well? 1=No   5. In general, do you have serious problems with your memory? 0=No   6. Do you take more than three different medications everyday?  1=Yes   TOTAL   2     Did you order a geriatric consult if the score was 2 or greater?: yes     Meds/Allergies   all current active meds have been reviewed, current meds:   Current Facility-Administered Medications   Medication Dose Route Frequency   • acetaminophen (TYLENOL) tablet 975 mg  975 mg Oral Q8H [address]   • enoxaparin (LOVENOX) subcutaneous injection 30 mg  30 mg Subcutaneous Q12H   • escitalopram (LEXAPRO) tablet 20 mg  20 mg Oral Daily   • HYDROmorphone HCl (DILAUDID) injection 0.2 mg  0.2 mg Intravenous Q4H PRN   • lidocaine (LIDODERM) 5 % patch 2 patch  2 patch Topical Daily   • methocarbamol (ROBAXIN) tablet 500 mg  500 mg Oral Q6H PRN   • metoprolol succinate (TOPROL-XL) 24 hr tablet 25 mg  25 mg Oral Daily   • naloxone (NARCAN) 0.04 mg/mL syringe 0.04 mg  0.04 mg Intravenous Q1MIN PRN   • ondansetron (ZOFRAN) injection 4 mg  4 mg Intravenous Q4H PRN   • oxyCODONE (ROXICODONE) IR tablet 5 mg  5 mg Oral Q4H PRN   • oxyCODONE (ROXICODONE) split tablet 2.5 mg  2.5 mg Oral Q4H PRN   • pantoprazole (PROTONIX) EC tablet 40 mg  40 mg Oral Early Morning   • polyethylene glycol (MIRALAX) packet 17 g  17 g Oral Daily   • sodium chloride 0.9 % infusion  100 mL/hr Intravenous Continuous    and PTA meds:   Prior to Admission Medications   Prescriptions Last Dose Informant Patient Reported? Taking? Diclofenac Sodium (VOLTAREN) 1 %   No No   Sig: Apply 2 g topically 4 (four) times a day   Ozempic, 0.25 or 0.5 MG/DOSE, 2 MG/1.5ML SOPN  Self Yes No   Sig: inject 0.5 milligrams subcutaneously every week   acetaminophen (TYLENOL) 500 mg tablet  Self Yes No   Sig: Take 500 mg by mouth every 6 (six) hours as needed   escitalopram (LEXAPRO) 20 mg tablet   No No   Sig: Take 1 tablet (20 mg total) by mouth daily   furosemide (LASIX) 40 mg tablet  Self No No   Sig: Take 2 tablets (80 mg total) by mouth daily AND 1 tablet (40 mg total) in the evening. Take before meals. Patient taking differently: Take 120mg in AM   lansoprazole (PREVACID) 30 mg capsule  Self No No   Sig: Take 1 capsule (30 mg total) by mouth daily   metoprolol succinate (TOPROL-XL) 25 mg 24 hr tablet  Self Yes No   Sig: Take 1 tablet by mouth daily   spironolactone (ALDACTONE) 100 mg tablet   No No   Sig: Take 2 tablets (200 mg total) by mouth daily   spironolactone (ALDACTONE) 50 mg tablet   No No   Sig: Take 3 tablets (150 mg total) by mouth daily      Facility-Administered Medications: None     Allergies have not been reviewed;     Allergies   Allergen Reactions   • Medical Tape Other (See Comments)     Skin tears  Per patient, Red -"rips my skin"  Per patient, Skin tears  Other reaction(s): Other (See Comments)  Per patient, Skin tears, Red -"rips my skin"   • Penicillins Itching and Rash     rash     • Diclofenac Sodium Itching and Myalgia     Per patient, tolerating Voltaren gel without any reaction started 3/16/23   • Nsaids Other (See Comments)     Cirrhosis of liver- pt reported starting Mobic 3/16/23  Other reaction(s):  Other (See Comments)  Per patient, Cirrhosis of liver- pt reported starting 3/16/23   • Pseudoephedrine-Guaifenesin Myalgia and Other (See Comments)     Entex- "jittery"   • Attapulgite    • Cephalexin      ?ithcy   • Clioquinol    • Diclofenac Sodium Itching     Patient reported tolerating Voltaren gel without any reaction started 3/16/23   • Meloxicam Itching   • Nabumetone Other (See Comments)     rash   • Phenylalanine    • Pseudoephedrine-Guaifenesin Other (See Comments)     Entex- "gittery"   • Streptomycin    • Celecoxib Rash   • Penicillin G Rash   • Rofecoxib Rash     skin reaction       Objective   Initial Vitals:   Temperature: 98.5 °F (36.9 °C) (07/14/23 0243)  Pulse: 62 (07/14/23 0243)  Respirations: 18 (07/14/23 0243)  Blood Pressure: 111/52 (07/14/23 0243)    Primary Survey:   Airway:        Status: patent;        Pre-hospital Interventions: none        Hospital Interventions: none  Breathing:        Pre-hospital Interventions: none       Effort: normal       Right breath sounds: normal       Left breath sounds: normal  Circulation:        Rhythm: regular       Rate: regular   Right Pulses Left Pulses    R radial: 2+    R pedal: 2+     L radial: 2+    L pedal: 2+       Disability:        GCS: Eye: 4; Verbal: 5 Motor: 6 Total: 15       Right Pupil:       Left Pupil:     R Motor Strength L Motor Strength    R : 5/5  R dorsiflex: 5/5  R plantarflex: 5/5 L : 5/5  L dorsiflex: 5/5  L plantarflex: 5/5        Sensory:  No sensory deficit  Exposure:       Completed: Yes      Secondary Survey:  Physical Exam  Vitals and nursing note reviewed. Constitutional:       General: She is not in acute distress. Appearance: Normal appearance. She is not ill-appearing or toxic-appearing. HENT:      Head: Normocephalic and atraumatic. Mouth/Throat:      Mouth: Mucous membranes are moist.      Pharynx: Oropharynx is clear. Eyes:      Extraocular Movements: Extraocular movements intact. Conjunctiva/sclera: Conjunctivae normal.      Pupils: Pupils are equal, round, and reactive to light. Cardiovascular:      Rate and Rhythm: Normal rate and regular rhythm. Pulmonary:      Effort: Pulmonary effort is normal. No respiratory distress. Abdominal:      General: Abdomen is flat. There is no distension. Palpations: Abdomen is soft. Tenderness: There is no abdominal tenderness. There is no guarding or rebound. Musculoskeletal:         General: Tenderness (L spine tenderness) present. No swelling or deformity. Cervical back: Normal range of motion and neck supple. No rigidity or tenderness. Right lower leg: No edema. Left lower leg: No edema. Skin:     General: Skin is warm and dry. Coloration: Skin is not jaundiced. Comments: BLE venous stasis changes   Neurological:      General: No focal deficit present. Mental Status: She is alert and oriented to person, place, and time. Mental status is at baseline. Psychiatric:         Mood and Affect: Mood normal.         Behavior: Behavior normal.         Thought Content:  Thought content normal.         Judgment: Judgment normal.         Invasive Devices     Peripheral Intravenous Line  Duration           Peripheral IV 07/13/23 Right Antecubital <1 day              Lab Results:   BMP/CMP:   Lab Results   Component Value Date    SODIUM 128 (L) 07/13/2023    K 4.0 07/13/2023    CL 94 (L) 07/13/2023    CO2 27 07/13/2023    BUN 15 07/13/2023    CREATININE 0.77 07/13/2023    CALCIUM 9.2 07/13/2023    AST 25 07/13/2023    ALT 26 07/13/2023    ALKPHOS 107 (H) 07/13/2023    EGFR 78 07/13/2023    and CBC:   Lab Results   Component Value Date    WBC 8.15 07/13/2023    HGB 11.8 07/13/2023    HCT 35.3 07/13/2023    MCV 92 07/13/2023    PLT 80 (L) 07/13/2023    RBC 3.84 07/13/2023    MCH 30.7 07/13/2023    MCHC 33.4 07/13/2023    RDW 13.9 07/13/2023    MPV 9.1 07/13/2023    NRBC 0 07/13/2023       Imaging Results: I have personally reviewed pertinent reports. Chest Xray(s): N/A   FAST exam(s): N/A   CT Scan(s): positive for acute findings: L2 and L4 compression fx   Additional Xray(s): N/A     Other Studies: N/A    Code Status: Level 2 - DNAR: but accepts endotracheal intubation  Advance Directive and Living Will:      Power of :    POLST:    I have spent 30 minutes with Patient  today in which greater than 50% of this time was spent in counseling/coordination of care regarding Diagnostic results, Prognosis, Impressions, Documenting in the medical record, Reviewing / ordering tests, medicine, procedures  , Obtaining or reviewing history   and Communicating with other healthcare professionals .

## 2023-07-14 NOTE — PLAN OF CARE
Problem: OCCUPATIONAL THERAPY ADULT  Goal: Performs self-care activities at highest level of function for planned discharge setting. See evaluation for individualized goals. Description: Treatment Interventions: ADL retraining, Functional transfer training, Endurance training, Patient/family training, Equipment evaluation/education, Compensatory technique education, Energy conservation, Activityengagement          See flowsheet documentation for full assessment, interventions and recommendations. Note: Limitation: Decreased ADL status, Decreased endurance, Decreased self-care trans, Decreased high-level ADLs  Prognosis: Good  Assessment: Pt admitted to [address] [place] [address] on 7/14/23 from a fall resulting in a compression fracture of L2-L4 lumbar vertebra. Per neurosurgery, pt to wear LSO brace when OOB and HOB is >45 degrees. Pt is also to follow lumbar spinal precautions. Pt has a past medical history of Anxiety, Anxiety and depression, Arthritis, Asthma, Asthma without status asthmaticus, Cirrhosis ([address]), Depression, Diabetes ([address]), Disease of thyroid gland, Diverticulitis, Diverticulosis, DVT (deep venous thrombosis) ([address]), GERD (gastroesophageal reflux disease), Liver disease, Obesity, morbid, BMI 40.0-49.9 ([address]), Pneumonia, Portal hypertensive gastropathy ([address]), Sleep apnea, Vertigo, and Vitreous hemorrhage of left eye ([address]). Prior to admission, pt from home alone with and was independent with ADLs and functional mobility. Pt I with IADLs but receives assistance with cleaning, grocery shopping, and driving. Pt is currently min A x1 for bed mobility, functional mobility with RW, and mod A x1 for transfers. Pt min A for UB ADLs and mod A for LB ADLs. Limitations include decreased dynamic sitting balance, decreased dynamic standing balance, decreased functional mobility, increased dizziness, pain, fall risk, and decreased independence with ADLs.  Pt educated on importance of OOB activities, slow controlled movements with functional mobility, spinal precautions, and LSO brace maintenance. The patient's raw score on the AM-PAC Daily Activity Inpatient Short Form is 16. A raw score of less than 19 suggests the patient may benefit from discharge to post-acute rehabilitation services. Please refer to the recommendation of the Occupational Therapist for safe discharge planning. Pt would continue to benefit from additional skilled occupational therapy services. OT recommends pt to be d/c to inpatient rehab.      OT Discharge Recommendation: Post acute rehabilitation services

## 2023-07-14 NOTE — ASSESSMENT & PLAN NOTE
- patient with chronic hyponatremia secondary to cirrhosis and diuretic use  - Admission Na is 128  - Review of prior Na show baseline in low 130s  - started on 100 mL/hr NS on admission  - 7/14 Na 130, which appears to be her baseline  - d/c IV fluids on 7/14

## 2023-07-14 NOTE — PLAN OF CARE
Problem: PHYSICAL THERAPY ADULT  Goal: Performs mobility at highest level of function for planned discharge setting. See evaluation for individualized goals. Description: Treatment/Interventions: Functional transfer training, LE strengthening/ROM, Therapeutic exercise, Endurance training, Patient/family training, Equipment eval/education, Bed mobility, Gait training  Equipment Recommended: Nikunj Spencer       See flowsheet documentation for full assessment, interventions and recommendations. 7/14/2023 1553 by Shashank Briones  Note: Prognosis: Good  Problem List: Decreased strength, Decreased endurance, Impaired balance, Decreased mobility, Pain  Assessment: Pt is a 65yo female seen for PT eval following admission to [facility] after originally presenting to [facility] ED s/p fall 2/2 severe sharp back pain. Pt's admission diagnosis is compression fx of L2 and L4 vertebrae; evaluated by NSx and recommended for LSO. Pt  has a past medical history of Anxiety, Anxiety and depression, Arthritis, Asthma, Asthma without status asthmaticus, Cirrhosis ([address]), Depression, Diabetes ([address]), Disease of thyroid gland, Diverticulitis, Diverticulosis, DVT (deep venous thrombosis) ([address]), GERD (gastroesophageal reflux disease), Liver disease, Obesity, morbid, BMI 40.0-49.9 ([address]), Pneumonia, Portal hypertensive gastropathy ([address]), Sleep apnea, Vertigo, and Vitreous hemorrhage of left eye ([address]). Pt lives alone in a 1st floor apt with 0 DENEEN, and reports being independent with all functional mobility, ADLs, and IADLs PTA, though she does receive help from a "cleaning lady" once per week. Pt reports using walker during ambulation at baseline. Pt required min Ax1 for bed mobility (supine-to-sit) and for ambulation this session, however required mod Ax1 for sit<>stand transfers. Pt performed 10 repetitions of LAQs, hip flexion, and manually-resisted hamstring isometric contractions on BLE.  During each exercise, she displayed gross muscle weakness as evidenced by inability to move LE through full range of motion. Pt ambulated approx. 60 ft with use of RW, displaying decreased foot clearance, slowed gait speed, and slight unsteadiness. The patient's AM-PAC Basic Mobility Inpatient Short Form Raw Score is 16. A Raw score of less than 16 suggests the patient may benefit from discharge to post-acute rehabilitation services. Please also refer to the recommendation of the Physical Therapist for safe discharge planning. Recommend post-acute rehab services upon d/c. Pt would benefit from skilled PT intervention to address the aforementioned impairments to allow for optimal functional mobility, safety, and independence upon discharge. At end of session, pt was left sitting comfortably in recliner with call bell in reach and all current needs met. PT Discharge Recommendation: Post acute rehabilitation services    See flowsheet documentation for full assessment.

## 2023-07-14 NOTE — DISCHARGE INSTR - AVS FIRST PAGE
Neurosurgery discharge instructions following spine fracture:     LSO brace to be worn when out of bed of head of bed greater than 45 degrees. May place brace on while sitting on edge of bed. May be removed for showering. No bending, twisting or heavy lifting. No strenuous activities. No Driving. Follow-up as scheduled in two weeks with repeat spine x-rays to be completed 2-3 days prior to visit. Prescription has been entered electronically. **Please notify MD immediately if you have increased back or leg pain.  New numbness, tingling, weakness in your legs and/or bowel/bladder incontinence**

## 2023-07-14 NOTE — ASSESSMENT & PLAN NOTE
Lab Results   Component Value Date    HGBA1C 5.2 04/24/2023       Recent Labs     07/14/23  0416   POCGLU 146*       Blood Sugar Average: Last 72 hrs:  (P) 146     - SSI algorithm 3

## 2023-07-14 NOTE — TREATMENT PLAN
Upright x-rays completed and reviewed. Pending final radiology read. However, per my review, noted L2 and L4 acute compression fractures with noted loss of height. Loss of height greater at L2 in comparison to L4. Overall alignment remains intact. No obvious retropulsion, distraction, or evidence of instability. Continue plan as previously documented in initial consult note completed this morning on 7/14. Neurosurgery will sign off at this time. Patient will be scheduled to follow-up in the neurosurgery clinic in 2 weeks with repeat upright x-rays. Please reach out to any further questions or concerns.

## 2023-07-14 NOTE — ASSESSMENT & PLAN NOTE
Acute L2 and L4 compression fracture  - presents as a trauma eval after she noticed sevre low back pain when bending to place papers on a shelf followed by subsequent fall   - neuro intact     Imaging:   · 7/13 CT c/a/p: New compression deformity of L2 and L4 with new, mild loss of height     Plan:   · Continue to closely monitor neuro exam  · Frequent neurochecks per primary team  · Contain normotensive BP goals, MAP > 65  · No acute neurosurgical intervention indicated at this time  · Case and imaging reviewed this a.m. on rounds  · Recommend continued conservative management at this time  · TLSO brace to be worn when OOB or HOB > 45 deg  · Maintain lumbar spine precautions  · No bending, twisting, lifting > 5-10lbs   · Please obtain upright lumbar x-rays  · Pain control per primary team  · PT/OT  · Patient encouraged to follow-up with her PCP in regards to work-up and management of possible osteoporosis. · DVT ppx: tyler Hernandez for chem dvt ppx from a nsgy standpoint   · Medical management per primary team  · Social work following for assistance with dispo once medically cleared    Neurosurgery will follow from the periphery and review upright x-rays once completed. Please reach out with any further questions or concerns.

## 2023-07-14 NOTE — EMTALA/ACUTE CARE TRANSFER
[address] [place] [address] Dr Booth [id number]  Dept: [phone]      EMTALA TRANSFER CONSENT    NAME Carmen BRYANT 1955                              MRN [id number]    I have been informed of my rights regarding examination, treatment, and transfer   by Dr. David Lyn DO, Ezio Jackson PA-C    Benefits: Specialized equipment and/or services available at the receiving facility (Include comment)________________________ (Trauma)    Risks: Potential for delay in receiving treatment, Potential deterioration of medical condition, Increased discomfort during transfer      Consent for Transfer:  I acknowledge that my medical condition has been evaluated and explained to me by the emergency department physician or other qualified medical person and/or my attending physician, who has recommended that I be transferred to the service of  Accepting Physician: Brittany Peralta at [address] [facility] [address] Name, [address] [place] [address] Street : [address] [place] [address]. The above potential benefits of such transfer, the potential risks associated with such transfer, and the probable risks of not being transferred have been explained to me, and I fully understand them. The doctor has explained that, in my case, the benefits of transfer outweigh the risks. I agree to be transferred. I authorize the performance of emergency medical procedures and treatments upon me in both transit and upon arrival at the receiving facility. Additionally, I authorize the release of any and all medical records to the receiving facility and request they be transported with me, if possible. I understand that the safest mode of transportation during a medical emergency is an ambulance and that the Hospital advocates the use of this mode of transport.  Risks of traveling to the receiving facility by car, including absence of medical control, life sustaining equipment, such as oxygen, and medical personnel has been explained to me and I fully understand them. (CUBA CORRECT BOX BELOW)  [  ]  I consent to the stated transfer and to be transported by ambulance/helicopter. [  ]  I consent to the stated transfer, but refuse transportation by ambulance and accept full responsibility for my transportation by car. I understand the risks of non-ambulance transfers and I exonerate the Hospital and its staff from any deterioration in my condition that results from this refusal.    X___________________________________________    DATE  23  TIME________  Signature of patient or legally responsible individual signing on patient behalf           RELATIONSHIP TO PATIENT_________________________          Provider Certification    NAME Alfredo Mueller                                         1955                              MRN [id number]    A medical screening exam was performed on the above named patient. Based on the examination:    Condition Necessitating Transfer The primary encounter diagnosis was Fall, initial encounter. A diagnosis of Lumbar compression fracture (HCC) was also pertinent to this visit.     Patient Condition: The patient has been stabilized such that within reasonable medical probability, no material deterioration of the patient condition or the condition of the unborn child(deangelo) is likely to result from the transfer    Reason for Transfer: Level of Care needed not available at this facility    Transfer Requirements: [facility]   · Space available and qualified personnel available for treatment as acknowledged by    · Agreed to accept transfer and to provide appropriate medical treatment as acknowledged by       [facility]  · Appropriate medical records of the examination and treatment of the patient are provided at the time of transfer   [address] _______  · Transfer will be performed by qualified personnel from    and appropriate transfer equipment as required, including the use of necessary and appropriate life support measures. Provider Certification: I have examined the patient and explained the following risks and benefits of being transferred/refusing transfer to the patient/family:  General risk, such as traffic hazards, adverse weather conditions, rough terrain or turbulence, possible failure of equipment (including vehicle or aircraft), or consequences of actions of persons outside the control of the transport personnel, Unanticipated needs of medical equipment and personnel during transport, Risk of worsening condition, The possibility of a transport vehicle being unavailable      Based on these reasonable risks and benefits to the patient and/or the unborn child(deangelo), and based upon the information available at the time of the patient’s examination, I certify that the medical benefits reasonably to be expected from the provision of appropriate medical treatments at another medical facility outweigh the increasing risks, if any, to the individual’s medical condition, and in the case of labor to the unborn child, from effecting the transfer.     X____________________________________________ DATE 07/14/23        TIME_______      ORIGINAL - SEND TO MEDICAL RECORDS   COPY - SEND WITH PATIENT DURING TRANSFER

## 2023-07-14 NOTE — CONSULTS
[address] [place] [address]  Consult  Name: Kimani Carpenter 77 y.o. female I MRN: [id number]  Unit/Bed#: [facility] 605-01 I Date of Admission: 7/14/2023   Date of Service: 7/14/2023 I Hospital Day: 0    Inpatient consult to Neurosurgery  Consult performed by: Susan Spence PA-C  Consult ordered by: Maxine Pereyra MD        Assessment/Plan   * Compression fracture of lumbar vertebra [facility])  Assessment & Plan  Acute L2 and L4 compression fracture  - presents as a trauma eval after she noticed sevre low back pain when bending to place papers on a shelf followed by subsequent fall   - neuro intact     Imaging:   · 7/13 CT c/a/p: New compression deformity of L2 and L4 with new, mild loss of height     Plan:   · Continue to closely monitor neuro exam  · Frequent neurochecks per primary team  · Contain normotensive BP goals, MAP > 65  · No acute neurosurgical intervention indicated at this time  · Case and imaging reviewed this a.m. on rounds  · Recommend continued conservative management at this time  · TLSO brace to be worn when OOB or HOB > 45 deg  · Maintain lumbar spine precautions  · No bending, twisting, lifting > 5-10lbs   · Please obtain upright lumbar x-rays  · Pain control per primary team  · PT/OT  · Patient encouraged to follow-up with her PCP in regards to work-up and management of possible osteoporosis. · DVT ppx: JAMALsytler for chem dvt ppx from a nsgy standpoint   · Medical management per primary team  · Social work following for assistance with dispo once medically cleared    Neurosurgery will follow from the periphery and review upright x-rays once completed. Please reach out with any further questions or concerns. History of Present Illness   HPI: Kimani Carpenter is a 77y.o. year old female who presented as a trauma transfer from [address] [place] [address] after a fall at home and subsequently noted to have L2 and L4 compression fractures.   Patient reports severe low back pain when bending to put papers on a shelf followed by subsequent fall. Patient states she felt she cannot get off the floor so she pressed her medical alert button and EMS arrived. Patient denies any LOC or head strike. Patient reports chronic back pain but states that the pain she experienced last night is worse anything she is felt before. Denies any lower extremity weakness, numbness, bowel bladder incontinence, urinary retention, saddle anesthesia. Denies any previous history of lumbar/spine surgery. Review of Systems   Constitutional: Negative for fatigue and fever. Respiratory: Negative for cough and shortness of breath. Cardiovascular: Negative for chest pain. Gastrointestinal: Negative for abdominal pain. Musculoskeletal: Positive for back pain, gait problem and myalgias. Negative for neck pain and neck stiffness. Neurological: Negative for dizziness, tremors, seizures, syncope, facial asymmetry, speech difficulty, weakness, light-headedness, numbness and headaches. Historical Information   Past Medical History:   Diagnosis Date   • Anxiety    • Anxiety and depression 9/29/2015   • Arthritis    • Asthma    • Asthma without status asthmaticus 4/26/2011   • Cirrhosis ([address])    • Depression    • Diabetes ([address])    • Disease of thyroid gland     nodules   • Diverticulitis 2011   • Diverticulosis    • DVT (deep venous thrombosis) ([address]) 2013   • GERD (gastroesophageal reflux disease)    • Liver disease     cirrhosis   • Obesity, morbid, BMI 40.0-49.9 ([address]) 8/16/2017   • Pneumonia    • Portal hypertensive gastropathy ([address])    • Sleep apnea    • Vertigo    • Vitreous hemorrhage of left eye ([address]) 1/20/2023     Past Surgical History:   Procedure Laterality Date   • BLADDER SUSPENSION     • CHOLECYSTECTOMY     • COLONOSCOPY  05/24/2019    had multiple with last being [id number]   • COLONOSCOPY  10/07/2004    Tasneem Keller. Leela Mcdonald M.D. / Diverticulosis   • COLONOSCOPY  06/14/2011    Tasneem Keller.  Leela Mcdonald M.D. / Diverticulosis   • EGD  02/28/2013    Dedra Reilly M.D. / Mild Portal Hypertensive Gastropathy   • EGD  10/01/2019    Artis Anne M.D. / Gastritis   • EGD AND COLONOSCOPY  09/03/2015    Artis Anne M.D. / Janene Wilkinson. Diverticulosis. • FLEXIBLE SIGMOIDOSCOPY  03/04/2013    Zita Navarrete M.D. / Hemorrhoidal Bleeding   • FLEXIBLE SIGMOIDOSCOPY  05/30/2018    Zita Navarrete M.D. / Internal hemorrhoids. biopsied.    • HERNIA REPAIR     • IR PARACENTESIS  08/23/2018   • IR PARACENTESIS  11/05/2018   • IR PARACENTESIS  01/11/2019   • IR PARACENTESIS  02/27/2019   • IR PARACENTESIS  04/15/2019   • IR PARACENTESIS  06/03/2019   • IR PARACENTESIS  07/10/2019   • IR PARACENTESIS  08/16/2019   • IR PARACENTESIS  09/10/2019   • IR PARACENTESIS  10/07/2019   • IR PARACENTESIS  11/05/2019   • IR PARACENTESIS  11/22/2019   • IR PARACENTESIS  12/17/2019   • IR PARACENTESIS  01/07/2020   • IR PARACENTESIS  01/28/2020   • IR PARACENTESIS  02/18/2020   • IR PARACENTESIS  03/10/2020   • IR PARACENTESIS  03/31/2020   • IR PARACENTESIS  04/21/2020   • IR PARACENTESIS  05/12/2020   • IR PARACENTESIS  06/02/2020   • IR PARACENTESIS  06/30/2020   • IR PARACENTESIS  07/20/2020   • IR PARACENTESIS  08/18/2020   • IR PARACENTESIS  11/12/2020   • IR PARACENTESIS  03/11/2021   • IR PARACENTESIS  2/8/2022   • IR PARACENTESIS  8/24/2022   • IR PARACENTESIS  11/14/2022   • IR PARACENTESIS  12/21/2022   • IR PARACENTESIS  2/1/2023   • IR PARACENTESIS  3/7/2023   • IR PARACENTESIS  3/30/2023   • IR PARACENTESIS  4/26/2023   • IR PARACENTESIS  5/18/2023   • IR PARACENTESIS  7/7/2023   • TONSILLECTOMY     • TRIGGER FINGER RELEASE Bilateral      Social History     Substance and Sexual Activity   Alcohol Use Not Currently    Comment: rarely     Social History     Substance and Sexual Activity   Drug Use Never     Social History     Tobacco Use   Smoking Status Never   Smokeless Tobacco Never     Family History   Problem Relation Age of Onset   • COPD Mother    • Heart attack Sister    • Lymphoma Brother    • Colon cancer Maternal Grandmother    • Cancer Maternal Aunt         unknown     Meds/Allergies   all current active meds have been reviewed  Allergies   Allergen Reactions   • Medical Tape Other (See Comments)     Skin tears  Per patient, Red -"rips my skin"  Per patient, Skin tears  Other reaction(s): Other (See Comments)  Per patient, Skin tears, Red -"rips my skin"   • Penicillins Itching and Rash     rash     • Diclofenac Sodium Itching and Myalgia     Per patient, tolerating Voltaren gel without any reaction started 3/16/23   • Nsaids Other (See Comments)     Cirrhosis of liver- pt reported starting Mobic 3/16/23  Other reaction(s): Other (See Comments)  Per patient, Cirrhosis of liver- pt reported starting 3/16/23   • Pseudoephedrine-Guaifenesin Myalgia and Other (See Comments)     Entex- "jittery"   • Attapulgite    • Cephalexin      ?ithcy   • Clioquinol    • Diclofenac Sodium Itching     Patient reported tolerating Voltaren gel without any reaction started 3/16/23   • Meloxicam Itching   • Nabumetone Other (See Comments)     rash   • Phenylalanine    • Pseudoephedrine-Guaifenesin Other (See Comments)     Entex- "gittery"   • Streptomycin    • Celecoxib Rash   • Penicillin G Rash   • Rofecoxib Rash     skin reaction       Objective   I/O       07/12 0701  07/13 0700 07/13 0701  07/14 0700 07/14 0701  07/15 0700    I.V. (mL/kg)  361.7 (4.3)     Total Intake(mL/kg)  361.7 (4.3)     Net  +361.7                  Physical Exam  Constitutional:       General: She is not in acute distress. Appearance: She is obese. She is not ill-appearing or toxic-appearing. Comments: Pleasant, elderly female sitting up comfortably in bed. No acute distress. HENT:      Head: Normocephalic and atraumatic. Right Ear: External ear normal.      Left Ear: External ear normal.      Nose: Nose normal. No congestion or rhinorrhea. Mouth/Throat:      Mouth: Mucous membranes are moist.   Eyes:      General: No scleral icterus. Right eye: No discharge. Left eye: No discharge. Extraocular Movements: Extraocular movements intact. Conjunctiva/sclera: Conjunctivae normal.      Pupils: Pupils are equal, round, and reactive to light. Cardiovascular:      Rate and Rhythm: Normal rate. Pulmonary:      Effort: Pulmonary effort is normal. No respiratory distress. Comments: No respiratory distress on room air  Abdominal:      General: Abdomen is flat. Musculoskeletal:         General: Tenderness present. No deformity or signs of injury. Normal range of motion. Cervical back: Normal range of motion. No rigidity or tenderness. Right lower leg: No edema. Left lower leg: No edema. Comments: Point tenderness noted over the lumbar spine centrally   Skin:     General: Skin is warm and dry. Capillary Refill: Capillary refill takes less than 2 seconds. Neurological:      General: No focal deficit present. Mental Status: She is alert and oriented to person, place, and time. Mental status is at baseline. Cranial Nerves: No cranial nerve deficit. Sensory: No sensory deficit. Motor: No weakness. Coordination: Coordination normal.      Gait: Gait normal.      Deep Tendon Reflexes: Reflexes normal.      Comments: GCS 15   A&Ox3   Appropriately answering questions and following commands   No dysarthria or aphasia   No appreciated CN deficits   Strength 5/5 throughout to errol UE and errol LE   Sensation intact to light touch to errol UE and errol LE   No drift or ataxia appreciated errol    Psychiatric:         Mood and Affect: Mood normal.         Behavior: Behavior normal.         Thought Content: Thought content normal.         Judgment: Judgment normal.       Neurologic Exam     Mental Status   Oriented to person, place, and time.      Cranial Nerves     CN III, IV, VI   Pupils are equal, round, and reactive to light. Vitals:Blood pressure 118/63, pulse 58, temperature 98.1 °F (36.7 °C), resp. rate 16, height 5' 4" (1.626 m), weight 83.8 kg (184 lb 11.9 oz), SpO2 96 %. ,Body mass index is 31.71 kg/m². Lab Results:   Results from last 7 days   Lab Units 07/14/23  0809 07/13/23  2213   WBC Thousand/uL 4.32 8.15   HEMOGLOBIN g/dL 11.9 11.8   HEMATOCRIT % 34.5* 35.3   PLATELETS Thousands/uL 82* 80*   NEUTROS PCT % 79* 88*   MONOS PCT % 10 7   EOS PCT % 2 0     Results from last 7 days   Lab Units 07/14/23  0809 07/13/23  2140   POTASSIUM mmol/L 4.0 4.0   CHLORIDE mmol/L 102 94*   CO2 mmol/L 25 27   BUN mg/dL 13 15   CREATININE mg/dL 0.81 0.77   CALCIUM mg/dL 8.8 9.2   ALK PHOS U/L  --  107*   ALT U/L  --  26   AST U/L  --  25     Results from last 7 days   Lab Units 07/13/23  2140   MAGNESIUM mg/dL 2.0         Results from last 7 days   Lab Units 07/13/23  2140   INR  1.21*   PTT seconds 34     No results found for: "TROPONINT"  ABG:No results found for: "PHART", "KVH6XDG", "PO2ART", "STA7VXS", "X6ADOLSP", "BEART", "SOURCE"    Imaging Studies: I have personally reviewed pertinent reports. CT chest abdomen pelvis w contrast    Result Date: 7/14/2023  Narrative: CT CHEST, ABDOMEN AND PELVIS WITH IV CONTRAST INDICATION:   fall, left rib/hip pain. COMPARISON: Multiple priors most recently CT 3/29/2023 TECHNIQUE: CT examination of the chest, abdomen and pelvis was performed. Multiplanar 2D reformatted images were created from the source data. This examination, like all CT scans performed in the [facility], was performed utilizing techniques to minimize radiation dose exposure, including the use of iterative reconstruction and automated exposure control. Radiation dose length product (DLP) for this visit:  1096 mGy-cm IV Contrast:  100 mL of iohexol (OMNIPAQUE) Enteric Contrast: Enteric contrast was administered.  FINDINGS: CHEST LUNGS: 0.2 cm right lower lobe pulmonary nodule (series 3, image 130). 0.3 cm left lower lobe pulmonary nodule (series 3, image 135). There is no tracheal or endobronchial lesion. PLEURA:  Unremarkable. HEART/GREAT VESSELS: Heart is unremarkable for patient's age. No thoracic aortic aneurysm. MEDIASTINUM AND DANA:  Unremarkable. CHEST WALL AND LOWER NECK: Enlarged heterogeneous thyroid gland. ABDOMEN LIVER/BILIARY TREE: Cirrhotic liver morphology. Area of low-attenuation in the right hepatic lobe measuring approximately 2.1 cm GALLBLADDER:  Gallbladder is surgically absent. SPLEEN: Splenomegaly. PANCREAS:  Unremarkable. ADRENAL GLANDS:  Unremarkable. KIDNEYS/URETERS:  Unremarkable. No hydronephrosis. STOMACH AND BOWEL:  There is colonic diverticulosis without evidence of acute diverticulitis. APPENDIX:  A normal appendix was visualized. ABDOMINOPELVIC CAVITY: Moderate ascites. No pneumoperitoneum. No lymphadenopathy. VESSELS:  Unremarkable for patient's age. PELVIS REPRODUCTIVE ORGANS:  Unremarkable for patient's age. URINARY BLADDER:  Unremarkable. ABDOMINAL WALL/INGUINAL REGIONS: Nerve stimulator device in place. OSSEOUS STRUCTURES: New compression deformity of L2 and L4. Please see concurrent CT reconstruction of the spine. Impression: New compression deformity of L2 and L4. Please see concurrent CT reconstruction of the spine. Cirrhotic liver morphology with a low-attenuation region in the right hepatic lobe. Recommend MRI with and without contrast for complete evaluation Moderate ascites. Small pulmonary nodules. Recommend 12-month follow-up noncontrast CT of the chest. The study was marked in EPIC for immediate notification. Workstation performed: [id number]     CT recon only thoracolumbar    Result Date: 7/14/2023  Narrative: CT THORACIC AND LUMBAR SPINE INDICATION:   fall, midline tenderness.  COMPARISON: CT of the abdomen and pelvis 3/29/2023 TECHNIQUE: Axial CT examination of the thoracic and lumbar spine was obtained utilizing reconstructed images from CT of the chest abdomen and pelvis performed the same day. Images were reformatted in the sagittal and coronal planes. This examination, like all CT scans performed in the [facility], was performed utilizing techniques to minimize radiation dose exposure, including the use of iterative reconstruction and automated exposure control. FINDINGS: ALIGNMENT: Normal alignment. No spondylolisthesis. No scoliosis. VERTEBRAE: Compared to CT of 3/29/2023, there is new mild height loss involving the superior endplates of L2 and L4. DEGENERATIVE CHANGES: Mild multilevel degenerative disc disease. PREVERTEBRAL AND PARASPINAL SOFT TISSUES: No prevertebral hematoma identified     Impression: Compared to CT of 3/29/2023, there is new mild height loss involving the superior endplates of L2 and L4. The study was marked in [facility] for immediate notification. Workstation performed: [id number]     CT cervical spine without contrast    Result Date: 7/13/2023  Narrative: CT CERVICAL SPINE - WITHOUT CONTRAST INDICATION:   Neck trauma (Age >= 65y) Fall, neck pain. COMPARISON:  None. TECHNIQUE:  CT examination of the cervical spine was performed without intravenous contrast.  Contiguous axial images were obtained. Multiplanar 2D reformatted images were created from the source data. Radiation dose length product (DLP) for this visit:  540 mGy-cm . This examination, like all CT scans performed in the [facility], was performed utilizing techniques to minimize radiation dose exposure, including the use of iterative reconstruction and automated exposure control. IMAGE QUALITY:  Diagnostic. FINDINGS: ALIGNMENT:  Normal alignment of the cervical spine. No subluxation. VERTEBRAE:  No fracture. DEGENERATIVE CHANGES:  No significant cervical degenerative changes are noted. PREVERTEBRAL AND PARASPINAL SOFT TISSUES: Enlarged heterogeneous thyroid gland with multiple nodules suggesting goiter.  THORACIC INLET:  Normal. Impression: No cervical spine fracture or traumatic malalignment. Incidental thyroid nodule(s) for which nonemergent thyroid ultrasound is recommended. The study was marked in [facility] for immediate notification. Workstation performed: [id number]     CT head without contrast    Result Date: 7/13/2023  Narrative: CT BRAIN - WITHOUT CONTRAST INDICATION:   Head trauma, moderate-severe fall, head strike. COMPARISON: 1/7/2013 TECHNIQUE:  CT examination of the brain was performed. Multiplanar 2D reformatted images were created from the source data. Radiation dose length product (DLP) for this visit:  942 mGy-cm . This examination, like all CT scans performed in the [facility], was performed utilizing techniques to minimize radiation dose exposure, including the use of iterative reconstruction and automated exposure control. IMAGE QUALITY:  Diagnostic. FINDINGS: PARENCHYMA: Decreased attenuation is noted in periventricular and subcortical white matter demonstrating an appearance that is statistically most likely to represent mild microangiopathic change. Chronic lacunar infarction left basal ganglia. No CT signs of acute infarction. No intracranial mass, mass effect or midline shift. No acute parenchymal hemorrhage. VENTRICLES AND EXTRA-AXIAL SPACES: Volume loss without hydrocephalus. VISUALIZED ORBITS: Normal visualized orbits. PARANASAL SINUSES: Normal visualized paranasal sinuses. CALVARIUM AND EXTRACRANIAL SOFT TISSUES:  Normal.     Impression: No acute intracranial abnormality. Chronic microangiopathic changes. Workstation performed: [id number]     IR paracentesis    Result Date: 7/7/2023  Narrative: Ultrasound-guided paracentesis Clinical History: Recurrent ascites. Technique: Patient was brought to the interventional radiology area and placed supine on the stretcher.  After a brief ultrasound examination was performed to localize a pocket of fluid,an area on the skin of the right abdomen was prepped, and draped in the usual sterile fashion. Lidocaine was administered to the skin and a small skin incision was made. A 5 Madhav multisidehole catheter  was advanced into the fluid and 5150 cc of clear ascites was aspirated. . After the procedure, the catheter was removed and a bandage applied to the site. The patient tolerated the procedure well and suffered no complications. Impression: Impression: 1. Successful ultrasound-guided paracentesis yielding 5150 cc of clear ascites. Workstation performed: [id number]     US extremity soft tissue    Result Date: 6/29/2023  Narrative: RIGHT LATERAL KNEE ULTRASOUND INDICATION:   M85.661: Other cyst of bone, right lower leg M25.561: Pain in right knee. COMPARISON:  None TECHNIQUE:   Real-time ultrasound of the right lateral knee was performed with a linear transducer with both volumetric sweeps and still imaging techniques. FINDINGS: There are multiple small areas of complex nodules with central areas of decreased echogenicity and peripheral areas of increased echogenicity. These are not vascular. These likely reflect small subcutaneous hematomas given bruising in this area. Small knee joint effusion partially seen     Impression: Findings suspicious for multifocal areas of small subcutaneous hematomas.  Workstation performed: [id number]     US thyroid    Result Date: 6/26/2023  Narrative: History: Goiter Ultrasound of the thyroid gland was performed Comparison Studies: Thyroid ultrasound 6/21/2021 Findings: Right thyroid lobe: 6.7 x 2.6 x 2.2 cm Left thyroid lobe: 5.7 x 2.5 x 2.5 cm Thyroid gland echotexture: Heterogeneous  Thyroid gland vascularity on color Doppler: Normal Nodules Right Lobe: Nodule 1 Right upper nodule measuring 2.2 x 1.2 x 1.4 cm Comparison: No substantial change Composition: Solid Echotexture: Heterogeneous but predominantly isoechoic Margin: Regular Echogenic foci: None JESSICA Category: Low Nodule 2 Right lower nodule measuring 2.3 x 1.9 x 2.0 cm Comparison: Not substantially changed Composition: Solid Echotexture: Predominantly isoechoic Margin: Regular Echogenic foci: Coarse shadowing calcifications JESSICA Category: Low Nodules Left Lobe: No significant left-sided nodules. Nodules Isthmus: None Other findings: None    Impression: IMPRESSION: Enlarged and heterogeneous thyroid gland with essentially stable right-sided nodules. . Sonographic pattern and FNA recommendations are based on American Thyroid Association (JESSICA) guidelines for assessment of thyroid nodules, as agreed upon by multidisciplinary departments at [facility]. Sonographic pattern Benign pattern (0% risk): no biopsy Very low suspicion pattern (<3% risk): biopsy if > or = 2 cm (or ultrasound observation) Low suspicion pattern (5-10% risk): biopsy if > or = 1.5 cm Intermediate suspicion pattern (10-20% risk): biopsy if > or = 1 cm High suspicion pattern (>70-90% risk): biopsy if > or = 1 cm (for nodules <1 cm, biopsy could be considered if technically feasible) Workstation:[id number]    EKG, Pathology, and Other Studies: I have personally reviewed pertinent reports. VTE Prophylaxis: Sequential compression device [place]) , Okay for chemical DVT prophylaxis from a neurosurgery standpoint. Code Status: Level 2 - DNAR: but accepts endotracheal intubation  Advance Directive and Living Will:      Power of :    POLST:      Counseling / Coordination of Care  I spent 30 minutes with the patient.

## 2023-07-14 NOTE — RESTORATIVE TECHNICIAN NOTE
Restorative Technician Note      Patient Name: Lacy Bacon     Note Type: Bracing, Initial consult  Patient Position Upon Consult: Seated edge of bed  Brace Applied: [id number]  Education Provided: Yes  Patient Position at End of Consult: Bedside chair  Nurse Communication: Nurse aware of consult, application of brace          Please call Mobility Coordinator at ext. [phone] or on Tiger text " SLB-PT-Restorative Tech" role in regards to bracing instruction and/or adjustment.     1 MARLEY Hu

## 2023-07-14 NOTE — CONSULTS
Consultation - [address] [facility],[address] 77 y.o. female MRN: [id number]  Unit/Bed#: [facility] 605-01 Encounter: [id number]      Assessment/Plan     L2 and L4 compression fracture  · Per surgical HPI and per interview with patient, experienced a sudden intense burst of pain in lower back when attempting to stock the cabinet  · This was followed by a fall attributable to pain  · Patient denied any loss of bowel or bladder continence, paresthesia, weakness, or other neurologic symptoms immediately before, during, or after event  Plan:  · Management as per primary  · PT/OT consult placed, appreciate recommendations  · Patient will likely need bracing and subacute rehab placement upon discharge  · Patient may additionally benefit from home health aide or other visiting service to assist her with ADLs prevent future injury    DMII  Lab Results   Component Value Date    HGBA1C 5.2 04/24/2023   · Patient has history of DM type II, currently well managed on Ozempic therapy  · Last HbA1c appropriate, as above  · Last fill of Ozempic was on 5/9 for 56 days, likely nearing end of dosage. · Inpatient management as per primary  · Outpatient management as per endocrinologist Dr. David Mayen:  · Management as per primary  · Close follow-up with outpatient endocrinology, good glycemic control will be absolutely necessary for proper injury healing    Alcoholic cirrhosis of liver with ascites  · Questionable diagnosis in chart of liver cell carcinoma, without substantiation, evidence, or further work-up. Suspect erroneous diagnosis.   · Patient does have well-documented history of cirrhosis with ascites which she receives periodic paracentesis with IR every 6 weeks with last one in May of 2023  · Follows outpatient with gastroenterologist Dr. Marlen Saravia and cognition  · Chart review indicates no longstanding diagnoses related to memory or cognition  · Patient does endorse mild issues with memory, states she occasionally forgets people's names and has to make lists of things she wishes to accomplish  · Patient states these do not significantly interfere with her life, and that she is able to attend her ADLs with mild organization efforts  · No history of any behavioral symptoms  Plan:  · Continue efforts to avoid polypharmacy  · Minimize nocturnal interruptions prevent onset of delirium  · No acute intervention indicated at this point in time, continue to monitor  · Encourage patient to remain mentally active and engage with surroundings    Ambulatory dysfunction  · Patient has both walker and cane at home, states 50-50 utilization of each  · Patient states she uses them for all mobility and transferring, believes they significantly aid her mobility and give her more confidence in movement  Plan:  · Patient utilization of walking adjunct is appropriate  · Continue to encourage use of these adjuncts    Falls  · Patient endorses no chronic falls at home  · Patient did have 1 fall prior to presentation which was attributable to primary medical concern of worsening L2 and L4 compression fracture  · Mechanical in origin, likely due to pain  Plan:  · Patient likely to experience increasing debility from above listed medical problems  · Patient may benefit from home health aide service to help attend to her ADLs and monitor her for other falls or worsening of medical conditions    Deconditioning/Debility  · Due to acute condition of worsening L2 and L4 compression fracture, as well as chronic conditions of cirrhosis, ascites, degenerative disc disease, bilateral hip osteoarthritis    Frailty  · Current clinical frailty rating of 4  · Last albumin 7/13 of 3.6    Impaired hearing/vision  · Patient endorses poor hearing with hearing aids, endorses no issues currently with his devices  · Patient additionally endorses farsightedness for which she wears eyeglasses.   · Hearing aids and eyeglasses present with patient in hospital    Incontinence  · Patient endorses full incontinence of urine and episodic incontinence of feces  · Both these are chronic conditions  · Likely worsened by spironolactone dosing, but benefits of continuing dosage at current regimen outweigh risks in light of cirrhosis    Dentition  · Patient endorses poor dentition with partial plate which she states does not fit well  · Patient states it does not affect her ability to eat, but that it is uncomfortable  · Patient initially states she will require refitting of device at some point in time as outpatient  · Dentures currently at home, patient is n.p.o. at this point in time    Appetite  · Patient does endorse poor dentition as mentioned above, denies any significant changes in appetite  · Patient weight fluctuates significantly due to ascites with periodic drainage, but patient does not believe she has gained or lost significant amount of weight outside of this    Anxiety/depression  · Currently managed on Lexapro 20 mg daily  · Managed as outpatient by JONE Gamble family nurse practitioner  · PHQ-9 score of 6, taken today  · JAYCOB-7 score of 5, taken today  · Scores consistent with mild depression and mild anxiety, well managed on current medication regimen    Polypharmacy  · Medication list as confirmed by pharmacy:  · Toprol-XL 25 mg once daily: Last filled 6/23 for 30 days  · Lexapro 20 mg once daily: Last filled 6/22 for 90 days  · Aldactone 100 mg tabs taken 200 mg BID: Last filled 5/9 for 90 days  · Ozempic 0.5 mg subcutaneous weekly: Last filled 5/9 for 56 days, per patient she is taking this medication currently but nearing end of current dosage period  · Lasix 40 mg taken as 80 mg AM 40mg PM: Last filled 1/16/2023 for 90 days, unlikely to be current medication  · Prevacid 30 mg daily: Last filled 04/2022, unlikely to be current medication    Home safety  · Patient lives at home alone in [address] [place] [address], does not interact with stairs  · Patient states no tripping hazards at home, denies use of area rugs  · Manages own medication, finances, does have weekly cleaning service  · Patient endorses no issues obtaining medication or restocking medication organizer    Driving/transportation  · Per patient, she has not driven or operate a motor vehicle in "months"  · Patient dependent upon her sister, brother, son for transportation  · Above-listed individuals take patient grocery shopping, transport to and from appointments  · Patient states she is able to acquire all necessary groceries and makes "most" appointments, with occasional missed appointments due to transportation difficulties  Home medication review    Personally confirmed with [facility]    History of Present Illness   Physician Requesting Consult: Jm Client, DO  Reason for Consult / Principal Problem: Fall following L2 and L4 compression fracture  Hx and PE limited by: No limiting factors  Additional history obtained from: Chart review and patient evaluation      HPI: Nessa Watson is a 77y.o. year old female with PMH of nonalcoholic cirrhosis requiring periodic paracentesis, depression, DM type II, GERD, ZEKE who presented to trauma service following a fall at home. Patient states prior to fall, she was attempting to restock paper products in an overhead cabinet when she experienced a sudden, sharp, excruciating pain in her lower back rated 10 out of 10 in intensity. Patient fell to the floor following this pain, pressed life alert button and was transported via EMS to ED. Patient denies any LOC or head strike during fall, stating primary  was the sudden onset of pain in her back. Patient denies any loss of bowel or bladder continence during event, as well as denying any numbness, paresthesia, weakness, or other neurologic symptoms.   Geriatrics has been consulted for assistance with medication management as well as recommendations on care for patient. Inpatient consult to Gerontology  Consult performed by: Dahiana Bunch MD  Consult ordered by: Antonella Moncada MD          Review of Systems   Constitutional: Positive for activity change and fatigue. Negative for appetite change and unexpected weight change. HENT: Positive for dental problem. Negative for trouble swallowing. Eyes: Negative for visual disturbance. Respiratory: Negative for chest tightness, shortness of breath and wheezing. Cardiovascular: Negative for chest pain and palpitations. Gastrointestinal: Positive for nausea. Negative for abdominal pain, constipation and vomiting. Endocrine: Negative for polydipsia and polyphagia. Genitourinary: Positive for urgency. Negative for dysuria. Musculoskeletal: Positive for arthralgias, back pain and gait problem. Skin: Negative for pallor and rash. Neurological: Negative for dizziness and syncope. Hematological: Negative for adenopathy. Historical Information   Past Medical History:   Diagnosis Date   • Anxiety    • Anxiety and depression 9/29/2015   • Arthritis    • Asthma    • Asthma without status asthmaticus 4/26/2011   • Cirrhosis ([address])    • Depression    • Diabetes ([address])    • Disease of thyroid gland     nodules   • Diverticulitis 2011   • Diverticulosis    • DVT (deep venous thrombosis) ([address]) 2013   • GERD (gastroesophageal reflux disease)    • Liver disease     cirrhosis   • Obesity, morbid, BMI 40.0-49.9 ([address]) 8/16/2017   • Pneumonia    • Portal hypertensive gastropathy ([address])    • Sleep apnea    • Vertigo    • Vitreous hemorrhage of left eye ([address]) 1/20/2023     Past Surgical History:   Procedure Laterality Date   • BLADDER SUSPENSION     • CHOLECYSTECTOMY     • COLONOSCOPY  05/24/2019    had multiple with last being [id number]   • COLONOSCOPY  10/07/2004    Bill Washington. Rosa Maria Salinas M.D. / Diverticulosis   • COLONOSCOPY  06/14/2011    Bill Washington.  Rosa Maria Salinas M.D. / Diverticulosis   • EGD  02/28/2013    Wilfrid Carrasquillo M.D. / [facility] Hypertensive Gastropathy   • EGD  10/01/2019    Destiny Medrano. Yoni Reed M.D. / Gastritis   • EGD AND COLONOSCOPY  09/03/2015    Destiny Medrano. Yoni Reed M.D. / [place]. Diverticulosis. • FLEXIBLE SIGMOIDOSCOPY  03/04/2013    Debbie Salcido M.D. / Hemorrhoidal Bleeding   • FLEXIBLE SIGMOIDOSCOPY  05/30/2018    Debbie Salcido M.D. / Internal hemorrhoids. biopsied.    • HERNIA REPAIR     • IR PARACENTESIS  08/23/2018   • IR PARACENTESIS  11/05/2018   • IR PARACENTESIS  01/11/2019   • IR PARACENTESIS  02/27/2019   • IR PARACENTESIS  04/15/2019   • IR PARACENTESIS  06/03/2019   • IR PARACENTESIS  07/10/2019   • IR PARACENTESIS  08/16/2019   • IR PARACENTESIS  09/10/2019   • IR PARACENTESIS  10/07/2019   • IR PARACENTESIS  11/05/2019   • IR PARACENTESIS  11/22/2019   • IR PARACENTESIS  12/17/2019   • IR PARACENTESIS  01/07/2020   • IR PARACENTESIS  01/28/2020   • IR PARACENTESIS  02/18/2020   • IR PARACENTESIS  03/10/2020   • IR PARACENTESIS  03/31/2020   • IR PARACENTESIS  04/21/2020   • IR PARACENTESIS  05/12/2020   • IR PARACENTESIS  06/02/2020   • IR PARACENTESIS  06/30/2020   • IR PARACENTESIS  07/20/2020   • IR PARACENTESIS  08/18/2020   • IR PARACENTESIS  11/12/2020   • IR PARACENTESIS  03/11/2021   • IR PARACENTESIS  2/8/2022   • IR PARACENTESIS  8/24/2022   • IR PARACENTESIS  11/14/2022   • IR PARACENTESIS  12/21/2022   • IR PARACENTESIS  2/1/2023   • IR PARACENTESIS  3/7/2023   • IR PARACENTESIS  3/30/2023   • IR PARACENTESIS  4/26/2023   • IR PARACENTESIS  5/18/2023   • IR PARACENTESIS  7/7/2023   • TONSILLECTOMY     • TRIGGER FINGER RELEASE Bilateral      Social History   Social History     Substance and Sexual Activity   Alcohol Use Not Currently    Comment: rarely     Social History     Substance and Sexual Activity   Drug Use Never     Social History     Tobacco Use   Smoking Status Never   Smokeless Tobacco Never     Family History:   Family History   Problem Relation Age of Onset   • COPD Mother • Heart attack Sister    • Lymphoma Brother    • Colon cancer Maternal Grandmother    • Cancer Maternal Aunt         unknown       Meds/Allergies   all current active meds have been reviewed, current meds:   Current Facility-Administered Medications   Medication Dose Route Frequency   • acetaminophen (TYLENOL) tablet 975 mg  975 mg Oral Q8H [facility]   • enoxaparin (LOVENOX) subcutaneous injection 30 mg  30 mg Subcutaneous Q12H   • escitalopram (LEXAPRO) tablet 20 mg  20 mg Oral Daily   • HYDROmorphone HCl (DILAUDID) injection 0.2 mg  0.2 mg Intravenous Q4H PRN   • insulin lispro (HumaLOG) 100 units/mL subcutaneous injection 1-6 Units  1-6 Units Subcutaneous Q6H [facility]   • lidocaine (LIDODERM) 5 % patch 2 patch  2 patch Topical Daily   • methocarbamol (ROBAXIN) tablet 500 mg  500 mg Oral Q6H PRN   • metoprolol succinate (TOPROL-XL) 24 hr tablet 25 mg  25 mg Oral Daily   • naloxone (NARCAN) 0.04 mg/mL syringe 0.04 mg  0.04 mg Intravenous Q1MIN PRN   • ondansetron (ZOFRAN) injection 4 mg  4 mg Intravenous Q4H PRN   • oxyCODONE (ROXICODONE) IR tablet 5 mg  5 mg Oral Q4H PRN   • oxyCODONE (ROXICODONE) split tablet 2.5 mg  2.5 mg Oral Q4H PRN   • pantoprazole (PROTONIX) EC tablet 40 mg  40 mg Oral Early Morning   • polyethylene glycol (MIRALAX) packet 17 g  17 g Oral Daily   • sodium chloride 0.9 % infusion  100 mL/hr Intravenous Continuous    and PTA meds:   Prior to Admission Medications   Prescriptions Last Dose Informant Patient Reported? Taking?    Diclofenac Sodium (VOLTAREN) 1 %   No No   Sig: Apply 2 g topically 4 (four) times a day   Ozempic, 0.25 or 0.5 MG/DOSE, 2 MG/1.5ML SOPN  Self Yes No   Sig: inject 0.5 milligrams subcutaneously every week   acetaminophen (TYLENOL) 500 mg tablet  Self Yes No   Sig: Take 500 mg by mouth every 6 (six) hours as needed   escitalopram (LEXAPRO) 20 mg tablet   No No   Sig: Take 1 tablet (20 mg total) by mouth daily   furosemide (LASIX) 40 mg tablet  Self No No   Sig: Take 2 tablets (80 mg total) by mouth daily AND 1 tablet (40 mg total) in the evening. Take before meals. Patient taking differently: Take 120mg in AM   lansoprazole (PREVACID) 30 mg capsule  Self No No   Sig: Take 1 capsule (30 mg total) by mouth daily   metoprolol succinate (TOPROL-XL) 25 mg 24 hr tablet  Self Yes No   Sig: Take 1 tablet by mouth daily   spironolactone (ALDACTONE) 100 mg tablet   No No   Sig: Take 2 tablets (200 mg total) by mouth daily   spironolactone (ALDACTONE) 50 mg tablet   No No   Sig: Take 3 tablets (150 mg total) by mouth daily      Facility-Administered Medications: None       Allergies   Allergen Reactions   • Medical Tape Other (See Comments)     Skin tears  Per patient, Red -"rips my skin"  Per patient, Skin tears  Other reaction(s): Other (See Comments)  Per patient, Skin tears, Red -"rips my skin"   • Penicillins Itching and Rash     rash     • Diclofenac Sodium Itching and Myalgia     Per patient, tolerating Voltaren gel without any reaction started 3/16/23   • Nsaids Other (See Comments)     Cirrhosis of liver- pt reported starting Mobic 3/16/23  Other reaction(s):  Other (See Comments)  Per patient, Cirrhosis of liver- pt reported starting 3/16/23   • Pseudoephedrine-Guaifenesin Myalgia and Other (See Comments)     Entex- "jittery"   • Attapulgite    • Cephalexin      ?ithcy   • Clioquinol    • Diclofenac Sodium Itching     Patient reported tolerating Voltaren gel without any reaction started 3/16/23   • Meloxicam Itching   • Nabumetone Other (See Comments)     rash   • Phenylalanine    • Pseudoephedrine-Guaifenesin Other (See Comments)     Entex- "gittery"   • Streptomycin    • Celecoxib Rash   • Penicillin G Rash   • Rofecoxib Rash     skin reaction       Objective     Intake/Output Summary (Last 24 hours) at 7/14/2023 0802  Last data filed at 7/14/2023 0646  Gross per 24 hour   Intake 361.67 ml   Output --   Net 361.67 ml     Invasive Devices     Peripheral Intravenous Line  Duration Peripheral IV 07/13/23 Right Antecubital <1 day                Physical Exam  Constitutional:       Appearance: Normal appearance. She is not ill-appearing or toxic-appearing. HENT:      Head: Normocephalic and atraumatic. Right Ear: External ear normal.      Left Ear: External ear normal.      Nose: Nose normal.      Mouth/Throat:      Mouth: Mucous membranes are moist.      Pharynx: Oropharynx is clear. Eyes:      Extraocular Movements: Extraocular movements intact. Conjunctiva/sclera: Conjunctivae normal.   Cardiovascular:      Rate and Rhythm: Normal rate and regular rhythm. Pulses: Normal pulses. Heart sounds: Normal heart sounds. No murmur heard. No friction rub. No gallop. Pulmonary:      Effort: Pulmonary effort is normal.      Breath sounds: Normal breath sounds. No wheezing, rhonchi or rales. Abdominal:      General: There is distension. Tenderness: There is abdominal tenderness. There is no guarding. Comments: Tenderness to palpation of right and left upper quadrant, which patient attributes to bruised ribs  Moderate fluid wave, unable to fully assess due to tenderness from injured ribs   Musculoskeletal:         General: Normal range of motion. Cervical back: Normal range of motion. Comments: Bilateral lower extremity venous stasis   Skin:     General: Skin is warm and dry. Capillary Refill: Capillary refill takes less than 2 seconds. Neurological:      Mental Status: She is alert. Mental status is at baseline.       Comments: Full lower extremity neuro examination not performed due to patient injury, per brief exam distal pulse, motor, sensory intact   Psychiatric:         Mood and Affect: Mood normal.         Lab Results:   I have personally reviewed pertinent lab results including the following:  -Glucose, CBC, CMP, INR    I have personally reviewed the following imaging study reports in PACS:  -CT head without contrast, CT cervical spine without contrast, CT recon thoracic or lumbar    Personal interpretation of imaging studies mentioned above:    -Worsening compression fractures with degenerative disc disease    Therapies:   PT: Ordered, not yet seen patient  OT: Ordered, not yet seen patient    VTE Prophylaxis: Enoxaparin (Lovenox)    Code Status: Level 2 - DNAR: but accepts endotracheal intubation  Advance Directive and Living Will:      Power of :    POLST:      Family and Social Support: Receives support from son, sister, brother  Living Arrangements: Lives Alone  Support Systems: Son; Family members  Assistance Needed: na  Type of Current Residence: Private residence  Current [address]: No      Goals of Care: Await neurosurgical evaluation, evaluate patient for EDWIN versus home with home health care

## 2023-07-14 NOTE — ED PROVIDER NOTES
History  Chief Complaint   Patient presents with   • Fall     Pt states sudden sharp pain in lower back causing her to fall on left side. Unsure if hit head. Denies dizziness. Denies loc. Denies thinners. C/o lower back pain and left rib pain. Patient is a 25-year-old female with a history of nonalcoholic cirrhosis, asthma, portal hypertensive gastropathy presenting for evaluation following a fall. States she was standing in her kitchen reaching for something when she had a sudden sharp pain developed in the middle of her back over her spine. States the pain was so severe that it caused her to fall over. Denies dizziness or lightheadedness, chest pain, shortness of breath prior to the fall. She is unsure if she hit her head. She is currently complaining of left rib pain and left side pain. Denies visual changes, tinnitus, speech disturbances, unilateral weakness/numbness/tingling. States she has known chronic back pain however this pain was completely different than usual.  She is incontinent at baseline. Otherwise denies recent fevers, saddle anesthesia, history of IVDU. She does not take blood thinners though she has ecchymosis over extremities likely secondary to cirrhosis. No medications prior to arrival.  She has not attempted to ambulate since the injury as she pressed her medic alert button on the floor and EMS retrieved her from where she fell. Prior to Admission Medications   Prescriptions Last Dose Informant Patient Reported? Taking?    Diclofenac Sodium (VOLTAREN) 1 %   No No   Sig: Apply 2 g topically 4 (four) times a day   Ozempic, 0.25 or 0.5 MG/DOSE, 2 MG/1.5ML SOPN  Self Yes No   Sig: inject 0.5 milligrams subcutaneously every week   acetaminophen (TYLENOL) 500 mg tablet  Self Yes No   Sig: Take 500 mg by mouth every 6 (six) hours as needed   escitalopram (LEXAPRO) 20 mg tablet   No No   Sig: Take 1 tablet (20 mg total) by mouth daily   furosemide (LASIX) 40 mg tablet  Self No No   Sig: Take 2 tablets (80 mg total) by mouth daily AND 1 tablet (40 mg total) in the evening. Take before meals. Patient taking differently: Take 120mg in AM   lansoprazole (PREVACID) 30 mg capsule  Self No No   Sig: Take 1 capsule (30 mg total) by mouth daily   metoprolol succinate (TOPROL-XL) 25 mg 24 hr tablet  Self Yes No   Sig: Take 1 tablet by mouth daily   spironolactone (ALDACTONE) 100 mg tablet   No No   Sig: Take 2 tablets (200 mg total) by mouth daily   spironolactone (ALDACTONE) 50 mg tablet   No No   Sig: Take 3 tablets (150 mg total) by mouth daily      Facility-Administered Medications: None       Past Medical History:   Diagnosis Date   • Anxiety    • Anxiety and depression 9/29/2015   • Arthritis    • Asthma    • Asthma without status asthmaticus 4/26/2011   • Cirrhosis ([address])    • Depression    • Diabetes ([address])    • Disease of thyroid gland     nodules   • Diverticulitis 2011   • Diverticulosis    • DVT (deep venous thrombosis) ([address]) 2013   • GERD (gastroesophageal reflux disease)    • Liver disease     cirrhosis   • Obesity, morbid, BMI 40.0-49.9 ([address]) 8/16/2017   • Pneumonia    • Portal hypertensive gastropathy ([address])    • Sleep apnea    • Vertigo    • Vitreous hemorrhage of left eye ([address]) 1/20/2023       Past Surgical History:   Procedure Laterality Date   • BLADDER SUSPENSION     • CHOLECYSTECTOMY     • COLONOSCOPY  05/24/2019    had multiple with last being 13261   • COLONOSCOPY  10/07/2004    Francine Byrnes M.D. / Diverticulosis   • COLONOSCOPY  06/14/2011    Francine Byrnes M.D. / Diverticulosis   • EGD  02/28/2013    Mercedez Calles M.D. / Mild Portal Hypertensive Gastropathy   • EGD  10/01/2019    Francine Byrnes M.D. / Gastritis   • EGD AND COLONOSCOPY  09/03/2015    Francine Byrnes M.D. / Anai Laboy. Diverticulosis.    • FLEXIBLE SIGMOIDOSCOPY  03/04/2013    Priti Mallory M.D. / Hemorrhoidal Bleeding   • FLEXIBLE SIGMOIDOSCOPY  05/30/2018    Priti Mallory M.D. / Internal hemorrhoids. biopsied. • HERNIA REPAIR     • IR PARACENTESIS  08/23/2018   • IR PARACENTESIS  11/05/2018   • IR PARACENTESIS  01/11/2019   • IR PARACENTESIS  02/27/2019   • IR PARACENTESIS  04/15/2019   • IR PARACENTESIS  06/03/2019   • IR PARACENTESIS  07/10/2019   • IR PARACENTESIS  08/16/2019   • IR PARACENTESIS  09/10/2019   • IR PARACENTESIS  10/07/2019   • IR PARACENTESIS  11/05/2019   • IR PARACENTESIS  11/22/2019   • IR PARACENTESIS  12/17/2019   • IR PARACENTESIS  01/07/2020   • IR PARACENTESIS  01/28/2020   • IR PARACENTESIS  02/18/2020   • IR PARACENTESIS  03/10/2020   • IR PARACENTESIS  03/31/2020   • IR PARACENTESIS  04/21/2020   • IR PARACENTESIS  05/12/2020   • IR PARACENTESIS  06/02/2020   • IR PARACENTESIS  06/30/2020   • IR PARACENTESIS  07/20/2020   • IR PARACENTESIS  08/18/2020   • IR PARACENTESIS  11/12/2020   • IR PARACENTESIS  03/11/2021   • IR PARACENTESIS  2/8/2022   • IR PARACENTESIS  8/24/2022   • IR PARACENTESIS  11/14/2022   • IR PARACENTESIS  12/21/2022   • IR PARACENTESIS  2/1/2023   • IR PARACENTESIS  3/7/2023   • IR PARACENTESIS  3/30/2023   • IR PARACENTESIS  4/26/2023   • IR PARACENTESIS  5/18/2023   • IR PARACENTESIS  7/7/2023   • TONSILLECTOMY     • TRIGGER FINGER RELEASE Bilateral        Family History   Problem Relation Age of Onset   • COPD Mother    • Heart attack Sister    • Lymphoma Brother    • Colon cancer Maternal Grandmother    • Cancer Maternal Aunt         unknown     I have reviewed and agree with the history as documented. E-Cigarette/Vaping   • E-Cigarette Use Never User      E-Cigarette/Vaping Substances     Social History     Tobacco Use   • Smoking status: Never   • Smokeless tobacco: Never   Vaping Use   • Vaping Use: Never used   Substance Use Topics   • Alcohol use: Not Currently     Comment: rarely   • Drug use: Never       Review of Systems   Constitutional: Negative for chills and fever.    HENT: Negative for congestion, ear pain and sore throat. Eyes: Negative for pain and visual disturbance. Respiratory: Negative for cough and shortness of breath. Cardiovascular: Negative for chest pain and palpitations. Gastrointestinal: Negative for abdominal pain, diarrhea, nausea and vomiting. Genitourinary: Negative for dysuria and hematuria. Musculoskeletal: Positive for back pain. Negative for arthralgias, myalgias and neck pain. Skin: Negative for color change and rash. Neurological: Negative for dizziness, seizures, syncope, facial asymmetry, speech difficulty, weakness, light-headedness and headaches. All other systems reviewed and are negative. Physical Exam  Physical Exam  Vitals and nursing note reviewed. Constitutional:       General: She is not in acute distress. Appearance: Normal appearance. She is not ill-appearing or toxic-appearing. Interventions: Cervical collar in place. HENT:      Head: Normocephalic and atraumatic. No raccoon eyes or Astorga's sign. Jaw: There is normal jaw occlusion. Right Ear: Tympanic membrane, ear canal and external ear normal. No hemotympanum. Left Ear: Tympanic membrane, ear canal and external ear normal. No hemotympanum. Nose: Nose normal.      Mouth/Throat:      Mouth: Mucous membranes are moist. No injury. Pharynx: Uvula midline. Eyes:      General: Gaze aligned appropriately. No scleral icterus. Right eye: No discharge. Left eye: No discharge. Extraocular Movements: Extraocular movements intact. Conjunctiva/sclera: Conjunctivae normal.      Pupils: Pupils are equal, round, and reactive to light. Neck:      Comments: Deferred due to presence of C collar. Cardiovascular:      Rate and Rhythm: Normal rate and regular rhythm. Pulses: Normal pulses. Heart sounds: Normal heart sounds. Comments: Chronic venous stasis changes in bilateral LE.    Pulmonary:      Effort: Pulmonary effort is normal. No respiratory distress. Breath sounds: Normal breath sounds. No decreased breath sounds, wheezing, rhonchi or rales. Chest:      Chest wall: Tenderness present. Abdominal:      Palpations: Abdomen is soft. Tenderness: There is no abdominal tenderness. Comments: +Ascites. No abdominal or flank ecchymosis. Musculoskeletal:         General: No deformity or signs of injury. Cervical back: Normal range of motion and neck supple. No spinous process tenderness or muscular tenderness. Thoracic back: Normal.      Lumbar back: Tenderness present. Back:       Right lower leg: No edema. Left lower leg: No edema. Comments: Midline lumbar spine tenderness. No step offs or deformities. Pelvis stable. No tenderness to lower extremities bilaterally. Skin:     General: Skin is dry. Coloration: Skin is not jaundiced. Findings: No erythema or rash. Neurological:      General: No focal deficit present. Mental Status: She is alert and oriented to person, place, and time. Mental status is at baseline. Motor: No weakness. Gait: Gait normal.   Psychiatric:         Mood and Affect: Mood normal.         Behavior: Behavior normal.         Thought Content:  Thought content normal.         Vital Signs  ED Triage Vitals [07/13/23 1958]   Temperature Pulse Respirations Blood Pressure SpO2   98.2 °F (36.8 °C) 69 18 108/56 100 %      Temp Source Heart Rate Source Patient Position - Orthostatic VS BP Location FiO2 (%)   Oral Monitor Lying Left arm --      Pain Score       6           Vitals:    07/13/23 1958 07/13/23 2318   BP: 108/56 97/55   Pulse: 69 72   Patient Position - Orthostatic VS: Lying Lying         Visual Acuity  Visual Acuity    Flowsheet Row Most Recent Value   L Pupil Size (mm) 3   R Pupil Size (mm) 3          ED Medications  Medications   acetaminophen (TYLENOL) tablet 975 mg (975 mg Oral Given 7/13/23 2142)   ondansetron (ZOFRAN) injection 4 mg (4 mg Intravenous Given 7/13/23 2206)   iohexol (OMNIPAQUE) 350 MG/ML injection (SINGLE-DOSE) 100 mL (100 mL Intravenous Given 7/13/23 2236)       Diagnostic Studies  Results Reviewed     Procedure Component Value Units Date/Time    CBC and differential [[id number]]  (Abnormal) Collected: 07/13/23 2213    Lab Status: Final result Specimen: Blood from Arm, Right Updated: 07/13/23 2241     WBC 8.15 Thousand/uL      RBC 3.84 Million/uL      Hemoglobin 11.8 g/dL      Hematocrit 35.3 %      MCV 92 fL      MCH 30.7 pg      MCHC 33.4 g/dL      RDW 13.9 %      MPV 9.1 fL      Platelets 80 Thousands/uL      nRBC 0 /100 WBCs      Neutrophils Relative 88 %      Immat GRANS % 1 %      Lymphocytes Relative 4 %      Monocytes Relative 7 %      Eosinophils Relative 0 %      Basophils Relative 0 %      Neutrophils Absolute 7.19 Thousands/µL      Immature Grans Absolute 0.04 Thousand/uL      Lymphocytes Absolute 0.34 Thousands/µL      Monocytes Absolute 0.53 Thousand/µL      Eosinophils Absolute 0.03 Thousand/µL      Basophils Absolute 0.02 Thousands/µL     HS Troponin 0hr (reflex protocol) [[id number]]  (Normal) Collected: 07/13/23 2140    Lab Status: Final result Specimen: Blood from Arm, Right Updated: 07/13/23 2215     hs TnI 0hr 3 ng/L     Basic metabolic panel [[id number]]  (Abnormal) Collected: 07/13/23 2140    Lab Status: Final result Specimen: Blood from Arm, Right Updated: 07/13/23 2214     Sodium 128 mmol/L      Potassium 4.0 mmol/L      Chloride 94 mmol/L      CO2 27 mmol/L      ANION GAP 7 mmol/L      BUN 15 mg/dL      Creatinine 0.77 mg/dL      Glucose 141 mg/dL      Calcium 9.2 mg/dL      eGFR 78 ml/min/1.73sq m     Narrative:      [place] guidelines for Chronic Kidney Disease (CKD):   •  Stage 1 with normal or high GFR (GFR > 90 mL/min/1.73 square meters)  •  Stage 2 Mild CKD (GFR = 60-89 mL/min/1.73 square meters)  •  Stage 3A Moderate CKD (GFR = 45-59 mL/min/1.73 square meters)  •  Stage 3B Moderate CKD (GFR = 30-44 mL/min/1.73 square meters)  •  Stage 4 Severe CKD (GFR = 15-29 mL/min/1.73 square meters)  •  Stage 5 End Stage CKD (GFR <15 mL/min/1.73 square meters)  Note: GFR calculation is accurate only with a steady state creatinine    Hepatic function panel [[id number]]  (Abnormal) Collected: 07/13/23 2140    Lab Status: Final result Specimen: Blood from Arm, Right Updated: 07/13/23 2214     Total Bilirubin 0.87 mg/dL      Bilirubin, Direct 0.25 mg/dL      Alkaline Phosphatase 107 U/L      AST 25 U/L      ALT 26 U/L      Total Protein 6.8 g/dL      Albumin 3.6 g/dL     Lipase [[id number]]  (Normal) Collected: 07/13/23 2140    Lab Status: Final result Specimen: Blood from Arm, Right Updated: 07/13/23 2214     Lipase 36 u/L     Magnesium [[id number]]  (Normal) Collected: 07/13/23 2140    Lab Status: Final result Specimen: Blood from Arm, Right Updated: 07/13/23 2214     Magnesium 2.0 mg/dL     Protime-INR [[id number]]  (Abnormal) Collected: 07/13/23 2140    Lab Status: Final result Specimen: Blood from Arm, Right Updated: 07/13/23 2213     Protime 15.3 seconds      INR 1.21    APTT [[id number]]  (Normal) Collected: 07/13/23 2140    Lab Status: Final result Specimen: Blood from Arm, Right Updated: 07/13/23 2213     PTT 34 seconds                  CT head without contrast   Final Result by Bhavna Black DO (07/13 2343)      No acute intracranial abnormality. Chronic microangiopathic changes. Workstation performed: [id number]         CT cervical spine without contrast   Final Result by Bhavna Black DO (07/13 2350)      No cervical spine fracture or traumatic malalignment. Incidental thyroid nodule(s) for which nonemergent thyroid ultrasound is recommended. The study was marked in [facility] for immediate notification. Workstation performed: [id number]         CT chest abdomen pelvis w contrast   Final Result by Bhavna Black DO (07/14 0005)      New compression deformity of L2 and L4.  Please see concurrent CT reconstruction of the spine. Cirrhotic liver morphology with a low-attenuation region in the right hepatic lobe. Recommend MRI with and without contrast for complete evaluation      Moderate ascites. Small pulmonary nodules. Recommend 12-month follow-up noncontrast CT of the chest.         The study was marked in [employer] for immediate notification. Workstation performed: [id number]         CT recon only thoracolumbar   Final Result by Stefani Kerr DO (07/14 0005)      Compared to CT of 3/29/2023, there is new mild height loss involving the superior endplates of L2 and L4. The study was marked in [facility] for immediate notification. Workstation performed: [id number]                    Procedures  ECG 12 Lead Documentation Only    Date/Time: 7/14/2023 7:03 AM    Performed by: Jaspal Xavier PA-C  Authorized by: Jaspal Xavier PA-C    Indications / Diagnosis:  Trauma, chest wall tenderness  ECG reviewed by me, the ED Provider: yes    Patient location:  ED  Interpretation:     Interpretation: normal    Rate:     ECG rate:  66    ECG rate assessment: normal    Rhythm:     Rhythm: sinus rhythm    Ectopy:     Ectopy: none    QRS:     QRS axis:  Normal    QRS intervals:  Normal  Conduction:     Conduction: normal    ST segments:     ST segments:  Normal  T waves:     T waves: normal               ED Course  ED Course as of 07/14/23 0711   Fri Jul 14, 2023   0037 Attempted to ambulate patient. Only able to take a few steps. Will reach out to trauma to determine if admission to them or [facility].   0112 Transfer order placed. Will discuss with trauma. LSO brace ordered. 0122 Accepted at B. SBIRT 22yo+    Flowsheet Row Most Recent Value   Initial Alcohol Screen: US AUDIT-C     1. How often do you have a drink containing alcohol? 0 Filed at: 07/14/2023 0115   2.  How many drinks containing alcohol do you have on a typical day you are drinking? 0 Filed at: 07/14/2023 0115   3b. FEMALE Any Age, or MALE 65+: How often do you have 4 or more drinks on one occassion? 0 Filed at: 07/14/2023 0115   Audit-C Score 0 Filed at: 07/14/2023 0115   YESY: How many times in the past year have you. .. Used an illegal drug or used a prescription medication for non-medical reasons? Never Filed at: 07/14/2023 0115                    Medical Decision Making  Patient is a 75-year-old female presenting for evaluation following a fall. Patient had acute onset of midline low back pain that caused her to fall. Unsure of head strike. Complaining of left-sided rib pain and back pain. All vitals are stable on arrival.  Physical exam remarkable for tenderness over the left chest wall and midline spine tenderness of the lumbar spine. We will obtain CT head, C-spine, chest abdomen pelvis, thoracolumbar recon to assess for any traumatic injuries. We will check basic labs including CBC, CMP, Trope, coags and EKG. Will treat symptomatically with Zofran and Tylenol. CT shows new compression abnormalities of L2 and L4. Remaining CTs are negative. Labs negative for leukocytosis, anemia, SHAISTA. Hepatic function and electrolytes are at baseline. Troponin is 3. EKG is normal sinus rhythm. Attempted to ambulate with patient in the ED but she is having significant pain. Discussed with trauma at [facility] accepted for transfer to the service of [address]. Patient being transferred to [facility] via EMS for lumbar compression fractures. The plan for transfer including risks and benefits were discussed with the patient/caregiver who verbally expressed understanding. EMTALA complete. All questions were answered at bedside to the satisfaction of the patient/caregiver. Patient stable at the time of transfer out of the emergency department.       Fall, initial encounter: acute illness or injury  Lumbar compression fracture [facility]): acute illness or injury  Amount and/or Complexity of Data Reviewed  Labs: ordered. Radiology: ordered. Risk  OTC drugs. Prescription drug management. Disposition  Final diagnoses:   Fall, initial encounter   Lumbar compression fracture [facility])     Time reflects when diagnosis was documented in both MDM as applicable and the Disposition within this note     Time User Action Codes Description Comment    7/14/2023  1:24 AM Mere Gomez Add [M12. OZOK] Fall, initial encounter     7/14/2023  1:24 AM Mere Gomez Add [S32.000A] Lumbar compression fracture [facility])       ED Disposition     ED Disposition   Transfer to Another Facility-In Network    Condition   --    Date/Time   Fri Jul 14, 2023  1:22 AM    Comment   Piyush Morrison should be transferred out to [facility].            MD Documentation    Sho Shoemaker Most Recent Value   Patient Condition The patient has been stabilized such that within reasonable medical probability, no material deterioration of the patient condition or the condition of the unborn child(deangelo) is likely to result from the transfer   Reason for Transfer Level of Care needed not available at this facility   Benefits of Transfer Specialized equipment and/or services available at the receiving facility (Include comment)________________________  [Trauma]   Risks of Transfer Potential for delay in receiving treatment, Potential deterioration of medical condition, Increased discomfort during transfer   Accepting Physician [address] [place] [address] Name, Gary Adamson   Sending MD Marin Bermudez   Provider Certification General risk, such as traffic hazards, adverse weather conditions, rough terrain or turbulence, possible failure of equipment (including vehicle or aircraft), or consequences of actions of persons outside the control of the transport personnel, Unanticipated needs of medical equipment and personnel during transport, Risk of worsening condition, The possibility of a transport vehicle being unavailable      RN Documentation    [address]  NameNakita   Medications Reviewed with Next Provider of Service No   Transport Mode Ambulance   Level of Care Basic life support   Patient Belongings Disposition Sent with patient   Transfer Date 07/14/23   Transfer Time 0201      Follow-up Information    None         Discharge Medication List as of 7/14/2023  2:07 AM      CONTINUE these medications which have NOT CHANGED    Details   acetaminophen (TYLENOL) 500 mg tablet Take 500 mg by mouth every 6 (six) hours as needed, Historical Med      Diclofenac Sodium (VOLTAREN) 1 % Apply 2 g topically 4 (four) times a day, Starting Thu 3/16/2023, Normal      escitalopram (LEXAPRO) 20 mg tablet Take 1 tablet (20 mg total) by mouth daily, Starting Fri 1/20/2023, Normal      furosemide (LASIX) 40 mg tablet Multiple Dosages:Starting Mon 1/16/2023Take 2 tablets (80 mg total) by mouth daily AND 1 tablet (40 mg total) in the evening. Take before meals. , Normal      lansoprazole (PREVACID) 30 mg capsule Take 1 capsule (30 mg total) by mouth daily, Starting Mon 6/14/2021, Normal      metoprolol succinate (TOPROL-XL) 25 mg 24 hr tablet Take 1 tablet by mouth daily, Starting Mon 8/27/2018, Historical Med      Ozempic, 0.25 or 0.5 MG/DOSE, 2 MG/1.5ML SOPN inject 0.5 milligrams subcutaneously every week, Historical Med      !! spironolactone (ALDACTONE) 100 mg tablet Take 2 tablets (200 mg total) by mouth daily, Starting Tue 5/9/2023, Until Mon 8/7/2023, Normal      !! spironolactone (ALDACTONE) 50 mg tablet Take 3 tablets (150 mg total) by mouth daily, Starting Mon 1/16/2023, Normal       !! - Potential duplicate medications found. Please discuss with provider. No discharge procedures on file.     PDMP Review     None          ED Provider  Electronically Signed by           Tolu Chávez PA-C  07/14/23 1242

## 2023-07-14 NOTE — PROGRESS NOTES
ADT Alert received. Chart reviewed. Patient presented to [facility] ER s/p fall and was a trauma transfer to [facility]. Patient was admitted to  today 7/14/23. Patient sustained compression fractures of L2 and L4. Note routed to Arleth Riggs.

## 2023-07-14 NOTE — ASSESSMENT & PLAN NOTE
S/p fall with compression fracture of L2 and L4    Plan:  Consult neurosurgery  Lumbar brace  Lumbar spine precautions   Pain control PRN  PT/OT when appropriate

## 2023-07-14 NOTE — PROGRESS NOTES
[address] [facility]  Progress Note  Name: Aysha Kang  MRN: [id number]  [facility] I Date of Admission: 7/14/2023   Date of Service: 7/14/2023 I Hospital Day: 0    Assessment/Plan   Acute pain due to trauma  Assessment & Plan  - secondary to fall leading to L2/L4 compression fractures  - current pain regimen: 975 mg acetaminophen Q8H, lidocaine patch to ribs daily, 2.5 mg oxycodone Q4H PRN, 5 mg oxycodone Q4H PRN, 0.2 mg hydromorphone IV Q4H PRN    Fall  Assessment & Plan  - patient has history of gait instability  - PT/OT consult    Type 2 diabetes mellitus (HCC)  Assessment & Plan  Lab Results   Component Value Date    HGBA1C 5.2 04/24/2023       Recent Labs     07/14/23  0416   POCGLU 146*       Blood Sugar Average: Last 72 hrs:  (P) 146     - SSI algorithm 3    Hyponatremia  Assessment & Plan  - patient with chronic hyponatremia secondary to cirrhosis and diuretic use  - Admission Na is 128  - Review of prior Na show baseline in low 130s  - started on 100 mL/hr NS on admission  - 7/14 Na 130, which appears to be her baseline  - d/c IV fluids on 7/14    GERD (gastroesophageal reflux disease)  Assessment & Plan  - substitute 40 mg pantoprazole for home lansoprazole    * Compression fracture of lumbar vertebra ([address])  Assessment & Plan  - secondary to fall  - 7/13 CT CAP- new mild height loss involving the superior endplates of L2 and L4  - neurosurgery consult  - L spine precautions  - PT/OT and CM consult for dispo planning             TRAUMA TERTIARY SURVEY NOTE    VTE Prophylaxis:Enoxaparin (Lovenox)     Disposition: pending PT/OT eval    Code status:  Level 2 - DNAR: but accepts endotracheal intubation    Consultants: IP CONSULT TO NEUROSURGERY  IP CONSULT TO CASE MANAGEMENT  IP CONSULT TO GERONTOLOGY    Subjective   Transfer from: [facility]    Mechanism of Injury:Fall     Chief Complaint: back pain    HPI/Last 24 hour events: patient continues to report back pain. She denies any other new pains. Objective   Vitals:   Temp:  [97.8 °F (36.6 °C)-98.5 °F (36.9 °C)] 98.1 °F (36.7 °C)  HR:  [58-72] 58  Resp:  [16-18] 16  BP: ()/(52-63) 118/63    I/O       07/12 0701 07/13 0700 07/13 0701 07/14 0700 07/14 0701  07/15 0700    I.V. (mL/kg)  361.7 (4.3)     Total Intake(mL/kg)  361.7 (4.3)     Net  +361.7                   Physical Exam:   General: awake and alert  CV: RRR  Pulm: CTAB  Abd: soft and non-tender  Neuro: follows commands in all extremities, oriented to person, place, time, and situation  MSK: full ROM of upper and lower extremities without pain  Skin: no new rashes    Invasive Devices     Peripheral Intravenous Line  Duration           Peripheral IV 07/13/23 Right Antecubital <1 day                   1. Before the illness or injury that brought you to the Emergency, did you need someone to help you on a regular basis? 0=No   2. Since the illness or injury that brought you to the Emergency, have you needed more help than usual to take care of yourself? 1=Yes   3. Have you been hospitalized for one or more nights during the past 6 months (excluding a stay in the Emergency Department)? 0=No   4. In general, do you see well? 0=Yes   5. In general, do you have serious problems with your memory? 0=No   6. Do you take more than three different medications everyday?  1=Yes   TOTAL   2     Did you order a geriatric consult if the score was 2 or greater?: yes         Lab Results:   Results: I have personally reviewed all pertinent laboratory/tests results, BMP/CMP:   Lab Results   Component Value Date    SODIUM 130 (L) 07/14/2023    K 4.0 07/14/2023     07/14/2023    CO2 25 07/14/2023    BUN 13 07/14/2023    CREATININE 0.81 07/14/2023    CALCIUM 8.8 07/14/2023    AST 25 07/13/2023    ALT 26 07/13/2023    ALKPHOS 107 (H) 07/13/2023    EGFR 73 07/14/2023    and CBC:   Lab Results   Component Value Date    WBC 4.32 07/14/2023    HGB 11.9 07/14/2023    HCT 34.5 (L) 07/14/2023    MCV 92 07/14/2023    PLT 82 (L) 07/14/2023    RBC 3.77 (L) 07/14/2023    MCH 31.6 07/14/2023    MCHC 34.5 07/14/2023    RDW 13.9 07/14/2023    MPV 9.8 07/14/2023    NRBC 0 07/14/2023       Imaging Results: I have personally reviewed pertinent reports.     Chest Xray(s): N/A   FAST exam(s): N/A   CT Scan(s): positive for acute findings: L2 and L4 fx   Additional Xray(s): N/A     Other Studies: n/a

## 2023-07-14 NOTE — PHYSICAL THERAPY NOTE
Physical Therapy Evaluation     Patient's Name: Deatrice Members    Admitting Diagnosis  Fall from standing, initial encounter [W19. XXXA]  Unspecified multiple injuries, initial encounter [T07. XXXA]  Compression fracture of L4 vertebra, initial encounter ([address]) [S32.040A]    Problem List  Patient Active Problem List   Diagnosis    Cirrhosis ([address])    Asthma    Depression, recurrent ([address])    Sleep apnea    Microscopic colitis    Liver failure without hepatic coma, unspecified chronicity (HCC)    Thrombocytopenia (HCC)    Allergic rhinitis    Anxiety state    Ascites due to alcoholic cirrhosis (HCC)    Carpal tunnel syndrome of left wrist    Other forms of angina pectoris (HCC)    DDD (degenerative disc disease), lumbosacral    GERD (gastroesophageal reflux disease)    Hemangioma of intra-abdominal structure    Hypokalemia    Hyponatremia    Incisional hernia, without obstruction or gangrene    Increased frequency of urination    Lumbar facet arthropathy    Microscopic hematuria    Midline cystocele    Mild intermittent asthma    Mixed incontinence    Nontoxic multinodular goiter    OAB (overactive bladder)    Osteoarthrosis    Other disorders of lung    Outlet dysfunction constipation    Palpitation    Pelvic pain in female    Portal hypertension ([address])    Primary osteoarthritis of first carpometacarpal joint of left hand    Recurrent UTI (urinary tract infection)    S/P endoscopic carpal tunnel release    Trigger finger of right thumb    Trigger thumb, left thumb    Urinary retention    Obstructive sleep apnea    Chronic nonalcoholic liver disease    Hepatic cirrhosis (HCC)    Decompensated hepatic cirrhosis (HCC)    Type 2 diabetes mellitus (HCC)    Liver cell carcinoma (HCC)    Chronic obstructive pulmonary disease, unspecified COPD type ([address])    Alcohol abuse with other alcohol-induced disorder ([address])    Stage 3a chronic kidney disease ([address])    Post-menopausal    Essential hypertension    Acute colitis    SOB (shortness of breath)    Right hip pain    Lumbar back pain    Compression fracture of lumbar vertebra ([address])    Fall    Acute pain due to trauma       Past Medical History  Past Medical History:   Diagnosis Date    Anxiety     Anxiety and depression 9/29/2015    Arthritis     Asthma     Asthma without status asthmaticus 4/26/2011    Cirrhosis ([address])     Depression     Diabetes ([address])     Disease of thyroid gland     nodules    Diverticulitis 2011    Diverticulosis     DVT (deep venous thrombosis) ([address]) 2013    GERD (gastroesophageal reflux disease)     Liver disease     cirrhosis    Obesity, morbid, BMI 40.0-49.9 ([address]) 8/16/2017    Pneumonia     Portal hypertensive gastropathy ([address])     Sleep apnea     Vertigo     Vitreous hemorrhage of left eye ([address]) 1/20/2023       Past Surgical History  Past Surgical History:   Procedure Laterality Date    BLADDER SUSPENSION      CHOLECYSTECTOMY      COLONOSCOPY  05/24/2019    had multiple with last being [id number]    COLONOSCOPY  10/07/2004    Naseem Munoz. Ashok Ulrich M.D. / Diverticulosis    COLONOSCOPY  06/14/2011    Naseem Munoz. Ashok Ulrich M.D. / Diverticulosis    EGD  02/28/2013    Soha Kerr M.D. / Mild Portal Hypertensive Gastropathy    EGD  10/01/2019    Naseem Munoz. Ashok Ulrich M.D. / Gastritis    EGD AND COLONOSCOPY  09/03/2015    Naseem Munoz. Ashok Ulrich M.D. / Kendra Awan. Diverticulosis. FLEXIBLE SIGMOIDOSCOPY  03/04/2013    Marguerite Lopez M.D. / Hemorrhoidal Bleeding    FLEXIBLE SIGMOIDOSCOPY  05/30/2018    Marguerite Lopez M.D. / Internal hemorrhoids. biopsied.     HERNIA REPAIR      IR PARACENTESIS  08/23/2018    IR PARACENTESIS  11/05/2018    IR PARACENTESIS  01/11/2019    IR PARACENTESIS  02/27/2019    IR PARACENTESIS  04/15/2019    IR PARACENTESIS  06/03/2019    IR PARACENTESIS  07/10/2019    IR PARACENTESIS  08/16/2019    IR PARACENTESIS  09/10/2019    IR PARACENTESIS  10/07/2019    IR PARACENTESIS  11/05/2019    IR PARACENTESIS  11/22/2019    IR PARACENTESIS  12/17/2019    IR PARACENTESIS  01/07/2020    IR PARACENTESIS  01/28/2020    IR PARACENTESIS  02/18/2020    IR PARACENTESIS  03/10/2020    IR PARACENTESIS  03/31/2020    IR PARACENTESIS  04/21/2020    IR PARACENTESIS  05/12/2020    IR PARACENTESIS  06/02/2020    IR PARACENTESIS  06/30/2020    IR PARACENTESIS  07/20/2020    IR PARACENTESIS  08/18/2020    IR PARACENTESIS  11/12/2020    IR PARACENTESIS  03/11/2021    IR PARACENTESIS  2/8/2022    IR PARACENTESIS  8/24/2022    IR PARACENTESIS  11/14/2022    IR PARACENTESIS  12/21/2022    IR PARACENTESIS  2/1/2023    IR PARACENTESIS  3/7/2023    IR PARACENTESIS  3/30/2023    IR PARACENTESIS  4/26/2023    IR PARACENTESIS  5/18/2023    IR PARACENTESIS  7/7/2023    TONSILLECTOMY      TRIGGER FINGER RELEASE Bilateral           07/14/23 1141   PT Last Visit   PT Visit Date 07/14/23   Note Type   Note type Evaluation   Pain Assessment   Pain Assessment Tool 0-10   Pain Score 5   Pain Location/Orientation Orientation: Lower; Location: Back   Hospital Pain Intervention(s) Medication (See MAR); Ambulation/increased activity;Repositioned   Restrictions/Precautions   Weight Bearing Precautions Per Order No   Braces or Orthoses LSO  (wear when OOB)   Other Precautions Chair Alarm; Bed Alarm;Multiple lines;Telemetry; Fall Risk;Pain;Spinal precautions   Home Living   Type of [address] One level;Performs ADLs on one level; Able to live on main level with bedroom/bathroom  (1st floor apt; 0 DENEEN)   Bathroom Shower/Tub Walk-in shower   Bathroom Toilet Raised   Bathroom Equipment Grab bars in shower;Grab bars around toilet   Home Equipment Walker;Cane  (uses walker at baseline)   Prior Function   Level of [place] Independent with ADLs; Independent with functional mobility; Independent with IADLS  (receives help with cleaning 1x/wk; othewise independent at baseline)   Lives With Alone   Receives Help From Family   IADLs Independent with meal prep; Independent with medication management; Family/Friend/Other provides transportation   Falls in the last 6 months 0   Vocational Retired   General   Family/Caregiver Present No   Cognition   Overall Cognitive Status WFL   Orientation Level Oriented X4   Memory Within functional limits   Following Commands Follows all commands and directions without difficulty   RLE Assessment   RLE Assessment   (grossly with mobility, at least 3+/5)   LLE Assessment   LLE Assessment   (grossly with mobility, at least 3+/5)   Bed Mobility   Supine to Sit 4  Minimal assistance   Additional items Assist x 1;HOB elevated; Increased time required   Sit to Supine Unable to assess  (pt left sitting in recliner with call bell in reach and all needs met)   Transfers   Sit to Stand 3  Moderate assistance   Additional items Assist x 1; Increased time required   Stand to Sit 3  Moderate assistance   Additional items Assist x 1; Increased time required   Ambulation/Elevation   Gait pattern Forward Flexion;Decreased foot clearance; Short stride; Step through pattern   Gait Assistance 4  Minimal assist   Additional items Assist x 1   Assistive Device Rolling walker   Distance 60ft   Balance   Static Sitting Fair +   Dynamic Sitting Fair   Static Standing Fair -   Dynamic Standing Fair -   Ambulatory Poor +   Activity Tolerance   Activity Tolerance Patient tolerated treatment well;Patient limited by fatigue   Medical Staff Made Aware Co-eval with OT   Assessment   Prognosis Good   Problem List Decreased strength;Decreased endurance; Impaired balance;Decreased mobility;Pain   Assessment Pt is a 65yo female seen for PT eval following admission to [facility] after originally presenting to [facility] ED s/p fall 2/2 severe sharp back pain. Pt's admission diagnosis is compression fx of L2 and L4 vertebrae; evaluated by NSx and recommended for LSO.      Pt has a past medical history of Anxiety, Anxiety and depression, Arthritis, Asthma, Asthma without status asthmaticus, Cirrhosis ([address]), Depression, Diabetes ([address]), Disease of thyroid gland, Diverticulitis, Diverticulosis, DVT (deep venous thrombosis) ([address]), GERD (gastroesophageal reflux disease), Liver disease, Obesity, morbid, BMI 40.0-49.9 ([address]), Pneumonia, Portal hypertensive gastropathy ([address]), Sleep apnea, Vertigo, and Vitreous hemorrhage of left eye ([address]). Pt lives alone in a 1st floor apt with 0 DENEEN, and reports being independent with all functional mobility, ADLs, and IADLs PTA, though she does receive help from a "cleaning lady" once per week. Pt reports using walker during ambulation at baseline. Pt required min Ax1 for bed mobility (supine-to-sit) and for ambulation this session, however required mod Ax1 for sit<>stand transfers. Pt performed 10 repetitions of LAQs, hip flexion, and manually-resisted hamstring isometric contractions on BLE. During each exercise, she displayed gross muscle weakness as evidenced by inability to move LE through full range of motion. Pt ambulated approx. 60 ft with use of RW, displaying decreased foot clearance, slowed gait speed, and slight unsteadiness. The patient's AM-PAC Basic Mobility Inpatient Short Form Raw Score is 16. A Raw score of less than 16 suggests the patient may benefit from discharge to post-acute rehabilitation services. Please also refer to the recommendation of the Physical Therapist for safe discharge planning. Recommend post-acute rehab services upon d/c. Pt would benefit from skilled PT intervention to address the aforementioned impairments to allow for optimal functional mobility, safety, and independence upon discharge. At end of session, pt was left sitting comfortably in recliner with call bell in reach and all current needs met. Goals   Patient Goals to go home   STG Expiration Date 07/28/23   Short Term Goal #1 1. In 2 weeks, pt will be independent with aspects of bed mobility to aid in optimizing independence and safety upon discharge.  2. In 2 weeks, pt will increase BLE muscle strength by at least 1/2 grade to aid in improving safety, independence, and overall mechanics with functional mobility, ambulation, and other physical activities. 3. In 2 weeks, pt will demonstrate improved balance in stance and during gait, as evidenced by decreased postural sway and no losses of balance, to allow for improved independence and decreased risk of falls. 4. In 2 weeks, pt will be able to ambulate 200 ft with RW and no greater than supervision level assist to aid in optimal functional independence and safety when ambulating household and community distances. 5. In 2 weeks, pt will perform sit<>stand transfer with no greater than supervision level hayden to allow for improved independence and decreased risk of fall upon d/c. 6. In 2 weeks, pt will be able to successfully negotiate at least 1 stair to allow for safe and effective stair/curb negotiation at home and in the community. Plan   Treatment/Interventions Functional transfer training;LE strengthening/ROM; Therapeutic exercise; Endurance training;Patient/family training;Equipment eval/education; Bed mobility;Gait training   PT Frequency 3-5x/wk   Recommendation   PT Discharge Recommendation Post acute rehabilitation services   Equipment Recommended [address] Mobility Inpatient   Turning in Flat Bed Without Bedrails 3   Lying on Back to Sitting on Edge of Flat Bed Without Bedrails 3   Moving Bed to Chair 3   Standing Up From Chair Using Arms 2   Walk in Room 3   Climb 3-5 Stairs With Railing 2   Basic Mobility Inpatient Raw Score 16   Basic Mobility Standardized Score 38.32   Highest Level Of Mobility   JH-HLM Goal 5: Stand one or more mins   JH-HLM Achieved 7: Walk 25 feet or more         ARLENE Fernandez

## 2023-07-14 NOTE — ASSESSMENT & PLAN NOTE
- secondary to fall  - 7/13 CT CAP- new mild height loss involving the superior endplates of L2 and L4  - neurosurgery consult  - L spine precautions  - PT/OT and CM consult for dispo planning

## 2023-07-15 LAB
GLUCOSE SERPL-MCNC: 119 MG/DL (ref 65–140)
GLUCOSE SERPL-MCNC: 135 MG/DL (ref 65–140)
GLUCOSE SERPL-MCNC: 146 MG/DL (ref 65–140)
GLUCOSE SERPL-MCNC: 153 MG/DL (ref 65–140)

## 2023-07-15 PROCEDURE — 82948 REAGENT STRIP/BLOOD GLUCOSE: CPT

## 2023-07-15 PROCEDURE — 99232 SBSQ HOSP IP/OBS MODERATE 35: CPT | Performed by: SURGERY

## 2023-07-15 RX ADMIN — ACETAMINOPHEN 975 MG: 325 TABLET, FILM COATED ORAL at 13:14

## 2023-07-15 RX ADMIN — ACETAMINOPHEN 975 MG: 325 TABLET, FILM COATED ORAL at 05:44

## 2023-07-15 RX ADMIN — ENOXAPARIN SODIUM 30 MG: 30 INJECTION SUBCUTANEOUS at 17:26

## 2023-07-15 RX ADMIN — OXYCODONE HYDROCHLORIDE 5 MG: 5 TABLET ORAL at 06:17

## 2023-07-15 RX ADMIN — LIDOCAINE 5% 2 PATCH: 700 PATCH TOPICAL at 08:20

## 2023-07-15 RX ADMIN — OXYCODONE HYDROCHLORIDE 5 MG: 5 TABLET ORAL at 20:24

## 2023-07-15 RX ADMIN — SPIRONOLACTONE 200 MG: 50 TABLET ORAL at 08:20

## 2023-07-15 RX ADMIN — METOPROLOL SUCCINATE 25 MG: 25 TABLET, FILM COATED, EXTENDED RELEASE ORAL at 08:20

## 2023-07-15 RX ADMIN — ENOXAPARIN SODIUM 30 MG: 30 INJECTION SUBCUTANEOUS at 05:44

## 2023-07-15 RX ADMIN — PANTOPRAZOLE SODIUM 40 MG: 40 TABLET, DELAYED RELEASE ORAL at 05:44

## 2023-07-15 RX ADMIN — HYDROMORPHONE HYDROCHLORIDE 0.2 MG: 0.2 INJECTION, SOLUTION INTRAMUSCULAR; INTRAVENOUS; SUBCUTANEOUS at 21:47

## 2023-07-15 RX ADMIN — ESCITALOPRAM OXALATE 20 MG: 20 TABLET, FILM COATED ORAL at 08:20

## 2023-07-15 RX ADMIN — POLYETHYLENE GLYCOL 3350 17 G: 17 POWDER, FOR SOLUTION ORAL at 08:21

## 2023-07-15 NOTE — ASSESSMENT & PLAN NOTE
- patient reported episode of dizziness prior to her fall and back pain  - symptoms have completely resolved   - presumed combination of chronic deconditioning, dehydration, and body positioning prior to her fall

## 2023-07-15 NOTE — PLAN OF CARE
Problem: MOBILITY - ADULT  Goal: Maintain or return to baseline ADL function  Description: INTERVENTIONS:  -  Assess patient's ability to carry out ADLs; assess patient's baseline for ADL function and identify physical deficits which impact ability to perform ADLs (bathing, care of mouth/teeth, toileting, grooming, dressing, etc.)  - Assess/evaluate cause of self-care deficits   - Assess range of motion  - Assess patient's mobility; develop plan if impaired  - Assess patient's need for assistive devices and provide as appropriate  - Encourage maximum independence but intervene and supervise when necessary  - Involve family in performance of ADLs  - Assess for home care needs following discharge   - Consider OT consult to assist with ADL evaluation and planning for discharge  - Provide patient education as appropriate  Outcome: Progressing  Goal: Maintains/Returns to pre admission functional level  Description: INTERVENTIONS:  - Perform BMAT or MOVE assessment daily.   - Set and communicate daily mobility goal to care team and patient/family/caregiver. - Collaborate with rehabilitation services on mobility goals if consulted  - Perform Range of Motion 3 times a day. - Reposition patient every 2 hours.   - Dangle patient 3 times a day  - Stand patient 3 times a day  - Ambulate patient 3 times a day  - Out of bed to chair 3 times a day   - Out of bed for meals 3 times a day  - Out of bed for toileting  - Record patient progress and toleration of activity level   Outcome: Progressing     Problem: Prexisting or High Potential for Compromised Skin Integrity  Goal: Skin integrity is maintained or improved  Description: INTERVENTIONS:  - Identify patients at risk for skin breakdown  - Assess and monitor skin integrity  - Assess and monitor nutrition and hydration status  - Monitor labs   - Assess for incontinence   - Turn and reposition patient  - Assist with mobility/ambulation  - Relieve pressure over bony prominences  - Avoid friction and shearing  - Provide appropriate hygiene as needed including keeping skin clean and dry  - Evaluate need for skin moisturizer/barrier cream  - Collaborate with interdisciplinary team   - Patient/family teaching  - Consider wound care consult   Outcome: Progressing     Problem: PAIN - ADULT  Goal: Verbalizes/displays adequate comfort level or baseline comfort level  Description: Interventions:  - Encourage patient to monitor pain and request assistance  - Assess pain using appropriate pain scale  - Administer analgesics based on type and severity of pain and evaluate response  - Implement non-pharmacological measures as appropriate and evaluate response  - Consider cultural and social influences on pain and pain management  - Notify physician/advanced practitioner if interventions unsuccessful or patient reports new pain  Outcome: Progressing     Problem: INFECTION - ADULT  Goal: Absence or prevention of progression during hospitalization  Description: INTERVENTIONS:  - Assess and monitor for signs and symptoms of infection  - Monitor lab/diagnostic results  - Monitor all insertion sites, i.e. indwelling lines, tubes, and drains  - Monitor endotracheal if appropriate and nasal secretions for changes in amount and color  - [place] appropriate cooling/warming therapies per order  - Administer medications as ordered  - Instruct and encourage patient and family to use good hand hygiene technique  - Identify and instruct in appropriate isolation precautions for identified infection/condition  Outcome: Progressing  Goal: Absence of fever/infection during neutropenic period  Description: INTERVENTIONS:  - Monitor WBC    Outcome: Progressing     Problem: SAFETY ADULT  Goal: Maintain or return to baseline ADL function  Description: INTERVENTIONS:  -  Assess patient's ability to carry out ADLs; assess patient's baseline for ADL function and identify physical deficits which impact ability to perform ADLs (bathing, care of mouth/teeth, toileting, grooming, dressing, etc.)  - Assess/evaluate cause of self-care deficits   - Assess range of motion  - Assess patient's mobility; develop plan if impaired  - Assess patient's need for assistive devices and provide as appropriate  - Encourage maximum independence but intervene and supervise when necessary  - Involve family in performance of ADLs  - Assess for home care needs following discharge   - Consider OT consult to assist with ADL evaluation and planning for discharge  - Provide patient education as appropriate  Outcome: Progressing  Goal: Maintains/Returns to pre admission functional level  Description: INTERVENTIONS:  - Perform BMAT or MOVE assessment daily.   - Set and communicate daily mobility goal to care team and patient/family/caregiver. - Collaborate with rehabilitation services on mobility goals if consulted  - Perform Range of Motion 3 times a day. - Reposition patient every 2 hours.   - Dangle patient 3 times a day  - Stand patient 3 times a day  - Ambulate patient 3 times a day  - Out of bed to chair 3 times a day   - Out of bed for meals 3 times a day  - Out of bed for toileting  - Record patient progress and toleration of activity level   Outcome: Progressing  Goal: Patient will remain free of falls  Description: INTERVENTIONS:  - Educate patient/family on patient safety including physical limitations  - Instruct patient to call for assistance with activity   - Consult OT/PT to assist with strengthening/mobility   - Keep Call bell within reach  - Keep bed low and locked with side rails adjusted as appropriate  - Keep care items and personal belongings within reach  - Initiate and maintain comfort rounds  - Make Fall Risk Sign visible to staff  - Offer Toileting every 2 Hours, in advance of need  - Initiate/Maintain alarm  - Obtain necessary fall risk management equipment:   - Apply yellow socks and bracelet for high fall risk patients  - Consider moving patient to room near nurses station  Outcome: Progressing     Problem: DISCHARGE PLANNING  Goal: Discharge to home or other facility with appropriate resources  Description: INTERVENTIONS:  - Identify barriers to discharge w/patient and caregiver  - Arrange for needed discharge resources and transportation as appropriate  - Identify discharge learning needs (meds, wound care, etc.)  - Arrange for interpretive services to assist at discharge as needed  - Refer to Case Management Department for coordinating discharge planning if the patient needs post-hospital services based on physician/advanced practitioner order or complex needs related to functional status, cognitive ability, or social support system  Outcome: Progressing     Problem: Knowledge Deficit  Goal: Patient/family/caregiver demonstrates understanding of disease process, treatment plan, medications, and discharge instructions  Description: Complete learning assessment and assess knowledge base.   Interventions:  - Provide teaching at level of understanding  - Provide teaching via preferred learning methods  Outcome: Progressing     Problem: NEUROSENSORY - ADULT  Goal: Achieves stable or improved neurological status  Description: INTERVENTIONS  - Monitor and report changes in neurological status  - Monitor vital signs such as temperature, blood pressure, glucose, and any other labs ordered   - Initiate measures to prevent increased intracranial pressure  - Monitor for seizure activity and implement precautions if appropriate      Outcome: Progressing  Goal: Remains free of injury related to seizures activity  Description: INTERVENTIONS  - Maintain airway, patient safety  and administer oxygen as ordered  - Monitor patient for seizure activity, document and report duration and description of seizure to physician/advanced practitioner  - If seizure occurs,  ensure patient safety during seizure  - Reorient patient post seizure  - Seizure pads on all 4 side rails  - Instruct patient/family to notify RN of any seizure activity including if an aura is experienced  - Instruct patient/family to call for assistance with activity based on nursing assessment  - Administer anti-seizure medications if ordered    Outcome: Progressing  Goal: Achieves maximal functionality and self care  Description: INTERVENTIONS  - Monitor swallowing and airway patency with patient fatigue and changes in neurological status  - Encourage and assist patient to increase activity and self care.    - Encourage visually impaired, hearing impaired and aphasic patients to use assistive/communication devices  Outcome: Progressing     Problem: GENITOURINARY - ADULT  Goal: Maintains or returns to baseline urinary function  Description: INTERVENTIONS:  - Assess urinary function  - Encourage oral fluids to ensure adequate hydration if ordered  - Administer IV fluids as ordered to ensure adequate hydration  - Administer ordered medications as needed  - Offer frequent toileting  - Follow urinary retention protocol if ordered  Outcome: Progressing  Goal: Absence of urinary retention  Description: INTERVENTIONS:  - Assess patient’s ability to void and empty bladder  - Monitor I/O  - Bladder scan as needed  - Discuss with physician/AP medications to alleviate retention as needed  - Discuss catheterization for long term situations as appropriate  Outcome: Progressing  Goal: Urinary catheter remains patent  Description: INTERVENTIONS:  - Assess patency of urinary catheter  - If patient has a chronic hussein, consider changing catheter if non-functioning  - Follow guidelines for intermittent irrigation of non-functioning urinary catheter  Outcome: Progressing     Problem: SKIN/TISSUE INTEGRITY - ADULT  Goal: Skin Integrity remains intact(Skin Breakdown Prevention)  Description: Assess:  -Perform Gregory assessment   -Clean and moisturize skin   -Inspect skin when repositioning, toileting, and assisting with ADLS  -Assess under medical devices   -Assess extremities for adequate circulation and sensation     Bed Management:  -Have minimal linens on bed & keep smooth, unwrinkled  -Change linens as needed when moist or perspiring  -Avoid sitting or lying in one position for more than 2 hours while in bed  -Keep HOB at 30 degrees     Toileting:  -Offer bedside commode  -Assess for incontinence   -Use incontinent care products after each incontinent episode     Activity:  -Mobilize patient 3 times a day  -Encourage activity and walks on unit  -Encourage or provide ROM exercises   -Turn and reposition patient every 2 Hours  -Use appropriate equipment to lift or move patient in bed  -Instruct/ Assist with weight shifting every 2 when out of bed in chair  -Consider limitation of chair time 2 hour intervals    Skin Care:  -Avoid use of baby powder, tape, friction and shearing, hot water or constrictive clothing  -Relieve pressure over bony prominences   -Do not massage red bony areas    Next Steps:  -Teach patient strategies to minimize risks    -Consider consults to  interdisciplinary teams   Outcome: Progressing  Goal: Incision(s), wounds(s) or drain site(s) healing without S/S of infection  Description: INTERVENTIONS  - Assess and document dressing, incision, wound bed, drain sites and surrounding tissue  - Provide patient and family education  - Perform skin care/dressing changes  Outcome: Progressing  Goal: Pressure injury heals and does not worsen  Description: Interventions:  - Implement low air loss mattress or specialty surface (Criteria met)  - Apply silicone foam dressing  - Consider nutrition services referral as needed  Outcome: Progressing     Problem: MUSCULOSKELETAL - ADULT  Goal: Maintain or return mobility to safest level of function  Description: INTERVENTIONS:  - Assess patient's ability to carry out ADLs; assess patient's baseline for ADL function and identify physical deficits which impact ability to perform ADLs (bathing, care of mouth/teeth, toileting, grooming, dressing, etc.)  - Assess/evaluate cause of self-care deficits   - Assess range of motion  - Assess patient's mobility  - Assess patient's need for assistive devices and provide as appropriate  - Encourage maximum independence but intervene and supervise when necessary  - Involve family in performance of ADLs  - Assess for home care needs following discharge   - Consider OT consult to assist with ADL evaluation and planning for discharge  - Provide patient education as appropriate  Outcome: Progressing  Goal: Maintain proper alignment of affected body part  Description: INTERVENTIONS:  - Support, maintain and protect limb and body alignment  - Provide patient/ family with appropriate education  Outcome: Progressing

## 2023-07-15 NOTE — ASSESSMENT & PLAN NOTE
Lab Results   Component Value Date    HGBA1C 5.2 04/24/2023       Recent Labs     07/14/23  0416 07/14/23  1148 07/14/23  1652 07/14/23  2141   POCGLU 146* 97 85 165*       Blood Sugar Average: Last 72 hrs:  (P) 123.25     - SSI algorithm 3

## 2023-07-15 NOTE — PROGRESS NOTES
[address] [facility]  Progress Note  Name: Ana M Fonseca  MRN: [id number]  Unit/Bed#: [facility]PHP 070-46 I Date of Admission: 7/14/2023   Date of Service: 7/15/2023 I Hospital Day: 1    Assessment/Plan   Acute pain due to trauma  Assessment & Plan  - secondary to fall leading to L2/L4 compression fractures  - current pain regimen: 975 mg acetaminophen Q8H, lidocaine patch to ribs daily, 2.5 mg oxycodone Q4H PRN, 5 mg oxycodone Q4H PRN, 0.2 mg hydromorphone IV Q4H PRN    Fall  Assessment & Plan  - patient has history of gait instability  - PT/OT consult    Type 2 diabetes mellitus [facility])  Assessment & Plan  Lab Results   Component Value Date    HGBA1C 5.2 04/24/2023       Recent Labs     07/14/23  0416 07/14/23  1148 07/14/23  1652 07/14/23  2141   POCGLU 146* 97 85 165*       Blood Sugar Average: Last 72 hrs:  (P) 123.25     - SSI algorithm 3    Hyponatremia  Assessment & Plan  - patient with chronic hyponatremia secondary to cirrhosis and diuretic use  - Admission Na is 128  - Review of prior Na show baseline in low 130s  - started on 100 mL/hr NS on admission  - 7/14 Na 130, which appears to be her baseline  - d/c IV fluids on 7/14    GERD (gastroesophageal reflux disease)  Assessment & Plan  - substitute 40 mg pantoprazole for home lansoprazole    * Compression fracture of lumbar vertebra (HCC)  Assessment & Plan  - secondary to fall  - 7/13 CT CAP- new mild height loss involving the superior endplates of L2 and L4  - neurosurgery consulted and signed off and recommend follow up in 2 weeks  - L spine precautions  - continue TSLO brace  - 7/14 upright radiographs with significant abnormality  - PT/OT recommend acute rehab  - CM consulted for placement    Dizziness-resolved as of 7/14/2023  Assessment & Plan  - patient reported episode of dizziness prior to her fall and back pain  - symptoms have completely resolved   - presumed combination of chronic deconditioning, dehydration, and body positioning prior to her fall             Bowel Regimen: PEG  VTE Prophylaxis:Enoxaparin (Lovenox)     Disposition: rehab    Subjective   Subjective: feeling well with no concerns aside from some left rib pain. Patient recommended to go to rehab, which she is agreeable. Objective   Vitals:   Temp:  [97.8 °F (36.6 °C)-98.7 °F (37.1 °C)] 98.7 °F (37.1 °C)  HR:  [58-73] 62  Resp:  [16-18] 16  BP: (114-132)/(60-81) 118/60    I/O       07/13 0701  07/14 0700 07/14 0701  07/15 0700 07/15 0701  07/16 0700    P. O.  480     I.V. (mL/kg) 361.7 (4.3) 486.7 (5.8)     Total Intake(mL/kg) 361.7 (4.3) 966.7 (11.5)     Urine (mL/kg/hr)  300 (0.1)     Total Output  300     Net +361.7 +666.7            Unmeasured Urine Occurrence  1 x            Physical Exam:   General: awake and alert  CV: RRR  Pulm: CTAB  Abd: soft and non-tender  Neuro: follows commands in all extremities, oriented to person, place, time, and situation  Skin: no new rashes    Invasive Devices     Peripheral Intravenous Line  Duration           Peripheral IV 07/13/23 Right Antecubital 1 day

## 2023-07-15 NOTE — CASE MANAGEMENT
Case Management Discharge Planning Note    Patient name Eliane Otero  Location [facility] [id number] MRN [id number]  : 1955 Date 7/15/2023       Current Admission Date: 2023  Current Admission Diagnosis:Compression fracture of lumbar vertebra [facility])   Patient Active Problem List    Diagnosis Date Noted   • Compression fracture of lumbar vertebra ([address]) 2023   • Fall 2023   • Acute pain due to trauma 2023   • Right hip pain 2023   • Lumbar back pain 2023   • Post-menopausal 2023   • Liver cell carcinoma ([address]) 2023   • Chronic obstructive pulmonary disease, unspecified COPD type ([address]) 2023   • Alcohol abuse with other alcohol-induced disorder ([address]) 2023   • Stage 3a chronic kidney disease ([address]) 2023   • Other forms of angina pectoris ([address])    • Hyponatremia 10/07/2021   • OAB (overactive bladder) 2021   • Liver failure without hepatic coma, unspecified chronicity ([address]) 2021   • Thrombocytopenia ([address]) 2021   • Microscopic colitis 2021   • Increased frequency of urination 2020   • S/P endoscopic carpal tunnel release 2019   • Trigger finger of right thumb 2019   • Cirrhosis ([address]) 10/07/2019   • Asthma 10/07/2019   • Sleep apnea 10/07/2019   • Depression, recurrent (HCC)    • Urinary retention 2019   • Lumbar facet arthropathy 2018   • Decompensated hepatic cirrhosis ([address]) 2018   • Essential hypertension 2018   • Hypokalemia 2017   • Acute colitis 2017   • Carpal tunnel syndrome of left wrist 2017   • Primary osteoarthritis of first carpometacarpal joint of left hand 2017   • Trigger thumb, left thumb 2017   • SOB (shortness of breath) 2017   • Palpitation 2016   • Microscopic hematuria 2016   • Midline cystocele 2016   • Mixed incontinence 2015   • Outlet dysfunction constipation 2015   • Pelvic pain in female 11/12/2015   • Recurrent UTI (urinary tract infection) 11/12/2015   • Incisional hernia, without obstruction or gangrene 11/09/2015   • GERD (gastroesophageal reflux disease) 09/29/2015   • Ascites due to alcoholic cirrhosis ([address]) 77/57/4569   • Portal hypertension ([address]) 03/08/2013   • Type 2 diabetes mellitus ([address]) 03/08/2013   • Hepatic cirrhosis ([address]) 06/25/2012   • Anxiety state 12/08/2011   • DDD (degenerative disc disease), lumbosacral 09/19/2011   • Nontoxic multinodular goiter 07/20/2011   • Other disorders of lung 07/20/2011   • Hemangioma of intra-abdominal structure 02/16/2011   • Allergic rhinitis 02/08/2010   • Mild intermittent asthma 02/08/2010   • Osteoarthrosis 02/08/2010   • Obstructive sleep apnea 02/08/2010   • Chronic nonalcoholic liver disease 78/68/6356      LOS (days): 1  Geometric Mean LOS (GMLOS) (days): 2.80  Days to GMLOS:1.5     OBJECTIVE:  Risk of Unplanned Readmission Score: 21.73         Current admission status: Inpatient   Preferred Pharmacy:   [address] [place] [address], [address] [place] [id number]  Phone: [phone] Fax: [phone]    Primary Care Provider: JONE Monahan    Primary Insurance: MEDICARE  Secondary Insurance: [facility] DETAILS:    Other Referral/Resources/Interventions Provided:  Interventions: Short Term Rehab  Referral Comments: Referral's in for STR and currently pending at this time. Per discussion w/ trauma team pt is medically cleared for d/c pending placement. CM team will continue to follow for accepting facility.     Treatment Team Recommendation: Short Term Rehab  Discharge Destination Plan[de-identified] Short Term Rehab

## 2023-07-15 NOTE — ASSESSMENT & PLAN NOTE
- secondary to fall  - 7/13 CT CAP- new mild height loss involving the superior endplates of L2 and L4  - neurosurgery consulted and signed off and recommend follow up in 2 weeks  - L spine precautions  - continue TSLO brace  - 7/14 upright radiographs with significant abnormality  - PT/OT recommend acute rehab  - CM consulted for placement

## 2023-07-16 LAB
GLUCOSE SERPL-MCNC: 144 MG/DL (ref 65–140)
GLUCOSE SERPL-MCNC: 147 MG/DL (ref 65–140)
GLUCOSE SERPL-MCNC: 200 MG/DL (ref 65–140)
GLUCOSE SERPL-MCNC: 94 MG/DL (ref 65–140)

## 2023-07-16 PROCEDURE — 99232 SBSQ HOSP IP/OBS MODERATE 35: CPT | Performed by: SURGERY

## 2023-07-16 PROCEDURE — 82948 REAGENT STRIP/BLOOD GLUCOSE: CPT

## 2023-07-16 RX ADMIN — ACETAMINOPHEN 975 MG: 325 TABLET, FILM COATED ORAL at 21:55

## 2023-07-16 RX ADMIN — ENOXAPARIN SODIUM 30 MG: 30 INJECTION SUBCUTANEOUS at 17:17

## 2023-07-16 RX ADMIN — OXYCODONE HYDROCHLORIDE 5 MG: 5 TABLET ORAL at 21:56

## 2023-07-16 RX ADMIN — ACETAMINOPHEN 975 MG: 325 TABLET, FILM COATED ORAL at 12:15

## 2023-07-16 RX ADMIN — ACETAMINOPHEN 975 MG: 325 TABLET, FILM COATED ORAL at 05:15

## 2023-07-16 RX ADMIN — POLYETHYLENE GLYCOL 3350 17 G: 17 POWDER, FOR SOLUTION ORAL at 08:45

## 2023-07-16 RX ADMIN — ENOXAPARIN SODIUM 30 MG: 30 INJECTION SUBCUTANEOUS at 05:15

## 2023-07-16 RX ADMIN — ESCITALOPRAM OXALATE 20 MG: 20 TABLET, FILM COATED ORAL at 08:43

## 2023-07-16 RX ADMIN — PANTOPRAZOLE SODIUM 40 MG: 40 TABLET, DELAYED RELEASE ORAL at 05:15

## 2023-07-16 RX ADMIN — OXYCODONE HYDROCHLORIDE 5 MG: 5 TABLET ORAL at 04:03

## 2023-07-16 RX ADMIN — LIDOCAINE 5% 2 PATCH: 700 PATCH TOPICAL at 08:44

## 2023-07-16 RX ADMIN — INSULIN LISPRO 2 UNITS: 100 INJECTION, SOLUTION INTRAVENOUS; SUBCUTANEOUS at 17:18

## 2023-07-16 NOTE — PROGRESS NOTES
[address] [facility]  Progress Note  Name: Shannon Irby  MRN: [id number]  Unit/Bed#: [facility]PHP 934-04 I Date of Admission: 7/14/2023   Date of Service: 7/16/2023 I Hospital Day: 2    Assessment/Plan   Acute pain due to trauma  Assessment & Plan  - secondary to fall leading to L2/L4 compression fractures  - current pain regimen: 975 mg acetaminophen Q8H, lidocaine patch to ribs daily, 2.5 mg oxycodone Q4H PRN, 5 mg oxycodone Q4H PRN, 0.2 mg hydromorphone IV Q4H PRN    Fall  Assessment & Plan  - patient has history of gait instability  - PT/OT consult    Type 2 diabetes mellitus [facility])  Assessment & Plan  Lab Results   Component Value Date    HGBA1C 5.2 04/24/2023       Recent Labs     07/15/23  0723 07/15/23  1139 07/15/23  1647 07/15/23  2051   POCGLU 119 153* 135 146*       Blood Sugar Average: Last 72 hrs:  (P) 130.75     - SSI algorithm 3    Hyponatremia  Assessment & Plan  - patient with chronic hyponatremia secondary to cirrhosis and diuretic use  - Admission Na is 128  - Review of prior Na show baseline in low 130s  - started on 100 mL/hr NS on admission  - 7/14 Na 130, which appears to be her baseline  - d/c IV fluids on 7/14    GERD (gastroesophageal reflux disease)  Assessment & Plan  - substitute 40 mg pantoprazole for home lansoprazole    * Compression fracture of lumbar vertebra (HCC)  Assessment & Plan  - secondary to fall  - 7/13 CT CAP- new mild height loss involving the superior endplates of L2 and L4  - neurosurgery consulted and signed off and recommend follow up in 2 weeks  - L spine precautions  - continue TSLO brace  - 7/14 upright radiographs with significant abnormality  - PT/OT recommend acute rehab  - CM consulted for placement             Bowel Regimen: miralax  VTE Prophylaxis:Enoxaparin (Lovenox)     Disposition: rehab    Subjective   Chief Complaint: none    Subjective: NAOE. Pt with tolerable pain level. Tolerating diet. No acute complaints this morning. Objective   Vitals:   Temp:  [98.2 °F (36.8 °C)-99.1 °F (37.3 °C)] 98.2 °F (36.8 °C)  HR:  [57-66] 57  Resp:  [16-18] 16  BP: ()/(59-64) 104/59    I/O       07/14 0701  07/15 0700 07/15 0701  07/16 0700 07/16 0701  07/17 0700    P. O. 480 560     I.V. (mL/kg) 486.7 (5.8)      Total Intake(mL/kg) 966.7 (11.5) 560 (6.7)     Urine (mL/kg/hr) 300 (0.1)      Total Output 300      Net +666.7 +560            Unmeasured Urine Occurrence 1 x 4 x     Unmeasured Stool Occurrence  0 x            Physical Exam:    General: NAD, non-toxic  HEENT: NC/AT, PERRL, MMM  CV: RRR, no m/r/g  Pulm: effort WNL, CTAB, no wheezes/rales/rhonchi  GI: soft, NT/ND, no rebound/guarding  MSK: no peripheral edema, chronic b/l LE skin changes and tenderness, compartments soft, NVI  Neuro: AAOx3, CN II-XII grossly intact, no focal motor or sensory deficits  Skin: wounds c/d/i        Invasive Devices     Peripheral Intravenous Line  Duration           Peripheral IV 07/13/23 Right Antecubital 2 days                      Lab Results: Results: I have personally reviewed all pertinent laboratory/tests results, BMP/CMP: No results found for: "SODIUM", "K", "CL", "CO2", "ANIONGAP", "BUN", "CREATININE", "GLUCOSE", "CALCIUM", "AST", "ALT", "ALKPHOS", "PROT", "BILITOT", "EGFR" and CBC: No results found for: "WBC", "HGB", "HCT", "MCV", "PLT", "ADJUSTEDWBC", "RBC", "MCH", "MCHC", "RDW", "MPV", "NRBC"  Imaging: I have personally reviewed pertinent reports.      Other Studies:

## 2023-07-16 NOTE — PLAN OF CARE
Problem: MOBILITY - ADULT  Goal: Maintain or return to baseline ADL function  Description: INTERVENTIONS:  -  Assess patient's ability to carry out ADLs; assess patient's baseline for ADL function and identify physical deficits which impact ability to perform ADLs (bathing, care of mouth/teeth, toileting, grooming, dressing, etc.)  - Assess/evaluate cause of self-care deficits   - Assess range of motion  - Assess patient's mobility; develop plan if impaired  - Assess patient's need for assistive devices and provide as appropriate  - Encourage maximum independence but intervene and supervise when necessary  - Involve family in performance of ADLs  - Assess for home care needs following discharge   - Consider OT consult to assist with ADL evaluation and planning for discharge  - Provide patient education as appropriate  7/16/2023 0703 by Belen Andersen RN  Outcome: Progressing  7/16/2023 0703 by Belen Andersen RN  Outcome: Progressing  Goal: Maintains/Returns to pre admission functional level  Description: INTERVENTIONS:  - Perform BMAT or MOVE assessment daily.   - Set and communicate daily mobility goal to care team and patient/family/caregiver. - Collaborate with rehabilitation services on mobility goals if consulted  - Perform Range of Motion 3 times a day. - Reposition patient every 2 hours.   - Dangle patient 3 times a day  - Stand patient 3 times a day  - Ambulate patient 3 times a day  - Out of bed to chair 3 times a day   - Out of bed for meals 3 times a day  - Out of bed for toileting  - Record patient progress and toleration of activity level   7/16/2023 0703 by Belen Andersen RN  Outcome: Progressing  7/16/2023 0703 by Belen Andersen RN  Outcome: Progressing     Problem: Prexisting or High Potential for Compromised Skin Integrity  Goal: Skin integrity is maintained or improved  Description: INTERVENTIONS:  - Identify patients at risk for skin breakdown  - Assess and monitor skin integrity  - Assess and monitor nutrition and hydration status  - Monitor labs   - Assess for incontinence   - Turn and reposition patient  - Assist with mobility/ambulation  - Relieve pressure over bony prominences  - Avoid friction and shearing  - Provide appropriate hygiene as needed including keeping skin clean and dry  - Evaluate need for skin moisturizer/barrier cream  - Collaborate with interdisciplinary team   - Patient/family teaching  - Consider wound care consult   7/16/2023 0703 by Charles Irving RN  Outcome: Progressing  7/16/2023 0703 by Charles Irving RN  Outcome: Progressing     Problem: PAIN - ADULT  Goal: Verbalizes/displays adequate comfort level or baseline comfort level  Description: Interventions:  - Encourage patient to monitor pain and request assistance  - Assess pain using appropriate pain scale  - Administer analgesics based on type and severity of pain and evaluate response  - Implement non-pharmacological measures as appropriate and evaluate response  - Consider cultural and social influences on pain and pain management  - Notify physician/advanced practitioner if interventions unsuccessful or patient reports new pain  7/16/2023 0703 by Charles Irving RN  Outcome: Progressing  7/16/2023 0703 by Charles Irving RN  Outcome: Progressing     Problem: INFECTION - ADULT  Goal: Absence or prevention of progression during hospitalization  Description: INTERVENTIONS:  - Assess and monitor for signs and symptoms of infection  - Monitor lab/diagnostic results  - Monitor all insertion sites, i.e. indwelling lines, tubes, and drains  - Monitor endotracheal if appropriate and nasal secretions for changes in amount and color  - [place] appropriate cooling/warming therapies per order  - Administer medications as ordered  - Instruct and encourage patient and family to use good hand hygiene technique  - Identify and instruct in appropriate isolation precautions for identified infection/condition  7/16/2023 0703 by Jeanine Smith Annel Alvarado RN  Outcome: Progressing  7/16/2023 0703 by Belen Andersen RN  Outcome: Progressing  Goal: Absence of fever/infection during neutropenic period  Description: INTERVENTIONS:  - Monitor WBC    7/16/2023 0703 by Belen Andersen RN  Outcome: Progressing  7/16/2023 0703 by Belen Andersen RN  Outcome: Progressing     Problem: SAFETY ADULT  Goal: Maintain or return to baseline ADL function  Description: INTERVENTIONS:  -  Assess patient's ability to carry out ADLs; assess patient's baseline for ADL function and identify physical deficits which impact ability to perform ADLs (bathing, care of mouth/teeth, toileting, grooming, dressing, etc.)  - Assess/evaluate cause of self-care deficits   - Assess range of motion  - Assess patient's mobility; develop plan if impaired  - Assess patient's need for assistive devices and provide as appropriate  - Encourage maximum independence but intervene and supervise when necessary  - Involve family in performance of ADLs  - Assess for home care needs following discharge   - Consider OT consult to assist with ADL evaluation and planning for discharge  - Provide patient education as appropriate  7/16/2023 0703 by Belen Andersen RN  Outcome: Progressing  7/16/2023 0703 by Belen Andersen RN  Outcome: Progressing  Goal: Maintains/Returns to pre admission functional level  Description: INTERVENTIONS:  - Perform BMAT or MOVE assessment daily.   - Set and communicate daily mobility goal to care team and patient/family/caregiver. - Collaborate with rehabilitation services on mobility goals if consulted  - Perform Range of Motion 3 times a day. - Reposition patient every 2 hours.   - Dangle patient 3 times a day  - Stand patient 3 times a day  - Ambulate patient 3 times a day  - Out of bed to chair 3 times a day   - Out of bed for meals 3 times a day  - Out of bed for toileting  - Record patient progress and toleration of activity level   7/16/2023 0703 by Belen Andersen RN  Outcome: Progressing  7/16/2023 0703 by Osman Conteh RN  Outcome: Progressing  Goal: Patient will remain free of falls  Description: INTERVENTIONS:  - Educate patient/family on patient safety including physical limitations  - Instruct patient to call for assistance with activity   - Consult OT/PT to assist with strengthening/mobility   - Keep Call bell within reach  - Keep bed low and locked with side rails adjusted as appropriate  - Keep care items and personal belongings within reach  - Initiate and maintain comfort rounds  - Make Fall Risk Sign visible to staff  - Offer Toileting every 2 Hours, in advance of need  - Initiate/Maintain alarm  - Obtain necessary fall risk management equipment:   - Apply yellow socks and bracelet for high fall risk patients  - Consider moving patient to room near nurses station  7/16/2023 0703 by Osman Conteh RN  Outcome: Progressing  7/16/2023 0703 by Osman Conteh RN  Outcome: Progressing     Problem: DISCHARGE PLANNING  Goal: Discharge to home or other facility with appropriate resources  Description: INTERVENTIONS:  - Identify barriers to discharge w/patient and caregiver  - Arrange for needed discharge resources and transportation as appropriate  - Identify discharge learning needs (meds, wound care, etc.)  - Arrange for interpretive services to assist at discharge as needed  - Refer to Case Management Department for coordinating discharge planning if the patient needs post-hospital services based on physician/advanced practitioner order or complex needs related to functional status, cognitive ability, or social support system  7/16/2023 0703 by Osman Conteh RN  Outcome: Progressing  7/16/2023 0703 by Osman Conteh RN  Outcome: Progressing     Problem: Knowledge Deficit  Goal: Patient/family/caregiver demonstrates understanding of disease process, treatment plan, medications, and discharge instructions  Description: Complete learning assessment and assess knowledge base. Interventions:  - Provide teaching at level of understanding  - Provide teaching via preferred learning methods  7/16/2023 0703 by Delmy Addison RN  Outcome: Progressing  7/16/2023 0703 by Delmy Addison RN  Outcome: Progressing     Problem: NEUROSENSORY - ADULT  Goal: Achieves stable or improved neurological status  Description: INTERVENTIONS  - Monitor and report changes in neurological status  - Monitor vital signs such as temperature, blood pressure, glucose, and any other labs ordered   - Initiate measures to prevent increased intracranial pressure  - Monitor for seizure activity and implement precautions if appropriate      7/16/2023 0703 by Delmy Addison RN  Outcome: Progressing  7/16/2023 0703 by Delmy Addison RN  Outcome: Progressing  Goal: Remains free of injury related to seizures activity  Description: INTERVENTIONS  - Maintain airway, patient safety  and administer oxygen as ordered  - Monitor patient for seizure activity, document and report duration and description of seizure to physician/advanced practitioner  - If seizure occurs,  ensure patient safety during seizure  - Reorient patient post seizure  - Seizure pads on all 4 side rails  - Instruct patient/family to notify RN of any seizure activity including if an aura is experienced  - Instruct patient/family to call for assistance with activity based on nursing assessment  - Administer anti-seizure medications if ordered    7/16/2023 0703 by Delmy Addison RN  Outcome: Progressing  7/16/2023 0703 by Delmy Addison RN  Outcome: Progressing  Goal: Achieves maximal functionality and self care  Description: INTERVENTIONS  - Monitor swallowing and airway patency with patient fatigue and changes in neurological status  - Encourage and assist patient to increase activity and self care.    - Encourage visually impaired, hearing impaired and aphasic patients to use assistive/communication devices  7/16/2023 0703 by Delmy Addison RN  Outcome: Progressing  7/16/2023 0703 by Belen Andersen RN  Outcome: Progressing     Problem: GENITOURINARY - ADULT  Goal: Maintains or returns to baseline urinary function  Description: INTERVENTIONS:  - Assess urinary function  - Encourage oral fluids to ensure adequate hydration if ordered  - Administer IV fluids as ordered to ensure adequate hydration  - Administer ordered medications as needed  - Offer frequent toileting  - Follow urinary retention protocol if ordered  7/16/2023 0703 by Belen Andersen RN  Outcome: Progressing  7/16/2023 0703 by Belen Andersen RN  Outcome: Progressing  Goal: Absence of urinary retention  Description: INTERVENTIONS:  - Assess patient’s ability to void and empty bladder  - Monitor I/O  - Bladder scan as needed  - Discuss with physician/AP medications to alleviate retention as needed  - Discuss catheterization for long term situations as appropriate  7/16/2023 0703 by Belen Andersen RN  Outcome: Progressing  7/16/2023 0703 by Belen Andersen RN  Outcome: Progressing  Goal: Urinary catheter remains patent  Description: INTERVENTIONS:  - Assess patency of urinary catheter  - If patient has a chronic hussein, consider changing catheter if non-functioning  - Follow guidelines for intermittent irrigation of non-functioning urinary catheter  7/16/2023 0703 by Belen Andersen RN  Outcome: Progressing  7/16/2023 0703 by Belen Andersen RN  Outcome: Progressing     Problem: SKIN/TISSUE INTEGRITY - ADULT  Goal: Skin Integrity remains intact(Skin Breakdown Prevention)  Description: Assess:  -Perform Gregory assessment   -Clean and moisturize skin   -Inspect skin when repositioning, toileting, and assisting with ADLS  -Assess under medical devices   -Assess extremities for adequate circulation and sensation     Bed Management:  -Have minimal linens on bed & keep smooth, unwrinkled  -Change linens as needed when moist or perspiring  -Avoid sitting or lying in one position for more than 2 hours while in bed  -Keep HOB at 30 degrees     Toileting:  -Offer bedside commode  -Assess for incontinence   -Use incontinent care products after each incontinent episode     Activity:  -Mobilize patient 3 times a day  -Encourage activity and walks on unit  -Encourage or provide ROM exercises   -Turn and reposition patient every 2 Hours  -Use appropriate equipment to lift or move patient in bed  -Instruct/ Assist with weight shifting every 2 when out of bed in chair  -Consider limitation of chair time 2 hour intervals    Skin Care:  -Avoid use of baby powder, tape, friction and shearing, hot water or constrictive clothing  -Relieve pressure over bony prominences  -Do not massage red bony areas    Next Steps:  -Teach patient strategies to minimize risks    -Consider consults to  interdisciplinary teams   7/16/2023 0703 by Telly Low RN  Outcome: Progressing  7/16/2023 0703 by Telly Low RN  Outcome: Progressing  Goal: Incision(s), wounds(s) or drain site(s) healing without S/S of infection  Description: INTERVENTIONS  - Assess and document dressing, incision, wound bed, drain sites and surrounding tissue  - Provide patient and family education  - Perform skin care/dressing changes  7/16/2023 0703 by Telly Low RN  Outcome: Progressing  7/16/2023 0703 by Telly Low RN  Outcome: Progressing  Goal: Pressure injury heals and does not worsen  Description: Interventions:  - Implement low air loss mattress or specialty surface (Criteria met)  - Apply silicone foam dressing  - Consider nutrition services referral as needed  7/16/2023 0703 by Telly Low RN  Outcome: Progressing  7/16/2023 0703 by Telly Low RN  Outcome: Progressing     Problem: MUSCULOSKELETAL - ADULT  Goal: Maintain or return mobility to safest level of function  Description: INTERVENTIONS:  - Assess patient's ability to carry out ADLs; assess patient's baseline for ADL function and identify physical deficits which impact ability to perform ADLs (bathing, care of mouth/teeth, toileting, grooming, dressing, etc.)  - Assess/evaluate cause of self-care deficits   - Assess range of motion  - Assess patient's mobility  - Assess patient's need for assistive devices and provide as appropriate  - Encourage maximum independence but intervene and supervise when necessary  - Involve family in performance of ADLs  - Assess for home care needs following discharge   - Consider OT consult to assist with ADL evaluation and planning for discharge  - Provide patient education as appropriate  7/16/2023 0703 by Mary Moore RN  Outcome: Progressing  7/16/2023 0703 by Mary Moore RN  Outcome: Progressing  Goal: Maintain proper alignment of affected body part  Description: INTERVENTIONS:  - Support, maintain and protect limb and body alignment  - Provide patient/ family with appropriate education  7/16/2023 0703 by Mary Moore RN  Outcome: Progressing  7/16/2023 0703 by Mary Moore RN  Outcome: Progressing

## 2023-07-16 NOTE — ASSESSMENT & PLAN NOTE
Lab Results   Component Value Date    HGBA1C 5.2 04/24/2023       Recent Labs     07/15/23  0723 07/15/23  1139 07/15/23  1647 07/15/23  2051   POCGLU 119 153* 135 146*       Blood Sugar Average: Last 72 hrs:  (P) 130.75     - SSI algorithm 3

## 2023-07-16 NOTE — CASE MANAGEMENT
Case Management Discharge Planning Note    Patient name Carmen Coleman  Location [facility] 605/[facility] 144-06 MRN [id number]  : 1955 Date 2023       Current Admission Date: 2023  Current Admission Diagnosis:Compression fracture of lumbar vertebra [facility])   Patient Active Problem List    Diagnosis Date Noted   • Compression fracture of lumbar vertebra ([address]) 2023   • Fall 2023   • Acute pain due to trauma 2023   • Right hip pain 2023   • Lumbar back pain 2023   • Post-menopausal 2023   • Liver cell carcinoma ([address]) 2023   • Chronic obstructive pulmonary disease, unspecified COPD type ([address]) 2023   • Alcohol abuse with other alcohol-induced disorder ([address]) 2023   • Stage 3a chronic kidney disease ([address]) 2023   • Other forms of angina pectoris ([address])    • Hyponatremia 10/07/2021   • OAB (overactive bladder) 2021   • Liver failure without hepatic coma, unspecified chronicity ([address]) 2021   • Thrombocytopenia ([address]) 2021   • Microscopic colitis 2021   • Increased frequency of urination 2020   • S/P endoscopic carpal tunnel release 2019   • Trigger finger of right thumb 2019   • Cirrhosis ([address]) 10/07/2019   • Asthma 10/07/2019   • Sleep apnea 10/07/2019   • Depression, recurrent (HCC)    • Urinary retention 2019   • Lumbar facet arthropathy 2018   • Decompensated hepatic cirrhosis ([address]) 2018   • Essential hypertension 2018   • Hypokalemia 2017   • Acute colitis 2017   • Carpal tunnel syndrome of left wrist 2017   • Primary osteoarthritis of first carpometacarpal joint of left hand 2017   • Trigger thumb, left thumb 2017   • SOB (shortness of breath) 2017   • Palpitation 2016   • Microscopic hematuria 2016   • Midline cystocele 2016   • Mixed incontinence 2015   • Outlet dysfunction constipation 2015   • Pelvic pain in female 11/12/2015   • Recurrent UTI (urinary tract infection) 11/12/2015   • Incisional hernia, without obstruction or gangrene 11/09/2015   • GERD (gastroesophageal reflux disease) 09/29/2015   • Ascites due to alcoholic cirrhosis ([address]) 58/46/4291   • Portal hypertension ([address]) 03/08/2013   • Type 2 diabetes mellitus ([address]) 03/08/2013   • Hepatic cirrhosis ([address]) 06/25/2012   • Anxiety state 12/08/2011   • DDD (degenerative disc disease), lumbosacral 09/19/2011   • Nontoxic multinodular goiter 07/20/2011   • Other disorders of lung 07/20/2011   • Hemangioma of intra-abdominal structure 02/16/2011   • Allergic rhinitis 02/08/2010   • Mild intermittent asthma 02/08/2010   • Osteoarthrosis 02/08/2010   • Obstructive sleep apnea 02/08/2010   • Chronic nonalcoholic liver disease 85/14/1195      LOS (days): 2  Geometric Mean LOS (GMLOS) (days): 2.80  Days to GMLOS:0.3     OBJECTIVE:  Risk of Unplanned Readmission Score: 22.07         Current admission status: Inpatient   Preferred Pharmacy:   [address] [place], [place] [id number]  Phone: [phone] Fax: [phone]    Primary Care Provider: JONE Jimeenz    Primary Insurance: MEDICARE  Secondary Insurance: [facility] DETAILS:    Other Referral/Resources/Interventions Provided:  Interventions: Short Term Rehab  Referral Comments: Per Deborah Bernstein communication; referral's for STR remain under clinical review at this time, referral extended for further review. CM team will continue to follow for accepting STR facilities.     Treatment Team Recommendation: Short Term Rehab  Discharge Destination Plan[de-identified] Short Term Rehab

## 2023-07-17 ENCOUNTER — APPOINTMENT (OUTPATIENT)
Age: 68
End: 2023-07-17
Payer: MEDICARE

## 2023-07-17 ENCOUNTER — TELEPHONE (OUTPATIENT)
Dept: NEUROSURGERY | Facility: CLINIC | Age: 68
End: 2023-07-17

## 2023-07-17 VITALS
RESPIRATION RATE: 16 BRPM | DIASTOLIC BLOOD PRESSURE: 67 MMHG | BODY MASS INDEX: 31.54 KG/M2 | HEART RATE: 68 BPM | TEMPERATURE: 98.7 F | OXYGEN SATURATION: 95 % | HEIGHT: 64 IN | SYSTOLIC BLOOD PRESSURE: 118 MMHG | WEIGHT: 184.75 LBS

## 2023-07-17 LAB
GLUCOSE SERPL-MCNC: 157 MG/DL (ref 65–140)
GLUCOSE SERPL-MCNC: 321 MG/DL (ref 65–140)

## 2023-07-17 PROCEDURE — 99231 SBSQ HOSP IP/OBS SF/LOW 25: CPT | Performed by: INTERNAL MEDICINE

## 2023-07-17 PROCEDURE — 97110 THERAPEUTIC EXERCISES: CPT

## 2023-07-17 PROCEDURE — 82948 REAGENT STRIP/BLOOD GLUCOSE: CPT

## 2023-07-17 PROCEDURE — 97116 GAIT TRAINING THERAPY: CPT

## 2023-07-17 PROCEDURE — 99238 HOSP IP/OBS DSCHRG MGMT 30/<: CPT | Performed by: SURGERY

## 2023-07-17 RX ORDER — BISACODYL 10 MG
10 SUPPOSITORY, RECTAL RECTAL DAILY
Status: DISCONTINUED | OUTPATIENT
Start: 2023-07-17 | End: 2023-07-17 | Stop reason: HOSPADM

## 2023-07-17 RX ORDER — INSULIN LISPRO 100 [IU]/ML
1-6 INJECTION, SOLUTION INTRAVENOUS; SUBCUTANEOUS
Refills: 0
Start: 2023-07-17

## 2023-07-17 RX ORDER — SENNOSIDES 8.6 MG
2 TABLET ORAL
Status: DISCONTINUED | OUTPATIENT
Start: 2023-07-17 | End: 2023-07-17 | Stop reason: HOSPADM

## 2023-07-17 RX ORDER — SPIRONOLACTONE 100 MG/1
200 TABLET, FILM COATED ORAL 2 TIMES DAILY
Start: 2023-07-17 | End: 2023-10-15

## 2023-07-17 RX ORDER — ACETAMINOPHEN 325 MG/1
975 TABLET ORAL EVERY 8 HOURS SCHEDULED
Refills: 0
Start: 2023-07-17

## 2023-07-17 RX ORDER — LIDOCAINE 50 MG/G
2 PATCH TOPICAL DAILY
Refills: 0
Start: 2023-07-18

## 2023-07-17 RX ORDER — METHOCARBAMOL 500 MG/1
500 TABLET, FILM COATED ORAL EVERY 6 HOURS PRN
Refills: 0
Start: 2023-07-17

## 2023-07-17 RX ORDER — OXYCODONE HYDROCHLORIDE 5 MG/1
TABLET ORAL
Qty: 20 TABLET | Refills: 0 | Status: SHIPPED | OUTPATIENT
Start: 2023-07-17

## 2023-07-17 RX ORDER — SENNOSIDES 8.6 MG
17.2 TABLET ORAL
Refills: 0
Start: 2023-07-17

## 2023-07-17 RX ORDER — POLYETHYLENE GLYCOL 3350 17 G/17G
17 POWDER, FOR SOLUTION ORAL DAILY
Refills: 0
Start: 2023-07-18

## 2023-07-17 RX ADMIN — LIDOCAINE 5% 2 PATCH: 700 PATCH TOPICAL at 08:48

## 2023-07-17 RX ADMIN — ESCITALOPRAM OXALATE 20 MG: 20 TABLET, FILM COATED ORAL at 08:48

## 2023-07-17 RX ADMIN — INSULIN LISPRO 5 UNITS: 100 INJECTION, SOLUTION INTRAVENOUS; SUBCUTANEOUS at 11:52

## 2023-07-17 RX ADMIN — METOPROLOL SUCCINATE 25 MG: 25 TABLET, FILM COATED, EXTENDED RELEASE ORAL at 08:48

## 2023-07-17 RX ADMIN — ACETAMINOPHEN 975 MG: 325 TABLET, FILM COATED ORAL at 13:38

## 2023-07-17 RX ADMIN — METHOCARBAMOL 500 MG: 500 TABLET ORAL at 13:38

## 2023-07-17 RX ADMIN — OXYCODONE HYDROCHLORIDE 5 MG: 5 TABLET ORAL at 13:42

## 2023-07-17 RX ADMIN — PANTOPRAZOLE SODIUM 40 MG: 40 TABLET, DELAYED RELEASE ORAL at 06:22

## 2023-07-17 RX ADMIN — ENOXAPARIN SODIUM 30 MG: 30 INJECTION SUBCUTANEOUS at 06:22

## 2023-07-17 RX ADMIN — SPIRONOLACTONE 200 MG: 50 TABLET ORAL at 08:48

## 2023-07-17 RX ADMIN — ACETAMINOPHEN 975 MG: 325 TABLET, FILM COATED ORAL at 06:22

## 2023-07-17 RX ADMIN — POLYETHYLENE GLYCOL 3350 17 G: 17 POWDER, FOR SOLUTION ORAL at 08:48

## 2023-07-17 RX ADMIN — INSULIN LISPRO 1 UNITS: 100 INJECTION, SOLUTION INTRAVENOUS; SUBCUTANEOUS at 08:49

## 2023-07-17 RX ADMIN — Medication 10 MG: at 11:52

## 2023-07-17 NOTE — ASSESSMENT & PLAN NOTE
Lab Results   Component Value Date    HGBA1C 5.2 04/24/2023       Recent Labs     07/16/23  1128 07/16/23  1609 07/16/23  2150 07/17/23  0711   POCGLU 94 200* 147* 157*       Blood Sugar Average: Last 72 hrs:  (P) 586.8193289984038822     - SSI algorithm 3  - diabetic diet  - f/u with PCP

## 2023-07-17 NOTE — PLAN OF CARE
Problem: MOBILITY - ADULT  Goal: Maintain or return to baseline ADL function  Description: INTERVENTIONS:  -  Assess patient's ability to carry out ADLs; assess patient's baseline for ADL function and identify physical deficits which impact ability to perform ADLs (bathing, care of mouth/teeth, toileting, grooming, dressing, etc.)  - Assess/evaluate cause of self-care deficits   - Assess range of motion  - Assess patient's mobility; develop plan if impaired  - Assess patient's need for assistive devices and provide as appropriate  - Encourage maximum independence but intervene and supervise when necessary  - Involve family in performance of ADLs  - Assess for home care needs following discharge   - Consider OT consult to assist with ADL evaluation and planning for discharge  - Provide patient education as appropriate  Outcome: Progressing  Goal: Maintains/Returns to pre admission functional level  Description: INTERVENTIONS:  - Perform BMAT or MOVE assessment daily.   - Set and communicate daily mobility goal to care team and patient/family/caregiver. - Collaborate with rehabilitation services on mobility goals if consulted  - Perform Range of Motion   - Reposition patient every 2 hours.   - Dangle patient   - Stand patient   - Ambulate patient   - Out of bed to chair   - Out of bed for meals   - Out of bed for toileting  - Record patient progress and toleration of activity level   Outcome: Progressing     Problem: Prexisting or High Potential for Compromised Skin Integrity  Goal: Skin integrity is maintained or improved  Description: INTERVENTIONS:  - Identify patients at risk for skin breakdown  - Assess and monitor skin integrity  - Assess and monitor nutrition and hydration status  - Monitor labs   - Assess for incontinence   - Turn and reposition patient  - Assist with mobility/ambulation  - Relieve pressure over bony prominences  - Avoid friction and shearing  - Provide appropriate hygiene as needed including keeping skin clean and dry  - Evaluate need for skin moisturizer/barrier cream  - Collaborate with interdisciplinary team   - Patient/family teaching  - Consider wound care consult   Outcome: Progressing     Problem: PAIN - ADULT  Goal: Verbalizes/displays adequate comfort level or baseline comfort level  Description: Interventions:  - Encourage patient to monitor pain and request assistance  - Assess pain using appropriate pain scale  - Administer analgesics based on type and severity of pain and evaluate response  - Implement non-pharmacological measures as appropriate and evaluate response  - Consider cultural and social influences on pain and pain management  - Notify physician/advanced practitioner if interventions unsuccessful or patient reports new pain  Outcome: Progressing     Problem: INFECTION - ADULT  Goal: Absence or prevention of progression during hospitalization  Description: INTERVENTIONS:  - Assess and monitor for signs and symptoms of infection  - Monitor lab/diagnostic results  - Monitor all insertion sites, i.e. indwelling lines, tubes, and drains  - Monitor endotracheal if appropriate and nasal secretions for changes in amount and color  - [place] appropriate cooling/warming therapies per order  - Administer medications as ordered  - Instruct and encourage patient and family to use good hand hygiene technique  - Identify and instruct in appropriate isolation precautions for identified infection/condition  Outcome: Progressing  Goal: Absence of fever/infection during neutropenic period  Description: INTERVENTIONS:  - Monitor WBC    Outcome: Progressing     Problem: SAFETY ADULT  Goal: Maintain or return to baseline ADL function  Description: INTERVENTIONS:  -  Assess patient's ability to carry out ADLs; assess patient's baseline for ADL function and identify physical deficits which impact ability to perform ADLs (bathing, care of mouth/teeth, toileting, grooming, dressing, etc.)  - Assess/evaluate cause of self-care deficits   - Assess range of motion  - Assess patient's mobility; develop plan if impaired  - Assess patient's need for assistive devices and provide as appropriate  - Encourage maximum independence but intervene and supervise when necessary  - Involve family in performance of ADLs  - Assess for home care needs following discharge   - Consider OT consult to assist with ADL evaluation and planning for discharge  - Provide patient education as appropriate  Outcome: Progressing  Goal: Maintains/Returns to pre admission functional level  Description: INTERVENTIONS:  - Perform BMAT or MOVE assessment daily.   - Set and communicate daily mobility goal to care team and patient/family/caregiver. - Collaborate with rehabilitation services on mobility goals if consulted  - Perform Range of Motion   - Reposition patient every 2 hours.   - Dangle patient   - Stand patient   - Ambulate patient   - Out of bed to chair   - Out of bed for meals   - Out of bed for toileting  - Record patient progress and toleration of activity level   Outcome: Progressing  Goal: Patient will remain free of falls  Description: INTERVENTIONS:  - Educate patient/family on patient safety including physical limitations  - Instruct patient to call for assistance with activity   - Consult OT/PT to assist with strengthening/mobility   - Keep Call bell within reach  - Keep bed low and locked with side rails adjusted as appropriate  - Keep care items and personal belongings within reach  - Initiate and maintain comfort rounds  - Make Fall Risk Sign visible to staff  - Offer Toileting every 2 Hours, in advance of need  - Initiate/Maintain alarm  - Obtain necessary fall risk management equipment:   - Apply yellow socks and bracelet for high fall risk patients  - Consider moving patient to room near nurses station  Outcome: Progressing     Problem: DISCHARGE PLANNING  Goal: Discharge to home or other facility with appropriate resources  Description: INTERVENTIONS:  - Identify barriers to discharge w/patient and caregiver  - Arrange for needed discharge resources and transportation as appropriate  - Identify discharge learning needs (meds, wound care, etc.)  - Arrange for interpretive services to assist at discharge as needed  - Refer to Case Management Department for coordinating discharge planning if the patient needs post-hospital services based on physician/advanced practitioner order or complex needs related to functional status, cognitive ability, or social support system  Outcome: Progressing     Problem: Knowledge Deficit  Goal: Patient/family/caregiver demonstrates understanding of disease process, treatment plan, medications, and discharge instructions  Description: Complete learning assessment and assess knowledge base.   Interventions:  - Provide teaching at level of understanding  - Provide teaching via preferred learning methods  Outcome: Progressing     Problem: NEUROSENSORY - ADULT  Goal: Achieves stable or improved neurological status  Description: INTERVENTIONS  - Monitor and report changes in neurological status  - Monitor vital signs such as temperature, blood pressure, glucose, and any other labs ordered   - Initiate measures to prevent increased intracranial pressure  - Monitor for seizure activity and implement precautions if appropriate      Outcome: Progressing  Goal: Remains free of injury related to seizures activity  Description: INTERVENTIONS  - Maintain airway, patient safety  and administer oxygen as ordered  - Monitor patient for seizure activity, document and report duration and description of seizure to physician/advanced practitioner  - If seizure occurs,  ensure patient safety during seizure  - Reorient patient post seizure  - Seizure pads on all 4 side rails  - Instruct patient/family to notify RN of any seizure activity including if an aura is experienced  - Instruct patient/family to call for assistance with activity based on nursing assessment  - Administer anti-seizure medications if ordered    Outcome: Progressing  Goal: Achieves maximal functionality and self care  Description: INTERVENTIONS  - Monitor swallowing and airway patency with patient fatigue and changes in neurological status  - Encourage and assist patient to increase activity and self care.    - Encourage visually impaired, hearing impaired and aphasic patients to use assistive/communication devices  Outcome: Progressing     Problem: GENITOURINARY - ADULT  Goal: Maintains or returns to baseline urinary function  Description: INTERVENTIONS:  - Assess urinary function  - Encourage oral fluids to ensure adequate hydration if ordered  - Administer IV fluids as ordered to ensure adequate hydration  - Administer ordered medications as needed  - Offer frequent toileting  - Follow urinary retention protocol if ordered  Outcome: Progressing  Goal: Absence of urinary retention  Description: INTERVENTIONS:  - Assess patient’s ability to void and empty bladder  - Monitor I/O  - Bladder scan as needed  - Discuss with physician/AP medications to alleviate retention as needed  - Discuss catheterization for long term situations as appropriate  Outcome: Progressing  Goal: Urinary catheter remains patent  Description: INTERVENTIONS:  - Assess patency of urinary catheter  - If patient has a chronic hussein, consider changing catheter if non-functioning  - Follow guidelines for intermittent irrigation of non-functioning urinary catheter  Outcome: Progressing     Problem: SKIN/TISSUE INTEGRITY - ADULT  Goal: Skin Integrity remains intact(Skin Breakdown Prevention)  Description: Assess:  -Perform Gregory assessment   -Clean and moisturize skin   -Inspect skin when repositioning, toileting, and assisting with ADLS  -Assess under medical devices   -Assess extremities for adequate circulation and sensation     Bed Management:  -Have minimal linens on bed & keep smooth, unwrinkled  -Change linens as needed when moist or perspiring      Toileting:  -Offer bedside commode  -Assess for incontinence   -Use incontinent care products after each incontinent episode    Activity:  -Mobilize patient   -Encourage activity and walks on unit  -Encourage or provide ROM exercises   -Turn and reposition patient every 2 Hours  -Use appropriate equipment to lift or move patient in bed      Skin Care:  -Avoid use of baby powder, tape, friction and shearing, hot water or constrictive clothing  -Relieve pressure over bony prominences  -Do not massage red bony areas    Next Steps:  -Teach patient strategies to minimize risks   -Consider consults to  interdisciplinary teams  Outcome: Progressing  Goal: Incision(s), wounds(s) or drain site(s) healing without S/S of infection  Description: INTERVENTIONS  - Assess and document dressing, incision, wound bed, drain sites and surrounding tissue  - Provide patient and family education  - Perform skin care/dressing changes  Outcome: Progressing  Goal: Pressure injury heals and does not worsen  Description: Interventions:  - Implement low air loss mattress or specialty surface (Criteria met)  - Apply silicone foam dressing  - Use special pressure reducing interventions when in chair   - Apply fecal or urinary incontinence containment device   - Perform passive or active ROM   - Turn and reposition patient & offload bony prominences every 2 hours   - Utilize friction reducing device or surface for transfers   - Consider consults to  interdisciplinary teams   - Use incontinent care products after each incontinent episode   - Consider nutrition services referral as needed  Outcome: Progressing     Problem: MUSCULOSKELETAL - ADULT  Goal: Maintain or return mobility to safest level of function  Description: INTERVENTIONS:  - Assess patient's ability to carry out ADLs; assess patient's baseline for ADL function and identify physical deficits which impact ability to perform ADLs (bathing, care of mouth/teeth, toileting, grooming, dressing, etc.)  - Assess/evaluate cause of self-care deficits   - Assess range of motion  - Assess patient's mobility  - Assess patient's need for assistive devices and provide as appropriate  - Encourage maximum independence but intervene and supervise when necessary  - Involve family in performance of ADLs  - Assess for home care needs following discharge   - Consider OT consult to assist with ADL evaluation and planning for discharge  - Provide patient education as appropriate  Outcome: Progressing  Goal: Maintain proper alignment of affected body part  Description: INTERVENTIONS:  - Support, maintain and protect limb and body alignment  - Provide patient/ family with appropriate education  Outcome: Progressing

## 2023-07-17 NOTE — ASSESSMENT & PLAN NOTE
- secondary to fall  - 7/13 CT CAP- new mild height loss involving the superior endplates of L2 and L4  - neurosurgery consulted and recommendations appreciated for conservative managment  - Lumbar spine precautions  - continue TSLO brace  - 7/14 upright xrays completed and reviewed by neurosurgery  - PT/OT recommend acute rehab  - CM consulted for placement  - f/u with neurosurgery in 2 weeks with repeat upright xrays at that time

## 2023-07-17 NOTE — ASSESSMENT & PLAN NOTE
- Acute pain secondary to traumatic injuries. - Continue multi modal pain regimen. D/C IV dilaudid. - Bowel regimen as long as using opioids.  - Continue to monitor pain and adjust regimen as indicated.

## 2023-07-17 NOTE — ASSESSMENT & PLAN NOTE
- patient has history of gait instability  - sustained below stated injuries  - PT/OT recommending inpatient rehab  - CM for rehab placement

## 2023-07-17 NOTE — CASE MANAGEMENT
Case Management Discharge Planning Note    Patient name Sera Deep  Location [facility] 605/[facility] 616-26 MRN [id number]  : 1955 Date 2023       Current Admission Date: 2023  Current Admission Diagnosis:Compression fracture of lumbar vertebra [facility])   Patient Active Problem List    Diagnosis Date Noted   • Compression fracture of lumbar vertebra ([address]) 2023   • Fall 2023   • Acute pain due to trauma 2023   • Right hip pain 2023   • Lumbar back pain 2023   • Post-menopausal 2023   • Liver cell carcinoma ([address]) 2023   • Chronic obstructive pulmonary disease, unspecified COPD type ([address]) 2023   • Alcohol abuse with other alcohol-induced disorder ([address]) 2023   • Stage 3a chronic kidney disease ([address]) 2023   • Other forms of angina pectoris ([address])    • Hyponatremia 10/07/2021   • OAB (overactive bladder) 2021   • Liver failure without hepatic coma, unspecified chronicity ([address]) 2021   • Thrombocytopenia ([address]) 2021   • Microscopic colitis 2021   • Increased frequency of urination 2020   • S/P endoscopic carpal tunnel release 2019   • Trigger finger of right thumb 2019   • Cirrhosis ([address]) 10/07/2019   • Asthma 10/07/2019   • Sleep apnea 10/07/2019   • Depression, recurrent (HCC)    • Urinary retention 2019   • Lumbar facet arthropathy 2018   • Decompensated hepatic cirrhosis ([address]) 2018   • Essential hypertension 2018   • Hypokalemia 2017   • Acute colitis 2017   • Carpal tunnel syndrome of left wrist 2017   • Primary osteoarthritis of first carpometacarpal joint of left hand 2017   • Trigger thumb, left thumb 2017   • SOB (shortness of breath) 2017   • Palpitation 2016   • Microscopic hematuria 2016   • Midline cystocele 2016   • Mixed incontinence 2015   • Outlet dysfunction constipation 2015   • Pelvic pain in female 11/12/2015   • Recurrent UTI (urinary tract infection) 11/12/2015   • Incisional hernia, without obstruction or gangrene 11/09/2015   • GERD (gastroesophageal reflux disease) 09/29/2015   • Ascites due to alcoholic cirrhosis ([address]) 97/15/7850   • Portal hypertension ([address]) 03/08/2013   • Type 2 diabetes mellitus ([address]) 03/08/2013   • Hepatic cirrhosis ([address]) 06/25/2012   • Anxiety state 12/08/2011   • DDD (degenerative disc disease), lumbosacral 09/19/2011   • Nontoxic multinodular goiter 07/20/2011   • Other disorders of lung 07/20/2011   • Hemangioma of intra-abdominal structure 02/16/2011   • Allergic rhinitis 02/08/2010   • Mild intermittent asthma 02/08/2010   • Osteoarthrosis 02/08/2010   • Obstructive sleep apnea 02/08/2010   • Chronic nonalcoholic liver disease 62/72/6797      LOS (days): 3  Geometric Mean LOS (GMLOS) (days): 2.80  Days to GMLOS:-0.7     OBJECTIVE:  Risk of Unplanned Readmission Score: 20.37         Current admission status: Inpatient   Preferred Pharmacy:   [address] [place] [address], [address] [place] [id number]  Phone: [phone] Fax: [phone]    Primary Care Provider: JONE Davies    Primary Insurance: MEDICARE  Secondary Insurance: [facility] DETAILS:    Pt will d/c to [facility] today @1600 via [facility].    Pt is aware and in agreement

## 2023-07-17 NOTE — PLAN OF CARE
Problem: Prexisting or High Potential for Compromised Skin Integrity  Goal: Skin integrity is maintained or improved  Description: INTERVENTIONS:  - Identify patients at risk for skin breakdown  - Assess and monitor skin integrity  - Assess and monitor nutrition and hydration status  - Monitor labs   - Assess for incontinence   - Turn and reposition patient  - Assist with mobility/ambulation  - Relieve pressure over bony prominences  - Avoid friction and shearing  - Provide appropriate hygiene as needed including keeping skin clean and dry  - Evaluate need for skin moisturizer/barrier cream  - Collaborate with interdisciplinary team   - Patient/family teaching  - Consider wound care consult   Outcome: Progressing     Problem: SAFETY ADULT  Goal: Maintain or return to baseline ADL function  Description: INTERVENTIONS:  -  Assess patient's ability to carry out ADLs; assess patient's baseline for ADL function and identify physical deficits which impact ability to perform ADLs (bathing, care of mouth/teeth, toileting, grooming, dressing, etc.)  - Assess/evaluate cause of self-care deficits   - Assess range of motion  - Assess patient's mobility; develop plan if impaired  - Assess patient's need for assistive devices and provide as appropriate  - Encourage maximum independence but intervene and supervise when necessary  - Involve family in performance of ADLs  - Assess for home care needs following discharge   - Consider OT consult to assist with ADL evaluation and planning for discharge  - Provide patient education as appropriate  Outcome: Progressing     Problem: NEUROSENSORY - ADULT  Goal: Achieves stable or improved neurological status  Description: INTERVENTIONS  - Monitor and report changes in neurological status  - Monitor vital signs such as temperature, blood pressure, glucose, and any other labs ordered   - Initiate measures to prevent increased intracranial pressure  - Monitor for seizure activity and implement precautions if appropriate      Outcome: Progressing     Problem: GENITOURINARY - ADULT  Goal: Urinary catheter remains patent  Description: INTERVENTIONS:  - Assess patency of urinary catheter  - If patient has a chronic hussein, consider changing catheter if non-functioning  - Follow guidelines for intermittent irrigation of non-functioning urinary catheter  Outcome: Progressing

## 2023-07-17 NOTE — PROGRESS NOTES
Progress Note - Geriatric Medicine   Eliane Otero 77 y.o. female MRN: [id number]  Unit/Bed#: [facility] 605-01 Encounter: [id number]      Assessment/Plan:    Ambulatory dysfunction with fall   -reportedly mechanical fall at home  -injuries as below  -remains high risk future falls, cont fall precautions  -encourage assist with all tx and maintain env free of fall hazards  -PT/OT following     L2 and L4 compression fracture   -s/p fall as above  -conservative management per Nsx  -TLSO brace as directed  -spine precautions   -continue acute pain control   -neurovascular checks per protocol     Acute pain due to trauma  -continue acute multimodal pain control per segun protocol being mindful of Tylenol dosing I setting of hepatic cirrhosis  -bowel regimen to tx constipation, suppository earlier today    Alcoholic cirrhosis of liver with ascites   -follows regularly with GI  -undergoes regularly scheduled paracentesis as o/p    Deconditioning/debiltiy/frailty  -clinical frailty scale stage IV, vulnerable  -continue optimization of chronic conditions  -continue to address acute derangements as arise  -encourage well balanced nutrition and healthy lifestyle choices     High risk developing delirium   -ensure pain well controlled  -maintain norm circadian rhythm  -tx constipation, suppository admin earlier this morning  -reorient frequently as appropriate     Care coordination: rounded with Jeferson Iniguez (GLEN)    Subjective:     Landen Coughlin is seen and examined at bedside where she is sitting resting, she reports that her back pain is currently approximately 4/10. She feels ready to have a BM now since receiving a suppository, she offers no additional acute complaints at this time. Review of Systems   Constitutional: Positive for appetite change (poor). Negative for chills and fever. HENT: Negative. Eyes: Negative. Respiratory: Negative. Negative for shortness of breath. Cardiovascular: Negative.     Gastrointestinal: Urgent need to have BM s/p suppository    Genitourinary: Negative. Musculoskeletal: Positive for back pain and gait problem. Skin: Negative. Neurological: Negative for dizziness, light-headedness and headaches. Hematological: Negative. Psychiatric/Behavioral: Negative. All other systems reviewed and are negative. Objective:     Vitals: Blood pressure 117/66, pulse 65, temperature 98.3 °F (36.8 °C), resp. rate 16, height 5' 4" (1.626 m), weight 83.8 kg (184 lb 11.9 oz), SpO2 97 %. ,Body mass index is 31.71 kg/m². Intake/Output Summary (Last 24 hours) at 7/17/2023 1302  Last data filed at 7/17/2023 1134  Gross per 24 hour   Intake 415 ml   Output 1000 ml   Net -585 ml     Current Medications: Reviewed    Physical Exam:   Physical Exam  Vitals and nursing note reviewed. Constitutional:       General: She is not in acute distress. Appearance: She is obese. She is not toxic-appearing. HENT:      Head: Normocephalic and atraumatic. Nose: Nose normal.      Mouth/Throat:      Mouth: Mucous membranes are moist.      Comments: Dentition in tact   Eyes:      General: No scleral icterus. Right eye: No discharge. Left eye: No discharge. Conjunctiva/sclera: Conjunctivae normal.      Comments: Wearing glasses    Neck:      Comments: Trachea midline, phonation normal  Cardiovascular:      Rate and Rhythm: Normal rate. Pulses: Normal pulses. Pulmonary:      Effort: Pulmonary effort is normal. No respiratory distress. Breath sounds: No wheezing. Abdominal:      General: Bowel sounds are normal.   Musculoskeletal:      Cervical back: Neck supple. Comments: Reduced overall muscle mass    LSO brace in place    Skin:     General: Skin is warm and dry. Neurological:      Mental Status: She is alert.       Comments: Awake and alert, answering ques appropriately   Psychiatric:         Mood and Affect: Mood normal.         Behavior: Behavior normal.        Invasive Devices     Peripheral Intravenous Line  Duration           Peripheral IV 07/13/23 Right Antecubital 3 days          Drain  Duration           External Urinary Catheter 1 day              Lab, Imaging and other studies:    I have personally reviewed pertinent lab results including the following:    Accuchecks past 24H    I have personally reviewed the following imaging study reports in PACS:    No new imaging overnight for review

## 2023-07-17 NOTE — PLAN OF CARE
Problem: PHYSICAL THERAPY ADULT  Goal: Performs mobility at highest level of function for planned discharge setting. See evaluation for individualized goals. Description: Treatment/Interventions: Functional transfer training, LE strengthening/ROM, Therapeutic exercise, Endurance training, Patient/family training, Equipment eval/education, Bed mobility, Gait training  Equipment Recommended: Patricia Babb       See flowsheet documentation for full assessment, interventions and recommendations. Outcome: Progressing  Note: Prognosis: Good  Problem List: Decreased strength, Decreased endurance, Decreased range of motion, Impaired balance, Decreased mobility, Pain  Assessment: Pt supine in bed at time of PT treatment. Pt A&O x4 and reports 2/10 pain in her back. Pt is Chelly x1 for bed mobility, STS transfers, and ambulation. Pt required modA x1 for toilet transfer due to lower toilet seat. Pt tolerated ambulation to 50 ft x2 before fatigue set in limiting pt's ability to ambulate further. Pt was able to maintain 3 mins of static standing with RW for assistance with lower body dressing and waiting for nurse to give suppository. Pt demonstrates ability to perform seated therapeutic exercises safely and independently without c/o increased pain or fatigue. Pt educated on the importance of continuing to perform exercises independently during hospital stay. Pt additionally educated on the importance of calling for assistance when wanting to get out of the chair d/t increased fall risk. Pt verbalized understanding. Pt's current functional limitations include LSO brace and spinal precautions, decreased LE strength and ROM, decreased endurance, impaired balance, pain, fall risk, decreased IND. Following PT treatment, pt was left OOB in chair with phone, call bell, and personal belongings in reach. PT continues to recommend D/C to rehab once medically cleared. Will continue to follow as able.         PT Discharge Recommendation: Post acute rehabilitation services    See flowsheet documentation for full assessment.

## 2023-07-17 NOTE — TELEPHONE ENCOUNTER
07/17/2023-PT STILL IN HOSPITAL  08/01/2023 APT W/LUMBAR SPINE XRAY      07/14/2023-Sarah Talley PA-C   Can we please schedule this patient for a 2-week hospital follow-up appointment with repeat upright x-rays to follow-up in regards to lumbar compression fractures?  Appointment can be scheduled with an AP solo.

## 2023-07-17 NOTE — ASSESSMENT & PLAN NOTE
- patient with chronic hyponatremia secondary to cirrhosis and diuretic use  - Admission Na is 128  - Review of prior Na show baseline in low 130s  - started on 100 mL/hr NS on admission  - 7/14 Na 130, which appears to be her baseline  - d/c IV fluids on 7/14  - no further rechecks needed, as problem has resolved

## 2023-07-17 NOTE — CASE MANAGEMENT
Case Management Discharge Planning Note    Patient name Summer Lucas  Location [facility] 605/[facility] 269-21 MRN [id number]  : 1955 Date 2023       Current Admission Date: 2023  Current Admission Diagnosis:Compression fracture of lumbar vertebra [facility])   Patient Active Problem List    Diagnosis Date Noted   • Compression fracture of lumbar vertebra ([address]) 2023   • Fall 2023   • Acute pain due to trauma 2023   • Right hip pain 2023   • Lumbar back pain 2023   • Post-menopausal 2023   • Liver cell carcinoma ([address]) 2023   • Chronic obstructive pulmonary disease, unspecified COPD type ([address]) 2023   • Alcohol abuse with other alcohol-induced disorder ([address]) 2023   • Stage 3a chronic kidney disease ([address]) 2023   • Other forms of angina pectoris ([address])    • Hyponatremia 10/07/2021   • OAB (overactive bladder) 2021   • Liver failure without hepatic coma, unspecified chronicity ([address]) 2021   • Thrombocytopenia ([address]) 2021   • Microscopic colitis 2021   • Increased frequency of urination 2020   • S/P endoscopic carpal tunnel release 2019   • Trigger finger of right thumb 2019   • Cirrhosis ([address]) 10/07/2019   • Asthma 10/07/2019   • Sleep apnea 10/07/2019   • Depression, recurrent (HCC)    • Urinary retention 2019   • Lumbar facet arthropathy 2018   • Decompensated hepatic cirrhosis ([address]) 2018   • Essential hypertension 2018   • Hypokalemia 2017   • Acute colitis 2017   • Carpal tunnel syndrome of left wrist 2017   • Primary osteoarthritis of first carpometacarpal joint of left hand 2017   • Trigger thumb, left thumb 2017   • SOB (shortness of breath) 2017   • Palpitation 2016   • Microscopic hematuria 2016   • Midline cystocele 2016   • Mixed incontinence 2015   • Outlet dysfunction constipation 2015   • Pelvic pain in female 11/12/2015   • Recurrent UTI (urinary tract infection) 11/12/2015   • Incisional hernia, without obstruction or gangrene 11/09/2015   • GERD (gastroesophageal reflux disease) 09/29/2015   • Ascites due to alcoholic cirrhosis ([address]) 87/60/4771   • Portal hypertension ([address]) 03/08/2013   • Type 2 diabetes mellitus ([address]) 03/08/2013   • Hepatic cirrhosis ([address]) 06/25/2012   • Anxiety state 12/08/2011   • DDD (degenerative disc disease), lumbosacral 09/19/2011   • Nontoxic multinodular goiter 07/20/2011   • Other disorders of lung 07/20/2011   • Hemangioma of intra-abdominal structure 02/16/2011   • Allergic rhinitis 02/08/2010   • Mild intermittent asthma 02/08/2010   • Osteoarthrosis 02/08/2010   • Obstructive sleep apnea 02/08/2010   • Chronic nonalcoholic liver disease 00/81/2994      LOS (days): 3  Geometric Mean LOS (GMLOS) (days): 2.80  Days to GMLOS:-0.6     OBJECTIVE:  Risk of Unplanned Readmission Score: 20.37         Current admission status: Inpatient   Preferred Pharmacy:   [address] [place] [address], [address] [place] [id number]  Phone: [phone] Fax: [phone]    Primary Care Provider: JONE Palafox    Primary Insurance: MEDICARE  Secondary Insurance: [facility] DETAILS:    Pt accepted to [facility], [address] [facility] [place].  Pt's preference is [facility]. CM informed facility about possible d/c there today v tomorrow.

## 2023-07-17 NOTE — INCIDENTAL FINDINGS
The following findings require follow up:  Radiographic finding   Finding: Incidental thyroid nodule(s) for which nonemergent thyroid ultrasound is recommended. Cirrhotic liver morphology with a low-attenuation region in the right hepatic lobe. Recommend MRI with and without contrast for complete evaluation     Small pulmonary nodules. Recommend 12-month follow-up noncontrast CT of the chest.      Follow up required: family doctor, f/u CT chest in 12 months, MRI abdomen with and without contrast, non-emergent thyroid ultrasound   Follow up should be done within 2 week(s)    Patient informed of incidental findings and recommended follow up. She verbalizes understanding.

## 2023-07-17 NOTE — DISCHARGE SUMMARY
[address] [facility]  Discharge- [facility] 1955, 77 y.o. female MRN: [id number]  Unit/Bed#: [facility] 605-01 Encounter: [id number]  Primary Care Provider: JONE Velez   Date and time admitted to hospital: 7/14/2023  2:47 AM    [address]  - patient has history of gait instability  - sustained below stated injuries  - PT/OT recommending inpatient rehab  - CM for rehab placement    * Compression fracture of lumbar vertebra ([address])  Assessment & Plan  - secondary to fall  - 7/13 CT CAP- new mild height loss involving the superior endplates of L2 and L4  - neurosurgery consulted and recommendations appreciated for conservative managment  - Lumbar spine precautions  - continue TSLO brace  - 7/14 upright xrays completed and reviewed by neurosurgery  - PT/OT recommend acute rehab  - CM consulted for placement  - f/u with neurosurgery in 2 weeks with repeat upright xrays at that time    Acute pain due to trauma  Assessment & Plan  - Acute pain secondary to traumatic injuries. - Continue multi modal pain regimen. D/C IV dilaudid. - Bowel regimen as long as using opioids.  - Continue to monitor pain and adjust regimen as indicated.       Type 2 diabetes mellitus [facility])  Assessment & Plan  Lab Results   Component Value Date    HGBA1C 5.2 04/24/2023       Recent Labs     07/16/23  1128 07/16/23  1609 07/16/23  2150 07/17/23  0711   POCGLU 94 200* 147* 157*       Blood Sugar Average: Last 72 hrs:  (P) [id number]     - SSI algorithm 3  - diabetic diet  - f/u with PCP    Hyponatremia  Assessment & Plan  - patient with chronic hyponatremia secondary to cirrhosis and diuretic use  - Admission Na is 128  - Review of prior Na show baseline in low 130s  - started on 100 mL/hr NS on admission  - 7/14 Na 130, which appears to be her baseline  - d/c IV fluids on 7/14  - no further rechecks needed, as problem has resolved    GERD (gastroesophageal reflux disease)  Assessment & Plan  - substitute 40 mg pantoprazole for home lansoprazole              Medical Problems     Resolved Problems  Date Reviewed: 7/17/2023          Resolved    Dizziness 7/14/2023     Resolved by  Anupam De Jesus MD          Admission Date:   Admission Orders (From admission, onward)     Ordered        07/14/23 0255  Inpatient Admission  Once                        Admitting Diagnosis: Fall from standing, initial encounter [W19. XXXA]  Unspecified multiple injuries, initial encounter [T07. XXXA]  Compression fracture of L4 vertebra, initial encounter ([address]) [S32.040A]    HPI: As documented by Dr. Stephenie Delong who evaluated the patient on admission, "Sukhdeep Callaway is a 77 y.o. female with a pmhx of non alcoholic cirrhosis, depression, T2DM, GERD, previous DVT, ZEKE who presents as a trauma transfer from [facility] after falling today at home. Patient states she was reaching in a cupboard trying to put paper away when she had acute, sudden onset of severe, sharp pain in her lower spine. She states this was the most severe pain she has ever felt, causing her to fall to the floor. She then pressed her medic alert button and EMS arrived. She denies LOC or head strike, denies syncope. She reports chronic back pain but pain tonight was unlike this. GCS 15."    Procedures Performed: No orders of the defined types were placed in this encounter. Summary of Hospital Course: Patient was placed on the trauma service following fall when she sustained L2/L4 fractures. She was managed conservatively by neurosurgery in an LSO brace. She was neuro intact and pain was controlled. She was ambulatory with PT/OT who recommended inpatient rehab. She is medically stable for d/c today. She will f/u with neurosurgery in  2 weeks with upright xrays. Today she is feeling well. She has no new complaints. Her pain is controlled. She'd like to have a BM today and is agreeable to a suppository.  She is motivated to get to rehab.     Exam:  Vitals:    07/17/23 1134   BP: 117/66   Pulse: 65   Resp: 16   Temp: 98.3 °F (36.8 °C)   SpO2: 97%     GEN: NAD  HEENT: NCAT  NEURO: GCS 15,non-focal  CV: RRR, no MGR  PULM: CTA bilaterally  GI: soft,non-tender,non-distended  : voiding  MSK: no edema, contusion or deformity  SKIN: pink, warm, dry    Significant Findings, Care, Treatment and Services Provided:   XR spine lumbar 2 or 3 views injury    Result Date: 7/17/2023  Impression: Transitional anatomy is evident. For the purposes of this study, numbering will be kept consistent with the previous CT report. Unchanged appearance of mild superior plate compression fractures of L2-L4 vertebrae. Workstation performed: [id number]       Complications: none    Condition at Discharge: good         Discharge instructions/Information to patient and family:   See after visit summary for information provided to patient and family. Provisions for Follow-Up Care:  See after visit summary for information related to follow-up care and any pertinent home health orders. PCP: JONE Vincent    Disposition: Short-term rehab at 1600    Planned Readmission: No    Discharge Statement   I spent 25 minutes discharging the patient. This time was spent on the day of discharge. I had direct contact with the patient on the day of discharge. Additional documentation is required if more than 30 minutes were spent on discharge. Discharge Medications:  See after visit summary for reconciled discharge medications provided to patient and family.

## 2023-07-18 ENCOUNTER — PATIENT OUTREACH (OUTPATIENT)
Age: 68
End: 2023-07-18

## 2023-07-18 ENCOUNTER — PATIENT OUTREACH (OUTPATIENT)
Dept: CASE MANAGEMENT | Facility: OTHER | Age: 68
End: 2023-07-18

## 2023-07-18 NOTE — PROGRESS NOTES
Traci received from [facility] [place] indicating the patient discharged 7/17/23 to [facility]. Email sent to facility to inform them I will be following the patient during their skilled stay. This Admin Coordinator will continue to monitor via chart review.

## 2023-07-18 NOTE — PROGRESS NOTES
Outpatient Care Management Note:    ADT alert received that Aleksey Stanley was discharged to [facility] after a fall with L2-L4 fractures. She is using a TLSO brace. Patient will be followed by Winston Dos Santos.

## 2023-07-20 ENCOUNTER — APPOINTMENT (OUTPATIENT)
Age: 68
End: 2023-07-20
Payer: MEDICARE

## 2023-07-20 NOTE — TELEPHONE ENCOUNTER
7/21/23- CALLED [facility] [phone] AND WAS ADVISED [facility] IS OOO TILL MONDAY 7/24. WILL MAKE ANOTHER ATTEMPT TO CONFIRM MONDAY 7/20/23- PT DISCHARGED TO [facility] [phone]. TRANSFERRED TO [facility] OF "[facility]IGI" AND [facility] TO CONFIRM 8/1/23 F/U APT AND COMPLETE XRAYS 4-5 DAYS PRIOR.  ADVISED IF DONE OUTSIDE NETWORK, IMAGES MUST BE LOADED ON DISC FOR APPT AND ARRIVE EARLY FOR UPLOAD

## 2023-07-25 ENCOUNTER — PATIENT OUTREACH (OUTPATIENT)
Dept: CASE MANAGEMENT | Facility: OTHER | Age: 68
End: 2023-07-25

## 2023-07-25 NOTE — PROGRESS NOTES
Chart review completed. Email sent to facility requesting update on patient. This Admin Coordinator will continue to monitor via chart review throughout episode.

## 2023-07-26 ENCOUNTER — APPOINTMENT (OUTPATIENT)
Age: 68
End: 2023-07-26
Payer: MEDICARE

## 2023-07-27 ENCOUNTER — APPOINTMENT (OUTPATIENT)
Age: 68
End: 2023-07-27
Payer: MEDICARE

## 2023-07-31 ENCOUNTER — TELEPHONE (OUTPATIENT)
Dept: NEUROSURGERY | Facility: CLINIC | Age: 68
End: 2023-07-31

## 2023-07-31 NOTE — TELEPHONE ENCOUNTER
Lea Da Silva from [facility] was unaware of appointment for tomorrow 08/01/23 and xrays needed. Rescheduled a[ppopintment to 08/03/23 with bandar at [facility]. Faxed over script for xrays   Explained patient should arrive early for upload.

## 2023-08-01 ENCOUNTER — APPOINTMENT (OUTPATIENT)
Age: 68
End: 2023-08-01
Payer: MEDICARE

## 2023-08-01 DIAGNOSIS — S32.000A COMPRESSION FRACTURE OF LUMBAR VERTEBRA, UNSPECIFIED LUMBAR VERTEBRAL LEVEL, INITIAL ENCOUNTER (HCC): ICD-10-CM

## 2023-08-01 PROCEDURE — 72100 X-RAY EXAM L-S SPINE 2/3 VWS: CPT

## 2023-08-02 ENCOUNTER — PATIENT OUTREACH (OUTPATIENT)
Dept: CASE MANAGEMENT | Facility: OTHER | Age: 68
End: 2023-08-02

## 2023-08-02 NOTE — PROGRESS NOTES
Chart review completed. Email sent to facility requesting update on patient. This Admin Coordinator will continue to monitor via chart review throughout episode. Update received the patient continues with Skilled care no LCD at this time.

## 2023-08-03 ENCOUNTER — TELEPHONE (OUTPATIENT)
Dept: NEUROSURGERY | Facility: CLINIC | Age: 68
End: 2023-08-03

## 2023-08-03 ENCOUNTER — OFFICE VISIT (OUTPATIENT)
Dept: NEUROSURGERY | Facility: CLINIC | Age: 68
End: 2023-08-03
Payer: MEDICARE

## 2023-08-03 VITALS
WEIGHT: 182 LBS | SYSTOLIC BLOOD PRESSURE: 106 MMHG | HEART RATE: 68 BPM | HEIGHT: 64 IN | TEMPERATURE: 97.5 F | BODY MASS INDEX: 31.07 KG/M2 | RESPIRATION RATE: 16 BRPM | DIASTOLIC BLOOD PRESSURE: 60 MMHG | OXYGEN SATURATION: 98 %

## 2023-08-03 DIAGNOSIS — S32.009A LUMBAR VERTEBRAL FRACTURE (HCC): Primary | ICD-10-CM

## 2023-08-03 PROCEDURE — 99213 OFFICE O/P EST LOW 20 MIN: CPT | Performed by: PHYSICIAN ASSISTANT

## 2023-08-03 RX ORDER — ONDANSETRON 4 MG/1
4 TABLET, FILM COATED ORAL EVERY 6 HOURS PRN
COMMUNITY

## 2023-08-03 RX ORDER — MAG HYDROX/ALUMINUM HYD/SIMETH 200-200-20
30 SUSPENSION, ORAL (FINAL DOSE FORM) ORAL DAILY PRN
COMMUNITY

## 2023-08-03 NOTE — TELEPHONE ENCOUNTER
XRAY DONE 8/1 called for READ at 9:20AM   Unaware patient had STL xray completed  Told patient will bring [address] [facility]

## 2023-08-03 NOTE — PROGRESS NOTES
Office Note - Neurosurgery   Samira Poster 77 y.o. female MRN: [id number]      Assessment:    Patient is a 77 yrs old pleasant woman with PMHx of cirrhosis, liver cell carcinoma, ZEKE, HTN, COPD, Anxiety, depression, Alcohol abuse, DM, Asthma, portal HTN, CKD, and recently injured her back after she bent down about 2-3 weeks ago and had L2 and L4 compression deformities. Patient is wearing LSO and she is here today with follow up upright Lumbar spine xrays in brace. Images shows more or less stable mild  L2 and L4 compression deformity. Patient reports 4/10 localized Lower back pain, no radiculopathy, but has Hx of peripheral neuropathy due to DM, Arthritis of the hips and knees and chronic  left leg weakness. Patient has also gait issues, no B/B issues. Currently, doing PT and takes Oxy at night and Tylenol during day time. Residing at Nursing home. Exam-A&OX2, disoriented to place. Mary, strength 5/5 , except slight left leg weakness likely due to pain inhibition in her left knee and hip, sensation to light decreased in the distal leg including the feet, DTR 2+. No clonus bilaterally. Tenderness on posterior lumbar spine palpation. LSO brace on. Hx, PEx,and images reviewed with the patient. Management plan discussed. I would recommend 4 weeks follow-up upright lumbar spine x-rays in brace. DEXA scan for OP surveillance. Advised to continue wearing LSO brace when upright and at 45 degree head of bed. Continue physical therapy. Fall precaution, avoid lifting heavy objects, excessive bending or twisting. All questions and concerns were answered to patient satisfaction. Patient expressed her understanding is in agreement with the plan. Plan:  1. Follow-up upright lumbar spine x-rays in LSO/4 weeks  2. DEXA scan of spine  3. Continue wearing LSO brace when upright as instructed  4. Continue physical therapy and pain medication  5.  Fall precaution, avoid lifting heavy objects, excessive bending or twisting  6. Follow-up in 4 weeks  7. Call with question or concern      Subjective/Objective     C/C: " Lumbar fracture, 2-3 weeks follow up"    HPI     All patient's medical histories were reviewed and updated as appropriate: Allergies, current medication list, past medical history, past surgical history, family history, social history, and current medical lists. Patient is a 77years old pleasant woman from nursing home, here today for 2 to 3 weeks follow-up of lumbar VBs fractures. She is wearing LSO brace and doing physical therapy. And follow-up upright lumbar spine x-rays. Image shows stable mild compression deformity of L2 and L4 vertebral bodies. Patient reports this improvement with her back pain, estimated her pain 4/10. Pain exacerbated with movement and when she stands up from sitting position has chronic hip and knee issues with peripheral neuropathy in the setting of diabetes mellitus and alcohol abuse with cirrhosis. Denies any radicular pain to the lower extremities, but has chronic residual weakness in the left leg. No bowel/bladder dysfunction. Patient denies any fever, chills, rigors, cough or chest pain. ROS  Review of system personally reviewed and updated  Review of Systems   Cardiovascular:        Left side RIB pain ( which is improving)    Gastrointestinal: Positive for constipation and diarrhea. Genitourinary: Positive for frequency. Negative for dysuria. Musculoskeletal: Positive for back pain and gait problem (ambulates today in wheelchair ). 2 [facility] F/U W/LUMBAR SPINE XRAY    s/p Patient had acute onset of midline low back pain that caused her to fall. Unsure of head strike. Complaining of left-sided rib pain ( which Improving) and back pain      rates pain today as discomfort 4/10 Mid back with some pain b/l leg pain when sitting too long denies n/t or weakness- pt also c/o bladder and bowel incontience     Hematological: Bruises/bleeds easily. 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   Tobacco Use   • Smoking status: Never   • Smokeless tobacco: Never   Vaping Use   • Vaping Use: Never used   Substance and Sexual Activity   • Alcohol use: Not Currently     Comment: rarely   • Drug use: Never   • Sexual activity: Not on file   Other Topics Concern   • Not on file   Social History Narrative   • Not on file     Social Determinants of Health     Financial Resource Strain: Not on file   Food Insecurity: No Food Insecurity (7/14/2023)    Hunger Vital Sign    • Worried About Running Out of Food in the Last Year: Never true    • Ran Out of Food in the Last Year: Never true   Transportation Needs: No Transportation Needs (3/30/2023)    PRAPARE - Transportation    • Lack of Transportation (Medical): No    • Lack of Transportation (Non-Medical):  No   Physical Activity: Not on file   Stress: Not on file   Social Connections: Not on file   Intimate Partner Violence: Not on file   Housing Stability: Low Risk  (7/14/2023)    Housing Stability Vital Sign    • Unable to Pay for Housing in the Last Year: No    • Number of Places Lived in the Last Year: 1    • Unstable Housing in the Last Year: No       Past Medical History    Past Medical History:   Diagnosis Date   • Anxiety    • Anxiety and depression 9/29/2015   • Arthritis    • Asthma    • Asthma without status asthmaticus 4/26/2011   • Cirrhosis ([facility])    • Depression    • Diabetes ([facility])    • Disease of thyroid gland     nodules   • Diverticulitis 2011 • Diverticulosis    • DVT (deep venous thrombosis) ([address]) 2013   • GERD (gastroesophageal reflux disease)    • Liver disease     cirrhosis   • Obesity, morbid, BMI 40.0-49.9 ([address]) 8/16/2017   • Pneumonia    • Portal hypertensive gastropathy ([address])    • Sleep apnea    • Vertigo    • Vitreous hemorrhage of left eye ([address]) 1/20/2023       Surgical History    Past Surgical History:   Procedure Laterality Date   • BLADDER SUSPENSION     • CHOLECYSTECTOMY     • COLONOSCOPY  05/24/2019    had multiple with last being [id number]   • COLONOSCOPY  10/07/2004    Viridiana Daniels M.D. / Diverticulosis   • COLONOSCOPY  06/14/2011    Viridiana Daniels M.D. / Diverticulosis   • EGD  02/28/2013    Nikki Wright M.D. / Mild Portal Hypertensive Gastropathy   • EGD  10/01/2019    Viridiana Daniels M.D. / Gastritis   • EGD AND COLONOSCOPY  09/03/2015    Viridiana Daniels M.D. / [facility] Ferns. Diverticulosis. • FLEXIBLE SIGMOIDOSCOPY  03/04/2013    Dante Mcdaniel M.D. / Hemorrhoidal Bleeding   • FLEXIBLE SIGMOIDOSCOPY  05/30/2018    Dante CHLOE Mcdaniel / Internal hemorrhoids. biopsied.    • HERNIA REPAIR     • IR PARACENTESIS  08/23/2018   • IR PARACENTESIS  11/05/2018   • IR PARACENTESIS  01/11/2019   • IR PARACENTESIS  02/27/2019   • IR PARACENTESIS  04/15/2019   • IR PARACENTESIS  06/03/2019   • IR PARACENTESIS  07/10/2019   • IR PARACENTESIS  08/16/2019   • IR PARACENTESIS  09/10/2019   • IR PARACENTESIS  10/07/2019   • IR PARACENTESIS  11/05/2019   • IR PARACENTESIS  11/22/2019   • IR PARACENTESIS  12/17/2019   • IR PARACENTESIS  01/07/2020   • IR PARACENTESIS  01/28/2020   • IR PARACENTESIS  02/18/2020   • IR PARACENTESIS  03/10/2020   • IR PARACENTESIS  03/31/2020   • IR PARACENTESIS  04/21/2020   • IR PARACENTESIS  05/12/2020   • IR PARACENTESIS  06/02/2020   • IR PARACENTESIS  06/30/2020   • IR PARACENTESIS  07/20/2020   • IR PARACENTESIS  08/18/2020   • IR PARACENTESIS  11/12/2020   • IR PARACENTESIS  03/11/2021   • IR PARACENTESIS  2/8/2022   • IR PARACENTESIS  8/24/2022   • IR PARACENTESIS  11/14/2022   • IR PARACENTESIS  12/21/2022   • IR PARACENTESIS  2/1/2023   • IR PARACENTESIS  3/7/2023   • IR PARACENTESIS  3/30/2023   • IR PARACENTESIS  4/26/2023   • IR PARACENTESIS  5/18/2023   • IR PARACENTESIS  7/7/2023   • TONSILLECTOMY     • TRIGGER FINGER RELEASE Bilateral        Medications      Current Outpatient Medications:   •  acetaminophen (TYLENOL) 325 mg tablet, Take 3 tablets (975 mg total) by mouth every 8 (eight) hours, Disp: , Rfl: 0  •  Diclofenac Sodium (VOLTAREN) 1 %, Apply 2 g topically 4 (four) times a day, Disp: 100 g, Rfl: 0  •  escitalopram (LEXAPRO) 20 mg tablet, Take 1 tablet (20 mg total) by mouth daily, Disp: 90 tablet, Rfl: 1  •  furosemide (LASIX) 40 mg tablet, Take 2 tablets (80 mg total) by mouth daily AND 1 tablet (40 mg total) in the evening. Take before meals. (Patient taking differently: Take 120mg in AM), Disp: 270 tablet, Rfl: 3  •  insulin lispro (HumaLOG) 100 units/mL injection, Inject 1-6 Units under the skin 4 (four) times a day (before meals and at bedtime), Disp: , Rfl: 0  •  lansoprazole (PREVACID) 30 mg capsule, Take 1 capsule (30 mg total) by mouth daily, Disp: 90 capsule, Rfl: 3  •  lidocaine (LIDODERM) 5 %, Apply 2 patches topically over 12 hours daily Remove & Discard patch within 12 hours or as directed by MD Do not start before July 18, 2023., Disp: , Rfl: 0  •  methocarbamol (ROBAXIN) 500 mg tablet, Take 1 tablet (500 mg total) by mouth every 6 (six) hours as needed for muscle spasms, Disp: , Rfl: 0  •  metoprolol succinate (TOPROL-XL) 25 mg 24 hr tablet, Take 1 tablet by mouth daily, Disp: , Rfl:   •  oxyCODONE (ROXICODONE) 5 immediate release tablet, 2.5 mg to 5 mg PO every 4 hours as needed for moderate to severe pain. Ongoing therapy. , Disp: 20 tablet, Rfl: 0  •  Ozempic, 0.25 or 0.5 MG/DOSE, 2 MG/1.5ML SOPN, inject 0.5 milligrams subcutaneously every week, Disp: , Rfl:   • polyethylene glycol (MIRALAX) 17 g packet, Take 17 g by mouth daily Do not start before July 18, 2023., Disp: , Rfl: 0  •  senna (SENOKOT) 8.6 mg, Take 2 tablets (17.2 mg total) by mouth daily at bedtime, Disp: , Rfl: 0  •  spironolactone (ALDACTONE) 100 mg tablet, Take 2 tablets (200 mg total) by mouth 2 (two) times a day, Disp: , Rfl:     Allergies    Allergies   Allergen Reactions   • Medical Tape Other (See Comments)     Skin tears  Per patient, Red -"rips my skin"  Per patient, Skin tears  Other reaction(s): Other (See Comments)  Per patient, Skin tears, Red -"rips my skin"   • Penicillins Itching and Rash     rash     • Diclofenac Sodium Itching and Myalgia     Per patient, tolerating Voltaren gel without any reaction started 3/16/23   • Nsaids Other (See Comments)     Cirrhosis of liver- pt reported starting Mobic 3/16/23  Other reaction(s): Other (See Comments)  Per patient, Cirrhosis of liver- pt reported starting 3/16/23   • Pseudoephedrine-Guaifenesin Myalgia and Other (See Comments)     Entex- "jittery"   • Attapulgite    • Cephalexin      ?ithcy   • Clioquinol    • Diclofenac Sodium Itching     Patient reported tolerating Voltaren gel without any reaction started 3/16/23   • Meloxicam Itching   • Nabumetone Other (See Comments)     rash   • Phenylalanine    • Pseudoephedrine-Guaifenesin Other (See Comments)     Entex- "gittery"   • Streptomycin    • Celecoxib Rash   • Penicillin G Rash   • Rofecoxib Rash     skin reaction       The following portions of the patient's history were reviewed and updated as appropriate: allergies, current medications, past family history, past medical history, past social history, past surgical history and problem list.    Investigations    I personally reviewed the XRAY results with the patient:        Physical Exam    There were no vitals filed for this visit. Physical Exam  Constitutional:       Appearance: Normal appearance.    HENT:      Head: Normocephalic and atraumatic. Eyes:      Extraocular Movements: Extraocular movements intact. Cardiovascular:      Rate and Rhythm: Normal rate. Pulses: Normal pulses. Pulmonary:      Effort: Pulmonary effort is normal.   Musculoskeletal:         General: Tenderness present. Cervical back: Normal range of motion. Neurological:      Mental Status: She is alert. GCS: GCS eye subscore is 4. GCS verbal subscore is 5. GCS motor subscore is 6. Cranial Nerves: Cranial nerves 2-12 are intact. Sensory: Sensory deficit present. Motor: Weakness present. Deep Tendon Reflexes: Reflexes are normal and symmetric. Reflex Scores:       Tricep reflexes are 2+ on the right side and 2+ on the left side. Bicep reflexes are 2+ on the right side and 2+ on the left side. Brachioradialis reflexes are 2+ on the right side and 2+ on the left side. Patellar reflexes are 2+ on the right side and 2+ on the left side. Achilles reflexes are 2+ on the right side and 2+ on the left side. Psychiatric:         Speech: Speech normal.       Neurologic Exam     Mental Status   Speech: speech is normal   Level of consciousness: alert    Cranial Nerves   Cranial nerves II through XII intact.      CN III, IV, VI   Nystagmus: none     CN XI   CN XI normal.     Motor Exam   Muscle bulk: normal  Overall muscle tone: normal  Right arm tone: normal  Left arm tone: normal  Right arm pronator drift: absent  Left arm pronator drift: absent  Right leg tone: normal  Left leg tone: normal    Sensory Exam   Right arm light touch: normal  Left arm light touch: normal  Right leg light touch: decreased from knee  Left leg light touch: decreased from knee    Gait, Coordination, and Reflexes     Reflexes   Right brachioradialis: 2+  Left brachioradialis: 2+  Right biceps: 2+  Left biceps: 2+  Right triceps: 2+  Left triceps: 2+  Right patellar: 2+  Left patellar: 2+  Right achilles: 2+  Left achilles: 2+  Right : 2+  Left : 2+  Right Alva: absent  Left Alva: absent  Right ankle clonus: absent  Left pendular knee jerk: absent

## 2023-08-09 ENCOUNTER — PATIENT OUTREACH (OUTPATIENT)
Dept: CASE MANAGEMENT | Facility: OTHER | Age: 68
End: 2023-08-09

## 2023-08-09 NOTE — PROGRESS NOTES
Chart review completed. Email sent to facility requesting update on patient. This Admin Coordinator will continue to monitor via chart review throughout episode. Update received the patient is currently skilled for therapy. No LCD at this time.

## 2023-08-10 ENCOUNTER — OFFICE VISIT (OUTPATIENT)
Age: 68
End: 2023-08-10
Payer: MEDICARE

## 2023-08-10 VITALS
BODY MASS INDEX: 29.96 KG/M2 | DIASTOLIC BLOOD PRESSURE: 62 MMHG | HEIGHT: 64 IN | HEART RATE: 58 BPM | SYSTOLIC BLOOD PRESSURE: 93 MMHG | WEIGHT: 175.5 LBS

## 2023-08-10 DIAGNOSIS — M25.551 GREATER TROCHANTERIC PAIN SYNDROME OF RIGHT LOWER EXTREMITY: ICD-10-CM

## 2023-08-10 DIAGNOSIS — M25.552 GREATER TROCHANTERIC PAIN SYNDROME OF LEFT LOWER EXTREMITY: Primary | ICD-10-CM

## 2023-08-10 DIAGNOSIS — S32.000A COMPRESSION FRACTURE OF LUMBAR VERTEBRA, UNSPECIFIED LUMBAR VERTEBRAL LEVEL, INITIAL ENCOUNTER (HCC): ICD-10-CM

## 2023-08-10 PROCEDURE — 99212 OFFICE O/P EST SF 10 MIN: CPT | Performed by: STUDENT IN AN ORGANIZED HEALTH CARE EDUCATION/TRAINING PROGRAM

## 2023-08-10 NOTE — PROGRESS NOTES
1. Greater trochanteric pain syndrome of left lower extremity        2. Greater trochanteric pain syndrome of right lower extremity        3. Compression fracture of lumbar vertebra, unspecified lumbar vertebral level, initial encounter ([address])          No orders of the defined types were placed in this encounter. Imaging Studies (I personally reviewed images in PACS and report):    • CT thoracolumbar spine without contrast 7/13/2023: Compression fracture deformities of L2 and L4. • CT abdomen pelvis without contrast 3/29/2023: Moderate to large ascites. Cirrhotic changes in the liver with splenomegaly suggesting portal hypertension. In regards to osseous fracture there were no reported acute fracture or destructive osseous lesion seen. • X-ray right hip 3/16/2023: No acute osseous abnormalities. No significant degenerative changes. There is a right pelvic neurostimulator with lead terminating in the region of the bladder. IMPRESSION:  • History of chronic bilateral lateral hip pain secondary to suspected greater trochanteric pain syndrome -previously had relief from ultrasound-guided greater trochanteric bursa cortisone injections in the past along with physical therapy. • However, since our last appointment - Acute on chronic low back pain as well as a recent injury from a fall resulting in compression fractures of L2 and L4 (DOI 7/13/2023)- recently had seen neurosurgery on 8/3/2023    Other factors:  • Liver cirrhosis/liver cell carcinoma with routine paracentesis  • Type 2 diabetes  • COPD  • CKD 3  • BMI >30 - varies based on abdominal edema from ascites  •   PLAN:    • Clinical exam and radiographic imaging reviewed with patient today, with impression as per above. I have discussed with the patient the pathophysiology of this diagnosis and reviewed how the examination correlates with this diagnosis.     • Given patient's recent fall resulting in compression fractures of her lumbar spine, I recommended against repeating greater trochanteric bursa injections today as I suspect most of her pain is secondary to her injury as of now. Patient agreeable as well to defer injections today. • I counseled that after being released through neurosurgery, she can follow-up with me again to decide whether she would want a repeat greater trochanteric bursa injection again in the future. • Form filled out for [facility] noting that we will defer greater trochanteric bursa injections today for now. Return if symptoms worsen or fail to improve. Portions of the record may have been created with voice recognition software. Occasional wrong word or "sound a like" substitutions may have occurred due to the inherent limitations of voice recognition software. Read the chart carefully and recognize, using context, where substitutions have occurred. CHIEF COMPLAINT:      HPI:  Vy Colvin is a 77 y.o. female  who presents with an attendant from [facility] for     Visit 8/10/2023: Follow-up bilateral hip pain:  Patient was last seen on 5/8/2023 when she was given an ultrasound-guided left greater trochanteric bursa cortisone injection. She had previously been given a right-sided greater trochanteric bursa cortisone injection with relief. Since her last appointment, she recently had a fall resulting in compression fractures of her lumbar spine. She recently saw neurosurgery on 8/3/2023. Based on that plan she is to continue wearing an LSO brace when upright, attend physical therapy, obtain a DEXA scan, and a plan follow-up in 4 weeks from their appointment. She is currently in a nursing home at [facility]    Patient reports today that most of her pain is in her midline and paralumbar back pain and can radiate to her hips. Most of her pain is in her back. She is currently wearing an LSO brace. States the pain is constant but has not been worsening.   Patient is unsure whether she would want a repeat injection today given the pain of her back. Medical, Surgical, Family, and Social History    Past Medical History:   Diagnosis Date   • Anxiety    • Anxiety and depression 9/29/2015   • Arthritis    • Asthma    • Asthma without status asthmaticus 4/26/2011   • Cirrhosis ([address])    • Depression    • Diabetes ([address])    • Disease of thyroid gland     nodules   • Diverticulitis 2011   • Diverticulosis    • DVT (deep venous thrombosis) ([address]) 2013   • GERD (gastroesophageal reflux disease)    • Liver disease     cirrhosis   • Obesity, morbid, BMI 40.0-49.9 ([address]) 8/16/2017   • Pneumonia    • Portal hypertensive gastropathy ([address])    • Sleep apnea    • Vertigo    • Vitreous hemorrhage of left eye ([address]) 1/20/2023     Past Surgical History:   Procedure Laterality Date   • BLADDER SUSPENSION     • CHOLECYSTECTOMY     • COLONOSCOPY  05/24/2019    had multiple with last being [id number]   • COLONOSCOPY  10/07/2004    Scooter Wei. Tressa Rodriguez M.D. / Diverticulosis   • COLONOSCOPY  06/14/2011    Scooter Rodriguez M.D. / Diverticulosis   • EGD  02/28/2013    Arti Hinkle M.D. / Mild Portal Hypertensive Gastropathy   • EGD  10/01/2019    Scooter Rodriguez M.D. / Gastritis   • EGD AND COLONOSCOPY  09/03/2015    Scooter Wei. Tressa Rodriguez M.D. / [place]. Diverticulosis. • FLEXIBLE SIGMOIDOSCOPY  03/04/2013    Dianne Guido M.D. / Hemorrhoidal Bleeding   • FLEXIBLE SIGMOIDOSCOPY  05/30/2018    Dianne Guido M.D. / Internal hemorrhoids. biopsied.    • HERNIA REPAIR     • IR PARACENTESIS  08/23/2018   • IR PARACENTESIS  11/05/2018   • IR PARACENTESIS  01/11/2019   • IR PARACENTESIS  02/27/2019   • IR PARACENTESIS  04/15/2019   • IR PARACENTESIS  06/03/2019   • IR PARACENTESIS  07/10/2019   • IR PARACENTESIS  08/16/2019   • IR PARACENTESIS  09/10/2019   • IR PARACENTESIS  10/07/2019   • IR PARACENTESIS  11/05/2019   • IR PARACENTESIS  11/22/2019   • IR PARACENTESIS  12/17/2019   • IR PARACENTESIS  01/07/2020   • IR PARACENTESIS  01/28/2020   • IR PARACENTESIS  02/18/2020   • IR PARACENTESIS  03/10/2020   • IR PARACENTESIS  03/31/2020   • IR PARACENTESIS  04/21/2020   • IR PARACENTESIS  05/12/2020   • IR PARACENTESIS  06/02/2020   • IR PARACENTESIS  06/30/2020   • IR PARACENTESIS  07/20/2020   • IR PARACENTESIS  08/18/2020   • IR PARACENTESIS  11/12/2020   • IR PARACENTESIS  03/11/2021   • IR PARACENTESIS  2/8/2022   • IR PARACENTESIS  8/24/2022   • IR PARACENTESIS  11/14/2022   • IR PARACENTESIS  12/21/2022   • IR PARACENTESIS  2/1/2023   • IR PARACENTESIS  3/7/2023   • IR PARACENTESIS  3/30/2023   • IR PARACENTESIS  4/26/2023   • IR PARACENTESIS  5/18/2023   • IR PARACENTESIS  7/7/2023   • TONSILLECTOMY     • TRIGGER FINGER RELEASE Bilateral      Social History   Social History     Substance and Sexual Activity   Alcohol Use Not Currently    Comment: rarely     Social History     Substance and Sexual Activity   Drug Use Never     Social History     Tobacco Use   Smoking Status Never   • Passive exposure: Never   Smokeless Tobacco Never     Family History   Problem Relation Age of Onset   • COPD Mother    • Heart attack Sister    • Lymphoma Brother    • Colon cancer Maternal Grandmother    • Cancer Maternal Aunt         unknown     Allergies   Allergen Reactions   • Medical Tape Other (See Comments)     Skin tears  Per patient, Red -"rips my skin"  Per patient, Skin tears  Other reaction(s): Other (See Comments)  Per patient, Skin tears, Red -"rips my skin"   • Penicillins Itching and Rash     rash     • Diclofenac Sodium Itching and Myalgia     Per patient, tolerating Voltaren gel without any reaction started 3/16/23   • Nsaids Other (See Comments)     Cirrhosis of liver- pt reported starting Mobic 3/16/23  Other reaction(s):  Other (See Comments)  Per patient, Cirrhosis of liver- pt reported starting 3/16/23   • Pseudoephedrine-Guaifenesin Myalgia and Other (See Comments)     Entex- "jittery"   • Attapulgite    • Cephalexin ?ithcy   • Clioquinol    • Diclofenac Sodium Itching     Patient reported tolerating Voltaren gel without any reaction started 3/16/23   • Meloxicam Itching   • Nabumetone Other (See Comments)     rash   • Phenylalanine    • Pseudoephedrine-Guaifenesin Other (See Comments)     Entex- "gittery"   • Streptomycin    • Celecoxib Rash   • Penicillin G Rash   • Rofecoxib Rash     skin reaction          Physical Exam  BP 93/62   Pulse 58   Ht 5' 4" (1.626 m)   Wt 79.6 kg (175 lb 8 oz)   BMI 30.12 kg/m²     Constitutional:  see vital signs  Gen: Abdominal distention consistent with history of cirrhosis, normocephalic/atraumatic, well-groomed  Pulmonary/Chest: Effort normal. No respiratory distress.    Gait: Currently in wheelchair and wearing an LSO brace      Ortho Exam      Procedures

## 2023-08-16 ENCOUNTER — PATIENT OUTREACH (OUTPATIENT)
Dept: CASE MANAGEMENT | Facility: OTHER | Age: 68
End: 2023-08-16

## 2023-08-16 NOTE — PROGRESS NOTES
Chart review completed. Email sent to facility requesting update on patient. This Admin Coordinator will continue to monitor via chart review throughout episode. Update received from [facility] the patient has a TDD of 8/26/23.

## 2023-08-17 ENCOUNTER — PATIENT OUTREACH (OUTPATIENT)
Dept: CASE MANAGEMENT | Facility: OTHER | Age: 68
End: 2023-08-17

## 2023-08-17 NOTE — PROGRESS NOTES
Chart review complete It has been 30 days since SNF/STR Surveillance episode was opened I will no longer be follow the patient per protocol. Inbasket sent to the [facility] to inform them I will no longer be following the patient. I have removed myself from the care team, updated the Care Coordination note, and closed the episode. Email sent to [facility] to inform them I will no longer be following the patient. Patient has a TDD of 8/26/23 to Home.

## 2023-08-25 ENCOUNTER — TELEPHONE (OUTPATIENT)
Age: 68
End: 2023-08-25

## 2023-08-25 NOTE — TELEPHONE ENCOUNTER
----- Message from Raeann Ornelas MD sent at 4/26/2018 10:22 AM CDT -----  Patient informed of benign diagnosis, no further treatment necessary.   Called patient to schedule wellness visit. Patient is at [address] [facility].

## 2023-08-28 ENCOUNTER — APPOINTMENT (OUTPATIENT)
Age: 68
End: 2023-08-28
Payer: MEDICARE

## 2023-08-28 ENCOUNTER — PATIENT OUTREACH (OUTPATIENT)
Age: 68
End: 2023-08-28

## 2023-08-28 DIAGNOSIS — S32.009A LUMBAR VERTEBRAL FRACTURE (HCC): ICD-10-CM

## 2023-08-28 PROCEDURE — 72100 X-RAY EXAM L-S SPINE 2/3 VWS: CPT

## 2023-08-28 NOTE — PROGRESS NOTES
Outpatient Care Management Note:    Inbasket message received from Yamil Roman that patient has been in SNF Surveillance for 30 days. Zainab Sánchez will no longer be following. She did note that patient has a tentative date of discharge of 8/26. Care manager attempted to outreach [facility]. She did not answer and no voice mail is set up. CM will try again another day.

## 2023-09-01 ENCOUNTER — TELEPHONE (OUTPATIENT)
Dept: NEUROSURGERY | Facility: CLINIC | Age: 68
End: 2023-09-01

## 2023-09-06 ENCOUNTER — PATIENT OUTREACH (OUTPATIENT)
Age: 68
End: 2023-09-06

## 2023-09-06 NOTE — PROGRESS NOTES
Outpatient Care Management Note:    Chart reviewed for outreach purposes. Patient is currently in [facility] after a fall while at rehab. CM will track patient and attempt to outreach on discharge.

## 2023-09-11 ENCOUNTER — PATIENT OUTREACH (OUTPATIENT)
Age: 68
End: 2023-09-11

## 2023-09-11 NOTE — PROGRESS NOTES
Outpatient Care Management Note:    Chart reviewed for outreach purposes. Patient remains hospitalized at [address] after an ORIF left femur. PT/OT are recommending rehab. CM will track patient until it is verified that she is going to rehab. If she goes back to rehab, RN Care Manager will close the referral as patient has been tracked in rehab for greater than 30 days.

## 2023-09-18 ENCOUNTER — PATIENT OUTREACH (OUTPATIENT)
Age: 68
End: 2023-09-18

## 2023-09-18 NOTE — PROGRESS NOTES
Outpatient Care Management Note:    Chart reviewed. Patient was hospitalized at [facility] from 8/30-9/13 after a fall. She had a closed fracture of L3 vertebra, manubrium, left femoral neck, and sacrum. She was discharged to [facility]. CM will close the referral as she has been hospitalized, discharged to rehab, rehospitalized and is now back in rehab. The process has been over 30 days, so CM will no longer follow per protocol. CM will update PCP.

## 2023-09-18 NOTE — PROGRESS NOTES
No problem Pat, thank you so much for letting me know. We were actually never notified of this so I appreciate the update. Have a great day!

## 2023-10-18 ENCOUNTER — TELEPHONE (OUTPATIENT)
Dept: NEUROSURGERY | Facility: CLINIC | Age: 68
End: 2023-10-18

## 2023-10-18 NOTE — TELEPHONE ENCOUNTER
Mailed nsx recall letter- patient is overdue for f/u w xray     See last telephone note & last office note for scheduling

## 2023-10-20 ENCOUNTER — HOSPITAL ENCOUNTER (EMERGENCY)
Facility: HOSPITAL | Age: 68
Discharge: NURSING, REHAB OR LONG-TERM CARE | End: 2023-10-20
Attending: EMERGENCY MEDICINE
Payer: MEDICARE

## 2023-10-20 ENCOUNTER — APPOINTMENT (EMERGENCY)
Dept: RADIOLOGY | Facility: HOSPITAL | Age: 68
End: 2023-10-20
Attending: EMERGENCY MEDICINE
Payer: MEDICARE

## 2023-10-20 VITALS
TEMPERATURE: 98.1 F | SYSTOLIC BLOOD PRESSURE: 90 MMHG | DIASTOLIC BLOOD PRESSURE: 46 MMHG | RESPIRATION RATE: 20 BRPM | HEART RATE: 83 BPM | OXYGEN SATURATION: 100 % | BODY MASS INDEX: 33.41 KG/M2 | WEIGHT: 194.67 LBS

## 2023-10-20 DIAGNOSIS — R18.8 ASCITES: Primary | ICD-10-CM

## 2023-10-20 DIAGNOSIS — K74.60 CIRRHOSIS (HCC): ICD-10-CM

## 2023-10-20 LAB
ALBUMIN SERPL BCP-MCNC: 2.9 G/DL (ref 3.5–5)
ALP SERPL-CCNC: 141 U/L (ref 34–104)
ALT SERPL W P-5'-P-CCNC: 12 U/L (ref 7–52)
ANION GAP SERPL CALCULATED.3IONS-SCNC: 5 MMOL/L
APPEARANCE FLD: CLEAR
AST SERPL W P-5'-P-CCNC: 19 U/L (ref 13–39)
ATRIAL RATE: 86 BPM
ATRIAL RATE: 90 BPM
BASOPHILS # BLD AUTO: 0.04 THOUSANDS/ÂΜL (ref 0–0.1)
BASOPHILS NFR BLD AUTO: 1 % (ref 0–1)
BILIRUB SERPL-MCNC: 0.7 MG/DL (ref 0.2–1)
BUN SERPL-MCNC: 9 MG/DL (ref 5–25)
CALCIUM ALBUM COR SERPL-MCNC: 9.4 MG/DL (ref 8.3–10.1)
CALCIUM SERPL-MCNC: 8.5 MG/DL (ref 8.4–10.2)
CHLORIDE SERPL-SCNC: 97 MMOL/L (ref 96–108)
CO2 SERPL-SCNC: 26 MMOL/L (ref 21–32)
COLOR FLD: YELLOW
CREAT SERPL-MCNC: 0.54 MG/DL (ref 0.6–1.3)
EOSINOPHIL # BLD AUTO: 0.05 THOUSAND/ÂΜL (ref 0–0.61)
EOSINOPHIL NFR BLD AUTO: 1 % (ref 0–6)
ERYTHROCYTE [DISTWIDTH] IN BLOOD BY AUTOMATED COUNT: 14.8 % (ref 11.6–15.1)
GFR SERPL CREATININE-BSD FRML MDRD: 95 ML/MIN/1.73SQ M
GLUCOSE SERPL-MCNC: 120 MG/DL (ref 65–140)
HCT VFR BLD AUTO: 36.8 % (ref 34.8–46.1)
HGB BLD-MCNC: 11.8 G/DL (ref 11.5–15.4)
HISTIOCYTES NFR FLD: 18 %
IMM GRANULOCYTES # BLD AUTO: 0.02 THOUSAND/UL (ref 0–0.2)
IMM GRANULOCYTES NFR BLD AUTO: 0 % (ref 0–2)
LIPASE SERPL-CCNC: 16 U/L (ref 11–82)
LYMPHOCYTES # BLD AUTO: 0.4 THOUSANDS/ÂΜL (ref 0.6–4.47)
LYMPHOCYTES NFR BLD AUTO: 24 %
LYMPHOCYTES NFR BLD AUTO: 8 % (ref 14–44)
MCH RBC QN AUTO: 29.5 PG (ref 26.8–34.3)
MCHC RBC AUTO-ENTMCNC: 32.1 G/DL (ref 31.4–37.4)
MCV RBC AUTO: 92 FL (ref 82–98)
MONOCYTES # BLD AUTO: 0.5 THOUSAND/ÂΜL (ref 0.17–1.22)
MONOCYTES NFR BLD AUTO: 10 % (ref 4–12)
MONOCYTES NFR BLD AUTO: 20 %
NEUTROPHILS # BLD AUTO: 4.01 THOUSANDS/ÂΜL (ref 1.85–7.62)
NEUTS SEG NFR BLD AUTO: 38 %
NEUTS SEG NFR BLD AUTO: 80 % (ref 43–75)
NRBC BLD AUTO-RTO: 0 /100 WBCS
P AXIS: 60 DEGREES
P AXIS: 64 DEGREES
PLATELET # BLD AUTO: 129 THOUSANDS/UL (ref 149–390)
PMV BLD AUTO: 9.9 FL (ref 8.9–12.7)
POTASSIUM SERPL-SCNC: 4 MMOL/L (ref 3.5–5.3)
PR INTERVAL: 130 MS
PR INTERVAL: 136 MS
PROT SERPL-MCNC: 6.1 G/DL (ref 6.4–8.4)
QRS AXIS: 26 DEGREES
QRS AXIS: 60 DEGREES
QRSD INTERVAL: 66 MS
QRSD INTERVAL: 70 MS
QT INTERVAL: 360 MS
QT INTERVAL: 382 MS
QTC INTERVAL: 430 MS
QTC INTERVAL: 467 MS
RBC # BLD AUTO: 4 MILLION/UL (ref 3.81–5.12)
SITE: NORMAL
SODIUM SERPL-SCNC: 128 MMOL/L (ref 135–147)
T WAVE AXIS: 18 DEGREES
T WAVE AXIS: 51 DEGREES
TOTAL CELLS COUNTED SPEC: 100
VENTRICULAR RATE: 86 BPM
VENTRICULAR RATE: 90 BPM
WBC # BLD AUTO: 5.02 THOUSAND/UL (ref 4.31–10.16)
WBC # FLD MANUAL: 69 /UL

## 2023-10-20 PROCEDURE — 36415 COLL VENOUS BLD VENIPUNCTURE: CPT

## 2023-10-20 PROCEDURE — 80053 COMPREHEN METABOLIC PANEL: CPT | Performed by: EMERGENCY MEDICINE

## 2023-10-20 PROCEDURE — 93005 ELECTROCARDIOGRAM TRACING: CPT

## 2023-10-20 PROCEDURE — 49083 ABD PARACENTESIS W/IMAGING: CPT

## 2023-10-20 PROCEDURE — 93010 ELECTROCARDIOGRAM REPORT: CPT

## 2023-10-20 PROCEDURE — 96365 THER/PROPH/DIAG IV INF INIT: CPT

## 2023-10-20 PROCEDURE — 49083 ABD PARACENTESIS W/IMAGING: CPT | Performed by: RADIOLOGY

## 2023-10-20 PROCEDURE — 99284 EMERGENCY DEPT VISIT MOD MDM: CPT

## 2023-10-20 PROCEDURE — 89051 BODY FLUID CELL COUNT: CPT

## 2023-10-20 PROCEDURE — 83690 ASSAY OF LIPASE: CPT | Performed by: EMERGENCY MEDICINE

## 2023-10-20 PROCEDURE — 87070 CULTURE OTHR SPECIMN AEROBIC: CPT

## 2023-10-20 PROCEDURE — 99285 EMERGENCY DEPT VISIT HI MDM: CPT | Performed by: EMERGENCY MEDICINE

## 2023-10-20 PROCEDURE — 85025 COMPLETE CBC W/AUTO DIFF WBC: CPT | Performed by: EMERGENCY MEDICINE

## 2023-10-20 PROCEDURE — 87205 SMEAR GRAM STAIN: CPT

## 2023-10-20 RX ORDER — MIRTAZAPINE 7.5 MG/1
7.5 TABLET, FILM COATED ORAL
COMMUNITY

## 2023-10-20 RX ORDER — LANOLIN ALCOHOL/MO/W.PET/CERES
5 CREAM (GRAM) TOPICAL
COMMUNITY

## 2023-10-20 RX ORDER — ALBUMIN (HUMAN) 12.5 G/50ML
25 SOLUTION INTRAVENOUS ONCE
Status: COMPLETED | OUTPATIENT
Start: 2023-10-20 | End: 2023-10-20

## 2023-10-20 RX ORDER — LIDOCAINE WITH 8.4% SOD BICARB 0.9%(10ML)
SYRINGE (ML) INJECTION AS NEEDED
Status: COMPLETED | OUTPATIENT
Start: 2023-10-20 | End: 2023-10-20

## 2023-10-20 RX ORDER — OXYCODONE HYDROCHLORIDE 5 MG/1
5 TABLET ORAL ONCE
Status: COMPLETED | OUTPATIENT
Start: 2023-10-20 | End: 2023-10-20

## 2023-10-20 RX ORDER — MIDODRINE HYDROCHLORIDE 5 MG/1
5 TABLET ORAL
COMMUNITY

## 2023-10-20 RX ADMIN — Medication 20 ML: at 14:30

## 2023-10-20 RX ADMIN — OXYCODONE HYDROCHLORIDE 5 MG: 5 TABLET ORAL at 16:10

## 2023-10-20 RX ADMIN — ALBUMIN HUMAN 25 G: 0.25 SOLUTION INTRAVENOUS at 12:42

## 2023-10-20 NOTE — DISCHARGE INSTRUCTIONS
You will be discharged from the emergency department to your nursing home. Return to the emergency department if you develop fever, chills, worsening abdominal pain, nausea, vomiting. Follow-up with your primary care physician. Vivian Mckeon received oxycodone 5 mg at 4:30 PM. She receives this medication for pain every 4 hours.  She is scheduled to receive her next dose at 8:30 PM.

## 2023-10-20 NOTE — ED PROVIDER NOTES
History  Chief Complaint   Patient presents with    Ascites     Pt reports sixteen pound weight gain over the past two weeks. Pt has had fluid removed previously in her abdomen. Pt states pain is a nine. 23-year-old female with past medical history liver cirrhosis presents to ED for evaluation of abdominal distention, worsening over the past 2 weeks. Patient states that she has had a 16 pound weight gain over the past 2 weeks. Patient has had IR paracentesis of her ascites multiple times in the past. Last paracentesis was 9/26/23. Patient complains of associated generalized abdominal pain. Patient denies fever, chills, chest pain, shortness of breath, nausea, vomiting, diarrhea, lower extremity edema. Prior to Admission Medications   Prescriptions Last Dose Informant Patient Reported? Taking? Bisacodyl (DULCOLAX RE)   Yes Yes   Sig: Insert 10 mg into the rectum if needed (constipation)   Diclofenac Sodium (VOLTAREN) 1 % Not Taking  No No   Sig: Apply 2 g topically 4 (four) times a day   Patient not taking: Reported on 10/20/2023   Ozempic, 0.25 or 0.5 MG/DOSE, 2 MG/1.5ML SOPN  Self Yes Yes   acetaminophen (TYLENOL) 325 mg tablet   No Yes   Sig: Take 3 tablets (975 mg total) by mouth every 8 (eight) hours   Patient taking differently: Take 325 mg by mouth every 8 (eight) hours   escitalopram (LEXAPRO) 20 mg tablet Not Taking  No No   Sig: Take 1 tablet (20 mg total) by mouth daily   Patient not taking: Reported on 10/20/2023   furosemide (LASIX) 40 mg tablet  Self No No   Sig: Take 2 tablets (80 mg total) by mouth daily AND 1 tablet (40 mg total) in the evening. Take before meals.    Patient taking differently: No sig reported   insulin lispro (HumaLOG) 100 units/mL injection   No Yes   Sig: Inject 1-6 Units under the skin 4 (four) times a day (before meals and at bedtime)   lansoprazole (PREVACID) 30 mg capsule  Self No Yes   Sig: Take 1 capsule (30 mg total) by mouth daily   lidocaine (LIDODERM) 5 % Not Taking  No No   Sig: Apply 2 patches topically over 12 hours daily Remove & Discard patch within 12 hours or as directed by MD Do not start before 2023. Patient not taking: Reported on 10/20/2023   magnesium hydroxide ([employer] Milk of Magnesia) 400 mg/5 mL oral suspension   Yes Yes   Sig: Take 30 mL by mouth daily as needed for constipation   melatonin 3 mg   Yes Yes   Sig: Take 5 mg by mouth daily at bedtime   methocarbamol (ROBAXIN) 500 mg tablet   No Yes   Sig: Take 1 tablet (500 mg total) by mouth every 6 (six) hours as needed for muscle spasms   metoprolol succinate (TOPROL-XL) 25 mg 24 hr tablet Not Taking Self Yes No   Sig: Take 1 tablet by mouth daily   Patient not taking: Reported on 10/20/2023   midodrine (PROAMATINE) 5 mg tablet   Yes Yes   Sig: Take 5 mg by mouth 3 (three) times a day before meals   mirtazapine (REMERON) 7.5 MG tablet   Yes Yes   Sig: Take 7.5 mg by mouth daily at bedtime   ondansetron (ZOFRAN) 4 mg tablet Not Taking  Yes No   Sig: Take 4 mg by mouth every 6 (six) hours as needed for nausea or vomiting   Patient not taking: Reported on 10/20/2023   oxyCODONE (ROXICODONE) 5 immediate release tablet   No Yes   Si.5 mg to 5 mg PO every 4 hours as needed for moderate to severe pain. Ongoing therapy. Patient taking differently: Take 5 mg by mouth every 4 (four) hours as needed for moderate pain   polyethylene glycol (MIRALAX) 17 g packet   No Yes   Sig: Take 17 g by mouth daily Do not start before 2023.    rifaximin (XIFAXAN) 550 mg tablet   Yes Yes   Sig: Take 550 mg by mouth every 12 (twelve) hours   senna (SENOKOT) 8.6 mg   No Yes   Sig: Take 2 tablets (17.2 mg total) by mouth daily at bedtime   spironolactone (ALDACTONE) 100 mg tablet   No No   Sig: Take 2 tablets (200 mg total) by mouth 2 (two) times a day      Facility-Administered Medications: None       Past Medical History:   Diagnosis Date    Anxiety     Anxiety and depression 2015    Arthritis Asthma     Asthma without status asthmaticus 4/26/2011    Cirrhosis ([address])     Depression     Diabetes ([address])     Disease of thyroid gland     nodules    Diverticulitis 2011    Diverticulosis     DVT (deep venous thrombosis) ([address]) 2013    GERD (gastroesophageal reflux disease)     Liver disease     cirrhosis    Obesity, morbid, BMI 40.0-49.9 ([address]) 8/16/2017    Pneumonia     Portal hypertensive gastropathy      Sleep apnea     Vertigo     Vitreous hemorrhage of left eye ([address]) 1/20/2023       Past Surgical History:   Procedure Laterality Date    BLADDER SUSPENSION      CHOLECYSTECTOMY      COLONOSCOPY  05/24/2019    had multiple with last being [id number]    COLONOSCOPY  10/07/2004    Rolanda Escalante M.D. / Diverticulosis    COLONOSCOPY  06/14/2011    Rolanda Escalante M.D. / Diverticulosis    EGD  02/28/2013    Saurav Noriega M.D. / Mild Portal Hypertensive Gastropathy    EGD  10/01/2019    Rolanda Escalante M.D. / Gastritis    EGD AND COLONOSCOPY  09/03/2015    Rolanda Escalante M.D. / Earle Rosa. Diverticulosis. FLEXIBLE SIGMOIDOSCOPY  03/04/2013    Katarina Gomez M.D. / Hemorrhoidal Bleeding    FLEXIBLE SIGMOIDOSCOPY  05/30/2018    Katarina Gomez M.D. / Internal hemorrhoids. biopsied.     HERNIA REPAIR      IR PARACENTESIS  08/23/2018    IR PARACENTESIS  11/05/2018    IR PARACENTESIS  01/11/2019    IR PARACENTESIS  02/27/2019    IR PARACENTESIS  04/15/2019    IR PARACENTESIS  06/03/2019    IR PARACENTESIS  07/10/2019    IR PARACENTESIS  08/16/2019    IR PARACENTESIS  09/10/2019    IR PARACENTESIS  10/07/2019    IR PARACENTESIS  11/05/2019    IR PARACENTESIS  11/22/2019    IR PARACENTESIS  12/17/2019    IR PARACENTESIS  01/07/2020    IR PARACENTESIS  01/28/2020    IR PARACENTESIS  02/18/2020    IR PARACENTESIS  03/10/2020    IR PARACENTESIS  03/31/2020    IR PARACENTESIS  04/21/2020    IR PARACENTESIS  05/12/2020    IR PARACENTESIS  06/02/2020    IR PARACENTESIS  06/30/2020    IR PARACENTESIS  07/20/2020 IR PARACENTESIS  08/18/2020    IR PARACENTESIS  11/12/2020    IR PARACENTESIS  03/11/2021    IR PARACENTESIS  2/8/2022    IR PARACENTESIS  8/24/2022    IR PARACENTESIS  11/14/2022    IR PARACENTESIS  12/21/2022    IR PARACENTESIS  2/1/2023    IR PARACENTESIS  3/7/2023    IR PARACENTESIS  3/30/2023    IR PARACENTESIS  4/26/2023    IR PARACENTESIS  5/18/2023    IR PARACENTESIS  7/7/2023    IR PARACENTESIS  10/20/2023    TONSILLECTOMY      TRIGGER FINGER RELEASE Bilateral        Family History   Problem Relation Age of Onset    COPD Mother     Heart attack Sister     Lymphoma Brother     Colon cancer Maternal Grandmother     Cancer Maternal Aunt         unknown     I have reviewed and agree with the history as documented. E-Cigarette/Vaping    E-Cigarette Use Never User      E-Cigarette/Vaping Substances     Social History     Tobacco Use    Smoking status: Never     Passive exposure: Never    Smokeless tobacco: Never   Vaping Use    Vaping Use: Never used   Substance Use Topics    Alcohol use: Not Currently     Comment: rarely    Drug use: Never        Review of Systems   Constitutional:  Negative for chills and fever. HENT:  Negative for congestion and rhinorrhea. Respiratory:  Negative for cough. Cardiovascular:  Negative for chest pain and palpitations. Gastrointestinal:  Positive for abdominal distention and abdominal pain. Negative for diarrhea, nausea and vomiting. Genitourinary:  Negative for difficulty urinating. Skin:  Negative for color change. Neurological:  Negative for dizziness, light-headedness and headaches. Psychiatric/Behavioral:  Negative for behavioral problems and confusion.         Physical Exam  ED Triage Vitals   Temperature Pulse Respirations Blood Pressure SpO2   10/20/23 1132 10/20/23 1039 10/20/23 1039 10/20/23 1039 10/20/23 1039   98.1 °F (36.7 °C) 84 22 115/58 99 %      Temp Source Heart Rate Source Patient Position - Orthostatic VS BP Location FiO2 (%)   10/20/23 1132 10/20/23 1039 10/20/23 1039 10/20/23 1039 --   Oral Monitor Lying Right arm       Pain Score       10/20/23 1039       9             Orthostatic Vital Signs  Vitals:    10/20/23 1039 10/20/23 1300 10/20/23 1601   BP: 115/58 116/53 130/53   Pulse: 84 101 91   Patient Position - Orthostatic VS: Lying Lying Lying       Physical Exam  Vitals and nursing note reviewed. Constitutional:       General: She is not in acute distress. Appearance: Normal appearance. She is normal weight. She is not toxic-appearing. HENT:      Head: Normocephalic and atraumatic. Mouth/Throat:      Mouth: Mucous membranes are moist.      Pharynx: Oropharynx is clear. Eyes:      Extraocular Movements: Extraocular movements intact. Conjunctiva/sclera: Conjunctivae normal.      Pupils: Pupils are equal, round, and reactive to light. Cardiovascular:      Rate and Rhythm: Normal rate and regular rhythm. Pulses: Normal pulses. Heart sounds: Normal heart sounds. Pulmonary:      Effort: Pulmonary effort is normal. No respiratory distress. Breath sounds: Normal breath sounds. No wheezing. Abdominal:      General: There is distension. Tenderness: There is abdominal tenderness (Generalized). There is no guarding or rebound. Musculoskeletal:      Cervical back: Neck supple. No tenderness. Right lower leg: No edema. Left lower leg: No edema. Skin:     General: Skin is warm and dry. Capillary Refill: Capillary refill takes less than 2 seconds. Neurological:      Mental Status: She is alert and oriented to person, place, and time.    Psychiatric:         Mood and Affect: Mood normal.         Behavior: Behavior normal.         ED Medications  Medications   lidocaine 1% buffered (20 mL Infiltration Given 10/20/23 1430)   albumin human (FLEXBUMIN) 25 % injection 25 g (0 g Intravenous Stopped 10/20/23 1329)   oxyCODONE (ROXICODONE) IR tablet 5 mg (5 mg Oral Given 10/20/23 1610)       Diagnostic Studies  Results Reviewed       Procedure Component Value Units Date/Time    Body fluid white cell count with differential [[id number]] Collected: 10/20/23 1417    Lab Status: Final result Specimen: Body Fluid from Paracentesis Updated: 10/20/23 1630     Site Paracentesis     Color, Fluid Yellow     Clarity, Fluid Clear     WBC, Fluid 69 /ul     Body Fluid Diff [[id number]] Collected: 10/20/23 1417    Lab Status: In process Specimen: Body Fluid from Paracentesis Updated: 10/20/23 1629    Body fluid culture and Gram stain [[id number]] Collected: 10/20/23 1417    Lab Status: In process Specimen:  Body Fluid from Paracentesis Updated: 10/20/23 1559    Comprehensive metabolic panel [[id number]]  (Abnormal) Collected: 10/20/23 1130    Lab Status: Final result Specimen: Blood from Arm, Left Updated: 10/20/23 1158     Sodium 128 mmol/L      Potassium 4.0 mmol/L      Chloride 97 mmol/L      CO2 26 mmol/L      ANION GAP 5 mmol/L      BUN 9 mg/dL      Creatinine 0.54 mg/dL      Glucose 120 mg/dL      Calcium 8.5 mg/dL      Corrected Calcium 9.4 mg/dL      AST 19 U/L      ALT 12 U/L      Alkaline Phosphatase 141 U/L      Total Protein 6.1 g/dL      Albumin 2.9 g/dL      Total Bilirubin 0.70 mg/dL      eGFR 95 ml/min/1.73sq m     Narrative:      [facility] guidelines for Chronic Kidney Disease (CKD):     Stage 1 with normal or high GFR (GFR > 90 mL/min/1.73 square meters)    Stage 2 Mild CKD (GFR = 60-89 mL/min/1.73 square meters)    Stage 3A Moderate CKD (GFR = 45-59 mL/min/1.73 square meters)    Stage 3B Moderate CKD (GFR = 30-44 mL/min/1.73 square meters)    Stage 4 Severe CKD (GFR = 15-29 mL/min/1.73 square meters)    Stage 5 End Stage CKD (GFR <15 mL/min/1.73 square meters)  Note: GFR calculation is accurate only with a steady state creatinine    Lipase [[id number]]  (Normal) Collected: 10/20/23 1130    Lab Status: Final result Specimen: Blood from Arm, Left Updated: 10/20/23 1158     Lipase 16 u/L CBC and differential [[id number]]  (Abnormal) Collected: 10/20/23 1130    Lab Status: Final result Specimen: Blood from Arm, Left Updated: 10/20/23 1153     WBC 5.02 Thousand/uL      RBC 4.00 Million/uL      Hemoglobin 11.8 g/dL      Hematocrit 36.8 %      MCV 92 fL      MCH 29.5 pg      MCHC 32.1 g/dL      RDW 14.8 %      MPV 9.9 fL      Platelets 040 Thousands/uL      nRBC 0 /100 WBCs      Neutrophils Relative 80 %      Immat GRANS % 0 %      Lymphocytes Relative 8 %      Monocytes Relative 10 %      Eosinophils Relative 1 %      Basophils Relative 1 %      Neutrophils Absolute 4.01 Thousands/µL      Immature Grans Absolute 0.02 Thousand/uL      Lymphocytes Absolute 0.40 Thousands/µL      Monocytes Absolute 0.50 Thousand/µL      Eosinophils Absolute 0.05 Thousand/µL      Basophils Absolute 0.04 Thousands/µL                    IR INPATIENT Paracentesis   Final Result by Scottie Walker MD (10/20 2878)   Impression:   1. Successful ultrasound-guided paracentesis. 2. Specimens were sent to the laboratory as described above. Workstation performed: [id number]               Procedures  Procedures      ED Course  ED Course as of 10/20/23 1727   Fri Oct 20, 2023   1345 Patient takes oxycodone 5 mg every 4 hours as needed for pain. Patient last took 1.5 hours prior to arrival.  Patient currently complaining of abdominal pain. Will order patient's dose of oxycodone 5 mg   1707 Albumin(!): 2.9  Patient was given albumin 25 g prior to paracentesis procedure. Patient with no complaints other than mild abdominal pain status post paracentesis. Will discharge to nursing home. SBIRT 20yo+      Flowsheet Row Most Recent Value   Initial Alcohol Screen: US AUDIT-C     1. How often do you have a drink containing alcohol? 0 Filed at: 10/20/2023 1055   2. How many drinks containing alcohol do you have on a typical day you are drinking? 0 Filed at: 10/20/2023 1055   3a. Male UNDER 65:  How often do you have five or more drinks on one occasion? 0 Filed at: 10/20/2023 1055   3b. FEMALE Any Age, or MALE 65+: How often do you have 4 or more drinks on one occassion? 0 Filed at: 10/20/2023 1055   Audit-C Score 0 Filed at: 10/20/2023 1055   YESY: How many times in the past year have you. .. Used an illegal drug or used a prescription medication for non-medical reasons? Never Filed at: 10/20/2023 659 Jer Madden is a 77 y.o. female who presents to ED for evaluation of worsening abdominal distention and 15 pound weight gain x2 weeks    Physical exam: Vitals stable. Afebrile. No acute distress. Heart regular rate and rhythm. Lungs clear to auscultation bilaterally. Abdomen distended with generalized tenderness to palpation. Differential diagnoses include: Liver cirrhosis, ascites. I doubt SBP    Workup includes: CBC, CMP, lipase. Will consult interventional radiology for paracentesis. We will discharge this patient to her nursing home after the procedure. Return precautions discussed with patient including fever, chills, worsening abdominal pain, nausea, vomiting. Will follow up and continue to monitor. Please see ED Course for additional information. Amount and/or Complexity of Data Reviewed  Labs: ordered. Decision-making details documented in ED Course. Risk  Prescription drug management. Disposition  Final diagnoses:   Ascites   Cirrhosis ([address])     Time reflects when diagnosis was documented in both MDM as applicable and the Disposition within this note       Time User Action Codes Description Comment    10/20/2023  4:29 PM Stella Levy Add [R18.8] Ascites     10/20/2023  4:29 PM Stella Levy Add [K74.60] Cirrhosis [facility])           ED Disposition       ED Disposition   Discharge    Condition   Stable    Date/Time   Fri Oct 20, 2023 1625    [address] discharge to home/self care.                    Follow-up Information    None         Patient's Medications   Discharge Prescriptions    No medications on file     No discharge procedures on file. PDMP Review         Value Time User    PDMP Reviewed  Yes 7/17/2023  2:21 PM Lobito Mtz PA-C             ED Provider  Attending physically available and evaluated Ramona Sanjuana. I managed the patient along with the ED Attending.     Electronically Signed by           Leydi Swann DO  10/20/23 3063

## 2023-10-20 NOTE — ED ATTENDING ATTESTATION
10/20/2023  IDale MD, saw and evaluated the patient. I have discussed the patient with the resident/non-physician practitioner and agree with the resident's/non-physician practitioner's findings, Plan of Care, and MDM as documented in the resident's/non-physician practitioner's note, except where noted. All available labs and Radiology studies were reviewed. I was present for key portions of any procedure(s) performed by the resident/non-physician practitioner and I was immediately available to provide assistance. At this point I agree with the current assessment done in the Emergency Department. I have conducted an independent evaluation of this patient a history and physical is as follows:    76 YO female presents with abominal distension and weight gain over the last 2 weeks. She has had paracenteses for this in the past due to Hx of liver cirrhosis with ascites. She has generalized abdominal pain, states similar in the past. Pt denies CP/SOB/F/C/N/V/D/C, no dysuria, burning on urination or blood in urine. Gen: Pt is in NAD  HEENT: Head is atraumatic, EOM's intact, neck has FROM  Chest: CTAB, non-tender  Heart: RRR  Abdomen: Soft, NT, distended. Musculoskeletal: FROM in all extremities  Skin: No rash, no ecchymosis  Neuro: Awake, alert, oriented x4; Cranial nerves II-XII intact  Psych: Normal affect    MDM -  Will discuss with IR regarding paracentesis.      ED Course         Critical Care Time  Procedures

## 2023-10-22 LAB
BACTERIA SPEC BFLD CULT: NO GROWTH
GRAM STN SPEC: NORMAL
GRAM STN SPEC: NORMAL

## 2023-10-23 ENCOUNTER — VBI (OUTPATIENT)
Age: 68
End: 2023-10-23

## 2023-10-23 LAB
BACTERIA SPEC BFLD CULT: NO GROWTH
GRAM STN SPEC: NORMAL
GRAM STN SPEC: NORMAL

## 2023-10-23 NOTE — TELEPHONE ENCOUNTER
10/23/23 10:12 AM    Patient contacted post ED visit, first outreach attempt made. Message was left for patient to return a call to the VBI Department at [facility]: Phone [phone]. Thank you.   Luciana Eng MA  [facility]

## 2023-10-24 NOTE — TELEPHONE ENCOUNTER
10/24/23 9:03 AM    Patient contacted post ED visit, VBI department spoke with patient/caregiver and outreach was successful. Thank you.   Luciana Eng MA  [facility]

## 2023-11-01 ENCOUNTER — HOSPITAL ENCOUNTER (OUTPATIENT)
Dept: INTERVENTIONAL RADIOLOGY/VASCULAR | Facility: HOSPITAL | Age: 68
Discharge: HOME | End: 2023-11-01
Admitting: RADIOLOGY
Payer: MEDICARE

## 2023-11-01 VITALS
RESPIRATION RATE: 16 BRPM | SYSTOLIC BLOOD PRESSURE: 111 MMHG | BODY MASS INDEX: 33.41 KG/M2 | OXYGEN SATURATION: 99 % | HEIGHT: 64 IN | DIASTOLIC BLOOD PRESSURE: 53 MMHG | TEMPERATURE: 99.1 F | HEART RATE: 91 BPM

## 2023-11-01 DIAGNOSIS — R18.8 ASCITES OF LIVER: ICD-10-CM

## 2023-11-01 PROCEDURE — 49083 ABD PARACENTESIS W/IMAGING: CPT

## 2023-11-01 PROCEDURE — 49083 ABD PARACENTESIS W/IMAGING: CPT | Performed by: NURSE PRACTITIONER

## 2023-11-01 RX ORDER — ALBUMIN (HUMAN) 12.5 G/50ML
50 SOLUTION INTRAVENOUS ONCE
Status: COMPLETED | OUTPATIENT
Start: 2023-11-01 | End: 2023-11-01

## 2023-11-01 RX ORDER — LIDOCAINE HYDROCHLORIDE 10 MG/ML
INJECTION, SOLUTION EPIDURAL; INFILTRATION; INTRACAUDAL; PERINEURAL AS NEEDED
Status: COMPLETED | OUTPATIENT
Start: 2023-11-01 | End: 2023-11-01

## 2023-11-01 RX ORDER — ALBUMIN (HUMAN) 12.5 G/50ML
25 SOLUTION INTRAVENOUS ONCE
Status: COMPLETED | OUTPATIENT
Start: 2023-11-01 | End: 2023-11-01

## 2023-11-01 RX ADMIN — ALBUMIN (HUMAN) 25 G: 0.25 INJECTION, SOLUTION INTRAVENOUS at 14:21

## 2023-11-01 RX ADMIN — ALBUMIN (HUMAN) 50 G: 0.25 INJECTION, SOLUTION INTRAVENOUS at 14:04

## 2023-11-01 RX ADMIN — LIDOCAINE HYDROCHLORIDE 10 ML: 10 INJECTION, SOLUTION EPIDURAL; INFILTRATION; INTRACAUDAL at 13:39

## 2023-11-01 NOTE — DISCHARGE INSTRUCTIONS
Abdominal Paracentesis     WHAT YOU NEED TO KNOW:   Abdominal paracentesis is a procedure to remove abnormal fluid buildup in your abdomen. Fluid builds up because of liver problems, such as swelling and scarring. Heart failure, kidney disease, a mass, or problems with your pancreas may also cause fluid buildup. DISCHARGE INSTRUCTIONS:     Follow up with your healthcare provider as directed: Write down your questions so you remember to ask them during your visits. Wound care: Remove dressing after 24 hours. Leave glue in place. Return to your normal activities    Contact Interventional Radiology at [phone] Darleen PATIENTS: Contact Interventional Radiology at [phone]) Shanti Lo PATIENTS: Contact Interventional Radiology at [phone]) if:  You have a fever and your wound is red and swollen. You have yellow, green, or bad-smelling discharge coming from your wound. You have pain or swelling in your abdomen. You have an upset stomach or you vomit. You have sudden, sharp pain in your abdomen. You urinate very little or not at all. You feel confused and more tired than usual.   Your arm or leg feels warm, tender, and painful. It may look swollen and red. You suddenly feel lightheaded and have trouble breathing.

## 2023-11-01 NOTE — BRIEF OP NOTE (RAD/CATH)
IR PARACENTESIS Procedure Note    PATIENT NAME: Georgia Fleischer  : 1955  MRN: [id number]    Pre-op Diagnosis:   1. Ascites of liver      Post-op Diagnosis:   1. Ascites of liver        Provider:   JONE Lozada  Assistants:     No qualified resident was available, Resident is only observing    Estimated Blood Loss: None  Findings: Paracentesis performed. 7650 ml clear yellow fluid drained from LLQ. 75 g Albumin infused.     Specimens: None    Complications:  None immediate    Anesthesia: local    JONE Lozada     Date: 2023  Time: 2:31 PM

## 2023-11-15 ENCOUNTER — HOSPITAL ENCOUNTER (OUTPATIENT)
Dept: INTERVENTIONAL RADIOLOGY/VASCULAR | Facility: HOSPITAL | Age: 68
Discharge: HOME | End: 2023-11-15
Admitting: STUDENT IN AN ORGANIZED HEALTH CARE EDUCATION/TRAINING PROGRAM
Payer: MEDICARE

## 2023-11-15 VITALS
OXYGEN SATURATION: 100 % | DIASTOLIC BLOOD PRESSURE: 51 MMHG | HEART RATE: 91 BPM | TEMPERATURE: 97.5 F | RESPIRATION RATE: 18 BRPM | SYSTOLIC BLOOD PRESSURE: 111 MMHG

## 2023-11-15 DIAGNOSIS — R18.8 ASCITES OF LIVER: ICD-10-CM

## 2023-11-15 PROCEDURE — 49083 ABD PARACENTESIS W/IMAGING: CPT | Performed by: NURSE PRACTITIONER

## 2023-11-15 PROCEDURE — 49083 ABD PARACENTESIS W/IMAGING: CPT

## 2023-11-15 RX ORDER — ALBUMIN (HUMAN) 12.5 G/50ML
50 SOLUTION INTRAVENOUS ONCE
Status: COMPLETED | OUTPATIENT
Start: 2023-11-15 | End: 2023-11-15

## 2023-11-15 RX ORDER — LIDOCAINE HYDROCHLORIDE 10 MG/ML
INJECTION, SOLUTION EPIDURAL; INFILTRATION; INTRACAUDAL; PERINEURAL AS NEEDED
Status: COMPLETED | OUTPATIENT
Start: 2023-11-15 | End: 2023-11-15

## 2023-11-15 RX ADMIN — LIDOCAINE HYDROCHLORIDE 10 ML: 10 INJECTION, SOLUTION EPIDURAL; INFILTRATION; INTRACAUDAL at 14:18

## 2023-11-15 RX ADMIN — ALBUMIN (HUMAN) 50 G: 0.25 INJECTION, SOLUTION INTRAVENOUS at 14:49

## 2023-11-15 NOTE — DISCHARGE INSTRUCTIONS
Abdominal Paracentesis     WHAT YOU NEED TO KNOW:   Abdominal paracentesis is a procedure to remove abnormal fluid buildup in your abdomen. Fluid builds up because of liver problems, such as swelling and scarring. Heart failure, kidney disease, a mass, or problems with your pancreas may also cause fluid buildup. DISCHARGE INSTRUCTIONS:     Follow up with your healthcare provider as directed: Write down your questions so you remember to ask them during your visits. Wound care: Remove dressing after 24 hours. Leave glue in place. Return to your normal activities    Contact Interventional Radiology at [phone] Darleen PATIENTS: Contact Interventional Radiology at [phone]) Irving Villarreal PATIENTS: Contact Interventional Radiology at [phone]) if:  You have a fever and your wound is red and swollen. You have yellow, green, or bad-smelling discharge coming from your wound. You have pain or swelling in your abdomen. You have an upset stomach or you vomit. You have sudden, sharp pain in your abdomen. You urinate very little or not at all. You feel confused and more tired than usual.   Your arm or leg feels warm, tender, and painful. It may look swollen and red. You suddenly feel lightheaded and have trouble breathing.

## 2023-11-15 NOTE — BRIEF OP NOTE (RAD/CATH)
IR LEFT PARACENTESIS Procedure Note    PATIENT NAME: Khang Lerner  : 1955  MRN: [id number]    Pre-op Diagnosis:   1. Ascites of liver      Post-op Diagnosis:   1. Ascites of liver        Provider:   JONE Oh  Assistants:     No qualified resident was available, Resident is only observing    Estimated Blood Loss: None    Findings: 6250 ml clear yellow ascites from LLQ. Albumin 50 g infused.     Specimens: None     Complications:  None immediately    Anesthesia: local    JONE hO     Date: 11/15/2023  Time: 3:13 PM

## 2023-12-06 ENCOUNTER — HOSPITAL ENCOUNTER (OUTPATIENT)
Dept: RADIOLOGY | Facility: HOSPITAL | Age: 68
Discharge: HOME | End: 2023-12-06
Attending: INTERNAL MEDICINE | Admitting: RADIOLOGY
Payer: MEDICARE

## 2023-12-06 VITALS
DIASTOLIC BLOOD PRESSURE: 55 MMHG | RESPIRATION RATE: 18 BRPM | HEART RATE: 58 BPM | SYSTOLIC BLOOD PRESSURE: 100 MMHG | OXYGEN SATURATION: 92 %

## 2023-12-06 DIAGNOSIS — K74.60 CIRRHOSIS OF LIVER WITH ASCITES, UNSPECIFIED HEPATIC CIRRHOSIS TYPE: ICD-10-CM

## 2023-12-06 DIAGNOSIS — R18.8 CIRRHOSIS OF LIVER WITH ASCITES, UNSPECIFIED HEPATIC CIRRHOSIS TYPE: ICD-10-CM

## 2023-12-06 PROCEDURE — 49083 ABD PARACENTESIS W/IMAGING: CPT | Performed by: NURSE PRACTITIONER

## 2023-12-06 PROCEDURE — 49083 ABD PARACENTESIS W/IMAGING: CPT

## 2023-12-06 RX ORDER — ALBUMIN (HUMAN) 12.5 G/50ML
SOLUTION INTRAVENOUS
Status: COMPLETED | OUTPATIENT
Start: 2023-12-06 | End: 2023-12-06

## 2023-12-06 RX ORDER — LIDOCAINE WITH 8.4% SOD BICARB 0.9%(10ML)
SYRINGE (ML) INJECTION AS NEEDED
Status: COMPLETED | OUTPATIENT
Start: 2023-12-06 | End: 2023-12-06

## 2023-12-06 RX ADMIN — ALBUMIN (HUMAN) 75 G: 0.25 INJECTION, SOLUTION INTRAVENOUS at 10:41

## 2023-12-06 RX ADMIN — Medication 10 ML: at 09:33

## 2023-12-06 NOTE — DISCHARGE INSTRUCTIONS
Abdominal Paracentesis     WHAT YOU NEED TO KNOW:   Abdominal paracentesis is a procedure to remove abnormal fluid buildup in your abdomen. Fluid builds up because of liver problems, such as swelling and scarring. Heart failure, kidney disease, a mass, or problems with your pancreas may also cause fluid buildup. DISCHARGE INSTRUCTIONS:     Follow up with your healthcare provider as directed: Write down your questions so you remember to ask them during your visits. Wound care: Remove dressing after 24 hours. Leave glue in place. Return to your normal activities    Contact Interventional Radiology at [phone] [place] PATIENTS: Contact Interventional Radiology at [phone]) [facility] PATIENTS: Contact Interventional Radiology at [phone]) if:  You have a fever and your wound is red and swollen. You have yellow, green, or bad-smelling discharge coming from your wound. You have pain or swelling in your abdomen. You have an upset stomach or you vomit. You have sudden, sharp pain in your abdomen. You urinate very little or not at all. You feel confused and more tired than usual.   Your arm or leg feels warm, tender, and painful. It may look swollen and red. You suddenly feel lightheaded and have trouble breathing.

## 2023-12-06 NOTE — BRIEF OP NOTE (RAD/CATH)
IR PARACENTESIS Procedure Note    PATIENT NAME: Jelena Heard  : 1955  MRN: [id number]    Pre-op Diagnosis:   1. Cirrhosis of liver with ascites, unspecified hepatic cirrhosis type       Post-op Diagnosis:   1.  Cirrhosis of liver with ascites, unspecified hepatic cirrhosis type         Provider:   JOEN Albarado with Claudine Ren PA-C  Assistants:     No qualified resident was available, Resident is only observing    Estimated Blood Loss: none  Findings: 9200 ml clear yellow fluid from LLQ paracentesis    Specimens: none     Complications:  none  immediately     Anesthesia: local    JONE Albarado     Date: 2023  Time: 11:08 AM